# Patient Record
Sex: FEMALE | Race: BLACK OR AFRICAN AMERICAN | Employment: UNEMPLOYED | URBAN - METROPOLITAN AREA
[De-identification: names, ages, dates, MRNs, and addresses within clinical notes are randomized per-mention and may not be internally consistent; named-entity substitution may affect disease eponyms.]

---

## 2017-01-31 ENCOUNTER — OFFICE VISIT (OUTPATIENT)
Dept: ORTHOPEDIC SURGERY | Age: 32
End: 2017-01-31

## 2017-01-31 VITALS
TEMPERATURE: 98.1 F | BODY MASS INDEX: 17.04 KG/M2 | HEART RATE: 57 BPM | SYSTOLIC BLOOD PRESSURE: 105 MMHG | HEIGHT: 66 IN | DIASTOLIC BLOOD PRESSURE: 63 MMHG | WEIGHT: 106 LBS | OXYGEN SATURATION: 100 % | RESPIRATION RATE: 18 BRPM

## 2017-01-31 DIAGNOSIS — M79.2 NEURITIS: ICD-10-CM

## 2017-01-31 DIAGNOSIS — M54.6 THORACIC SPINE PAIN: Primary | ICD-10-CM

## 2017-01-31 DIAGNOSIS — M47.816 LUMBAR FACET ARTHROPATHY: ICD-10-CM

## 2017-01-31 DIAGNOSIS — M54.50 PAIN OF LUMBAR SPINE: ICD-10-CM

## 2017-01-31 RX ORDER — METHYLPREDNISOLONE 4 MG/1
TABLET ORAL
Qty: 1 DOSE PACK | Refills: 0 | Status: SHIPPED | OUTPATIENT
Start: 2017-01-31 | End: 2017-02-09 | Stop reason: ALTCHOICE

## 2017-01-31 NOTE — PROGRESS NOTES
[facility]  [address] [place], [address]  Phone: [phone]  Fax: [phone]          HISTORY OF PRESENT ILLNESS:  Gordo Sevilla is a 32 y.o. female with history of lumbar pain x 5+ year. She c/o progressive lower back pain and denies any inciting injuries. Pt also c/o numbness in the left thigh when she experiences lower back pain/hip pain. She states that her lower back pain increases as the day progresses. Pt tried physical therapy with initial improvement but stopped when she experienced increased pain and moved to the area from [place]. She states that she has a lumbar support. Pt denies ever trying steroid injections. Pt has history of a seizure disorder. She states that she is not currently on seizure medication but she is taking medication for migraine headaches. Pt reports that she has difficulty gaining weight and has had multiple thyroid test. She has tried a muscle relaxant in the past but discontinued the medication due to sedation. Pt has also used Excedrin Extra Strength with minimal benefit. Pt denies ever trying a Medrol Dosepak. Pt at this time desires to proceed with lumbar MRI and medication evaluation. 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 She has a 15 y.o and two 6 y. o.children. Her  is a  in [place], [place]. Pain Scale: 4/10     PCP: TE Mcgovern      Past Medical History   Diagnosis Date    Abnormal Papanicolaou smear of cervix     Asthma     Bradycardia     GERD (gastroesophageal reflux disease)     H/O sinus bradycardia     Headache     Migraine     Photophobia of both eyes     Seizures (HCC)     Stomach pain         Social History     Social History    Marital status: SINGLE     Spouse name: N/A    Number of children: 2    Years of education: 12     Occupational History    Not on file.      Social History Main Topics    Smoking status: Former Smoker     Years: 13.00    Smokeless tobacco: Never Used      Comment: cigarello, once a month    Alcohol use No    Drug use: No    Sexual activity: Yes     Partners: Male     Birth control/ protection: Pill     Other Topics Concern     Service Yes     [employer]    Blood Transfusions No    Caffeine Concern No    Occupational Exposure No    Hobby Hazards No    Sleep Concern Yes    Stress Concern No    Weight Concern No    Special Diet Yes    Back Care Yes    Exercise Yes    Bike Helmet No    Seat Belt Yes    Self-Exams No     Social History Narrative       Current Outpatient Prescriptions   Medication Sig Dispense Refill    methylPREDNISolone (MEDROL DOSEPACK) 4 mg tablet Per dose pack instructions 1 Dose Pack 0    Cholecalciferol, Vitamin D3, (VITAMIN D3) 2,000 unit cap capsule Take 2,000 Units by mouth daily. 90 Cap 3    DAILY VITAMIN WITH IRON AND CA tab TAKE 1 TABLET BY MOUTH EVERY DAY  3    Omeprazole delayed release (PRILOSEC D/R) 20 mg tablet Take 1 Tab by mouth daily. 90 Tab 3    fluticasone furoate (ARNUITY ELLIPTA) 200 mcg/actuation dsdv inhaler 1 puff daily. 1 Inhaler 3    ondansetron (ZOFRAN ODT) 4 mg disintegrating tablet DISSOLVE 1 TABLET BY MOUTH EVERY 8 HOURS AS NEEDED FOR NAUSEA  3    albuterol (PROVENTIL HFA, VENTOLIN HFA, PROAIR HFA) 90 mcg/actuation inhaler Take 1 Puff by inhalation every four (4) hours as needed for Wheezing or Shortness of Breath. 2 Inhaler 3    cyproheptadine (PERIACTIN) 4 mg tablet Take 1 Tab by mouth once over twenty-four (24) hours. 90 Tab 3    food supplemt, lactose-reduced (ENSURE PLUS) 0.05-1.5 gram-kcal/mL liqd Take 237 mL by mouth three (3) times daily. 100 Can 3    multivitamin (ONE A DAY) tablet Take 1 Tab by mouth daily. 90 Tab 3    norethindrone (MICRONOR) 0.35 mg tablet Take 1 Tab by mouth daily. 1 Package 11    cyclobenzaprine (FLEXERIL) 5 mg tablet Take 5 mg by mouth.          Allergies   Allergen Reactions    Imitrex [Sumatriptan Succinate] Anaphylaxis    Sea Food [Seafood] Hives     Per pt report        REVIEW OF SYSTEMS    Constitutional: Negative for fever, chills, or weight change. Respiratory: Negative for cough or shortness of breath. Cardiovascular: Negative for chest pain or palpitations. Gastrointestinal: Negative for acid reflux, change in bowel habits, or constipation. Genitourinary: Negative for dysuria and flank pain. Musculoskeletal: Positive for lumbar pain. Skin: Negative for rash. Neurological: Positive for intermittent numbness in the left leg. Negative for headaches or dizziness. Endo/Heme/Allergies: Negative for increased bruising. Psychiatric/Behavioral: Negative for difficulty with sleep. PHYSICAL EXAMINATION  Visit Vitals    /63    Pulse (!) 57    Temp 98.1 °F (36.7 °C) (Oral)    Resp 18    Ht 5' 6\" (1.676 m)    Wt 106 lb (48.1 kg)    SpO2 100%    BMI 17.11 kg/m2       Constitutional: Awake, alert, and in no acute distress  HEENT: Normocephalic. Atraumatic. Oropharynx is moist and clear. PERRL. EOMI. Sclerae are nonicteric  Heart: Regular rate and rhythm  Lungs: Clear to auscultation bilaterally  Abdomen: Soft and nontender. Bowel sounds are present  Neurological: 1+ symmetrical DTRs in the upper extremities. 1+ symmetrical DTRs in the lower extremities. Sensation to light touch is intact. Negative Krystle's sign bilaterally. Skin: warm, dry, and intact. Musculoskeletal: Tenderness to palpation in the lower lumbar region. Moderate pain with extension and axial loading. No different with forward flexion. No pain with internal or external rotation of her hips. Negative straight leg raise bilaterally. No pain with heel or toe walking. No difficulty with the single leg stand bilaterally.       Biceps  Triceps Deltoids Wrist Ext Wrist Flex Hand Intrin   Right +4/5 +4/5 +4/5 +4/5 +4/5 +4/5   Left +4/5 +4/5 +4/5 +4/5 +4/5 +4/5      Hip Flex  Quads Hamstrings Ankle DF EHL Ankle PF   Right +4/5 +4/5 +4/5 +4/5 +4/5 +4/5   Left +4/5 +4/5 +4/5 +4/5 +4/5 +4/5     IMAGING:    Thoracic Spine 2V X-rays from 01/31/2017 were personally reviewed with the Pt and demonstrated:  1) straightening of the thoracic spine with mild scoliosis at the lower end. Lumbar Spine 4V X-rays from 01/31/2017 were personally reviewed with the Pt and demonstrated:  1) Mild scoliosis. 2) Good alignment of the spine   3) No instability. Lumbar Spine MRI from 02/21/2015 was personally reviewed with the Pt and demonstrated:  IMPRESSION:  L3-L4 demonstrates a disc bulge. L4-5 demonstrates a disc bulge. Transitional vertebrae of the lumbosacral junction. La Smiley was seen today for back pain and new patient. Diagnoses and all orders for this visit:    Thoracic spine pain  -     [31309] T Spine 2V  -     MRI LUMB SPINE WO CONT; Future    Pain of lumbar spine  -     [67894] LS Spine 4V  -     MRI LUMB SPINE WO CONT; Future    Lumbar facet arthropathy (HCC)  -     methylPREDNISolone (MEDROL DOSEPACK) 4 mg tablet; Per dose pack instructions  -     MRI LUMB SPINE WO CONT; Future    Neuritis  -     MRI LUMB SPINE WO CONT; Future       IMPRESSION AND PLAN:  Kacy Lobato is a 32 y.o. female with history of lumbar pain x 5+ year. She c/o progressive lower back pain and denies any inciting injuries. Pt also c/o numbness in the left thigh when she experiences lower back pain/hip pain. She states that her lower back pain increases as the day progresses. Pt tried physical therapy with initial improvement but stopped when she experienced increased pain and moved to the area from [place]. 1) Pt was given information on lumbar arthritis exercises. 2) Discussed treatment options with the Pt, including medication, physical therapy, steroid injections, and a RFA. 3) Pt was given information on radiofrequency ablation. 4) I instructed the Pt to only wear her lumbar support intermittently.   5) A lumbar MRI was ordered. The pt has tried physical therapy, Excedrin, and muscle relaxants with minimal benefit. 6) She was prescribed a Medrol Dosepak. 7) Ms. Jarrett Alicea has a reminder for a \"due or due soon\" health maintenance. I have asked that she contact her primary care provider, Valentino Palma, PA, for follow-up on this health maintenance. 8)  reviewed. 9) Pt will follow-up in 2-3 weeks.         Written by Sarika Vigil, as dictated by Gilma Harris MD.

## 2017-01-31 NOTE — PATIENT INSTRUCTIONS
Low Back Arthritis: Exercises  Your Care Instructions  Here are some examples of typical rehabilitation exercises for your condition. Start each exercise slowly. Ease off the exercise if you start to have pain. Your doctor or physical therapist will tell you when you can start these exercises and which ones will work best for you. When you are not being active, find a comfortable position for rest. Some people are comfortable on the floor or a medium-firm bed with a small pillow under their head and another under their knees. Some people prefer to lie on their side with a pillow between their knees. Don't stay in one position for too long. Take short walks (10 to 20 minutes) every 2 to 3 hours. Avoid slopes, hills, and stairs until you feel better. Walk only distances you can manage without pain, especially leg pain. How to do the exercises  Pelvic tilt    1. Lie on your back with your knees bent. 2. \"Brace\" your stomach--tighten your muscles by pulling in and imagining your belly button moving toward your spine. 3. Press your lower back into the floor. You should feel your hips and pelvis rock back. 4. Hold for 6 seconds while breathing smoothly. 5. Relax and allow your pelvis and hips to rock forward. 6. Repeat 8 to 12 times. Back stretches    1. Get down on your hands and knees on the floor. 2. Relax your head and allow it to droop. Round your back up toward the ceiling until you feel a nice stretch in your upper, middle, and lower back. Hold this stretch for as long as it feels comfortable, or about 15 to 30 seconds. 3. Return to the starting position with a flat back while you are on your hands and knees. 4. Let your back sway by pressing your stomach toward the floor. Lift your buttocks toward the ceiling. 5. Hold this position for 15 to 30 seconds. 6. Repeat 2 to 4 times. Follow-up care is a key part of your treatment and safety.  Be sure to make and go to all appointments, and call your doctor if you are having problems. It's also a good idea to know your test results and keep a list of the medicines you take. Where can you learn more? Go to http://norma-kaley.info/. Enter J497 in the search box to learn more about \"Low Back Arthritis: Exercises. \"  Current as of: May 23, 2016  Content Version: 11.1  © 6022-3661 [employer]. Care instructions adapted under license by [employer] (which disclaims liability or warranty for this information). If you have questions about a medical condition or this instruction, always ask your healthcare professional. [place] [address] any warranty or liability for your use of this information. Learning About Medial Branch Block and Neurotomy  What are medial branch block and neurotomy? Facet joints connect your vertebrae to each other. Problems in these joints can cause chronic (long-term) pain in the neck or back. They can sometimes affect the shoulders, arms, buttocks, or legs. Medial branch nerves are the nerves that carry many of the pain messages from your facet joints. Radiofrequency medial branch neurotomy is a type of medial branch neurotomy that is used to relieve arthritis pain. It uses radio waves to damage nerves in your neck or back so that they can no longer send pain messages to your brain. Before your doctor knows if a neurotomy will help you, he or she will do a medial branch block to find out if certain nerves are the ones that are a source of your pain. You will need two separate visits to the outpatient center or hospital to have both procedures. How is a medial branch block done? The doctor will use a tiny needle to numb the skin where you will get the block. Then he or she puts the block needle into the numbed area. You may feel some pressure, but you should not feel pain.  Using fluoroscopy (live X-ray) to guide the needle, the doctor injects medicine onto one or more nerves to make them numb. If you get relief from your pain in the next 4 to 6 hours, it's a sign that those nerves may be contributing to your pain. The relief will last only a short time. You may then have a medial branch neurotomy at a later visit to try to get longer relief. It takes 20 to 30 minutes to get the block. You can go home after the doctor watches you for about an hour. You will get instructions on how to report how much pain you have when you are at home. You will need someone to drive you home. How is medial branch neurotomy done? The doctor will use a tiny needle to numb the skin where you will get the neurotomy. Then he or she puts the neurotomy needle into the numbed area. You may feel some pressure. Using fluoroscopy (live X-ray) to guide the needle, the doctor sends radio waves through the needle to the nerve for 60 to 90 seconds. The radio waves heat the nerve, which damages it. The doctor may do this several times. And he or she may treat more than one nerve. It takes 45 to 90 minutes to get a neurotomy, depending on how many nerves are heated. You will probably go home 30 to 60 minutes later. You will need someone to drive you home. What can you expect after a neurotomy? You may feel a little sore or tender at the injection site at first. But after a successful neurotomy, most people have pain relief right away. It often lasts for 9 to 12 months or longer. Sometimes the pain relief is permanent. If your pain does come back, it may mean that the damaged nerve has healed and can send pain messages again. Or it can mean that a different nerve is causing pain. Your doctor will discuss your options with you. Follow-up care is a key part of your treatment and safety. Be sure to make and go to all appointments, and call your doctor if you are having problems. It's also a good idea to know your test results and keep a list of the medicines you take. Where can you learn more?   Go to http://norma-kaley.info/. Enter B736 in the search box to learn more about \"Learning About Medial Branch Block and Neurotomy. \"  Current as of: February 19, 2016  Content Version: 11.1  © 1193-3287 [employer]. Care instructions adapted under license by [employer] (which disclaims liability or warranty for this information). If you have questions about a medical condition or this instruction, always ask your healthcare professional. [address] any warranty or liability for your use of this information.

## 2017-01-31 NOTE — PROGRESS NOTES
VORB per Dr. Farrell Deem MRI Lumbar without contrast ordered.  Placed in [facility]

## 2017-01-31 NOTE — MR AVS SNAPSHOT
Visit Information Date & Time Provider Department Dept. Phone Encounter #  
 1/31/2017 11:00 AM Quin Madrigal, [address] [facility] [phone] [id number] Follow-up Instructions Return in about 3 weeks (around 2/21/2017) for Diagnostic Test follow up. Routing History Your Appointments 2/1/2017 10:00 AM  
Follow Up with TE Rosenbaum [facility] ([facility]) Appt Note: paperwork to be fill out [address] [facility] [address] [facility]  
[phone]  
  
   
 [address] [id number]  
  
    
 2/17/2017 11:00 AM  
Follow Up with TE Rosenbaum [facility] ([facility]) Appt Note: [address] [facility] [address] [facility]  
[phone]  
  
    
 2/28/2017 10:50 AM  
DIAG TEST F/U with Quin Madrigal MD  
[facility]) Appt Note: mri back fu no copay [address] [facility] [address]  
[phone]  
  
   
 [address] [facility] [address] Upcoming Health Maintenance Date Due Pneumococcal 19-64 Medium Risk (1 of 1 - PPSV23) 11/30/2004 DTaP/Tdap/Td series (1 - Tdap) 11/30/2006 INFLUENZA AGE 9 TO ADULT 8/1/2016 PAP AKA CERVICAL CYTOLOGY 12/3/2018 Allergies as of 1/31/2017  Review Complete On: 1/31/2017 By: Quin Madrigal MD  
  
 Severity Noted Reaction Type Reactions Imitrex [Sumatriptan Succinate] High 05/25/2015    Anaphylaxis Sea Food [Seafood]  06/04/2015    Hives Per pt report Current Immunizations  Never Reviewed No immunizations on file. Not reviewed this visit You Were Diagnosed With   
  
 Codes Comments Thoracic spine pain    -  Primary ICD-10-CM: M54.6 ICD-9-CM: 724.1 Pain of lumbar spine     ICD-10-CM: M54.5 ICD-9-CM: 724.2 Lumbar facet arthropathy (HCC)     ICD-10-CM: M12.88 ICD-9-CM: 721.3 Neuritis     ICD-10-CM: M79.2 ICD-9-CM: 729.2 Vitals BP Pulse Temp Resp Height(growth percentile) Weight(growth percentile) 105/63 (!) 57 98.1 °F (36.7 °C) (Oral) 18 5' 6\" (1.676 m) 106 lb (48.1 kg) SpO2 BMI OB Status Smoking Status 100% 17.11 kg/m2 Having regular periods Former Smoker BMI and BSA Data Body Mass Index Body Surface Area  
 17.11 kg/m 2 1.5 m 2 Preferred Pharmacy Pharmacy Name Phone [facility]/PHARMACY #[id number]- [address], [address] [place] [address] [phone] Your Updated Medication List  
  
   
This list is accurate as of: 1/31/17 12:25 PM.  Always use your most recent med list.  
  
  
  
  
 albuterol 90 mcg/actuation inhaler Commonly known as:  PROVENTIL HFA, VENTOLIN HFA, PROAIR HFA Take 1 Puff by inhalation every four (4) hours as needed for Wheezing or Shortness of Breath. Cholecalciferol (Vitamin D3) 2,000 unit Cap capsule Commonly known as:  VITAMIN D3 Take 2,000 Units by mouth daily. cyclobenzaprine 5 mg tablet Commonly known as:  FLEXERIL Take 5 mg by mouth. cyproheptadine 4 mg tablet Commonly known as:  PERIACTIN Take 1 Tab by mouth once over twenty-four (24) hours. DAILY VITAMIN WITH IRON AND CA Tab Generic drug:  multivitamins-ca-iron-minerals TAKE 1 TABLET BY MOUTH EVERY DAY  
  
 fluticasone furoate 200 mcg/actuation Dsdv inhaler Commonly known as:  ARNUITY ELLIPTA 1 puff daily. food supplemt, lactose-reduced 0.05-1.5 gram-kcal/mL Liqd Commonly known as:  ENSURE PLUS Take 237 mL by mouth three (3) times daily. methylPREDNISolone 4 mg tablet Commonly known as:  Cleta Loss Per dose pack instructions  
  
 multivitamin tablet Commonly known as:  ONE A DAY Take 1 Tab by mouth daily. norethindrone 0.35 mg Tab Commonly known as:  Micha & Micha Take 1 Tab by mouth daily. Omeprazole delayed release 20 mg tablet Commonly known as:  PRILOSEC D/R Take 1 Tab by mouth daily. ondansetron 4 mg disintegrating tablet Commonly known as:  ZOFRAN ODT  
DISSOLVE 1 TABLET BY MOUTH EVERY 8 HOURS AS NEEDED FOR NAUSEA Prescriptions Sent to Pharmacy Refills  
 methylPREDNISolone (MEDROL DOSEPACK) 4 mg tablet 0 Sig: Per dose pack instructions Class: Normal  
 Pharmacy: [address], [address] [facility],[address]  #: [phone] We Performed the Following AMB POC XRAY, SPINE, LUMBOSACRAL; 4+ [id number] CPT(R)] AMB POC XRAY, SPINE; THORACIC, 2 VIEW [86731 CPT(R)] Follow-up Instructions Return in about 3 weeks (around 2/21/2017) for Diagnostic Test follow up. To-Do List   
 02/07/2017 Imaging:  MRI LUMB SPINE WO CONT Referral Information Referral ID Referred By Referred To  
  
 [id number] Vidal Galindo Not Available Visits Status Start Date End Date 1 New Request 1/31/17 1/31/18 If your referral has a status of pending review or denied, additional information will be sent to support the outcome of this decision. Patient Instructions Low Back Arthritis: Exercises Your Care Instructions Here are some examples of typical rehabilitation exercises for your condition. Start each exercise slowly. Ease off the exercise if you start to have pain. Your doctor or physical therapist will tell you when you can start these exercises and which ones will work best for you. When you are not being active, find a comfortable position for rest. Some people are comfortable on the floor or a medium-firm bed with a small pillow under their head and another under their knees. Some people prefer to lie on their side with a pillow between their knees. Don't stay in one position for too long. Take short walks (10 to 20 minutes) every 2 to 3 hours.  Avoid slopes, hills, and stairs until you feel better. Walk only distances you can manage without pain, especially leg pain. How to do the exercises Pelvic tilt 1. Lie on your back with your knees bent. 2. \"Brace\" your stomachtighten your muscles by pulling in and imagining your belly button moving toward your spine. 3. Press your lower back into the floor. You should feel your hips and pelvis rock back. 4. Hold for 6 seconds while breathing smoothly. 5. Relax and allow your pelvis and hips to rock forward. 6. Repeat 8 to 12 times. Back stretches 1. Get down on your hands and knees on the floor. 2. Relax your head and allow it to droop. Round your back up toward the ceiling until you feel a nice stretch in your upper, middle, and lower back. Hold this stretch for as long as it feels comfortable, or about 15 to 30 seconds. 3. Return to the starting position with a flat back while you are on your hands and knees. 4. Let your back sway by pressing your stomach toward the floor. Lift your buttocks toward the ceiling. 5. Hold this position for 15 to 30 seconds. 6. Repeat 2 to 4 times. Follow-up care is a key part of your treatment and safety. Be sure to make and go to all appointments, and call your doctor if you are having problems. It's also a good idea to know your test results and keep a list of the medicines you take. Where can you learn more? Go to http://norma-kaley.info/. Enter M880 in the search box to learn more about \"Low Back Arthritis: Exercises. \" Current as of: May 23, 2016 Content Version: 11.1 © 4561-1674 Tyres on the Drive[employer]. Care instructions adapted under license by Glassdoor (which disclaims liability or warranty for this information). If you have questions about a medical condition or this instruction, always ask your healthcare professional. [address] any warranty or liability for your use of this information. Learning About Medial Branch Block and Neurotomy What are medial branch block and neurotomy? Facet joints connect your vertebrae to each other. Problems in these joints can cause chronic (long-term) pain in the neck or back. They can sometimes affect the shoulders, arms, buttocks, or legs. Medial branch nerves are the nerves that carry many of the pain messages from your facet joints. Radiofrequency medial branch neurotomy is a type of medial branch neurotomy that is used to relieve arthritis pain. It uses radio waves to damage nerves in your neck or back so that they can no longer send pain messages to your brain. Before your doctor knows if a neurotomy will help you, he or she will do a medial branch block to find out if certain nerves are the ones that are a source of your pain. You will need two separate visits to the outpatient center or hospital to have both procedures. How is a medial branch block done? The doctor will use a tiny needle to numb the skin where you will get the block. Then he or she puts the block needle into the numbed area. You may feel some pressure, but you should not feel pain. Using fluoroscopy (live X-ray) to guide the needle, the doctor injects medicine onto one or more nerves to make them numb. If you get relief from your pain in the next 4 to 6 hours, it's a sign that those nerves may be contributing to your pain. The relief will last only a short time. You may then have a medial branch neurotomy at a later visit to try to get longer relief. It takes 20 to 30 minutes to get the block. You can go home after the doctor watches you for about an hour. You will get instructions on how to report how much pain you have when you are at home. You will need someone to drive you home. How is medial branch neurotomy done? The doctor will use a tiny needle to numb the skin where you will get the neurotomy. Then he or she puts the neurotomy needle into the numbed area. You may feel some pressure. Using fluoroscopy (live X-ray) to guide the needle, the doctor sends radio waves through the needle to the nerve for 60 to 90 seconds. The radio waves heat the nerve, which damages it. The doctor may do this several times. And he or she may treat more than one nerve. It takes 45 to 90 minutes to get a neurotomy, depending on how many nerves are heated. You will probably go home 30 to 60 minutes later. You will need someone to drive you home. What can you expect after a neurotomy? You may feel a little sore or tender at the injection site at first. But after a successful neurotomy, most people have pain relief right away. It often lasts for 9 to 12 months or longer. Sometimes the pain relief is permanent. If your pain does come back, it may mean that the damaged nerve has healed and can send pain messages again. Or it can mean that a different nerve is causing pain. Your doctor will discuss your options with you. Follow-up care is a key part of your treatment and safety. Be sure to make and go to all appointments, and call your doctor if you are having problems. It's also a good idea to know your test results and keep a list of the medicines you take. Where can you learn more? Go to http://norma-kaley.info/. Enter W363 in the search box to learn more about \"Learning About Medial Branch Block and Neurotomy. \" Current as of: February 19, 2016 Content Version: 11.1 © 7205-0583 Swiftype. Care instructions adapted under license by XMLAW (which disclaims liability or warranty for this information). If you have questions about a medical condition or this instruction, always ask your healthcare professional. [address] any warranty or liability for your use of this information. Introducing [facility] & HEALTH SERVICES! Dear Yasmine Hollingsworth: 
Thank you for requesting a Hobzy account.   Our records indicate that you already have an active Searchbox account. You can access your account anytime at [email or web address] Wolly Doodle/GameMix Did you know that you can access your hospital and ER discharge instructions at any time in Searchbox? You can also review all of your test results from your hospital stay or ER visit. Additional Information If you have questions, please visit the Frequently Asked Questions section of the Searchbox website at [email or web address] [email or web address] Remember, Searchbox is NOT to be used for urgent needs. For medical emergencies, dial 911. Now available from your iPhone and Android! Please provide this summary of care documentation to your next provider. Your primary care clinician is listed as Ramesh Hagan. If you have any questions after today's visit, please call [phone].

## 2017-02-01 ENCOUNTER — PATIENT MESSAGE (OUTPATIENT)
Dept: FAMILY MEDICINE CLINIC | Facility: CLINIC | Age: 32
End: 2017-02-01

## 2017-02-01 ENCOUNTER — OFFICE VISIT (OUTPATIENT)
Dept: FAMILY MEDICINE CLINIC | Facility: CLINIC | Age: 32
End: 2017-02-01

## 2017-02-01 VITALS
SYSTOLIC BLOOD PRESSURE: 110 MMHG | RESPIRATION RATE: 14 BRPM | OXYGEN SATURATION: 99 % | DIASTOLIC BLOOD PRESSURE: 60 MMHG | WEIGHT: 105.8 LBS | TEMPERATURE: 97.4 F | BODY MASS INDEX: 17.08 KG/M2 | HEART RATE: 59 BPM

## 2017-02-01 DIAGNOSIS — Z23 NEED FOR TDAP VACCINATION: ICD-10-CM

## 2017-02-01 DIAGNOSIS — G89.29 CHRONIC MIDLINE LOW BACK PAIN, WITH SCIATICA PRESENCE UNSPECIFIED: ICD-10-CM

## 2017-02-01 DIAGNOSIS — M54.5 CHRONIC MIDLINE LOW BACK PAIN, WITH SCIATICA PRESENCE UNSPECIFIED: ICD-10-CM

## 2017-02-01 DIAGNOSIS — R63.6 LOW BODY WEIGHT DUE TO INADEQUATE CALORIC INTAKE: ICD-10-CM

## 2017-02-01 DIAGNOSIS — R59.9 ADENOPATHY: ICD-10-CM

## 2017-02-01 DIAGNOSIS — M47.816 LUMBAR FACET ARTHROPATHY: ICD-10-CM

## 2017-02-01 DIAGNOSIS — J45.22 MILD INTERMITTENT ASTHMA WITH STATUS ASTHMATICUS: Primary | ICD-10-CM

## 2017-02-01 DIAGNOSIS — G40.409 OTHER GENERALIZED EPILEPSY, NOT INTRACTABLE, WITHOUT STATUS EPILEPTICUS (HCC): ICD-10-CM

## 2017-02-01 DIAGNOSIS — R11.0 CHRONIC NAUSEA: ICD-10-CM

## 2017-02-01 DIAGNOSIS — E55.9 VITAMIN D DEFICIENCY: ICD-10-CM

## 2017-02-01 DIAGNOSIS — R00.1 BRADYCARDIA: ICD-10-CM

## 2017-02-01 RX ORDER — BUDESONIDE AND FORMOTEROL FUMARATE DIHYDRATE 160; 4.5 UG/1; UG/1
2 AEROSOL RESPIRATORY (INHALATION) 2 TIMES DAILY
Qty: 1 INHALER | Refills: 1 | Status: SHIPPED | OUTPATIENT
Start: 2017-02-01 | End: 2017-02-16

## 2017-02-01 NOTE — PATIENT INSTRUCTIONS

## 2017-02-01 NOTE — MR AVS SNAPSHOT
Visit Information Date & Time Provider Department Dept. Phone Encounter #  
 2/1/2017 10:00 AM Rohan DavisBenita [address] [phone] [id number] Follow-up Instructions Return in about 3 months (around 5/1/2017) for routine care with me. Your Appointments 2/17/2017 11:00 AM  
Follow Up with TE Davis [address] ([address]) Appt Note: [address] [facility] [address]  
[phone]  
  
   
 [address] [address]  
  
    
 2/28/2017 10:50 AM  
DIAG TEST F/U with Juan Clifford MD  
[facility] [address]) Appt Note: mri back fu no copay [address] [facility] [id number] [phone]  
  
   
 [address] [facility] [address] Upcoming Health Maintenance Date Due Pneumococcal 19-64 Medium Risk (1 of 1 - PPSV23) 11/30/2004 DTaP/Tdap/Td series (1 - Tdap) 11/30/2006 PAP AKA CERVICAL CYTOLOGY 12/3/2018 Allergies as of 2/1/2017  Review Complete On: 2/1/2017 By: TE Davis Severity Noted Reaction Type Reactions Imitrex [Sumatriptan Succinate] High 05/25/2015    Anaphylaxis Sea Food [Seafood]  06/04/2015    Hives Per pt report Current Immunizations  Never Reviewed No immunizations on file. Not reviewed this visit You Were Diagnosed With   
  
 Codes Comments Mild intermittent asthma with status asthmaticus    -  Primary ICD-10-CM: J45.22 
ICD-9-CM: 493.91 Need for Tdap vaccination     ICD-10-CM: L96 ICD-9-CM: V06.1 Other generalized epilepsy, not intractable, without status epilepticus ([facility]     ICD-10-CM: G40.409 Lumbar facet arthropathy (HCC)     ICD-10-CM: M12.88 ICD-9-CM: 721.3 Bradycardia     ICD-10-CM: R00.1 ICD-9-CM: 427.89 Vitamin D deficiency     ICD-10-CM: E55.9 ICD-9-CM: 268.9 Vitals BP Pulse Temp Resp Weight(growth percentile) SpO2  
 110/60 (BP 1 Location: Right arm, BP Patient Position: Sitting) (!) 59 97.4 °F (36.3 °C) (Oral) 14 105 lb 12.8 oz (48 kg) 99% BMI OB Status Smoking Status 17.08 kg/m2 Having regular periods Former Smoker BMI and BSA Data Body Mass Index Body Surface Area 17.08 kg/m 2 1.5 m 2 Preferred Pharmacy Pharmacy Name Phone [facility]/PHARMACY #[id number]- [address] [phone] Your Updated Medication List  
  
   
This list is accurate as of: 2/1/17 10:42 AM.  Always use your most recent med list.  
  
  
  
  
 albuterol 90 mcg/actuation inhaler Commonly known as:  PROVENTIL HFA, VENTOLIN HFA, PROAIR HFA Take 1 Puff by inhalation every four (4) hours as needed for Wheezing or Shortness of Breath. budesonide-formoterol 160-4.5 mcg/actuation HFA inhaler Commonly known as:  SYMBICORT Take 2 Puffs by inhalation two (2) times a day. Cholecalciferol (Vitamin D3) 2,000 unit Cap capsule Commonly known as:  VITAMIN D3 Take 2,000 Units by mouth daily. cyproheptadine 4 mg tablet Commonly known as:  PERIACTIN Take 1 Tab by mouth once over twenty-four (24) hours. DAILY VITAMIN WITH IRON AND CA Tab Generic drug:  multivitamins-ca-iron-minerals TAKE 1 TABLET BY MOUTH EVERY DAY  
  
 diph,Pertuss(Acell),Tet Vac-PF 2 Lf-(2.5-5-3-5 mcg)-5Lf/0.5 mL susp Commonly known as:  ADACEL  
0.5 mL by IntraMUSCular route once for 1 dose. food supplemt, lactose-reduced 0.05-1.5 gram-kcal/mL Liqd Commonly known as:  ENSURE PLUS Take 237 mL by mouth three (3) times daily. methylPREDNISolone 4 mg tablet Commonly known as:  Grambling Glee Per dose pack instructions  
  
 multivitamin tablet Commonly known as:  ONE A DAY Take 1 Tab by mouth daily. norethindrone 0.35 mg Tab Commonly known as:  Micha & Micha Take 1 Tab by mouth daily. Omeprazole delayed release 20 mg tablet Commonly known as:  PRILOSEC D/R Take 1 Tab by mouth daily. ondansetron 4 mg disintegrating tablet Commonly known as:  ZOFRAN ODT  
DISSOLVE 1 TABLET BY MOUTH EVERY 8 HOURS AS NEEDED FOR NAUSEA  
  
 pneumococcal 23-valent 25 mcg/0.5 mL injection Commonly known as:  PNEUMOVAX 23  
0.5 mL by IntraMUSCular route once for 1 dose. Prescriptions Printed Refills  
 pneumococcal 23-valent (PNEUMOVAX 23) 25 mcg/0.5 mL injection 0 Si.5 mL by IntraMUSCular route once for 1 dose. Class: Print Route: IntraMUSCular  
 diph,Pertuss,Acell,,Tet Vac-PF (ADACEL) 2 Lf-(2.5-5-3-5 mcg)-5Lf/0.5 mL susp 0 Si.5 mL by IntraMUSCular route once for 1 dose. Class: Print Route: IntraMUSCular Prescriptions Sent to Pharmacy Refills  
 budesonide-formoterol (SYMBICORT) 160-4.5 mcg/actuation HFA inhaler 1 Sig: Take 2 Puffs by inhalation two (2) times a day. Class: Normal  
 Pharmacy: [address], [address] [facility],[address] Floor  #: [phone] Route: Inhalation Follow-up Instructions Return in about 3 months (around 2017) for routine care with me. To-Do List   
 2017 Lab:  METABOLIC PANEL, COMPREHENSIVE   
  
 02/15/2017 Lab:  VITAMIN D, 25 HYDROXY Patient Instructions Asthma Attack: Care Instructions Your Care Instructions During an asthma attack, the airways swell and narrow. This makes it hard to breathe. Severe asthma attacks can be life-threatening, but you can help prevent them by keeping your asthma under control and treating symptoms before they get bad. Symptoms include being short of breath, having chest tightness, coughing, and wheezing. Noting and treating these symptoms can also help you avoid future trips to the emergency room. The doctor has checked you carefully, but problems can develop later.  If you notice any problems or new symptoms, get medical treatment right away. Follow-up care is a key part of your treatment and safety. Be sure to make and go to all appointments, and call your doctor if you are having problems. It's also a good idea to know your test results and keep a list of the medicines you take. How can you care for yourself at home? · Follow your asthma action plan to prevent and treat attacks. If you don't have an asthma action plan, work with your doctor to create one. · Take your asthma medicines exactly as prescribed. Talk to your doctor right away if you have any questions about how to take them. ¨ Use your quick-relief medicine when you have symptoms of an attack. Quick-relief medicine is usually an albuterol inhaler. Some people need to use quick-relief medicine before they exercise. ¨ Take your controller medicine every day, not just when you have symptoms. Controller medicine is usually an inhaled corticosteroid. The goal is to prevent problems before they occur. Don't use your controller medicine to treat an attack that has already started. It doesn't work fast enough to help. ¨ If your doctor prescribed corticosteroid pills to use during an attack, take them exactly as prescribed. It may take hours for the pills to work, but they may make the episode shorter and help you breathe better. ¨ Keep your quick-relief medicine with you at all times. · Talk to your doctor before using other medicines. Some medicines, such as aspirin, can cause asthma attacks in some people. · If you have a peak flow meter, use it to check how well you are breathing. This can help you predict when an asthma attack is going to occur. Then you can take medicine to prevent the asthma attack or make it less severe. · Do not smoke or allow others to smoke around you. Avoid smoky places. Smoking makes asthma worse.  If you need help quitting, talk to your doctor about stop-smoking programs and medicines. These can increase your chances of quitting for good. · Learn what triggers an asthma attack for you, and avoid the triggers when you can. Common triggers include colds, smoke, air pollution, dust, pollen, mold, pets, cockroaches, stress, and cold air. · Avoid colds and the flu. Get a pneumococcal vaccine shot. If you have had one before, ask your doctor if you need a second dose. Get a flu vaccine every fall. If you must be around people with colds or the flu, wash your hands often. When should you call for help? Call 911 anytime you think you may need emergency care. For example, call if: 
· You have severe trouble breathing. Call your doctor now or seek immediate medical care if: 
· Your symptoms do not get better after you have followed your asthma action plan. · You have new or worse trouble breathing. · Your coughing and wheezing get worse. · You cough up dark brown or bloody mucus (sputum). · You have a new or higher fever. Watch closely for changes in your health, and be sure to contact your doctor if: 
· You need to use quick-relief medicine on more than 2 days a week (unless it is just for exercise). · You cough more deeply or more often, especially if you notice more mucus or a change in the color of your mucus. · You are not getting better as expected. Where can you learn more? Go to http://norma-kaley.info/. Enter G574 in the search box to learn more about \"Asthma Attack: Care Instructions. \" Current as of: May 23, 2016 Content Version: 11.1 © 4730-6250 Healthwise, Incorporated. Care instructions adapted under license by Yoox Group (which disclaims liability or warranty for this information). If you have questions about a medical condition or this instruction, always ask your healthcare professional. [address] any warranty or liability for your use of this information. Introducing [facility] & HEALTH SERVICES! Dear Martin Garcia: 
Thank you for requesting a Retail Solutions account. Our records indicate that you already have an active Retail Solutions account. You can access your account anytime at [email or web address] [email or web address]/Mantis Deposition Did you know that you can access your hospital and ER discharge instructions at any time in Retail Solutions? You can also review all of your test results from your hospital stay or ER visit. Additional Information If you have questions, please visit the Frequently Asked Questions section of the Retail Solutions website at [email or web address] Remember, Retail Solutions is NOT to be used for urgent needs. For medical emergencies, dial 911. Now available from your iPhone and Android! Please provide this summary of care documentation to your next provider. Your primary care clinician is listed as Kita Najjar. If you have any questions after today's visit, please call [phone].

## 2017-02-01 NOTE — LETTER
NOTIFICATION RETURN TO WORK / SCHOOL 
 
2/1/2017 11:40 AM 
 
Ms. Ines Freeman [address] [facility] [address] [id number] To Whom It May Concern: 
 
Ines Freeman is currently under the care of [address] [place] [address]. She was initially seen on 4/20/16 for chronic low back pain, dizziness, headaches, chest pains, abdominal pain, nausea, shortness of breath, weight loss, lack of appetite and headaches. All of these symptoms were preventing her from performing her normal daily activities of living. She was diagnosed with GERD (Gastroesophageal Reflux Disease) and was referred to Dr. Fred Gonzalez at [facility]. Miss Author Norris underwent an Endoscopy on August 12, 2016 for GERD. She has been prescribed the following medications to control her symptoms: Omeprazole delayed release, Fluticasone Furoate, Ondansetron, Cyclobenzaprine, Albuterol, Cyproheptadine (for weight gain), food supplement lactose-reduced (Ensure Plus 3 times daily), Cholecalciferol (vitamin D), and daily multivitamin with iron and calcium tablet. As of today, Miss Author Norris is feeling and doing much better. If there are questions or concerns please have the patient contact our office.  
 
 
 
Sincerely, 
 
 
 
 
TE Urena

## 2017-02-01 NOTE — PROGRESS NOTES
History and Physical    Patient: Freddy Fernandez MRN: [id number]  SSN: [id number]    YOB: 1985  Age: 32 y.o. Sex: female      Subjective:      Freddy Fernandez is a 32 y.o. female who presents today for follow up of asthma, low BMI, chronic nausea, chest pains, h/o seizures etc.    Fasting blood work has been ordered but not done. Patient recently followed up with spine specialist for h/o chronic low back pain, was placed on medrol mega, has MRI that needs to be done. Has not noticed change in back pain as of yet as she states she just started medication. In terms of her low BMI, she eats 3 meals with snacks daily and drinks ensure three times daily and takes periactin daily. Weight is down by 1 lb. Patient states she has an appetite. Has an upcoming appt with gi coming up in several months for h/o GERD, patient continues to need zofran daily for chronic nausea, she denies current problems with swallowing that she was having, on days she tries not to take her zofran, she will feel nauseated the whole day, is not related to eating. In terms of her chest pains, she states they only occur when she runs, otherwise they are absent, they are sternal and feels like her chest is collapsing, she is using her albuterol inhaler twice daily as she continues to feel short of breath. She uses her Arnuity Ellipta daily. ECHO several months ago was normal.  Was referred to pulmonology but she states she has not seen them yet, she is working on it as it went through her [facility] benefits rather than medicaid, denies need for another referral.    In terms of her h/o seizures, she has not scheduled appointment with neurologist as of yet.    She states there was confusion with her referral going through her VA benefits rather than medicaid, she denies needing another referral, she states that she is working on getting the appointment.     She is feeling much better, she is ready to go back to school, is requesting a letter that states as such.       Lat PAP due: 12/15- was stated to be normal by GYN      She is followed by:  GYN  Ortho/spine      PMH:  Past Medical History   Diagnosis Date    Abnormal Papanicolaou smear of cervix     Asthma     Bradycardia     GERD (gastroesophageal reflux disease)     H/O sinus bradycardia     Headache     Migraine     Photophobia of both eyes     Seizures ([address]     Stomach pain      Past Surgical History   Procedure Laterality Date    Hx appendectomy      Hx gyn       cervical cerclage    Hx  section          FamHx:  Family History   Problem Relation Age of Onset    Diabetes Mother     Heart Attack Father     Attention Deficit Hyperactivity Disorder Sister     Attention Deficit Hyperactivity Disorder Brother     Cancer Maternal Aunt 45     breast    Diabetes Maternal Grandmother     Attention Deficit Hyperactivity Disorder Brother     No Known Problems Brother     No Known Problems Brother     No Known Problems Brother     Cancer Maternal Aunt      lung    Heart defect Son    Panchito Aura Migraines Son     Seizures Son      h/o    Other Son      h/o jaundice       SocialHx:  Social History   Substance Use Topics    Smoking status: Former Smoker     Years: 13.00    Smokeless tobacco: Never Used      Comment: cigarello, once a month    Alcohol use No        Meds:  Prior to Admission medications    Medication Sig Start Date End Date Taking? Authorizing Provider   budesonide-formoterol (SYMBICORT) 160-4.5 mcg/actuation HFA inhaler Take 2 Puffs by inhalation two (2) times a day. 17  Yes Jessica Pumphrey V, PA   pneumococcal 23-valent (PNEUMOVAX 23) 25 mcg/0.5 mL injection 0.5 mL by IntraMUSCular route once for 1 dose. 17 Yes Jessica Pumphrey V, PA   diph,Pertuss,Acell,,Tet Vac-PF (ADACEL) 2 Lf-(2.5-5-3-5 mcg)-5Lf/0.5 mL susp 0.5 mL by IntraMUSCular route once for 1 dose.  17 Yes Jessica Pumphrey V, PA   methylPREDNISolone (MEDROL DOSEPACK) 4 mg tablet Per dose pack instructions 1/31/17  Yes Marley Mckeon MD   Cholecalciferol, Vitamin D3, (VITAMIN D3) 2,000 unit cap capsule Take 2,000 Units by mouth daily. 11/29/16  Yes Jessica Pumphrey V, PA   DAILY VITAMIN WITH IRON AND CA tab TAKE 1 TABLET BY MOUTH EVERY DAY 10/18/16  Yes Historical Provider   Omeprazole delayed release (PRILOSEC D/R) 20 mg tablet Take 1 Tab by mouth daily. 9/27/16  Yes Jessica Pumphrey V, PA   ondansetron (ZOFRAN ODT) 4 mg disintegrating tablet DISSOLVE 1 TABLET BY MOUTH EVERY 8 HOURS AS NEEDED FOR NAUSEA 7/5/16  Yes Historical Provider   albuterol (PROVENTIL HFA, VENTOLIN HFA, PROAIR HFA) 90 mcg/actuation inhaler Take 1 Puff by inhalation every four (4) hours as needed for Wheezing or Shortness of Breath. 9/23/16  Yes Jessica Pumphrey V, PA   cyproheptadine (PERIACTIN) 4 mg tablet Take 1 Tab by mouth once over twenty-four (24) hours. 9/23/16  Yes Jessica Pumphrey V, PA   food supplemt, lactose-reduced (ENSURE PLUS) 0.05-1.5 gram-kcal/mL liqd Take 237 mL by mouth three (3) times daily. 9/23/16  Yes Jessica Pumphrey V, PA   multivitamin (ONE A DAY) tablet Take 1 Tab by mouth daily. 9/23/16  Yes Jessica Pumphrey V, PA   norethindrone (MICRONOR) 0.35 mg tablet Take 1 Tab by mouth daily. 12/4/15  Yes Eduardo Hollingsworth DO        Allergies:   Allergies   Allergen Reactions    Imitrex [Sumatriptan Succinate] Anaphylaxis    Sea Food [Seafood] Hives     Per pt report        Review of Systems:  Items in [place] are positive  Constitutional: negative for fevers, chills and malaise  Eyes: negative for visual disturbance  Ears, Nose, Mouth, Throat, and Face: negative for nasal congestion  Respiratory: intermittent SOB, negative for cough   Cardiovascular: sternal CP with exertion, negative for chest pressure/discomfort  Gastrointestinal: chronic nausea- stable, negative for vomiting, melena, diarrhea, constipation and abdominal pain  Genitourinary:negative for frequency, dysuria or hematuria  Musculoskeletal: chronic low back pain, negative for myalgias and arthralgias  Neurological: negative for headaches, dizziness and paresthesia, negative for recent seizure activity    Objective:     Visit Vitals    /60 (BP 1 Location: Right arm, BP Patient Position: Sitting)    Pulse (!) 59    Temp 97.4 °F (36.3 °C) (Oral)    Resp 14    Wt 105 lb 12.8 oz (48 kg)    SpO2 99%    BMI 17.08 kg/m2       Physical Exam:  GENERAL: thin, alert, cooperative, no distress, appears stated age  HEENT: EYE: conjunctivae/corneas clear. PERRL, EOM's intact. EAR: TM's pearly gray bilaterally NOSE: Nasal mucosa pink and moist bilaterally, THROAT: no erythema or edema  THYROID: no thyromegaly  NECK: bilateral posterior cervical adenopathy  LUNG: clear to auscultation bilaterally  HEART: regular rate and rhythm, S1, S2 normal, no murmur, click, rub or gallop  ABDOMEN: soft, non-tender. Bowel sounds normal. No masses  BACK: no spinal or paraspinal ttp  EXTREMITIES:  extremities normal, atraumatic, no cyanosis or edema  NEUROLOGIC: AOx3. Gait normal.        Assessment and Plan:       ICD-10-CM ICD-9-CM    1. Mild intermittent asthma with status asthmaticus J45.22 493.91 budesonide-formoterol (SYMBICORT) 160-4.5 mcg/actuation HFA inhaler      pneumococcal 23-valent (PNEUMOVAX 23) 25 mcg/0.5 mL injection   2. Low body weight due to inadequate caloric intake R63.6 783.22 REFERRAL TO GENERAL SURGERY   3. Other generalized epilepsy, not intractable, without status epilepticus (HCC) U58.287  METABOLIC PANEL, COMPREHENSIVE   4. Lumbar facet arthropathy (HCC) M12.88 721.3    5. Chronic midline low back pain, with sciatica presence unspecified M54.5 724.2     G89.29 338.29    6. Bradycardia R00.1 427.89    7. Adenopathy R59.1 785.6 REFERRAL TO GENERAL SURGERY   8. Chronic nausea R11.0 787.02 REFERRAL TO GENERAL SURGERY   9. Vitamin D deficiency E55.9 268.9 VITAMIN D, 25 HYDROXY   10.  Need for Tdap vaccination Z23 V06.1 diph,Pertuss,Acell,,Tet Vac-PF (ADACEL) 2 Lf-(2.5-5-3-5 mcg)-5Lf/0.5 mL susp         Medical Decision Making:  Asthma- adding Symbicort currently uncontrolled with twice daily use of albuterol    Low BMI- continue periactin + ensure- patients appetite is up from before, weight stable    H/O Seizures- patient in process of getting appointment with neurology    Chronic Low Back Pain- being managed by spine/ortho    Bradycardia- continue to monitor- asymptomatic    Posterior Cervical Adenopathy- referral to gen surg- wish to rule out potential pathology from lymphadenopathy, especially given difficulty gaining weight and chronic nausea    Vitamin d deficiency- labs- needs follow up    *script given for tdap  *script given for pneumovax    Follow-up Disposition:  Return in about 3 months (around 5/1/2017) for routine care with me. Patient acknowledges understanding of instructions and acknowledges understanding to call back if current symptoms worsen or new symptoms arise. Patient acknowledges and agrees with plan.         Signed By: TE Campos     February 1, 2017

## 2017-02-01 NOTE — LETTER
NOTIFICATION RETURN TO WORK / SCHOOL 
 
2/1/2017 11:55 AM 
 
Ms. Lisa Clancy [address] [facility] [address] [id number] To Whom It May Concern: 
 
Lisa Clancy is currently under the care of [address] [place] [address]. Patient has been in seen in my office for chronic low back pain, dizziness, headaches, chest pains, abdominal pain, nausea, shortness of breath, weight loss, lack of appetite and headaches. All of these symptoms were preventing her from performing her normal daily activities of living. She was diagnosed with GERD (Gastroesophageal Reflux Disease) and was referred to Dr. Isi Owens at [facility]. Miss Ana Nelson underwent an Endoscopy on August 12, 2016 for GERD. She has been prescribed the following medications to control her symptoms: Omeprazole delayed release, Fluticasone Furoate, Ondansetron, Cyclobenzaprine, Albuterol, Cyproheptadine (for weight gain), food supplement lactose-reduced (Ensure Plus 3 times daily), Cholecalciferol (vitamin D), and daily multivitamin with iron and calcium tablet. As of today, Miss Ana Nelson is feeling and doing much better.  
 
 
 
Sincerely, 
 
 
TE Diez

## 2017-02-02 ENCOUNTER — TELEPHONE (OUTPATIENT)
Dept: FAMILY MEDICINE CLINIC | Facility: CLINIC | Age: 32
End: 2017-02-02

## 2017-02-02 ENCOUNTER — HOSPITAL ENCOUNTER (OUTPATIENT)
Dept: MRI IMAGING | Age: 32
Discharge: HOME | End: 2017-02-02
Attending: PHYSICAL MEDICINE & REHABILITATION
Payer: MEDICAID

## 2017-02-02 DIAGNOSIS — M47.816 LUMBAR FACET ARTHROPATHY: ICD-10-CM

## 2017-02-02 DIAGNOSIS — M54.50 PAIN OF LUMBAR SPINE: ICD-10-CM

## 2017-02-02 DIAGNOSIS — E87.5 HYPERKALEMIA: Primary | ICD-10-CM

## 2017-02-02 DIAGNOSIS — M54.6 THORACIC SPINE PAIN: ICD-10-CM

## 2017-02-02 DIAGNOSIS — M79.2 NEURITIS: ICD-10-CM

## 2017-02-02 LAB
25(OH)D3+25(OH)D2 SERPL-MCNC: 14.9 NG/ML (ref 30–100)
ALBUMIN SERPL-MCNC: 4.9 G/DL (ref 3.5–5.5)
ALBUMIN/GLOB SERPL: 2 {RATIO} (ref 1.1–2.5)
ALP SERPL-CCNC: 55 IU/L (ref 39–117)
ALT SERPL-CCNC: 8 IU/L (ref 0–32)
AST SERPL-CCNC: 17 IU/L (ref 0–40)
BILIRUB SERPL-MCNC: 0.5 MG/DL (ref 0–1.2)
BUN SERPL-MCNC: 9 MG/DL (ref 6–20)
BUN/CREAT SERPL: 13 (ref 8–20)
CALCIUM SERPL-MCNC: 10.1 MG/DL (ref 8.7–10.2)
CHLORIDE SERPL-SCNC: 102 MMOL/L (ref 96–106)
CO2 SERPL-SCNC: 26 MMOL/L (ref 18–29)
CREAT SERPL-MCNC: 0.68 MG/DL (ref 0.57–1)
GLOBULIN SER CALC-MCNC: 2.5 G/DL (ref 1.5–4.5)
GLUCOSE SERPL-MCNC: 98 MG/DL (ref 65–99)
POTASSIUM SERPL-SCNC: 5.4 MMOL/L (ref 3.5–5.2)
PROT SERPL-MCNC: 7.4 G/DL (ref 6–8.5)
SODIUM SERPL-SCNC: 143 MMOL/L (ref 134–144)

## 2017-02-02 PROCEDURE — 72148 MRI LUMBAR SPINE W/O DYE: CPT

## 2017-02-02 RX ORDER — BUTALBITAL, ASPIRIN, AND CAFFEINE 325; 50; 40 MG/1; MG/1; MG/1
2 CAPSULE ORAL
COMMUNITY
End: 2017-05-30

## 2017-02-02 RX ORDER — ERGOCALCIFEROL 1.25 MG/1
50000 CAPSULE ORAL
Qty: 12 CAP | Refills: 1 | Status: SHIPPED | OUTPATIENT
Start: 2017-02-02 | End: 2017-08-24 | Stop reason: SDUPTHER

## 2017-02-02 NOTE — TELEPHONE ENCOUNTER
Advised of lab results, patient will return next week to recheck potassium and vitamin D once weekly will be sent to pharmacy, patient was out of vitamin d

## 2017-02-02 NOTE — TELEPHONE ENCOUNTER
Spoke with the pt and informed received notice her insurance does not cover vaccinations. Pt is provided a list of clinics provided low cost vaccines. Pt verbally acknowledges and voices no concerns at this time.

## 2017-02-09 ENCOUNTER — HOSPITAL ENCOUNTER (OUTPATIENT)
Dept: LAB | Age: 32
Discharge: HOME | End: 2017-02-09

## 2017-02-09 ENCOUNTER — OFFICE VISIT (OUTPATIENT)
Dept: OBGYN CLINIC | Age: 32
End: 2017-02-09

## 2017-02-09 VITALS
SYSTOLIC BLOOD PRESSURE: 109 MMHG | BODY MASS INDEX: 17.52 KG/M2 | WEIGHT: 109 LBS | HEART RATE: 76 BPM | DIASTOLIC BLOOD PRESSURE: 74 MMHG | HEIGHT: 66 IN

## 2017-02-09 DIAGNOSIS — Z30.09 FAMILY PLANNING: ICD-10-CM

## 2017-02-09 DIAGNOSIS — Z01.419 WELL WOMAN EXAM WITH ROUTINE GYNECOLOGICAL EXAM: Primary | ICD-10-CM

## 2017-02-09 DIAGNOSIS — Z11.3 SCREEN FOR STD (SEXUALLY TRANSMITTED DISEASE): ICD-10-CM

## 2017-02-09 PROCEDURE — 99001 SPECIMEN HANDLING PT-LAB: CPT | Performed by: PHYSICIAN ASSISTANT

## 2017-02-09 RX ORDER — ACETAMINOPHEN AND CODEINE PHOSPHATE 120; 12 MG/5ML; MG/5ML
1 SOLUTION ORAL DAILY
Qty: 1 PACKAGE | Refills: 11 | Status: SHIPPED | OUTPATIENT
Start: 2017-02-09 | End: 2018-02-13 | Stop reason: SDUPTHER

## 2017-02-09 NOTE — PATIENT INSTRUCTIONS

## 2017-02-09 NOTE — MR AVS SNAPSHOT
Visit Information Date & Time Provider Department Dept. Phone Encounter #  
 2/9/2017  1:00 PM Hollie Argueta, [facility] [phone] Follow-up Instructions Return in about 1 year (around 2/9/2018). Your Appointments 2/16/2017 10:00 AM  
Follow Up with TE [facility] ([facility]) Appt Note: FOLLOW-UP; .  
 [address] [facility] [address] [phone]  
[phone]  
  
   
 [phone]  
  
    
 2/28/2017 10:50 AM  
DIAG TEST F/U with Elida Lynne MD  
[facility]) Appt Note: mri back fu no copay [address] [place] [address]  
[phone]  
  
   
 [address] [place] [address] Upcoming Health Maintenance Date Due  
 PAP AKA CERVICAL CYTOLOGY 12/3/2018 Allergies as of 2/9/2017  Review Complete On: 2/9/2017 By: Hollie Argueta DO Severity Noted Reaction Type Reactions Imitrex [Sumatriptan Succinate] High 05/25/2015    Anaphylaxis Sea Food [Seafood]  06/04/2015    Hives Per pt report Current Immunizations  Never Reviewed No immunizations on file. Not reviewed this visit You Were Diagnosed With   
  
 Codes Comments Well woman exam with routine gynecological exam    -  Primary ICD-10-CM: C21.880 ICD-9-CM: V72.31 Family planning     ICD-10-CM: Z30.09 
ICD-9-CM: V25.09 Vitals BP Pulse Height(growth percentile) Weight(growth percentile) LMP BMI  
 109/74 76 5' 6\" (1.676 m) 109 lb (49.4 kg) 01/24/2017 17.59 kg/m2 OB Status Smoking Status Having regular periods Former Smoker Vitals History BMI and BSA Data Body Mass Index Body Surface Area  
 17.59 kg/m 2 1.52 m 2 Preferred Pharmacy Pharmacy Name Phone [facility][id number], [address] [place] [address] Michel [phone] Your Updated Medication List  
  
   
This list is accurate as of: 2/9/17  1:44 PM.  Always use your most recent med list.  
  
  
  
  
 albuterol 90 mcg/actuation inhaler Commonly known as:  PROVENTIL HFA, VENTOLIN HFA, PROAIR HFA Take 1 Puff by inhalation every four (4) hours as needed for Wheezing or Shortness of Breath. budesonide-formoterol 160-4.5 mcg/actuation HFA inhaler Commonly known as:  SYMBICORT Take 2 Puffs by inhalation two (2) times a day. butalbital-aspirin-caffeine capsule Commonly known as:  Amanda Maxcy Take 2 Caps by mouth every eight (8) hours as needed for Pain. cyproheptadine 4 mg tablet Commonly known as:  PERIACTIN Take 1 Tab by mouth once over twenty-four (24) hours. DAILY VITAMIN WITH IRON AND CA Tab Generic drug:  multivitamins-ca-iron-minerals TAKE 1 TABLET BY MOUTH EVERY DAY  
  
 ergocalciferol 50,000 unit capsule Commonly known as:  ERGOCALCIFEROL Take 1 Cap by mouth every seven (7) days. food supplemt, lactose-reduced 0.05-1.5 gram-kcal/mL Liqd Commonly known as:  ENSURE PLUS Take 237 mL by mouth three (3) times daily. multivitamin tablet Commonly known as:  ONE A DAY Take 1 Tab by mouth daily. norethindrone 0.35 mg Tab Commonly known as:  Micha & Micha Take 1 Tab by mouth daily. Omeprazole delayed release 20 mg tablet Commonly known as:  PRILOSEC D/R Take 1 Tab by mouth daily. ondansetron 4 mg disintegrating tablet Commonly known as:  ZOFRAN ODT  
DISSOLVE 1 TABLET BY MOUTH EVERY 8 HOURS AS NEEDED FOR NAUSEA Prescriptions Sent to Pharmacy Refills  
 norethindrone (MICRONOR) 0.35 mg tab 11 Sig: Take 1 Tab by mouth daily. Class: Normal  
 Pharmacy: [facility], [address] [facility] Ph #: [phone] Route: Oral  
  
We Performed the Following AMB POC URINE PREGNANCY TEST, VISUAL COLOR COMPARISON [17147 CPT(R)] Follow-up Instructions Return in about 1 year (around 2/9/2018). To-Do List   
 02/09/2017 Lab:  CHLAMYDIA/NEISSERIA/TRICHOMONAS AMP Patient Instructions Well Visit, Ages 25 to 48: Care Instructions Your Care Instructions Physical exams can help you stay healthy. Your doctor has checked your overall health and may have suggested ways to take good care of yourself. He or she also may have recommended tests. At home, you can help prevent illness with healthy eating, regular exercise, and other steps. Follow-up care is a key part of your treatment and safety. Be sure to make and go to all appointments, and call your doctor if you are having problems. It's also a good idea to know your test results and keep a list of the medicines you take. How can you care for yourself at home? · Reach and stay at a healthy weight. This will lower your risk for many problems, such as obesity, diabetes, heart disease, and high blood pressure. · Get at least 30 minutes of physical activity on most days of the week. Walking is a good choice. You also may want to do other activities, such as running, swimming, cycling, or playing tennis or team sports. Discuss any changes in your exercise program with your doctor. · Do not smoke or allow others to smoke around you. If you need help quitting, talk to your doctor about stop-smoking programs and medicines. These can increase your chances of quitting for good. · Talk to your doctor about whether you have any risk factors for sexually transmitted infections (STIs). Having one sex partner (who does not have STIs and does not have sex with anyone else) is a good way to avoid these infections. · Use birth control if you do not want to have children at this time. Talk with your doctor about the choices available and what might be best for you. · Protect your skin from too much sun. When you're outdoors from 10 a.m. to 4 p.m., stay in the shade or cover up with clothing and a hat with a wide brim. Wear sunglasses that block UV rays. Even when it's cloudy, put broad-spectrum sunscreen (SPF 30 or higher) on any exposed skin. · See a dentist one or two times a year for checkups and to have your teeth cleaned. · Wear a seat belt in the car. · Drink alcohol in moderation, if at all. That means no more than 2 drinks a day for men and 1 drink a day for women. Follow your doctor's advice about when to have certain tests. These tests can spot problems early. For everyone · Cholesterol. Have the fat (cholesterol) in your blood tested after age 21. Your doctor will tell you how often to have this done based on your age, family history, or other things that can increase your risk for heart disease. · Blood pressure. Have your blood pressure checked during a routine doctor visit. Your doctor will tell you how often to check your blood pressure based on your age, your blood pressure results, and other factors. · Vision. Talk with your doctor about how often to have a glaucoma test. 
· Diabetes. Ask your doctor whether you should have tests for diabetes. · Colon cancer. Have a test for colon cancer at age 48. You may have one of several tests. If you are younger than 48, you may need a test earlier if you have any risk factors. Risk factors include whether you already had a precancerous polyp removed from your colon or whether your parent, brother, sister, or child has had colon cancer. For women · Breast exam and mammogram. Talk to your doctor about when you should have a clinical breast exam and a mammogram. Medical experts differ on whether and how often women under 50 should have these tests. Your doctor can help you decide what is right for you. · Pap test and pelvic exam. Begin Pap tests at age 24.  A Pap test is the best way to find cervical cancer. The test often is part of a pelvic exam. Ask how often to have this test. 
· Tests for sexually transmitted infections (STIs). Ask whether you should have tests for STIs. You may be at risk if you have sex with more than one person, especially if your partners do not wear condoms. For men · Tests for sexually transmitted infections (STIs). Ask whether you should have tests for STIs. You may be at risk if you have sex with more than one person, especially if you do not wear a condom. · Testicular cancer exam. Ask your doctor whether you should check your testicles regularly. · Prostate exam. Talk to your doctor about whether you should have a blood test (called a PSA test) for prostate cancer. Experts differ on whether and when men should have this test. Some experts suggest it if you are older than 39 and are -American or have a father or brother who got prostate cancer when he was younger than 72. When should you call for help? Watch closely for changes in your health, and be sure to contact your doctor if you have any problems or symptoms that concern you. Where can you learn more? Go to http://norma-kaley.info/. Enter P072 in the search box to learn more about \"Well Visit, Ages 25 to 48: Care Instructions. \" Current as of: July 19, 2016 Content Version: 11.1 © 1591-9824 [employer]. Care instructions adapted under license by [employer] (which disclaims liability or warranty for this information). If you have questions about a medical condition or this instruction, always ask your healthcare professional. [place] [address] any warranty or liability for your use of this information. Introducing [facility]! Dear Laura Bautista: 
Thank you for requesting a Sun Number account. Our records indicate that you already have an active Sun Number account.   You can access your account anytime at [email or web address]. [email or web address]/"[email or web address] Did you know that you can access your hospital and ER discharge instructions at any time in WordRake? You can also review all of your test results from your hospital stay or ER visit. Additional Information If you have questions, please visit the Frequently Asked Questions section of the WordRake website at [email or web address] Remember, WordRake is NOT to be used for urgent needs. For medical emergencies, dial 911. Now available from your iPhone and Android! Please provide this summary of care documentation to your next provider. Your primary care clinician is listed as Nancy Reich. If you have any questions after today's visit, please call [phone].

## 2017-02-09 NOTE — PROGRESS NOTES
Subjective:   32 y.o. female for annual routine Pap and checkup. Patient's last menstrual period was 2017. Last pap smear:  2015 NILM  Menstrual history: regular on POPs  Dysmenorrhea no  H/o STIs:  no  Social History: single partner, contraception - OCP (Oral Contraceptive Pills). [unfilled]  OB History    Para Term  AB SAB TAB Ectopic Multiple Living   4 2   1 1    2      # Outcome Date GA Lbr Luan/2nd Weight Sex Delivery Anes PTL Lv   4             3 Para            2 Para            1 SAB                   Pertinent past medical hstory: migraines, no history of HTN, DVT, CAD, DM, liver disease, or smoking. Patient Active Problem List   Diagnosis Code    Spontaneous  O03.9    Epilepsy ([facility] G40.909    Chronic midline low back pain M54.5, G89.29    ACP (advance care planning) Z71.89    Bradycardia R00.1    Mild intermittent asthma with status asthmaticus J45.22    Low body weight due to inadequate caloric intake R63.6    Lumbar facet arthropathy ([facility]) M12.88    Vitamin D deficiency E55.9     Patient Active Problem List    Diagnosis Date Noted    Vitamin D deficiency 2017    [facility] 2017    Mild intermittent asthma with status asthmaticus 2016    Low body weight due to inadequate caloric intake 2016    Bradycardia 2016    Epilepsy ([facility] 2016    Chronic midline low back pain 2016    ACP (advance care planning) 2016    Spontaneous  2015     Current Outpatient Prescriptions   Medication Sig Dispense Refill    norethindrone (MICRONOR) 0.35 mg tab Take 1 Tab by mouth daily. 1 Package 11    ergocalciferol (ERGOCALCIFEROL) 50,000 unit capsule Take 1 Cap by mouth every seven (7) days. 12 Cap 1    butalbital-aspirin-caffeine (FIORINAL) capsule Take 2 Caps by mouth every eight (8) hours as needed for Pain.       budesonide-formoterol (SYMBICORT) 160-4.5 mcg/actuation HFA inhaler Take 2 Puffs by inhalation two (2) times a day. 1 Inhaler 1    DAILY VITAMIN WITH IRON AND CA tab TAKE 1 TABLET BY MOUTH EVERY DAY  3    Omeprazole delayed release (PRILOSEC D/R) 20 mg tablet Take 1 Tab by mouth daily. 90 Tab 3    ondansetron (ZOFRAN ODT) 4 mg disintegrating tablet DISSOLVE 1 TABLET BY MOUTH EVERY 8 HOURS AS NEEDED FOR NAUSEA  3    albuterol (PROVENTIL HFA, VENTOLIN HFA, PROAIR HFA) 90 mcg/actuation inhaler Take 1 Puff by inhalation every four (4) hours as needed for Wheezing or Shortness of Breath. 2 Inhaler 3    cyproheptadine (PERIACTIN) 4 mg tablet Take 1 Tab by mouth once over twenty-four (24) hours. 90 Tab 3    food supplemt, lactose-reduced (ENSURE PLUS) 0.05-1.5 gram-kcal/mL liqd Take 237 mL by mouth three (3) times daily. 100 Can 3    multivitamin (ONE A DAY) tablet Take 1 Tab by mouth daily.  90 Tab 3     Allergies   Allergen Reactions    Imitrex [Sumatriptan Succinate] Anaphylaxis    Sea Food [Seafood] Hives     Per pt report      Past Medical History   Diagnosis Date    Abnormal Papanicolaou smear of cervix     Asthma     Bradycardia     GERD (gastroesophageal reflux disease)     H/O sinus bradycardia     Headache     Migraine     Photophobia of both eyes     Seizures (HCC)     Stomach pain      Past Surgical History   Procedure Laterality Date    Hx appendectomy      Hx gyn       cervical cerclage    Hx  section       Family History   Problem Relation Age of Onset    Diabetes Mother     Heart Attack Father     Attention Deficit Hyperactivity Disorder Sister     Attention Deficit Hyperactivity Disorder Brother     Cancer Maternal Aunt 45     breast    Diabetes Maternal Grandmother     Attention Deficit Hyperactivity Disorder Brother     No Known Problems Brother     No Known Problems Brother     No Known Problems Brother     Cancer Maternal Aunt      lung    Heart defect Son     Migraines Son     Seizures Son      h/o    Other Son h/o jaundice     Social History   Substance Use Topics    Smoking status: Former Smoker     Years: 13.00    Smokeless tobacco: Never Used      Comment: cigarello, once a month    Alcohol use No        ROS:  Feeling well. No dyspnea or chest pain on exertion. No abdominal pain, change in bowel habits, black or bloody stools. No urinary tract symptoms. GYN ROS: normal menses, no pelvic pain or discharge, no breast pain or new or enlarging lumps on self exam. No neurological complaints. Objective:     Visit Vitals    /74    Pulse 76    Ht 5' 6\" (1.676 m)    Wt 109 lb (49.4 kg)    LMP 01/24/2017    BMI 17.59 kg/m2     The patient appears well, alert, oriented x 3, in no distress. ENT normal.  Neck supple. No adenopathy or thyromegaly. TASHA. Lungs are clear, good air entry, no wheezes, rhonchi or rales. S1 and S2 normal, no murmurs, regular rate and rhythm. Abdomen soft without tenderness, guarding, mass or organomegaly. Extremities show no edema, normal peripheral pulses. Neurological is normal, no focal findings. BREAST EXAM: right breast normal without mass, skin or nipple changes or axillary nodes, left breast normal without mass, skin or nipple changes or axillary nodes    PELVIC EXAM: VULVA: normal appearing vulva with no masses, tenderness or lesions, VAGINA: normal appearing vagina with normal color and discharge, no lesions, CERVIX: normal appearing cervix without discharge or lesions, UTERUS: uterus is normal size, shape, consistency and nontender, ADNEXA: normal adnexa in size, nontender and no masses, DNA probe for chlamydia and GC obtained    Assessment/Plan:   well woman  counseled on breast self exam, use and side effects of OCP's and family planning choices  additional lab tests per orders  return annually or prn    ICD-10-CM ICD-9-CM    1.  Well woman exam with routine gynecological exam Z01.419 V72.31 AMB POC URINE PREGNANCY TEST, VISUAL COLOR COMPARISON CHLAMYDIA/NEISSERIA/TRICHOMONAS AMP   2. Family planning Z30.09 V25.09 norethindrone (MICRONOR) 0.35 mg tab   3. Screen for STD (sexually transmitted disease) Z11.3 V74.5 CHLAMYDIA / GC AMPLIFICATION      T PALLIDUM AB      HEP B SURFACE AG      HEPATITIS C AB      HIV 1/2 AG/AB, 4TH GENERATION,W RFLX CONFIRM   . I have verbalized the plan of care with patient and the patient expressed understanding.    All questions were answered

## 2017-02-10 LAB
HBV SURFACE AG SERPL QL IA: NEGATIVE
HCV AB S/CO SERPL IA: <0.1 S/CO RATIO (ref 0–0.9)

## 2017-02-11 LAB
C TRACH RRNA SPEC QL NAA+PROBE: NEGATIVE
HIV 1+2 AB+HIV1 P24 AG SERPL QL IA: NON REACTIVE
N GONORRHOEA RRNA SPEC QL NAA+PROBE: NEGATIVE
T PALLIDUM AB SER QL IA: NEGATIVE
T VAGINALIS RRNA SPEC QL NAA+PROBE: POSITIVE

## 2017-02-13 DIAGNOSIS — E55.9 VITAMIN D DEFICIENCY: ICD-10-CM

## 2017-02-13 DIAGNOSIS — G40.409 OTHER GENERALIZED EPILEPSY, NOT INTRACTABLE, WITHOUT STATUS EPILEPTICUS (HCC): ICD-10-CM

## 2017-02-14 ENCOUNTER — TELEPHONE (OUTPATIENT)
Dept: OBGYN CLINIC | Age: 32
End: 2017-02-14

## 2017-02-14 DIAGNOSIS — A59.9 TRICHOMONAS INFECTION: Primary | ICD-10-CM

## 2017-02-14 RX ORDER — METRONIDAZOLE 500 MG/1
TABLET ORAL
Qty: 4 TAB | Refills: 0 | Status: SHIPPED | OUTPATIENT
Start: 2017-02-14 | End: 2017-02-16

## 2017-02-14 NOTE — TELEPHONE ENCOUNTER
Spoke with patient concerning abnormal Lab results , patient stated she undesrtood these results, Pt aware that med's have been sent to the Lab

## 2017-02-14 NOTE — PROGRESS NOTES
+trich. Rx sent to patient's preferred pharmacy on file. LPN to call patient to notify her of results and that she may  her medication and take as prescribed. Patient to call if any further questions.

## 2017-02-15 ENCOUNTER — TELEPHONE (OUTPATIENT)
Dept: FAMILY MEDICINE CLINIC | Facility: CLINIC | Age: 32
End: 2017-02-15

## 2017-02-16 ENCOUNTER — OFFICE VISIT (OUTPATIENT)
Dept: FAMILY MEDICINE CLINIC | Facility: CLINIC | Age: 32
End: 2017-02-16

## 2017-02-16 VITALS
HEART RATE: 82 BPM | BODY MASS INDEX: 17.29 KG/M2 | WEIGHT: 107.6 LBS | HEIGHT: 66 IN | OXYGEN SATURATION: 97 % | SYSTOLIC BLOOD PRESSURE: 108 MMHG | DIASTOLIC BLOOD PRESSURE: 64 MMHG | TEMPERATURE: 98.3 F | RESPIRATION RATE: 16 BRPM

## 2017-02-16 DIAGNOSIS — J45.22 MILD INTERMITTENT ASTHMA WITH STATUS ASTHMATICUS: Primary | ICD-10-CM

## 2017-02-16 DIAGNOSIS — G40.409 OTHER GENERALIZED EPILEPSY, NOT INTRACTABLE, WITHOUT STATUS EPILEPTICUS (HCC): ICD-10-CM

## 2017-02-16 DIAGNOSIS — A59.9 TRICHOMONAS INFECTION: ICD-10-CM

## 2017-02-16 DIAGNOSIS — E55.9 VITAMIN D DEFICIENCY: ICD-10-CM

## 2017-02-16 DIAGNOSIS — Z13.9 SCREENING: ICD-10-CM

## 2017-02-16 DIAGNOSIS — G89.29 CHRONIC BILATERAL LOW BACK PAIN WITHOUT SCIATICA: ICD-10-CM

## 2017-02-16 DIAGNOSIS — R63.6 LOW BODY WEIGHT DUE TO INADEQUATE CALORIC INTAKE: ICD-10-CM

## 2017-02-16 DIAGNOSIS — M54.50 CHRONIC BILATERAL LOW BACK PAIN WITHOUT SCIATICA: ICD-10-CM

## 2017-02-16 RX ORDER — FLUTICASONE FUROATE 200 UG/1
POWDER RESPIRATORY (INHALATION)
Refills: 3 | COMMUNITY
Start: 2017-01-07 | End: 2017-04-18 | Stop reason: ALTCHOICE

## 2017-02-16 RX ORDER — FLUTICASONE FUROATE AND VILANTEROL 100; 25 UG/1; UG/1
1 POWDER RESPIRATORY (INHALATION) DAILY
Qty: 1 INHALER | Refills: 1 | Status: SHIPPED | OUTPATIENT
Start: 2017-02-16 | End: 2017-04-27 | Stop reason: SDUPTHER

## 2017-02-16 RX ORDER — NICOTINE 11MG/24HR
PATCH, TRANSDERMAL 24 HOURS TRANSDERMAL
Refills: 3 | COMMUNITY
Start: 2017-01-12 | End: 2017-04-18 | Stop reason: ALTCHOICE

## 2017-02-16 NOTE — PROGRESS NOTES
History and Physical    Patient: Belkis Whelan MRN: [id number]  SSN: [id number]    YOB: 1985  Age: 32 y.o. Sex: female      Subjective:      Belkis Whelan is a 32 y.o. female who presents today for lab review and follow up of asthma, low BMI, chronic nausea, h/o seizures etc.     In terms of her low BMI, she eats 3 meals with snacks daily and drinks ensure three times daily and takes periactin daily. Weight is down by 2 lbs. Patient states she has been sick to her stomach recently as she found out she has an STD and is very upset with her fiancee.     Patient previously stated she had a follow up with GI for follow up of her chronic nausea and GERD, but now states she does not have follow up. Patient continues to need zofran daily for chronic nausea, she denies current problems with swallowing that she was having, on days she tries not to take her zofran, she will feel nauseated the whole day, is not related to eating.      In terms of her chest pains, she states they only occur when she runs, otherwise they are absent, they are sternal and feels like her chest is collapsing, she is using her albuterol inhaler twice daily as she continues to feel short of breath. She uses her Arnuity Ellipta daily. ECHO several months ago was normal. Was referred to pulmonology but that went through her [employer] benefits, and has not seen anyone yet. She was given Symbicort, but her insurance wishes her to try [facility] or [facility] first.  She is requesting another referral.     In terms of her h/o seizures, she has not scheduled appointment with neurologist as of yet, referral went through [place]. Last seizures 1/2014, she is starting to get similar symptoms she had prior to last seizure, such as dizziness, she knows its stress related, she is trying to remain calm.   She is requesting another referral to neurology.      Patient is concerned as she recently found out that she has Trichomoniasis, she is very upset as she is thinking her fiance was unfaithful. She has finished the flagyl, she wishes to be tested for clearance/cure. She has started school, she is doing well. Patient had recent MRI of her spine done by her spine specialist for h/o chronic low back pain. Last PAP due: 12/15- was normal by GYN      She is followed by:  GYN  Ortho/spine      PMH:  Past Medical History   Diagnosis Date    Abnormal Papanicolaou smear of cervix     Asthma     Bradycardia     GERD (gastroesophageal reflux disease)     H/O sinus bradycardia     Headache     Migraine     Photophobia of both eyes     Seizures ([address]     Stomach pain      Past Surgical History   Procedure Laterality Date    Hx appendectomy      Hx gyn       cervical cerclage    Hx  section          FamHx:  Family History   Problem Relation Age of Onset    Diabetes Mother     Heart Attack Father     Attention Deficit Hyperactivity Disorder Sister     Attention Deficit Hyperactivity Disorder Brother     Cancer Maternal Aunt 45     breast    Diabetes Maternal Grandmother     Attention Deficit Hyperactivity Disorder Brother     No Known Problems Brother     No Known Problems Brother     No Known Problems Brother     Cancer Maternal Aunt      lung    Heart defect Son    Vertie Amis Migraines Son     Seizures Son      h/o    Other Son      h/o jaundice       SocialHx:  Social History   Substance Use Topics    Smoking status: Former Smoker     Years: 13.00    Smokeless tobacco: Never Used      Comment: cigarello, once a month    Alcohol use No        Meds:  Prior to Admission medications    Medication Sig Start Date End Date Taking? Authorizing Provider   VITAMIN D3 2,000 unit cap capsule TAKE 1 CAPSULE BY MOUTH DAILY. 17  Yes Historical Provider   ARNUITY ELLIPTA 200 mcg/actuation dsdv inhaler USE 1 PUFF DAILY AS DIRECTED 17  Yes Historical Provider   norethindrone (MICRONOR) 0.35 mg tab Take 1 Tab by mouth daily.  17  Yes Anita Cortes Casimiro English,    ergocalciferol (ERGOCALCIFEROL) 50,000 unit capsule Take 1 Cap by mouth every seven (7) days. 2/2/17  Yes Jessica Pumphrey V, PA   butalbital-aspirin-caffeine (FIORINAL) capsule Take 2 Caps by mouth every eight (8) hours as needed for Pain. Yes Historical Provider   DAILY VITAMIN WITH IRON AND CA tab TAKE 1 TABLET BY MOUTH EVERY DAY 10/18/16  Yes Historical Provider   Omeprazole delayed release (PRILOSEC D/R) 20 mg tablet Take 1 Tab by mouth daily. 9/27/16  Yes Jessica Pumphrey V, PA   ondansetron (ZOFRAN ODT) 4 mg disintegrating tablet DISSOLVE 1 TABLET BY MOUTH EVERY 8 HOURS AS NEEDED FOR NAUSEA 7/5/16  Yes Historical Provider   albuterol (PROVENTIL HFA, VENTOLIN HFA, PROAIR HFA) 90 mcg/actuation inhaler Take 1 Puff by inhalation every four (4) hours as needed for Wheezing or Shortness of Breath. 9/23/16  Yes Jessica Pumphrey V, PA   cyproheptadine (PERIACTIN) 4 mg tablet Take 1 Tab by mouth once over twenty-four (24) hours. 9/23/16  Yes Jessica Pumphrey V, PA   food supplemt, lactose-reduced (ENSURE PLUS) 0.05-1.5 gram-kcal/mL liqd Take 237 mL by mouth three (3) times daily. 9/23/16  Yes Jessica Pumphrey V, PA   multivitamin (ONE A DAY) tablet Take 1 Tab by mouth daily. 9/23/16  Yes Jessica Pumphrey V, PA   fluticasone-vilanterol (BREO ELLIPTA) 100-25 mcg/dose inhaler Take 1 Puff by inhalation daily. 2/16/17   TE Mcgovern        Allergies:   Allergies   Allergen Reactions    Imitrex [Sumatriptan Succinate] Anaphylaxis    Iodine Cough     Coughing up blood      Sea Food [Seafood] Hives     Per pt report        Review of Systems:  Items in [place] are positive  Constitutional: negative for fevers, chills and malaise  Eyes: negative for visual disturbance  Ears, Nose, Mouth, Throat, and Face: negative for nasal congestion  Respiratory: intermittent SOB, negative for cough   Cardiovascular: sternal CP with exertion, negative for chest pressure/discomfort  Gastrointestinal: chronic nausea- stable, negative for vomiting, melena, diarrhea, constipation and abdominal pain  Genitourinary:negative for frequency, dysuria or hematuria  Musculoskeletal: negative for myalgias and arthralgias  Neurological: dizziness, negative for headaches and paresthesia, negative for recent seizure activity    Objective:     Visit Vitals    /64 (BP 1 Location: Right arm, BP Patient Position: Sitting)    Pulse 82    Temp 98.3 °F (36.8 °C) (Oral)    Resp 16    Ht 5' 6\" (1.676 m)    Wt 107 lb 9.6 oz (48.8 kg)    LMP 01/24/2017    SpO2 97%    BMI 17.37 kg/m2       Physical Exam:  GENERAL: alert, cooperative, no distress, appears stated age  HEENT: EYE: conjunctivae/corneas clear. PERRL, EOM's intact. LUNG: clear to auscultation bilaterally  HEART: regular rate and rhythm, S1, S2 normal, no murmur, click, rub or gallop  NEUROLOGIC: AOx3. Gait normal.    Labs  Lab Results   Component Value Date/Time    WBC 9.1 02/09/2017 09:54 AM    HGB 12.8 02/09/2017 09:54 AM    HCT 38.5 02/09/2017 09:54 AM    PLATELET 716 96/95/2441 09:54 AM    MCV 91 02/09/2017 09:54 AM     Lab Results   Component Value Date/Time    Sodium 140 02/09/2017 09:54 AM    Potassium 4.7 02/09/2017 09:54 AM    Chloride 102 02/09/2017 09:54 AM    CO2 22 02/09/2017 09:54 AM    Anion gap 10 04/20/2016 11:16 AM    Glucose 93 02/09/2017 09:54 AM    BUN 11 02/09/2017 09:54 AM    Creatinine 0.68 02/09/2017 09:54 AM    BUN/Creatinine ratio 16 02/09/2017 09:54 AM    GFR est  02/09/2017 09:54 AM    GFR est non- 02/09/2017 09:54 AM    Calcium 9.4 02/09/2017 09:54 AM    Bilirubin, total 0.2 02/09/2017 09:54 AM    AST (SGOT) 9 02/09/2017 09:54 AM    Alk.  phosphatase 48 02/09/2017 09:54 AM    Protein, total 6.8 02/09/2017 09:54 AM    Albumin 4.4 02/09/2017 09:54 AM    Globulin 3.1 04/20/2016 11:16 AM    A-G Ratio 1.8 02/09/2017 09:54 AM    ALT (SGPT) 8 02/09/2017 09:54 AM     Lab Results   Component Value Date/Time    Cholesterol, total 150 02/09/2017 09:54 AM    HDL Cholesterol 58 02/09/2017 09:54 AM    LDL, calculated 84 02/09/2017 09:54 AM    VLDL, calculated 8 02/09/2017 09:54 AM    Triglyceride 42 02/09/2017 09:54 AM    CHOL/HDL Ratio 2.6 04/20/2016 11:16 AM     Lab Results   Component Value Date/Time    TSH 0.769 02/09/2017 09:54 AM    T4, Free 1.15 02/09/2017 09:54 AM     Lab Results   Component Value Date/Time    Hemoglobin A1c 5.6 02/09/2017 09:54 AM     Lab Results   Component Value Date/Time    Vitamin D 25-Hydroxy 27.2 04/20/2016 11:16 AM    VITAMIN D, 25-HYDROXY 30.1 02/09/2017 09:54 AM           Assessment and Plan:       ICD-10-CM ICD-9-CM    1. Mild intermittent asthma with status asthmaticus J45.22 493.91 REFERRAL TO PULMONARY DISEASE   2. Low body weight due to inadequate caloric intake R63.6 783.22    3. Other generalized epilepsy, not intractable, without status epilepticus ([facility] G40.409  REFERRAL TO NEUROLOGY   4. Vitamin D deficiency E55.9 268.9    5. Trichomonas infection A59.9 131.9    6. Chronic bilateral low back pain without sciatica M54.5 724.2     G89.29 338.29          Medical Decision Making:  Asthma- switch to [employer], insurance not covering Symbicort + referral to pulmonology- run through [facility]- currently uncontrolled with twice daily use of albuterol     Low BMI- continue periactin + ensure- patient encouraged to call gastro and schedule follow up for chronic nausea and GERD     H/O Seizures- referral to neurology- will run through medicaid     Vitamin d deficiency- continue current therapy    Trichomoniasis- will follow up within 1-2 weeks with test for cure    Chronic low back pain- follow up with ortho/spine specialist for results and next steps     *script given for tdap- has not had done  *script given for pneumovax- has not had done      Follow-up Disposition:  Return in about 2 weeks (around 3/2/2017) for for repeat trich testing, routine care with me.     Patient acknowledges understanding of instructions and acknowledges understanding to call back if current symptoms worsen or new symptoms arise. Patient acknowledges and agrees with plan.         Signed By: TE Oneil     February 16, 2017

## 2017-02-16 NOTE — MR AVS SNAPSHOT
Visit Information Date & Time Provider Department Dept. Phone Encounter #  
 2/16/2017 10:00 AM Valentino Palma, [address] [phone] [id number] Follow-up Instructions Return in about 2 weeks (around 3/2/2017) for for repeat trich testing, routine care with me. Your Appointments 2/28/2017 10:50 AM  
DIAG TEST F/U with Edis Trinidad MD  
[facility]) Appt Note: mri back fu no copay [address] [place] [address]  
[phone]  
  
   
 [address] [place] [address] Upcoming Health Maintenance Date Due Pneumococcal 19-64 Medium Risk (1 of 1 - PPSV23) 11/30/2004 DTaP/Tdap/Td series (1 - Tdap) 11/30/2006 PAP AKA CERVICAL CYTOLOGY 12/3/2018 Allergies as of 2/16/2017  Review Complete On: 2/16/2017 By: Fernie Scott LPN Severity Noted Reaction Type Reactions Imitrex [Sumatriptan Succinate] High 05/25/2015    Anaphylaxis Iodine  02/16/2017    Cough Coughing up blood Sea Food [Seafood]  06/04/2015    Hives Per pt report Current Immunizations  Never Reviewed No immunizations on file. Not reviewed this visit You Were Diagnosed With   
  
 Codes Comments Mild intermittent asthma with status asthmaticus    -  Primary ICD-10-CM: J45.22 
ICD-9-CM: 493.91 Other generalized epilepsy, not intractable, without status epilepticus ([facility]     ICD-10-CM: G40.409 Vitals BP Pulse Temp Resp Height(growth percentile) Weight(growth percentile) 108/64 (BP 1 Location: Right arm, BP Patient Position: Sitting) 82 98.3 °F (36.8 °C) (Oral) 16 5' 6\" (1.676 m) 107 lb 9.6 oz (48.8 kg) LMP SpO2 BMI OB Status Smoking Status 01/24/2017 97% 17.37 kg/m2 Having regular periods Former Smoker BMI and BSA Data  Body Mass Index Body Surface Area  
 17.37 kg/m 2 1.51 m 2  
  
  
 Preferred Pharmacy Pharmacy Name Phone [facility] #[id number]- Huma Alvares, [address] [place] [address] [phone] Your Updated Medication List  
  
   
This list is accurate as of: 2/16/17 10:13 AM.  Always use your most recent med list.  
  
  
  
  
 albuterol 90 mcg/actuation inhaler Commonly known as:  PROVENTIL HFA, VENTOLIN HFA, PROAIR HFA Take 1 Puff by inhalation every four (4) hours as needed for Wheezing or Shortness of Breath. ARNUITY ELLIPTA 200 mcg/actuation Dsdv inhaler Generic drug:  fluticasone furoate USE 1 PUFF DAILY AS DIRECTED  
  
 butalbital-aspirin-caffeine capsule Commonly known as:  Josetta  Take 2 Caps by mouth every eight (8) hours as needed for Pain. cyproheptadine 4 mg tablet Commonly known as:  PERIACTIN Take 1 Tab by mouth once over twenty-four (24) hours. DAILY VITAMIN WITH IRON AND CA Tab Generic drug:  multivitamins-ca-iron-minerals TAKE 1 TABLET BY MOUTH EVERY DAY  
  
 ergocalciferol 50,000 unit capsule Commonly known as:  ERGOCALCIFEROL Take 1 Cap by mouth every seven (7) days. fluticasone-vilanterol 100-25 mcg/dose inhaler Commonly known as:  BREO ELLIPTA Take 1 Puff by inhalation daily. food supplemt, lactose-reduced 0.05-1.5 gram-kcal/mL Liqd Commonly known as:  ENSURE PLUS Take 237 mL by mouth three (3) times daily. metroNIDAZOLE 500 mg tablet Commonly known as:  FLAGYL Take 4 tabs PO all at once. Indications: trichomoniasis  
  
 multivitamin tablet Commonly known as:  ONE A DAY Take 1 Tab by mouth daily. norethindrone 0.35 mg Tab Commonly known as:  Micha & Micha Take 1 Tab by mouth daily. Omeprazole delayed release 20 mg tablet Commonly known as:  PRILOSEC D/R Take 1 Tab by mouth daily. ondansetron 4 mg disintegrating tablet Commonly known as:  ZOFRAN ODT  
DISSOLVE 1 TABLET BY MOUTH EVERY 8 HOURS AS NEEDED FOR NAUSEA  
  
 VITAMIN D3 2,000 unit Cap capsule Generic drug:  Cholecalciferol (Vitamin D3) TAKE 1 CAPSULE BY MOUTH DAILY. We Performed the Following REFERRAL TO NEUROLOGY [JRD80 Custom] Comments:  
 For h/o seizures- [facility], not [facility] REFERRAL TO PULMONARY DISEASE [JES02 Custom] Comments:  
 For h/o uncontrolled asthma-  go through medicaid, not [facility] Follow-up Instructions Return in about 2 weeks (around 3/2/2017) for for repeat trich testing, routine care with me. Referral Information Referral ID Referred By Referred To  
  
 [id number] [facility] Not Available Visits Status Start Date End Date 1 New Request 2/16/17 2/16/18 If your referral has a status of pending review or denied, additional information will be sent to support the outcome of this decision. Referral ID Referred By Referred To  
 [id number] [facility] Not Available Visits Status Start Date End Date 1 New Request 2/16/17 2/16/18 If your referral has a status of pending review or denied, additional information will be sent to support the outcome of this decision. Patient Instructions Learning About Vitamin D Why is it important to get enough vitamin D? Your body needs vitamin D to absorb calcium. Calcium keeps your bones and muscles, including your heart, healthy and strong. If your muscles don't get enough calcium, they can cramp, hurt, or feel weak. You may have long-term (chronic) muscle aches and pains. If you don't get enough vitamin D throughout life, you have an increased chance of having thin and brittle bones (osteoporosis) in your later years. Children who don't get enough vitamin D may not grow as much as others their age. They also have a chance of getting a rare disease called rickets. It causes weak bones. Vitamin D and calcium are added to many foods. And your body uses sunshine to make its own vitamin D. How much vitamin D do you need? The [place] of Medicine recommends that people ages 3 through 79 get 600 IU (international units) every day. Adults 71 and older need 800 IU every day. Blood tests for vitamin D can check your vitamin D level. But there is no standard normal range used by all laboratories. The [place] of Medicine recommends a blood level of 20 ng/mL of vitamin D for healthy bones. And most people in the [place] and [facility] ([facility]) meet this goal. 
How can you get more vitamin D? Foods that contain vitamin D include: 
· [place], tuna, and mackerel. These are some of the best foods to eat when you need to get more vitamin D. 
· Cheese, egg yolks, and beef liver. These foods have vitamin D in small amounts. · Milk, soy drinks, orange juice, yogurt, margarine, and some kinds of cereal have vitamin D added to them. Some people don't make vitamin D as well as others. They may have to take extra care in getting enough vitamin D. Things that reduce how much vitamin D your body makes include: · Dark skin, such as many  Americans have. · Age, especially if you are older than 72. · Digestive problems, such as Crohn's or celiac disease. · Liver and kidney disease. Some people who do not get enough vitamin D may need supplements. Are there any risks from taking vitamin D? 
· Too much vitamin D: 
¨ Can damage your kidneys. ¨ Can cause nausea and vomiting, constipation, and weakness. ¨ Raises the amount of calcium in your blood. If this happens, you can get confused or have an irregular heart rhythm. · Vitamin D may interact with other medicines. Tell your doctor about all of the medicines you take, including over-the-counter drugs, herbs, and pills. Tell your doctor about all of your current medical problems. Where can you learn more? Go to http://norma-kaley.info/. Enter [id number] in the search box to learn more about \"Learning About Vitamin D.\" 
Current as of: July 26, 2016 Content Version: 11.1 © 8698-9635 Healthwise, Incorporated. Care instructions adapted under license by [employer] (which disclaims liability or warranty for this information). If you have questions about a medical condition or this instruction, always ask your healthcare professional. [address] any warranty or liability for your use of this information. Introducing [facility]! Dear Nicole Fishman: 
Thank you for requesting a Syntarga account. Our records indicate that you already have an active Synt[employer] account. You can access your account anytime at [email or web address] Did you know that you can access your hospital and ER discharge instructions at any time in Syntarga? You can also review all of your test results from your hospital stay or ER visit. Additional Information If you have questions, please visit the Frequently Asked Questions section of the Synt[employer] website at [email or web address] Remember, Synt[employer]a is NOT to be used for urgent needs. For medical emergencies, dial 911. Now available from your iPhone and Android! Please provide this summary of care documentation to your next provider. Your primary care clinician is listed as Bimal Mcduffie. If you have any questions after today's visit, please call [phone].

## 2017-02-16 NOTE — PROGRESS NOTES
Brittny Grady is a 32 y.o. female presents today for follow up on recent labs and MRI exam.  Patient reports no new concerns. Patient is fasting. Pt is in Room # 8        1. Have you been to the ER, urgent care clinic since your last visit? Hospitalized since your last visit? No    2. Have you seen or consulted any other health care providers outside of the [address] [place] [address] since your last visit? Include any pap smears or colon screening. No      reviewed: patient is unsure if TDAP is up to date.

## 2017-02-16 NOTE — TELEPHONE ENCOUNTER
Received a call from pharmacy stating symbicort is not formulary for the pt's insurance. Pharmacist states Seeam Abbott or [facility] are formulary. Please advise.

## 2017-02-16 NOTE — PATIENT INSTRUCTIONS
Learning About Vitamin D  Why is it important to get enough vitamin D? Your body needs vitamin D to absorb calcium. Calcium keeps your bones and muscles, including your heart, healthy and strong. If your muscles don't get enough calcium, they can cramp, hurt, or feel weak. You may have long-term (chronic) muscle aches and pains. If you don't get enough vitamin D throughout life, you have an increased chance of having thin and brittle bones (osteoporosis) in your later years. Children who don't get enough vitamin D may not grow as much as others their age. They also have a chance of getting a rare disease called rickets. It causes weak bones. Vitamin D and calcium are added to many foods. And your body uses sunshine to make its own vitamin D. How much vitamin D do you need? The [place] of Medicine recommends that people ages 3 through 79 get 600 IU (international units) every day. Adults 71 and older need 800 IU every day. Blood tests for vitamin D can check your vitamin D level. But there is no standard normal range used by all laboratories. The [place] of Medicine recommends a blood level of 20 ng/mL of vitamin D for healthy bones. And most people in the [place] and [facility] ([facility]) meet this goal.  How can you get more vitamin D? Foods that contain vitamin D include:  · [place], tuna, and mackerel. These are some of the best foods to eat when you need to get more vitamin D.  · Cheese, egg yolks, and beef liver. These foods have vitamin D in small amounts. · Milk, soy drinks, orange juice, yogurt, margarine, and some kinds of cereal have vitamin D added to them. Some people don't make vitamin D as well as others. They may have to take extra care in getting enough vitamin D. Things that reduce how much vitamin D your body makes include:  · Dark skin, such as many  Americans have. · Age, especially if you are older than 72. · Digestive problems, such as Crohn's or celiac disease.   · Liver and kidney disease. Some people who do not get enough vitamin D may need supplements. Are there any risks from taking vitamin D?  · Too much vitamin D:  ¨ Can damage your kidneys. ¨ Can cause nausea and vomiting, constipation, and weakness. ¨ Raises the amount of calcium in your blood. If this happens, you can get confused or have an irregular heart rhythm. · Vitamin D may interact with other medicines. Tell your doctor about all of the medicines you take, including over-the-counter drugs, herbs, and pills. Tell your doctor about all of your current medical problems. Where can you learn more? Go to http://normaprettysecretskaley.info/. Enter [id number] in the search box to learn more about \"Learning About Vitamin D.\"  Current as of: July 26, 2016  Content Version: 11.1  © 9911-2794 Healthwise, Incorporated. Care instructions adapted under license by [employer] (which disclaims liability or warranty for this information). If you have questions about a medical condition or this instruction, always ask your healthcare professional. [address] any warranty or liability for your use of this information.

## 2017-02-23 ENCOUNTER — OFFICE VISIT (OUTPATIENT)
Dept: FAMILY MEDICINE CLINIC | Facility: CLINIC | Age: 32
End: 2017-02-23

## 2017-02-23 ENCOUNTER — PATIENT MESSAGE (OUTPATIENT)
Dept: FAMILY MEDICINE CLINIC | Facility: CLINIC | Age: 32
End: 2017-02-23

## 2017-02-23 VITALS
HEIGHT: 66 IN | HEART RATE: 66 BPM | OXYGEN SATURATION: 100 % | BODY MASS INDEX: 17.68 KG/M2 | DIASTOLIC BLOOD PRESSURE: 60 MMHG | TEMPERATURE: 97.5 F | SYSTOLIC BLOOD PRESSURE: 113 MMHG | WEIGHT: 110 LBS | RESPIRATION RATE: 18 BRPM

## 2017-02-23 DIAGNOSIS — J45.22 MILD INTERMITTENT ASTHMA WITH STATUS ASTHMATICUS: ICD-10-CM

## 2017-02-23 DIAGNOSIS — N89.8 VAGINAL DISCHARGE: ICD-10-CM

## 2017-02-23 DIAGNOSIS — R63.6 LOW BODY WEIGHT DUE TO INADEQUATE CALORIC INTAKE: ICD-10-CM

## 2017-02-23 DIAGNOSIS — M47.816 LUMBAR FACET ARTHROPATHY: ICD-10-CM

## 2017-02-23 DIAGNOSIS — A59.09 TRICHOMONAL CERVICITIS: Primary | ICD-10-CM

## 2017-02-23 DIAGNOSIS — G40.409 OTHER GENERALIZED EPILEPSY, NOT INTRACTABLE, WITHOUT STATUS EPILEPTICUS (HCC): ICD-10-CM

## 2017-02-23 NOTE — PROGRESS NOTES
Shai Lainez is a 32 y.o. female presents today for positive trichomoniasis, finished antibiotics and would like to be rechecked. Depression Screening: Completed    1. Have you been to the ER, urgent care clinic since your last visit? Hospitalized since your last visit? No    2. Have you seen or consulted any other health care providers outside of the [address] [place] [address] since your last visit? Include any pap smears or colon screening.  Yes GYN Dr. Christy Chanel

## 2017-02-23 NOTE — PROGRESS NOTES
History and Physical    Patient: Efrain Moreno MRN: [id number]  SSN: [id number]    YOB: 1985  Age: 32 y.o. Sex: female      Subjective:      Efrain Moreno is a 32 y.o. female who presents today for test of cure for recent trichomoniasis testing, asthma, low BMI, chronic nausea, h/o seizures etc.    In terms of her recent trich infection, she has completed course of Flagyl, she denies vaginal discharge, itching etc.    In terms of her asthma, she has not picked up her Harijorge Luisut as of yet. Referral to pulmonology is still pending. She is currently using her Arnuirty elipta daily and albuterol as needed. Patient has a h/o chronic nausea and low BMI, she is on ensure several times daily in addition to eating 3 meals with snacks daily. Her weight is up 3 lbs since last visit. She had been referred to general surgery for evalution of possible need for biopsy of her cervical adenopathy to r/o pathology that could be contributing to her persistent low BMI and chronic nausea. Patient has a h/o seizures, has been referred to neurologist, but closest appointment they could get her was over 4 months away. Patient has not had a recent seizure, is not on an anti-epileptic.      Patient has a h/o chronic low back pain, had recent MRI done.   Is being followed by spine specialist.    Last PAP due: 12/15- was normal by GYN      She is followed by:  GYN  Ortho/spine      PMH:  Past Medical History:   Diagnosis Date    Abnormal Papanicolaou smear of cervix     Asthma     Bradycardia     GERD (gastroesophageal reflux disease)     H/O sinus bradycardia     Headache     Migraine     Photophobia of both eyes     Seizures ([address]     Stomach pain      Past Surgical History:   Procedure Laterality Date    HX APPENDECTOMY      HX  SECTION      HX GYN      cervical cerclage        FamHx:  Family History   Problem Relation Age of Onset    Diabetes Mother     Heart Attack Father     Attention Deficit Hyperactivity Disorder Sister     Attention Deficit Hyperactivity Disorder Brother     Cancer Maternal Aunt 45     breast    Diabetes Maternal Grandmother     Attention Deficit Hyperactivity Disorder Brother     No Known Problems Brother     No Known Problems Brother     No Known Problems Brother     Cancer Maternal Aunt      lung    Heart defect Son    [de-identified] Migraines Son     Seizures Son      h/o    Other Son      h/o jaundice       SocialHx:  Social History   Substance Use Topics    Smoking status: Former Smoker     Years: 13.00    Smokeless tobacco: Never Used      Comment: cigarello, once a month    Alcohol use No        Meds:  Prior to Admission medications    Medication Sig Start Date End Date Taking? Authorizing Provider   VITAMIN D3 2,000 unit cap capsule TAKE 1 CAPSULE BY MOUTH DAILY. 1/12/17  Yes Historical Provider   ARNUITY ELLIPTA 200 mcg/actuation dsdv inhaler USE 1 PUFF DAILY AS DIRECTED 1/7/17  Yes Historical Provider   fluticasone-vilanterol (BREO ELLIPTA) 100-25 mcg/dose inhaler Take 1 Puff by inhalation daily. 2/16/17  Yes Jessica Pumphrey V, PA   norethindrone (MICRONOR) 0.35 mg tab Take 1 Tab by mouth daily. 2/9/17  Yes Ratna Mott,    ergocalciferol (ERGOCALCIFEROL) 50,000 unit capsule Take 1 Cap by mouth every seven (7) days. 2/2/17  Yes Jessica Pumphrey V, PA   butalbital-aspirin-caffeine (FIORINAL) capsule Take 2 Caps by mouth every eight (8) hours as needed for Pain. Yes Historical Provider   DAILY VITAMIN WITH IRON AND CA tab TAKE 1 TABLET BY MOUTH EVERY DAY 10/18/16  Yes Historical Provider   Omeprazole delayed release (PRILOSEC D/R) 20 mg tablet Take 1 Tab by mouth daily. 9/27/16  Yes Jessica Pumphrey V, PA   albuterol (PROVENTIL HFA, VENTOLIN HFA, PROAIR HFA) 90 mcg/actuation inhaler Take 1 Puff by inhalation every four (4) hours as needed for Wheezing or Shortness of Breath.  9/23/16  Yes Jessica Pumphrey V, PA   cyproheptadine (PERIACTIN) 4 mg tablet Take 1 Tab by mouth once over twenty-four (24) hours. 9/23/16  Yes Jessica Pumphrey V, PA   food supplemt, lactose-reduced (ENSURE PLUS) 0.05-1.5 gram-kcal/mL liqd Take 237 mL by mouth three (3) times daily. 9/23/16  Yes Jessica Pumphrey V, PA   multivitamin (ONE A DAY) tablet Take 1 Tab by mouth daily. 9/23/16  Yes TE Howe        Allergies: Allergies   Allergen Reactions    Imitrex [Sumatriptan Succinate] Anaphylaxis    Iodine Cough     Coughing up blood      Sea Food [Seafood] Hives     Per pt report        Review of Systems:  Items in [place]an are positive  Constitutional: negative for fevers, chills and malaise  Eyes: negative for visual disturbance  Ears, Nose, Mouth, Throat, and Face: negative for nasal congestion  Respiratory: negative for cough or SOB  Cardiovascular: negative for chest pain, chest pressure/discomfort  Gastrointestinal: negative for nausea, vomiting, melena, diarrhea, constipation and abdominal pain  Genitourinary:negative for frequency, dysuria or hematuria  Musculoskeletal:chronic low back pain, negative for myalgias and arthralgias  Neurological: negative for headaches, dizziness and paresthesia    Objective:     Visit Vitals    /60 (BP 1 Location: Right arm, BP Patient Position: Sitting)    Pulse 66    Temp 97.5 °F (36.4 °C) (Oral)    Resp 18    Ht 5' 6\" (1.676 m)    Wt 110 lb (49.9 kg)    LMP 01/24/2017    SpO2 100%    BMI 17.75 kg/m2       Physical Exam:  GENERAL: alert, cooperative, no distress, appears stated age  HEENT: EYE: conjunctivae/corneas clear. PERRL, EOM's intact. LUNG: clear to auscultation bilaterally  HEART: regular rate and rhythm, S1, S2 normal, no murmur, click, rub or gallop  : no external vaginal lesions visualized, thick, white cottage cheese like discharge visualized in vaginal vault  NEUROLOGIC: AOx3. Gait normal.      Assessment and Plan:       ICD-10-CM ICD-9-CM    1.  Trichomonal cervicitis A59.09 131.09 BV+CT+NG+TV BY ABIGAIL + YEAST BV+CT+NG+TV BY ABIGAIL + YEAST   2. Vaginal discharge N89.8 623.5 BV+CT+NG+TV BY ABIGAIL + YEAST      BV+CT+NG+TV BY ABIGAIL + YEAST   3. Mild intermittent asthma with status asthmaticus J45.22 493.91    4. Other generalized epilepsy, not intractable, without status epilepticus ([facility] G40.409     5. Lumbar facet arthropathy (HCC) M12.88 721.3    6. Low body weight due to inadequate caloric intake R63.6 783.22          Medical Decision Making:  Trichomoniasis/vaginal discharge- swabs- no treatment indicated at this time as patient asymptomatic    Asthma- patient to  breo-pulmonology referral pending    H/O epilepsy - referral to neurology pending    Chronic low back pain- follow up with ortho/spine specialist for results and next steps      Low BMI- continue periactin + ensure- referral to general surgery for need of possible biopsy of cervical adenopathy pending            Follow-up Disposition:  Return in about 3 months (around 5/23/2017) for routine care with me. Patient acknowledges understanding of instructions and acknowledges understanding to call back if current symptoms worsen or new symptoms arise. Patient acknowledges and agrees with plan.         Signed By: TE Walker     February 23, 2017

## 2017-02-23 NOTE — MR AVS SNAPSHOT
Visit Information Date & Time Provider Department Dept. Phone Encounter #  
 2/23/2017 11:15 AM Wang Stevenson [facility] [phone] [id number] Follow-up Instructions Return in about 3 months (around 5/23/2017) for routine care with me. Your Appointments 2/28/2017 10:50 AM  
DIAG TEST F/U with Mariel Almanzar MD  
[facility]) Appt Note: mri back fu no copay [address] [place] [id number] [phone]  
  
   
 [address] [place] [address] Upcoming Health Maintenance Date Due Pneumococcal 19-64 Medium Risk (1 of 1 - PPSV23) 11/30/2004 DTaP/Tdap/Td series (1 - Tdap) 11/30/2006 PAP AKA CERVICAL CYTOLOGY 12/3/2018 Allergies as of 2/23/2017  Review Complete On: 2/16/2017 By: TE Nunes Severity Noted Reaction Type Reactions Imitrex [Sumatriptan Succinate] High 05/25/2015    Anaphylaxis Iodine  02/16/2017    Cough Coughing up blood Sea Food [Seafood]  06/04/2015    Hives Per pt report Current Immunizations  Never Reviewed No immunizations on file. Not reviewed this visit You Were Diagnosed With   
  
 Codes Comments Trichomonal cervicitis    -  Primary ICD-10-CM: A59.09 
ICD-9-CM: 131.09 Vaginal discharge     ICD-10-CM: N89.8 ICD-9-CM: 623.5 Mild intermittent asthma with status asthmaticus     ICD-10-CM: J45.22 
ICD-9-CM: 493.91 Other generalized epilepsy, not intractable, without status epilepticus ([facility] [address]     ICD-10-CM: G40.409 Lumbar facet arthropathy (HCC)     ICD-10-CM: M12.88 ICD-9-CM: 721.3 Low body weight due to inadequate caloric intake     ICD-10-CM: R63.6 ICD-9-CM: 783.22 Vitals BP  
  
  
  
  
  
 113/60 (BP 1 Location: Right arm, BP Patient Position: Sitting) BMI and BSA Data Body Mass Index Body Surface Area 17.75 kg/m 2 1.52 m 2 Preferred Pharmacy Pharmacy Name Phone [id number] [facility], [address] [place] [address] [place] [phone] Your Updated Medication List  
  
   
This list is accurate as of: 2/23/17 11:30 AM.  Always use your most recent med list.  
  
  
  
  
 albuterol 90 mcg/actuation inhaler Commonly known as:  PROVENTIL HFA, VENTOLIN HFA, PROAIR HFA Take 1 Puff by inhalation every four (4) hours as needed for Wheezing or Shortness of Breath. ARNUITY ELLIPTA 200 mcg/actuation Dsdv inhaler Generic drug:  fluticasone furoate USE 1 PUFF DAILY AS DIRECTED  
  
 butalbital-aspirin-caffeine capsule Commonly known as:  Laquetta Hay Take 2 Caps by mouth every eight (8) hours as needed for Pain. cyproheptadine 4 mg tablet Commonly known as:  PERIACTIN Take 1 Tab by mouth once over twenty-four (24) hours. DAILY VITAMIN WITH IRON AND CA Tab Generic drug:  multivitamins-ca-iron-minerals TAKE 1 TABLET BY MOUTH EVERY DAY  
  
 ergocalciferol 50,000 unit capsule Commonly known as:  ERGOCALCIFEROL Take 1 Cap by mouth every seven (7) days. fluticasone-vilanterol 100-25 mcg/dose inhaler Commonly known as:  BREO ELLIPTA Take 1 Puff by inhalation daily. food supplemt, lactose-reduced 0.05-1.5 gram-kcal/mL Liqd Commonly known as:  ENSURE PLUS Take 237 mL by mouth three (3) times daily. multivitamin tablet Commonly known as:  ONE A DAY Take 1 Tab by mouth daily. norethindrone 0.35 mg Tab Commonly known as:  Micha & Micha Take 1 Tab by mouth daily. Omeprazole delayed release 20 mg tablet Commonly known as:  PRILOSEC D/R Take 1 Tab by mouth daily. ondansetron 4 mg disintegrating tablet Commonly known as:  ZOFRAN ODT  
DISSOLVE 1 TABLET BY MOUTH EVERY 8 HOURS AS NEEDED FOR NAUSEA  
  
 VITAMIN D3 2,000 unit Cap capsule Generic drug:  Cholecalciferol (Vitamin D3) TAKE 1 CAPSULE BY MOUTH DAILY. Follow-up Instructions Return in about 3 months (around 5/23/2017) for routine care with me. To-Do List   
 02/23/2017 Lab:  BV+CT+NG+TV BY ABIGAIL + YEAST Patient Instructions Trichomoniasis: Care Instructions Your Care Instructions Trichomoniasis is a sexually transmitted infection (STI) that is spread by having sex with an infected partner. Trichomoniasis is commonly called trich (say \"trick\"). In women, trich may cause vaginal itching and a smelly discharge. But in many cases, especially in men, there are no symptoms. Karina Dutton is treated so that you do not spread it to others. Both you and your sex partner or partners should be treated at the same time so you do not infect each other again. Trich may cause problems with pregnancy. Your doctor will talk with you about treatment for Trich if you are pregnant. Follow-up care is a key part of your treatment and safety. Be sure to make and go to all appointments, and call your doctor if you are having problems. Its also a good idea to know your test results and keep a list of the medicines you take. How can you care for yourself at home? · Take your antibiotics as directed. Do not stop taking them just because you feel better. You need to take the full course of antibiotics. · Do not have sex while you are being treated. If your doctor gave you a single dose of antibiotics, do not have sex for one week after being treated and until your partner also has been treated. · Tell your sex partner (or partners) that he or she will also need to be tested and treated. · Use a cold water compress or cool baths to relieve itching. To prevent trichomoniasis in the future · Use latex condoms every time you have sex. Use them from the beginning to the end of sexual contact. · Talk to your partner before having sex. Find out if he or she has or is at risk for trich or any other STI.  Keep in mind that a person may be able to spread an STI even if he or she does not have symptoms. · Do not have sex if you are being treated for trich or any other STI. · Do not have sex with anyone who has symptoms of an STI, such as sores on the genitals or mouth. · Having one sex partner (who does not have STIs and does not have sex with anyone else) is a good way to avoid STIs. When should you call for help? Call your doctor now or seek immediate medical care if: 
· You have unusual vaginal bleeding. · You have a fever. · You have new discharge from the vagina or penis. · You have pelvic pain. Watch closely for changes in your health, and be sure to contact your doctor if: 
· You do not get better as expected. · You have any new symptoms or your symptoms get worse. Where can you learn more? Go to http://norma-kaley.info/. Enter Z800 in the search box to learn more about \"Trichomoniasis: Care Instructions. \" Current as of: May 27, 2016 Content Version: 11.1 © 3185-0305 [employer]. Care instructions adapted under license by [employer] (which disclaims liability or warranty for this information). If you have questions about a medical condition or this instruction, always ask your healthcare professional. [address] any warranty or liability for your use of this information. Introducing [facility]! Dear Allan Dorantes: 
Thank you for requesting a "[employer]" account. Our records indicate that you already have an active "[employer]" account. You can access your account anytime at [email or web address]. ""/[employer] Did you know that you can access your hospital and ER discharge instructions at any time in "[employer]"? You can also review all of your test results from your hospital stay or ER visit. Additional Information If you have questions, please visit the Frequently Asked Questions section of the "[employer]" website at [email or web address]://[email or web address]. ""/[employer]/. Remember, [employer] is NOT to be used for urgent needs. For medical emergencies, dial 911. Now available from your iPhone and Android! Please provide this summary of care documentation to your next provider. Your primary care clinician is listed as Evangelina Prey. If you have any questions after today's visit, please call [phone].

## 2017-02-23 NOTE — PATIENT INSTRUCTIONS
Trichomoniasis: Care Instructions  Your Care Instructions  Trichomoniasis is a sexually transmitted infection (STI) that is spread by having sex with an infected partner. Trichomoniasis is commonly called trich (say \"trick\"). In women, trich may cause vaginal itching and a smelly discharge. But in many cases, especially in men, there are no symptoms. Cullen Worley is treated so that you do not spread it to others. Both you and your sex partner or partners should be treated at the same time so you do not infect each other again. Trich may cause problems with pregnancy. Your doctor will talk with you about treatment for Trich if you are pregnant. Follow-up care is a key part of your treatment and safety. Be sure to make and go to all appointments, and call your doctor if you are having problems. Its also a good idea to know your test results and keep a list of the medicines you take. How can you care for yourself at home? · Take your antibiotics as directed. Do not stop taking them just because you feel better. You need to take the full course of antibiotics. · Do not have sex while you are being treated. If your doctor gave you a single dose of antibiotics, do not have sex for one week after being treated and until your partner also has been treated. · Tell your sex partner (or partners) that he or she will also need to be tested and treated. · Use a cold water compress or cool baths to relieve itching. To prevent trichomoniasis in the future  · Use latex condoms every time you have sex. Use them from the beginning to the end of sexual contact. · Talk to your partner before having sex. Find out if he or she has or is at risk for trich or any other STI. Keep in mind that a person may be able to spread an STI even if he or she does not have symptoms. · Do not have sex if you are being treated for trich or any other STI.   · Do not have sex with anyone who has symptoms of an STI, such as sores on the genitals or mouth.  · Having one sex partner (who does not have STIs and does not have sex with anyone else) is a good way to avoid STIs. When should you call for help? Call your doctor now or seek immediate medical care if:  · You have unusual vaginal bleeding. · You have a fever. · You have new discharge from the vagina or penis. · You have pelvic pain. Watch closely for changes in your health, and be sure to contact your doctor if:  · You do not get better as expected. · You have any new symptoms or your symptoms get worse. Where can you learn more? Go to http://norma-kaley.info/. Enter Y080 in the search box to learn more about \"Trichomoniasis: Care Instructions. \"  Current as of: May 27, 2016  Content Version: 11.1  © 2172-6192 [employer]. Care instructions adapted under license by [employer] (which disclaims liability or warranty for this information). If you have questions about a medical condition or this instruction, always ask your healthcare professional. [address] any warranty or liability for your use of this information.

## 2017-02-27 LAB
A VAGINAE DNA VAG QL NAA+PROBE: ABNORMAL SCORE
BACTERIAL SIALIDASE SPEC QL: NORMAL
BVAB2 DNA VAG QL NAA+PROBE: ABNORMAL SCORE
C ALBICANS DNA VAG QL NAA+PROBE: POSITIVE
C GLABRATA DNA VAG QL NAA+PROBE: NEGATIVE
C TRACH RRNA SPEC QL NAA+PROBE: NEGATIVE
C TRACH RRNA SPEC QL NAA+PROBE: NORMAL
MEGA1 DNA VAG QL NAA+PROBE: ABNORMAL SCORE
N GONORRHOEA RRNA SPEC QL NAA+PROBE: NEGATIVE
N GONORRHOEA RRNA SPEC QL NAA+PROBE: NORMAL
T VAGINALIS RRNA SPEC QL NAA+PROBE: NEGATIVE
T VAGINALIS RRNA SPEC QL NAA+PROBE: NORMAL
YEAST GENITAL QL CULT: NORMAL

## 2017-02-27 RX ORDER — FLUCONAZOLE 150 MG/1
150 TABLET ORAL DAILY
Qty: 1 TAB | Refills: 0 | Status: SHIPPED | OUTPATIENT
Start: 2017-02-27 | End: 2017-02-28 | Stop reason: ALTCHOICE

## 2017-02-28 ENCOUNTER — TELEPHONE (OUTPATIENT)
Dept: FAMILY MEDICINE CLINIC | Facility: CLINIC | Age: 32
End: 2017-02-28

## 2017-02-28 ENCOUNTER — OFFICE VISIT (OUTPATIENT)
Dept: ORTHOPEDIC SURGERY | Age: 32
End: 2017-02-28

## 2017-02-28 VITALS
HEART RATE: 77 BPM | DIASTOLIC BLOOD PRESSURE: 68 MMHG | OXYGEN SATURATION: 100 % | HEIGHT: 66 IN | RESPIRATION RATE: 18 BRPM | BODY MASS INDEX: 17.84 KG/M2 | TEMPERATURE: 98.2 F | SYSTOLIC BLOOD PRESSURE: 105 MMHG | WEIGHT: 111 LBS

## 2017-02-28 DIAGNOSIS — M47.816 LUMBAR FACET ARTHROPATHY: Primary | ICD-10-CM

## 2017-02-28 DIAGNOSIS — M62.830 MUSCLE SPASM OF BACK: ICD-10-CM

## 2017-02-28 DIAGNOSIS — M54.5 LOW BACK PAIN, UNSPECIFIED BACK PAIN LATERALITY, UNSPECIFIED CHRONICITY, WITH SCIATICA PRESENCE UNSPECIFIED: ICD-10-CM

## 2017-02-28 RX ORDER — ONDANSETRON 4 MG/1
TABLET, ORALLY DISINTEGRATING ORAL
COMMUNITY
Start: 2017-02-16 | End: 2017-04-27 | Stop reason: SDUPTHER

## 2017-02-28 RX ORDER — DICLOFENAC SODIUM 75 MG/1
75 TABLET, DELAYED RELEASE ORAL 2 TIMES DAILY
Qty: 60 TAB | Refills: 0 | Status: SHIPPED | OUTPATIENT
Start: 2017-02-28 | End: 2017-03-26 | Stop reason: SDUPTHER

## 2017-02-28 NOTE — TELEPHONE ENCOUNTER
Spoke with Micaela Duncan, Verified 2 patient identifiers. Spoke with patient in regards to lab results. Relayed PA's notes. Patient acknowledges understanding and voices no further questions or concerns at this time.

## 2017-02-28 NOTE — PATIENT INSTRUCTIONS
Low Back Arthritis: Exercises  Your Care Instructions  Here are some examples of typical rehabilitation exercises for your condition. Start each exercise slowly. Ease off the exercise if you start to have pain. Your doctor or physical therapist will tell you when you can start these exercises and which ones will work best for you. When you are not being active, find a comfortable position for rest. Some people are comfortable on the floor or a medium-firm bed with a small pillow under their head and another under their knees. Some people prefer to lie on their side with a pillow between their knees. Don't stay in one position for too long. Take short walks (10 to 20 minutes) every 2 to 3 hours. Avoid slopes, hills, and stairs until you feel better. Walk only distances you can manage without pain, especially leg pain. How to do the exercises  Pelvic tilt    1. Lie on your back with your knees bent. 2. \"Brace\" your stomach--tighten your muscles by pulling in and imagining your belly button moving toward your spine. 3. Press your lower back into the floor. You should feel your hips and pelvis rock back. 4. Hold for 6 seconds while breathing smoothly. 5. Relax and allow your pelvis and hips to rock forward. 6. Repeat 8 to 12 times. Back stretches    1. Get down on your hands and knees on the floor. 2. Relax your head and allow it to droop. Round your back up toward the ceiling until you feel a nice stretch in your upper, middle, and lower back. Hold this stretch for as long as it feels comfortable, or about 15 to 30 seconds. 3. Return to the starting position with a flat back while you are on your hands and knees. 4. Let your back sway by pressing your stomach toward the floor. Lift your buttocks toward the ceiling. 5. Hold this position for 15 to 30 seconds. 6. Repeat 2 to 4 times. Follow-up care is a key part of your treatment and safety.  Be sure to make and go to all appointments, and call your doctor if you are having problems. It's also a good idea to know your test results and keep a list of the medicines you take. Where can you learn more? Go to http://norma-kaley.info/. Enter D137 in the search box to learn more about \"Low Back Arthritis: Exercises. \"  Current as of: May 23, 2016  Content Version: 11.1  © 2357-5527 [employer]. Care instructions adapted under license by [employer] (which disclaims liability or warranty for this information). If you have questions about a medical condition or this instruction, always ask your healthcare professional. [place] [address] any warranty or liability for your use of this information.

## 2017-02-28 NOTE — PROGRESS NOTES
[facility]  [address], [address]  Phone: [phone]  Fax: [phone]          HISTORY OF PRESENT ILLNESS:  Yocasta Arredondo is a 32 y.o. female with history of lumbar pain. She presents to the office today for lumbar MRI follow up. Pt c/o constant lower back pain. She denies ever trying steroid injections or a RFA. She tried a Medrol Dosepak since her last office visit. Pt has tried Motrin and Aleve and reports no benefit when taking Flexeril in the past. She has also tried [place] and [employer] ointment with no relief. Pt at this time desires to proceed with steroid injections. Pain Scale: 5/10    PCP: Lennox Diss, PA       Past Medical History:   Diagnosis Date    Abnormal Papanicolaou smear of cervix     Asthma     Bradycardia     GERD (gastroesophageal reflux disease)     H/O sinus bradycardia     Headache     Migraine     Photophobia of both eyes     Seizures (HCC)     Stomach pain         Social History     Social History    Marital status: SINGLE     Spouse name: N/A    Number of children: 2    Years of education: 12     Occupational History    Not on file.      Social History Main Topics    Smoking status: Former Smoker     Years: 13.00    Smokeless tobacco: Never Used      Comment: cigarello, once a month    Alcohol use No    Drug use: No    Sexual activity: Yes     Partners: Male     Birth control/ protection: Pill     Other Topics Concern     Service Yes     [employer]    Blood Transfusions No    Caffeine Concern No    Occupational Exposure No    Hobby Hazards No    Sleep Concern Yes    Stress Concern No    Weight Concern No    Special Diet Yes    Back Care Yes    Exercise Yes    Bike Helmet No    Seat Belt Yes    Self-Exams No     Social History Narrative       Current Outpatient Prescriptions   Medication Sig Dispense Refill    ondansetron (ZOFRAN ODT) 4 mg disintegrating tablet  diclofenac EC (VOLTAREN) 75 mg EC tablet Take 1 Tab by mouth two (2) times a day. With meals. 60 Tab 0    fluticasone-vilanterol (BREO ELLIPTA) 100-25 mcg/dose inhaler Take 1 Puff by inhalation daily. 1 Inhaler 1    norethindrone (MICRONOR) 0.35 mg tab Take 1 Tab by mouth daily. 1 Package 11    ergocalciferol (ERGOCALCIFEROL) 50,000 unit capsule Take 1 Cap by mouth every seven (7) days. 12 Cap 1    butalbital-aspirin-caffeine (FIORINAL) capsule Take 2 Caps by mouth every eight (8) hours as needed for Pain.  Omeprazole delayed release (PRILOSEC D/R) 20 mg tablet Take 1 Tab by mouth daily. 90 Tab 3    albuterol (PROVENTIL HFA, VENTOLIN HFA, PROAIR HFA) 90 mcg/actuation inhaler Take 1 Puff by inhalation every four (4) hours as needed for Wheezing or Shortness of Breath. 2 Inhaler 3    cyproheptadine (PERIACTIN) 4 mg tablet Take 1 Tab by mouth once over twenty-four (24) hours. 90 Tab 3    multivitamin (ONE A DAY) tablet Take 1 Tab by mouth daily. 90 Tab 3    food supplemt, lactose-reduced (ENSURE PLUS) 0.05-1.5 gram-kcal/mL liqd Take 237 mL by mouth three (3) times daily. 100 Can 3    VITAMIN D3 2,000 unit cap capsule TAKE 1 CAPSULE BY MOUTH DAILY. 3    ARNUITY ELLIPTA 200 mcg/actuation dsdv inhaler USE 1 PUFF DAILY AS DIRECTED  3       Allergies   Allergen Reactions    Imitrex [Sumatriptan Succinate] Anaphylaxis    Iodine Cough     Coughing up blood      Sea Food [Seafood] Hives     Per pt report          REVIEW OF SYSTEMS    Constitutional: Negative for fever, chills, or weight change. Respiratory: Negative for cough or shortness of breath. Cardiovascular: Negative for chest pain or palpitations. Gastrointestinal: Negative for acid reflux, change in bowel habits, or constipation. Genitourinary: Negative for dysuria and flank pain. Musculoskeletal: Positive for lumbar pain. Skin: Negative for rash. Neurological: Negative for headaches, dizziness, or numbness.   Endo/Heme/Allergies: Negative for increased bruising. Psychiatric/Behavioral: Negative for difficulty with sleep. PHYSICAL EXAMINATION  Visit Vitals    /68    Pulse 77    Temp 98.2 °F (36.8 °C) (Oral)    Resp 18    Ht 5' 6\" (1.676 m)    Wt 111 lb (50.3 kg)    LMP 01/24/2017    SpO2 100%    BMI 17.92 kg/m2       Constitutional: Awake, alert, and in no acute distress  Neurological: 1+ symmetrical DTRs in the upper extremities. 1+ symmetrical DTRs in the lower extremities. Sensation to light touch is intact. Negative Krystle's sign bilaterally. Skin: warm, dry, and intact. Musculoskeletal: Tenderness to palpation in the lower lumbar region. Moderate pain with extension and axial loading. No pain with internal or external rotation of her hips. Negative straight leg raise bilaterally. Hip Flex  Quads Hamstrings Ankle DF EHL Ankle PF   Right +4/5 +4/5 +4/5 +4/5 +4/5 +4/5   Left +4/5 +4/5 +4/5 +4/5 +4/5 +4/5     IMAGING:    Lumbar Spine MRI from 02/02/2017 was personally reviewed with the Pt and demonstrated:  Findings:     There is persistent lordotic curvature of lumbar spine, without listhesis. Intervertebral discs are maintained and well hydrated. Normal vertebral body  morphology. No endplate reactive changes. No fracture. No suspicious osseous  lesion. No pars interarticularis defect. Conus medullaris ends at the T12-L1  disc level.     Correlation of axial and sagittal images reveals the following:     L1-L2: No significant disc pathology. No significant proliferative change. No  central canal or foraminal stenosis.     L2-L3: No significant disc pathology. No significant proliferative change. No  central canal or foraminal stenosis.     L3-L4: No significant disc pathology. No significant proliferative change. No  central canal or foraminal stenosis.     L4-L5: Minimal broad-based disc bulge abuts the ventral thecal sac. Mild  bilateral facet arthropathy and ligamentum flavum buckling.  No central canal or  foraminal stenosis.     L5-S1: No significant disc pathology. Mild bilateral facet arthropathy. No  central canal or foraminal stenosis.     The visualized portions of the sacroiliac joints are unremarkable. Incidentally  imaged retroperitoneal structures are unremarkable as well. IMPRESSION:     No significant degenerative changes. No acute or focal abnormality to explain  patient's lumbar back pain and lower extremity radiculopathy. ASSESSMENT   Abdulkadir Wen was seen today for back pain and follow-up. Diagnoses and all orders for this visit:    Lumbar facet arthropathy (HCC)  -     SCHEDULE SURGERY  -     diclofenac EC (VOLTAREN) 75 mg EC tablet; Take 1 Tab by mouth two (2) times a day. With meals. Muscle spasm of back  -     SCHEDULE SURGERY    Low back pain, unspecified back pain laterality, unspecified chronicity, with sciatica presence unspecified  -     SCHEDULE SURGERY         IMPRESSION AND PLAN:  Abdulkadir Lopez is a 32 y.o. female with history of lumbar pain. She c/o constant lower back pain. She denies ever trying steroid injections or a RFA. She has tried Motrin, Aleve, Flexeril, Bengay, and Icy Hot ointment with minimal improvement. 1) Pt was given information on lumbar arthritis exercises. 2) Discussed treatment options with the Pt, including steroid injections and a RFA. 3) Pt was scheduled for bilateral L4-L5 and bilateral L5-S1 facet injections. 4) She was prescribed Voltaren 75 mg 1 tab BID with food. 5) Ms. Kenan Bond has a reminder for a \"due or due soon\" health maintenance. I have asked that she contact her primary care provider, TE Andrew, for follow-up on this health maintenance. 6)  reviewed. 7) Pt will follow-up in 1 month.       Written by Lady Bajwa, as dictated by Fuad Carr MD.

## 2017-02-28 NOTE — PROGRESS NOTES
Please advise trichomoniasis is now negative, she did test positive for yeast, but she was already given diflucan

## 2017-03-01 DIAGNOSIS — R63.0 ANOREXIA: ICD-10-CM

## 2017-03-01 DIAGNOSIS — R63.4 WEIGHT LOSS: ICD-10-CM

## 2017-03-15 ENCOUNTER — HOSPITAL ENCOUNTER (EMERGENCY)
Age: 32
Discharge: HOME | End: 2017-03-15
Attending: EMERGENCY MEDICINE | Admitting: EMERGENCY MEDICINE
Payer: MEDICAID

## 2017-03-15 ENCOUNTER — APPOINTMENT (OUTPATIENT)
Dept: ULTRASOUND IMAGING | Age: 32
End: 2017-03-15
Attending: PHYSICIAN ASSISTANT
Payer: MEDICAID

## 2017-03-15 VITALS
TEMPERATURE: 98.8 F | RESPIRATION RATE: 18 BRPM | HEART RATE: 62 BPM | SYSTOLIC BLOOD PRESSURE: 109 MMHG | OXYGEN SATURATION: 100 % | DIASTOLIC BLOOD PRESSURE: 76 MMHG

## 2017-03-15 DIAGNOSIS — R10.9 LEFT FLANK PAIN: Primary | ICD-10-CM

## 2017-03-15 LAB
ALBUMIN SERPL BCP-MCNC: 4.5 G/DL (ref 3.4–5)
ALBUMIN/GLOB SERPL: 1.4 {RATIO} (ref 0.8–1.7)
ALP SERPL-CCNC: 61 U/L (ref 45–117)
ALT SERPL-CCNC: 19 U/L (ref 13–56)
ANION GAP BLD CALC-SCNC: 7 MMOL/L (ref 3–18)
APPEARANCE UR: ABNORMAL
AST SERPL W P-5'-P-CCNC: 10 U/L (ref 15–37)
BASOPHILS # BLD AUTO: 0 K/UL (ref 0–0.06)
BASOPHILS # BLD: 0 % (ref 0–2)
BILIRUB SERPL-MCNC: 0.5 MG/DL (ref 0.2–1)
BILIRUB UR QL: NEGATIVE
BUN SERPL-MCNC: 8 MG/DL (ref 7–18)
BUN/CREAT SERPL: 12 (ref 12–20)
CALCIUM SERPL-MCNC: 9.1 MG/DL (ref 8.5–10.1)
CHLORIDE SERPL-SCNC: 107 MMOL/L (ref 100–108)
CO2 SERPL-SCNC: 28 MMOL/L (ref 21–32)
COLOR UR: YELLOW
CREAT SERPL-MCNC: 0.67 MG/DL (ref 0.6–1.3)
DIFFERENTIAL METHOD BLD: NORMAL
EOSINOPHIL # BLD: 0 K/UL (ref 0–0.4)
EOSINOPHIL NFR BLD: 0 % (ref 0–5)
ERYTHROCYTE [DISTWIDTH] IN BLOOD BY AUTOMATED COUNT: 12 % (ref 11.6–14.5)
GLOBULIN SER CALC-MCNC: 3.2 G/DL (ref 2–4)
GLUCOSE SERPL-MCNC: 84 MG/DL (ref 74–99)
GLUCOSE UR STRIP.AUTO-MCNC: NEGATIVE MG/DL
HCG UR QL: NEGATIVE
HCT VFR BLD AUTO: 39.1 % (ref 35–45)
HGB BLD-MCNC: 13.2 G/DL (ref 12–16)
HGB UR QL STRIP: NEGATIVE
KETONES UR QL STRIP.AUTO: NEGATIVE MG/DL
LEUKOCYTE ESTERASE UR QL STRIP.AUTO: NEGATIVE
LIPASE SERPL-CCNC: 168 U/L (ref 73–393)
LYMPHOCYTES # BLD AUTO: 34 % (ref 21–52)
LYMPHOCYTES # BLD: 1.7 K/UL (ref 0.9–3.6)
MCH RBC QN AUTO: 30.5 PG (ref 24–34)
MCHC RBC AUTO-ENTMCNC: 33.8 G/DL (ref 31–37)
MCV RBC AUTO: 90.3 FL (ref 74–97)
MONOCYTES # BLD: 0.3 K/UL (ref 0.05–1.2)
MONOCYTES NFR BLD AUTO: 7 % (ref 3–10)
NEUTS SEG # BLD: 2.9 K/UL (ref 1.8–8)
NEUTS SEG NFR BLD AUTO: 59 % (ref 40–73)
NITRITE UR QL STRIP.AUTO: NEGATIVE
PH UR STRIP: 6.5 [PH] (ref 5–8)
PLATELET # BLD AUTO: 257 K/UL (ref 135–420)
PMV BLD AUTO: 10.7 FL (ref 9.2–11.8)
POTASSIUM SERPL-SCNC: 4 MMOL/L (ref 3.5–5.5)
PROT SERPL-MCNC: 7.7 G/DL (ref 6.4–8.2)
PROT UR STRIP-MCNC: NEGATIVE MG/DL
RBC # BLD AUTO: 4.33 M/UL (ref 4.2–5.3)
SODIUM SERPL-SCNC: 142 MMOL/L (ref 136–145)
SP GR UR REFRACTOMETRY: >1.03 (ref 1–1.03)
UROBILINOGEN UR QL STRIP.AUTO: 1 EU/DL (ref 0.2–1)
WBC # BLD AUTO: 4.9 K/UL (ref 4.6–13.2)

## 2017-03-15 PROCEDURE — 81003 URINALYSIS AUTO W/O SCOPE: CPT | Performed by: EMERGENCY MEDICINE

## 2017-03-15 PROCEDURE — 80053 COMPREHEN METABOLIC PANEL: CPT

## 2017-03-15 PROCEDURE — 85025 COMPLETE CBC W/AUTO DIFF WBC: CPT

## 2017-03-15 PROCEDURE — 76705 ECHO EXAM OF ABDOMEN: CPT

## 2017-03-15 PROCEDURE — 81025 URINE PREGNANCY TEST: CPT | Performed by: EMERGENCY MEDICINE

## 2017-03-15 PROCEDURE — 83690 ASSAY OF LIPASE: CPT

## 2017-03-15 PROCEDURE — 99283 EMERGENCY DEPT VISIT LOW MDM: CPT

## 2017-03-15 NOTE — ED PROVIDER NOTES
HPI 32 YOF here for atraumatic left flank pain for 2 weeks. She says sometimes its worse with movement, sometimes not. She says she started with left low back pain, then the flank, some pain radiates to the left mid abdomen, and sometimes toward the LLQ, but not much. She says she mainly came to see what may be going on. She is still eating, voiding, and having normal BM's. She says she has had a recent ob/gyn workup over the last few weeks for routine tests and all was normal.  She says she isn't worried about her pelvis today. She denies fevers, chills, or other complaints. Past Medical History:   Diagnosis Date    Abnormal Papanicolaou smear of cervix     Asthma     Bradycardia     GERD (gastroesophageal reflux disease)     H/O sinus bradycardia     Headache     Migraine     Photophobia of both eyes     Seizures ([address]     Stomach pain        Past Surgical History:   Procedure Laterality Date    HX APPENDECTOMY      HX  SECTION      HX GYN      cervical cerclage         Family History:   Problem Relation Age of Onset    Diabetes Mother     Heart Attack Father     Attention Deficit Hyperactivity Disorder Sister     Attention Deficit Hyperactivity Disorder Brother     Cancer Maternal Aunt 45     breast    Diabetes Maternal Grandmother     Attention Deficit Hyperactivity Disorder Brother     No Known Problems Brother     No Known Problems Brother     No Known Problems Brother     Cancer Maternal Aunt      lung    Heart defect Son    [de-identified] Migraines Son     Seizures Son      h/o    Other Son      h/o jaundice       Social History     Social History    Marital status: SINGLE     Spouse name: N/A    Number of children: 2    Years of education: 12     Occupational History    Not on file.      Social History Main Topics    Smoking status: Former Smoker     Years: 13.00    Smokeless tobacco: Never Used      Comment: cigarello, once a month    Alcohol use No    Drug use: No    Sexual activity: Yes     Partners: Male     Birth control/ protection: Pill     Other Topics Concern     Service Yes     [employer]    Blood Transfusions No    Caffeine Concern No    Occupational Exposure No    Hobby Hazards No    Sleep Concern Yes    Stress Concern No    Weight Concern No    Special Diet Yes    Back Care Yes    Exercise Yes    Bike Helmet No    Seat Belt Yes    Self-Exams No     Social History Narrative         ALLERGIES: Imitrex [sumatriptan succinate]; Iodine; and Sea food [seafood]    Review of Systems   Constitutional: Negative. HENT: Negative. Eyes: Negative. Respiratory: Negative. Gastrointestinal: Positive for abdominal pain. Negative for abdominal distention, anal bleeding, blood in stool, constipation, diarrhea, nausea, rectal pain and vomiting. Genitourinary: Positive for flank pain. Negative for decreased urine volume, difficulty urinating, dysuria, hematuria, pelvic pain, vaginal bleeding and vaginal pain. Musculoskeletal: Negative for back pain, neck pain and neck stiffness. Skin: Negative. Neurological: Negative. Psychiatric/Behavioral: Negative for confusion. All other systems reviewed and are negative. Vitals:    03/15/17 0949   BP: 121/72   Pulse: 62   Resp: 18   Temp: 98.8 °F (37.1 °C)   SpO2: 100%            Physical Exam   Constitutional: She is oriented to person, place, and time. She appears well-developed and well-nourished. No distress. HENT:   Head: Normocephalic and atraumatic. Right Ear: External ear normal.   Left Ear: External ear normal.   Nose: Nose normal.   Mouth/Throat: Oropharynx is clear and moist.   Eyes: Conjunctivae and EOM are normal.   Neck: Normal range of motion. Neck supple. Cardiovascular: Normal rate, regular rhythm, normal heart sounds and intact distal pulses. Pulmonary/Chest: Effort normal and breath sounds normal.   Abdominal: Soft. Bowel sounds are normal. She exhibits no distension.  There is tenderness (left flank). There is no rebound and no guarding. Musculoskeletal: Normal range of motion. She exhibits no edema or tenderness. Lymphadenopathy:     She has no cervical adenopathy. Neurological: She is alert and oriented to person, place, and time. No cranial nerve deficit. Skin: Skin is warm and dry. She is not diaphoretic. Psychiatric: She has a normal mood and affect. Her behavior is normal.   Nursing note and vitals reviewed. MDM  Number of Diagnoses or Management Options  Left flank pain:      Amount and/or Complexity of Data Reviewed  Clinical lab tests: ordered and reviewed  Tests in the radiology section of CPT®: ordered and reviewed    Risk of Complications, Morbidity, and/or Mortality  Presenting problems: low  Diagnostic procedures: low  Management options: low  General comments: Pt in no distress, using cell phone in ED ambulatory, doing well, ready for home. ED Course       Procedures           Labs Reviewed   URINALYSIS W/ RFLX MICROSCOPIC - Abnormal; Notable for the following:        Result Value    Specific gravity >1.030 (*)     All other components within normal limits   METABOLIC PANEL, COMPREHENSIVE - Abnormal; Notable for the following:     AST (SGOT) 10 (*)     All other components within normal limits   HCG URINE, QL   CBC WITH AUTOMATED DIFF   LIPASE     Status Provider Status         Final result (Exam End: 3/15/2017 12:15 PM) Open        Study Result      EXAM: Limited abdominal ultrasound     INDICATION: Left flank pain.     COMPARISON: None.     _______________        FINDINGS:     Dedicated ultrasound of the left abdomen was performed, including spleen and  left kidney.     SPLEEN: Normal size, maximum diameter = 8.5 cm. Normal echogenicity.     LEFT KIDNEY: 9.6 cm. No hydronephrosis. No evidence of solid mass or calculus.   Cortical echogenicity/thickness appears normal.     OTHER: No evidence of ascites.     _______________        IMPRESSION  IMPRESSION:     Unremarkable exam. Normal appearance of the left kidney and spleen. No  hydronephrosis. ICD-10-CM ICD-9-CM   1. Left flank pain R10.9 789.09       Plan: fluids, rest, see pcp in 2 days, return here for any concerns.

## 2017-03-15 NOTE — ED NOTES
I have reviewed discharge instruction with patient. Patient verbalized understanding and has no further questions at this time. Education taught and patient verbalized understanding of education. Teach back method used. PIV catheter tip intact on removal.    Patients pain decreased. Belongings given to patient. Patient discharged with family to home.

## 2017-03-22 ENCOUNTER — HOSPITAL ENCOUNTER (OUTPATIENT)
Age: 32
Setting detail: OUTPATIENT SURGERY
Discharge: HOME | End: 2017-03-22
Attending: PHYSICAL MEDICINE & REHABILITATION | Admitting: PHYSICAL MEDICINE & REHABILITATION
Payer: MEDICAID

## 2017-03-22 ENCOUNTER — SURGERY (OUTPATIENT)
Age: 32
End: 2017-03-22

## 2017-03-22 ENCOUNTER — APPOINTMENT (OUTPATIENT)
Dept: GENERAL RADIOLOGY | Age: 32
End: 2017-03-22
Attending: PHYSICAL MEDICINE & REHABILITATION
Payer: MEDICAID

## 2017-03-22 VITALS
SYSTOLIC BLOOD PRESSURE: 107 MMHG | DIASTOLIC BLOOD PRESSURE: 60 MMHG | WEIGHT: 108 LBS | TEMPERATURE: 98.3 F | HEIGHT: 66 IN | RESPIRATION RATE: 16 BRPM | OXYGEN SATURATION: 100 % | BODY MASS INDEX: 17.36 KG/M2 | HEART RATE: 66 BPM

## 2017-03-22 PROCEDURE — 76010000009 HC PAIN MGT 0 TO 30 MIN PROC: Performed by: PHYSICAL MEDICINE & REHABILITATION

## 2017-03-22 PROCEDURE — 74011250637 HC RX REV CODE- 250/637: Performed by: PHYSICAL MEDICINE & REHABILITATION

## 2017-03-22 PROCEDURE — 74011000250 HC RX REV CODE- 250: Performed by: PHYSICAL MEDICINE & REHABILITATION

## 2017-03-22 PROCEDURE — 74011250636 HC RX REV CODE- 250/636

## 2017-03-22 PROCEDURE — 74011000250 HC RX REV CODE- 250

## 2017-03-22 PROCEDURE — 74011250636 HC RX REV CODE- 250/636: Performed by: PHYSICAL MEDICINE & REHABILITATION

## 2017-03-22 RX ORDER — DEXAMETHASONE SODIUM PHOSPHATE 100 MG/10ML
INJECTION INTRAMUSCULAR; INTRAVENOUS AS NEEDED
Status: DISCONTINUED | OUTPATIENT
Start: 2017-03-22 | End: 2017-03-22 | Stop reason: HOSPADM

## 2017-03-22 RX ORDER — DIAZEPAM 5 MG/1
5-20 TABLET ORAL ONCE
Status: COMPLETED | OUTPATIENT
Start: 2017-03-22 | End: 2017-03-22

## 2017-03-22 RX ORDER — SODIUM CHLORIDE 0.9 % (FLUSH) 0.9 %
5-10 SYRINGE (ML) INJECTION AS NEEDED
Status: DISCONTINUED | OUTPATIENT
Start: 2017-03-22 | End: 2017-03-22 | Stop reason: HOSPADM

## 2017-03-22 RX ORDER — LIDOCAINE HYDROCHLORIDE 10 MG/ML
INJECTION, SOLUTION EPIDURAL; INFILTRATION; INTRACAUDAL; PERINEURAL AS NEEDED
Status: DISCONTINUED | OUTPATIENT
Start: 2017-03-22 | End: 2017-03-22 | Stop reason: HOSPADM

## 2017-03-22 RX ADMIN — DEXAMETHASONE SODIUM PHOSPHATE 30 MG: 10 INJECTION INTRAMUSCULAR; INTRAVENOUS at 13:05

## 2017-03-22 RX ADMIN — LIDOCAINE HYDROCHLORIDE 10 ML: 10 INJECTION, SOLUTION EPIDURAL; INFILTRATION; INTRACAUDAL; PERINEURAL at 13:05

## 2017-03-22 RX ADMIN — DIAZEPAM 5 MG: 5 TABLET ORAL at 12:20

## 2017-03-22 NOTE — DISCHARGE INSTRUCTIONS
[facility]      Post Procedures Instructions    *Resume Diet and Activity as tolerated. Rest for the remainder of the day. *You may fell worse before you feel better as the numbing medications wear off before the steroids take effect if used for your procedures. *Do not use affected extremity until numbness or loss of sensation has completely resolved without assistance. *DO NOT DRIVE, operate machinery/heavey equipment for 24 hours. *DO NOT DRINK ALCOHOL for 24 hours as it may interact with the sedation if you received it and also thins your blood and may cause you to bleed. *WAIT 24 hours before starting back ANY Blood thinning medications:   (Heparin, Coumadin, Warfarin, Lovenox, Plavix, Aggrenox)    *Resume Pre-Procedure Medications as prescribed except Blood Thinners unless directed by your Physician or Cardiologist.     *Avoid Hot tubs and Heating pad for 24 hours to prevent dissipation of medications, you may shower to remove bandages and remaining prep residue on the skin. * If you develop a Headache, drink plenty of fluids including beverages with caffeine (Coffee, Mt. Dew etc.) and rest.  If the headache persists longer than 24 hoursor intensifies - Please call [facility]) [phone]      * If you are DIABETIC, check your blood sugar three times a day for the next three days, the steroids will increase your blood sugar. If your blood sugar is greater than 400 have someone drive you to the nearest [address]. * If you experience any of the following problems, call the [facility] [phone] between 8:00 am - 4:30pm or After Hours [phone].     Shortness of breath    Fever of 101 F or higher    Nausea / Vomiting (not normal to you)    Increasing stiffness in the neck    Weakness or numbness in the arms or legs that is not resolving    Prolonged and increasing pain > than 4 days    ANYTHING OUT of the ORDINARY TO YOU    If YOU are experiencing a severe reaction / complication that you have never had before post procedure, call 911 or go to the nearest emergency room! All patients must have a  for transportation [place] regardless if you do or do not receive sedation. DISCHARGE SUMMARY from Nurse      PATIENT INSTRUCTIONS:    After Oral  or intravenous sedation, for 24 hours or while taking prescription Narcotics:  · Limit your activities  · Do not drive and operate hazardous machinery  · Do not make important personal or business decisions  · Do  not drink alcoholic beverages  · If you have not urinated within 8 hours after discharge, please contact your surgeon on call. Report the following to your surgeon:  · Excessive pain, swelling, redness or odor of or around the surgical area  · Temperature over 101  · Nausea and vomiting lasting longer than 4 hours or if unable to take medications  · Any signs of decreased circulation or nerve impairment to extremity: change in color, persistent  numbness, tingling, coldness or increase pain  · Any questions        What to do at Home:  Recommended activity: Activity as tolerated, NO DRIVING FOR 24 Hours post injection          *  Please give a list of your current medications to your Primary Care Provider. *  Please update this list whenever your medications are discontinued, doses are      changed, or new medications (including over-the-counter products) are added. *  Please carry medication information at all times in case of emergency situations. These are general instructions for a healthy lifestyle:    No smoking/ No tobacco products/ Avoid exposure to second hand smoke    Surgeon General's Warning:  Quitting smoking now greatly reduces serious risk to your health.     Obesity, smoking, and sedentary lifestyle greatly increases your risk for illness    A healthy diet, regular physical exercise & weight monitoring are important for maintaining a healthy lifestyle    You may be retaining fluid if you have a history of heart failure or if you experience any of the following symptoms:  Weight gain of 3 pounds or more overnight or 5 pounds in a week, increased swelling in our hands or feet or shortness of breath while lying flat in bed. Please call your doctor as soon as you notice any of these symptoms; do not wait until your next office visit. Recognize signs and symptoms of STROKE:    F-face looks uneven    A-arms unable to move or move unevenly    S-speech slurred or non-existent    T-time-call 911 as soon as signs and symptoms begin-DO NOT go       Back to bed or wait to see if you get better-TIME IS BRAIN. [employer] Activation    Thank you for requesting access to [employer]. Please follow the instructions below to securely access and download your online medical record. [employer] allows you to send messages to your doctor, view your test results, renew your prescriptions, schedule appointments, and more. How Do I Sign Up? 1. In your internet browser, go to [email or web address]  2. Click on the First Time User? Click Here link in the Sign In box. You will be redirect to the New Member Sign Up page. 3. Enter your [employer] Access Code exactly as it appears below. You will not need to use this code after youve completed the sign-up process. If you do not sign up before the expiration date, you must request a new code. [employer] Access Code: Activation code not generated  Current [employer] Status: Active (This is the date your [employer] access code will )    4. Enter the last four digits of your Social Security Number (xxxx) and Date of Birth (mm/dd/yyyy) as indicated and click Submit. You will be taken to the next sign-up page. 5. Create a [employer] ID. This will be your [employer] login ID and cannot be changed, so think of one that is secure and easy to remember.   6. Create a [employer] password. You can change your password at any time. 7. Enter your Password Reset Question and Answer. This can be used at a later time if you forget your password. 8. Enter your e-mail address. You will receive e-mail notification when new information is available in [address] 9. Click Sign Up. You can now view and download portions of your medical record. 10. Click the Download Summary menu link to download a portable copy of your medical information. Additional Information    If you have questions, please visit the Frequently Asked Questions section of the [employer] website at [email or web address] Remember, [employer] is NOT to be used for urgent needs. For medical emergencies, dial 911.

## 2017-03-22 NOTE — H&P
Date of Surgery Update:  Tobin Ohara was seen and examined. History and physical has been reviewed. The patient has been examined. There have been no significant clinical changes since the completion of the last office visit.       Signed By: Daphne Greer MD     March 22, 2017 12:54 PM

## 2017-03-22 NOTE — PROCEDURES
Facet Joint Block Procedure Note    Patient Name: Pat Aguila    Date of Procedure: March 22, 2017    Preoperative Diagnosis: Lumbar facet arthropathy ([facility] [address] [M12.88]  Muscle spasm of back [M62.830]  Low back pain, unspecified back pain laterality, unspecified chronicity, with sciatica presence unspecified [M54.5]    Post Operative Diagnosis:Lumbar facet arthropathy ([address] [M12.88]  Muscle spasm of back [M62.830]  Low back pain, unspecified back pain laterality, unspecified chronicity, with sciatica presence unspecified [M54.5]     Procedure:  bilateral  L4-L5 Facet Joint Block  bilateral  L5-S1 Facet Joint Block    Consent: Informed consent was obtained prior to the procedure. The patient was given the opportunity to ask questions regarding the procedure and its associated risks. In addition to the potential risks associated with the procedure itself, the patient was informed both verbally and in writing of potential side effects of the use of glucocorticoids. The patient appeared to comprehend the informed consent and desired to have the procedure perfored. Procedure: The patient was placed in the prone position on the flouroscopy table and the back was prepped and draped in the usual sterile manner. At each blocked level, the exact location of the facet joint was identified with flouroscopy, and after local Lidocaine 1% injection, a #22 gauge spinal needle was then advanced toward the joint. A total of 30 mg of preservative free Dexamethasone and 5 cc of Lidocaine was injected around and equally divided among all of the sites. The patient tolerated the procedure well. The injection area was cleaned and bandaids applied. No excessive bleeding was noted. Patient dressed and was discharged to home with instructions.        Karey Juares MD  March 22, 2017

## 2017-03-23 ENCOUNTER — TELEPHONE (OUTPATIENT)
Dept: OBGYN CLINIC | Age: 32
End: 2017-03-23

## 2017-03-23 NOTE — TELEPHONE ENCOUNTER
Patient called wanted to speak with Dr. Amelie Acosta regarding her test results.  She had a few questions

## 2017-03-24 NOTE — TELEPHONE ENCOUNTER
Answered questions regarding trich transmission. I have verbalized the plan of care with patient. The patient was given a full opportunity to ask questions and indicated that her questions had been answered.

## 2017-03-26 DIAGNOSIS — M47.816 LUMBAR FACET ARTHROPATHY: ICD-10-CM

## 2017-03-27 RX ORDER — DICLOFENAC SODIUM 75 MG/1
TABLET, DELAYED RELEASE ORAL
Qty: 60 TAB | Refills: 0 | Status: SHIPPED | OUTPATIENT
Start: 2017-03-27 | End: 2017-04-17 | Stop reason: SINTOL

## 2017-04-05 ENCOUNTER — TELEPHONE (OUTPATIENT)
Dept: FAMILY MEDICINE CLINIC | Facility: CLINIC | Age: 32
End: 2017-04-05

## 2017-04-05 DIAGNOSIS — G47.00 INSOMNIA, UNSPECIFIED TYPE: Primary | ICD-10-CM

## 2017-04-05 NOTE — TELEPHONE ENCOUNTER
Patient would like to see sleep specialist ib regards to having sleep issues, patient states she wake up constantly ha not slept in days.

## 2017-04-17 ENCOUNTER — OFFICE VISIT (OUTPATIENT)
Dept: ORTHOPEDIC SURGERY | Age: 32
End: 2017-04-17

## 2017-04-17 VITALS
DIASTOLIC BLOOD PRESSURE: 62 MMHG | HEART RATE: 79 BPM | RESPIRATION RATE: 20 BRPM | TEMPERATURE: 98.1 F | SYSTOLIC BLOOD PRESSURE: 101 MMHG | WEIGHT: 109.4 LBS | OXYGEN SATURATION: 100 % | BODY MASS INDEX: 17.58 KG/M2 | HEIGHT: 66 IN

## 2017-04-17 DIAGNOSIS — M54.5 CHRONIC MIDLINE LOW BACK PAIN, WITH SCIATICA PRESENCE UNSPECIFIED: ICD-10-CM

## 2017-04-17 DIAGNOSIS — M62.830 MUSCLE SPASM OF BACK: ICD-10-CM

## 2017-04-17 DIAGNOSIS — G89.29 CHRONIC MIDLINE LOW BACK PAIN, WITH SCIATICA PRESENCE UNSPECIFIED: ICD-10-CM

## 2017-04-17 DIAGNOSIS — M47.816 LUMBAR FACET ARTHROPATHY: Primary | ICD-10-CM

## 2017-04-17 RX ORDER — MELOXICAM 7.5 MG/1
7.5 TABLET ORAL DAILY
Qty: 30 TAB | Refills: 2 | Status: SHIPPED | OUTPATIENT
Start: 2017-04-17 | End: 2017-11-07

## 2017-04-17 NOTE — PROGRESS NOTES
[facility]  [address] [place], [facility] [address]  Phone: [phone]  Fax: [phone]      ASSESSMENT   Rosa Jackson was seen today for back pain and follow-up. Diagnoses and all orders for this visit:    Lumbar facet arthropathy ([address]  -     REFERRAL TO PHYSICAL THERAPY  -     meloxicam (MOBIC) 7.5 mg tablet; Take 1 Tab by mouth daily. Chronic midline low back pain, with sciatica presence unspecified  -     REFERRAL TO PHYSICAL THERAPY    Muscle spasm of back  -     REFERRAL TO PHYSICAL THERAPY         IMPRESSION AND PLAN:  Nehal Latham is a 32 y.o. female with history of lumbar pain. She tried a bilateral L4-5 and bilateral L5-S1 facet injections on 03/22/2017 and experienced low back pain relief. Pt wishes to restart physical therapy at this time since she continues to experience middle back pain. She did not tolerate Voltaren 75 mg.    1) Pt was given information on lumbar arthritis exercises. 2) I recommended the Pt try water exercise and yoga. 3) She was given orders to resume physical therapy. 4) Pt was prescribed Mobic 7.5 mg 1 tab daily to take in placed of Voltaren 75 mg.   5) Ms. Carrie Garcia has a reminder for a \"due or due soon\" health maintenance. I have asked that she contact her primary care provider, TE Sigala, for follow-up on this health maintenance. 6)  reviewed. 7) Pt will follow-up in 2-3 months. HISTORY OF PRESENT ILLNESS:  Nehal Latham is a 32 y.o. female with history of lumbar pain. She tried a bilateral L4-5 and bilateral L5-S1 facet injections on 03/22/2017 and experienced low back pain relief. Pt admits that she continues to experience left hip pain that radiates down the leg. She admits that she continues to experience burning pain in the lower back but this is not as severe as it was prior to injections. Pt had to discontinue physical therapy sessions since she became sick.  She wishes to restart physical therapy at this time since she continues to experience middle back pain. Pt discontinued Voltaren 75 mg since she experienced stomach pain and felt like she was \"going to pass out,\" when taking the medication. Pt at this time desires to proceed with physical therapy. Of note, Pt is a nonsmoker. Pain Scale: 6/10    PCP: TE Mcconnell       Past Medical History:   Diagnosis Date    Abnormal Papanicolaou smear of cervix     Asthma     Bradycardia     GERD (gastroesophageal reflux disease)     H/O sinus bradycardia     Headache     Migraine     Photophobia of both eyes     Seizures (HCC)     Stomach pain         Social History     Social History    Marital status: SINGLE     Spouse name: N/A    Number of children: 2    Years of education: 12     Occupational History    Not on file. Social History Main Topics    Smoking status: Former Smoker     Years: 13.00    Smokeless tobacco: Never Used      Comment: cigarello, once a month    Alcohol use No    Drug use: No    Sexual activity: Yes     Partners: Male     Birth control/ protection: Pill     Other Topics Concern     Service Yes     [employer]    Blood Transfusions No    Caffeine Concern No    Occupational Exposure No    Hobby Hazards No    Sleep Concern Yes    Stress Concern No    Weight Concern No    Special Diet Yes    Back Care Yes    Exercise Yes    Bike Helmet No    Seat Belt Yes    Self-Exams No     Social History Narrative       Current Outpatient Prescriptions   Medication Sig Dispense Refill    meloxicam (MOBIC) 7.5 mg tablet Take 1 Tab by mouth daily. 30 Tab 2    food supplemt, lactose-reduced (ENSURE PLUS) 0.05-1.5 gram-kcal/mL liqd Take 237 mL by mouth three (3) times daily. 100 Can 3    ondansetron (ZOFRAN ODT) 4 mg disintegrating tablet       fluticasone-vilanterol (BREO ELLIPTA) 100-25 mcg/dose inhaler Take 1 Puff by inhalation daily.  1 Inhaler 1    norethindrone (MICRONOR) 0.35 mg tab Take 1 Tab by mouth daily. 1 Package 11    ergocalciferol (ERGOCALCIFEROL) 50,000 unit capsule Take 1 Cap by mouth every seven (7) days. 12 Cap 1    butalbital-aspirin-caffeine (FIORINAL) capsule Take 2 Caps by mouth every eight (8) hours as needed for Pain.  Omeprazole delayed release (PRILOSEC D/R) 20 mg tablet Take 1 Tab by mouth daily. 90 Tab 3    albuterol (PROVENTIL HFA, VENTOLIN HFA, PROAIR HFA) 90 mcg/actuation inhaler Take 1 Puff by inhalation every four (4) hours as needed for Wheezing or Shortness of Breath. 2 Inhaler 3    cyproheptadine (PERIACTIN) 4 mg tablet Take 1 Tab by mouth once over twenty-four (24) hours. 90 Tab 3    multivitamin (ONE A DAY) tablet Take 1 Tab by mouth daily. 90 Tab 3    VITAMIN D3 2,000 unit cap capsule TAKE 1 CAPSULE BY MOUTH DAILY. 3    ARNUITY ELLIPTA 200 mcg/actuation dsdv inhaler USE 1 PUFF DAILY AS DIRECTED  3       Allergies   Allergen Reactions    Imitrex [Sumatriptan Succinate] Anaphylaxis    Iodine Cough     Coughing up blood      Sea Food [Seafood] Hives     Per pt report          REVIEW OF SYSTEMS    Constitutional: Negative for fever, chills, or weight change. Respiratory: Negative for cough or shortness of breath. Cardiovascular: Negative for chest pain or palpitations. Gastrointestinal: Negative for acid reflux, change in bowel habits, or constipation. Genitourinary: Negative for dysuria and flank pain. Musculoskeletal: Positive for lumbar pain. Skin: Negative for rash. Neurological: Negative for headaches, dizziness, or numbness. Endo/Heme/Allergies: Negative for increased bruising. Psychiatric/Behavioral: Negative for difficulty with sleep.       PHYSICAL EXAMINATION  Visit Vitals    /62    Pulse 79    Temp 98.1 °F (36.7 °C) (Oral)    Resp 20    Ht 5' 6\" (1.676 m)    Wt 109 lb 6.4 oz (49.6 kg)    SpO2 100%    BMI 17.66 kg/m2       Constitutional: Awake, alert, and in no acute distress  Neurological: 1+ symmetrical DTRs in the lower extremities. Sensation to light touch is intact. Skin: warm, dry, and intact. Musculoskeletal: Mild Tenderness to palpation in the lower lumbar region. Moderate pain with extension and axial loading. No pain with internal or external rotation of her hips. Negative straight leg raise bilaterally. Hip Flex  Quads Hamstrings Ankle DF EHL Ankle PF   Right +4/5 +4/5 +4/5 +4/5 +4/5 +4/5   Left +4/5 +4/5 +4/5 +4/5 +4/5 +4/5     IMAGING:    Lumbar spine MRI from 02/02/2017 was personally reviewed with the Pt and demonstrated:  Findings:     There is persistent lordotic curvature of lumbar spine, without listhesis. Intervertebral discs are maintained and well hydrated. Normal vertebral body  morphology. No endplate reactive changes. No fracture. No suspicious osseous  lesion. No pars interarticularis defect. Conus medullaris ends at the T12-L1  disc level.     Correlation of axial and sagittal images reveals the following:     L1-L2: No significant disc pathology. No significant proliferative change. No  central canal or foraminal stenosis.     L2-L3: No significant disc pathology. No significant proliferative change. No  central canal or foraminal stenosis.     L3-L4: No significant disc pathology. No significant proliferative change. No  central canal or foraminal stenosis.     L4-L5: Minimal broad-based disc bulge abuts the ventral thecal sac. Mild  bilateral facet arthropathy and ligamentum flavum buckling. No central canal or  foraminal stenosis.     L5-S1: No significant disc pathology. Mild bilateral facet arthropathy. No  central canal or foraminal stenosis.     The visualized portions of the sacroiliac joints are unremarkable. Incidentally  imaged retroperitoneal structures are unremarkable as well.     IMPRESSION:     No significant degenerative changes. No acute or focal abnormality to explain  patient's lumbar back pain and lower extremity radiculopathy.      Written by Gilson Cordero ScribPerlaick, as dictated by Jerome Collins MD.  I, Dr. Jerome Collins confirm that all documentation is accurate.

## 2017-04-17 NOTE — MR AVS SNAPSHOT
Visit Information Date & Time Provider Department Dept. Phone Encounter #  
 4/17/2017 10:45 AM Jaci Cordova MD [facility] [phone] Follow-up Instructions Return in about 3 months (around 7/17/2017) for PT follow up, Medication follow up. Your Appointments 4/18/2017 11:15 AM  
New Patient with Silver Olmedo MD  
[facility]) Appt Note: TE Ellis; insomnia; sleep eval; notes & ref in cc; pt to arrive @ 10:30  
 [address] [place] [address] Nasim [id number] [phone]  
  
   
 [facility] [id number] 5/16/2017 10:15 AM  
XRAY with [facility] [address] ([facility]) Appt Note: geo 1030; np apt w/ejf 11am dx-sob [address] [place] [address], [address] [id number] [phone]  
  
   
 [address] [place] [address], [address] [place] [address],[facility] [address] & [address] [place] [id number] 5/16/2017 10:30 AM  
Nurse Visit with [facility] [address] ([facility]) Appt Note: np apt w/ejf 11am dx-sob; cxr [address], [address] [address]  
[phone]  
  
   
 [address] [place] [address], [address] [place] [address],[facility] [address] & [address] [place] [id number] 5/16/2017 11:00 AM  
New Patient with Kyler Kim MD  
[address] ([facility]) Appt Note: PA Jessica Pumphrey/sob; [address]r 36; geo [address], [address] [address]  
[phone]  
  
   
 [address] [place] [address], [address] [place] [address],[facility] [address] & [address] [place] [id number] Upcoming Health Maintenance Date Due Pneumococcal 19-64 Medium Risk (1 of 1 - PPSV23) 11/30/2004 DTaP/Tdap/Td series (1 - Tdap) 11/30/2006 PAP AKA CERVICAL CYTOLOGY 12/3/2018 Allergies as of 4/17/2017  Review Complete On: 4/17/2017 By: Jaci Cordova MD  
  
 Severity Noted Reaction Type Reactions Imitrex [Sumatriptan Succinate] High 05/25/2015    Anaphylaxis Iodine  02/16/2017    Cough Coughing up blood Sea Food [Seafood]  06/04/2015    Hives Per pt report Current Immunizations  Never Reviewed No immunizations on file. Not reviewed this visit You Were Diagnosed With   
  
 Codes Comments Lumbar facet arthropathy (HCC)    -  Primary ICD-10-CM: M12.88 ICD-9-CM: 309. 3 Chronic midline low back pain, with sciatica presence unspecified     ICD-10-CM: M54.5, G89.29 ICD-9-CM: 724.2, 338.29 Muscle spasm of back     ICD-10-CM: R60.059 ICD-9-CM: 724.8 Vitals BP Pulse Temp Resp Height(growth percentile) Weight(growth percentile) 101/62 79 98.1 °F (36.7 °C) (Oral) 20 5' 6\" (1.676 m) 109 lb 6.4 oz (49.6 kg) SpO2 BMI OB Status Smoking Status 100% 17.66 kg/m2 Having regular periods Former Smoker BMI and BSA Data Body Mass Index Body Surface Area  
 17.66 kg/m 2 1.52 m 2 Preferred Pharmacy Pharmacy Name Phone [id number] [phone] [phone] Your Updated Medication List  
  
   
This list is accurate as of: 4/17/17 11:27 AM.  Always use your most recent med list.  
  
  
  
  
 albuterol 90 mcg/actuation inhaler Commonly known as:  PROVENTIL HFA, VENTOLIN HFA, PROAIR HFA Take 1 Puff by inhalation every four (4) hours as needed for Wheezing or Shortness of Breath. ARNUITY ELLIPTA 200 mcg/actuation Dsdv inhaler Generic drug:  fluticasone furoate USE 1 PUFF DAILY AS DIRECTED  
  
 butalbital-aspirin-caffeine capsule Commonly known as:  Bennye Ochoa Take 2 Caps by mouth every eight (8) hours as needed for Pain. cyproheptadine 4 mg tablet Commonly known as:  PERIACTIN Take 1 Tab by mouth once over twenty-four (24) hours. ergocalciferol 50,000 unit capsule Commonly known as:  ERGOCALCIFEROL Take 1 Cap by mouth every seven (7) days. fluticasone-vilanterol 100-25 mcg/dose inhaler Commonly known as:  BREO ELLIPTA Take 1 Puff by inhalation daily. food supplemt, lactose-reduced 0.05-1.5 gram-kcal/mL Liqd Commonly known as:  ENSURE PLUS Take 237 mL by mouth three (3) times daily. meloxicam 7.5 mg tablet Commonly known as:  MOBIC Take 1 Tab by mouth daily. multivitamin tablet Commonly known as:  ONE A DAY Take 1 Tab by mouth daily. norethindrone 0.35 mg Tab Commonly known as:  Micha & Micha Take 1 Tab by mouth daily. Omeprazole delayed release 20 mg tablet Commonly known as:  PRILOSEC D/R Take 1 Tab by mouth daily. ondansetron 4 mg disintegrating tablet Commonly known as:  ZOFRAN ODT  
  
 VITAMIN D3 2,000 unit Cap capsule Generic drug:  Cholecalciferol (Vitamin D3) TAKE 1 CAPSULE BY MOUTH DAILY. Prescriptions Sent to Pharmacy Refills  
 meloxicam (MOBIC) 7.5 mg tablet 2 Sig: Take 1 Tab by mouth daily. Class: Normal  
 Pharmacy: Gabi Young , [address] [facility]  #: [phone] Route: Oral  
  
We Performed the Following REFERRAL TO PHYSICAL THERAPY [NRT12 Custom] Comments:  
 Eval/Treat, patient to resume PT as before. Patient will set up PT when ready. Follow-up Instructions Return in about 3 months (around 7/17/2017) for PT follow up, Medication follow up. Referral Information Referral ID Referred By Referred To  
  
 [id number] Not Available Visits Status Start Date End Date 1 New Request 4/17/17 4/17/18 If your referral has a status of pending review or denied, additional information will be sent to support the outcome of this decision. Patient Instructions Low Back Arthritis: Exercises Your Care Instructions Here are some examples of typical rehabilitation exercises for your condition. Start each exercise slowly.  Ease off the exercise if you start to have pain. Your doctor or physical therapist will tell you when you can start these exercises and which ones will work best for you. When you are not being active, find a comfortable position for rest. Some people are comfortable on the floor or a medium-firm bed with a small pillow under their head and another under their knees. Some people prefer to lie on their side with a pillow between their knees. Don't stay in one position for too long. Take short walks (10 to 20 minutes) every 2 to 3 hours. Avoid slopes, hills, and stairs until you feel better. Walk only distances you can manage without pain, especially leg pain. How to do the exercises Pelvic tilt 1. Lie on your back with your knees bent. 2. \"Brace\" your stomachtighten your muscles by pulling in and imagining your belly button moving toward your spine. 3. Press your lower back into the floor. You should feel your hips and pelvis rock back. 4. Hold for 6 seconds while breathing smoothly. 5. Relax and allow your pelvis and hips to rock forward. 6. Repeat 8 to 12 times. Back stretches 1. Get down on your hands and knees on the floor. 2. Relax your head and allow it to droop. Round your back up toward the ceiling until you feel a nice stretch in your upper, middle, and lower back. Hold this stretch for as long as it feels comfortable, or about 15 to 30 seconds. 3. Return to the starting position with a flat back while you are on your hands and knees. 4. Let your back sway by pressing your stomach toward the floor. Lift your buttocks toward the ceiling. 5. Hold this position for 15 to 30 seconds. 6. Repeat 2 to 4 times. Follow-up care is a key part of your treatment and safety. Be sure to make and go to all appointments, and call your doctor if you are having problems. It's also a good idea to know your test results and keep a list of the medicines you take. Where can you learn more? Go to http://norma-kaley.info/. Enter K270 in the search box to learn more about \"Low Back Arthritis: Exercises. \" Current as of: May 23, 2016 Content Version: 11.2 © 8284-6595 [employer]. Care instructions adapted under license by [employer] (which disclaims liability or warranty for this information). If you have questions about a medical condition or this instruction, always ask your healthcare professional. [address] any warranty or liability for your use of this information. Introducing [facility]! Dear Zuleima Wyatt: 
Thank you for requesting a FUJIAN HAIYUAN account. Our records indicate that you already have an active FUJIAN HAIYUAN account. You can access your account anytime at [email or web address] Did you know that you can access your hospital and ER discharge instructions at any time in FUJIAN HAIYUAN? You can also review all of your test results from your hospital stay or ER visit. Additional Information If you have questions, please visit the Frequently Asked Questions section of the FUJIAN HAIYUAN website at [email or web address] Remember, FUJIAN HAIYUAN is NOT to be used for urgent needs. For medical emergencies, dial 911. Now available from your iPhone and Android! Please provide this summary of care documentation to your next provider. Your primary care clinician is listed as Leonarda Marcum. If you have any questions after today's visit, please call [phone].

## 2017-04-17 NOTE — PATIENT INSTRUCTIONS
Low Back Arthritis: Exercises  Your Care Instructions  Here are some examples of typical rehabilitation exercises for your condition. Start each exercise slowly. Ease off the exercise if you start to have pain. Your doctor or physical therapist will tell you when you can start these exercises and which ones will work best for you. When you are not being active, find a comfortable position for rest. Some people are comfortable on the floor or a medium-firm bed with a small pillow under their head and another under their knees. Some people prefer to lie on their side with a pillow between their knees. Don't stay in one position for too long. Take short walks (10 to 20 minutes) every 2 to 3 hours. Avoid slopes, hills, and stairs until you feel better. Walk only distances you can manage without pain, especially leg pain. How to do the exercises  Pelvic tilt    1. Lie on your back with your knees bent. 2. \"Brace\" your stomach--tighten your muscles by pulling in and imagining your belly button moving toward your spine. 3. Press your lower back into the floor. You should feel your hips and pelvis rock back. 4. Hold for 6 seconds while breathing smoothly. 5. Relax and allow your pelvis and hips to rock forward. 6. Repeat 8 to 12 times. Back stretches    1. Get down on your hands and knees on the floor. 2. Relax your head and allow it to droop. Round your back up toward the ceiling until you feel a nice stretch in your upper, middle, and lower back. Hold this stretch for as long as it feels comfortable, or about 15 to 30 seconds. 3. Return to the starting position with a flat back while you are on your hands and knees. 4. Let your back sway by pressing your stomach toward the floor. Lift your buttocks toward the ceiling. 5. Hold this position for 15 to 30 seconds. 6. Repeat 2 to 4 times. Follow-up care is a key part of your treatment and safety.  Be sure to make and go to all appointments, and call your doctor if you are having problems. It's also a good idea to know your test results and keep a list of the medicines you take. Where can you learn more? Go to http://norma-kaley.info/. Enter O516 in the search box to learn more about \"Low Back Arthritis: Exercises. \"  Current as of: May 23, 2016  Content Version: 11.2  © 4006-3906 [employer]. Care instructions adapted under license by [employer] (which disclaims liability or warranty for this information). If you have questions about a medical condition or this instruction, always ask your healthcare professional. [address] any warranty or liability for your use of this information.

## 2017-04-18 ENCOUNTER — OFFICE VISIT (OUTPATIENT)
Dept: NEUROLOGY | Age: 32
End: 2017-04-18

## 2017-04-18 VITALS
WEIGHT: 109 LBS | DIASTOLIC BLOOD PRESSURE: 74 MMHG | RESPIRATION RATE: 14 BRPM | HEIGHT: 66 IN | SYSTOLIC BLOOD PRESSURE: 118 MMHG | TEMPERATURE: 98.7 F | HEART RATE: 93 BPM | OXYGEN SATURATION: 99 % | BODY MASS INDEX: 17.52 KG/M2

## 2017-04-18 DIAGNOSIS — G47.10 HYPERSOMNIA WITH SLEEP APNEA: ICD-10-CM

## 2017-04-18 DIAGNOSIS — G47.30 HYPERSOMNIA WITH SLEEP APNEA: ICD-10-CM

## 2017-04-18 DIAGNOSIS — G47.8 SLEEP PARALYSIS: ICD-10-CM

## 2017-04-18 DIAGNOSIS — R51.9 CHRONIC DAILY HEADACHE: ICD-10-CM

## 2017-04-18 DIAGNOSIS — G47.00 INSOMNIA W/ SLEEP APNEA: Primary | ICD-10-CM

## 2017-04-18 DIAGNOSIS — R51.9 MORNING HEADACHE: ICD-10-CM

## 2017-04-18 DIAGNOSIS — G47.30 INSOMNIA W/ SLEEP APNEA: Primary | ICD-10-CM

## 2017-04-18 DIAGNOSIS — Z86.69 HISTORY OF EPILEPSY: ICD-10-CM

## 2017-04-18 RX ORDER — ZALEPLON 10 MG/1
10 CAPSULE ORAL
Qty: 10 CAP | Refills: 0 | Status: SHIPPED | OUTPATIENT
Start: 2017-04-18 | End: 2017-05-30

## 2017-04-18 NOTE — PROGRESS NOTES
[facility]             [address] [facility], [address] [place] [address]      Liza Cranker is right handed [address]  female who presents in evaluation of sleep disorder. Patient describes early adult years Onset was gradual over several years. Patient describes difficulty initiating and maintaining sleep worse over the past several years. She does drink up to 36 ounces of soda a day she goes to bed at 10 can take on average 45 minutes to an hour to fall asleep but at times does not fall asleep at all wakes every several hours up to 10-15 minutes to eat and occasionally to urinate she gets up at 7 AM but does not feel rested she does not schedule naps she does not act out dreams snore or have witnessed apneas. She does admit to sleep hallucinations and sleep paralysis as well as morning headaches but she does have chronic daily headaches as well she also has history of seizure disorder none since 2014 he is aware of discomfort in her leg but denies restless leg symptoms  Ess 11/24,stop bang=1/8    Current Outpatient Prescriptions:     loratadine-pseudoephedrine (CLARITIN-D 12 HOUR) 5-120 mg per tablet, Take 1 Tab by mouth two (2) times daily as needed. , Disp: , Rfl:     zaleplon (SONATA) 10 mg capsule, Take 1 Cap by mouth nightly. Max Daily Amount: 10 mg., Disp: 10 Cap, Rfl: 0    meloxicam (MOBIC) 7.5 mg tablet, Take 1 Tab by mouth daily. , Disp: 30 Tab, Rfl: 2    food supplemt, lactose-reduced (ENSURE PLUS) 0.05-1.5 gram-kcal/mL liqd, Take 237 mL by mouth three (3) times daily. , Disp: 100 Can, Rfl: 3    ondansetron (ZOFRAN ODT) 4 mg disintegrating tablet, , Disp: , Rfl:     fluticasone-vilanterol (BREO ELLIPTA) 100-25 mcg/dose inhaler, Take 1 Puff by inhalation daily. , Disp: 1 Inhaler, Rfl: 1    norethindrone (MICRONOR) 0.35 mg tab, Take 1 Tab by mouth daily. , Disp: 1 Package, Rfl: 11    ergocalciferol (ERGOCALCIFEROL) 50,000 unit capsule, Take 1 Cap by mouth every seven (7) days. , Disp: 12 Cap, Rfl: 1    butalbital-aspirin-caffeine (FIORINAL) capsule, Take 2 Caps by mouth every eight (8) hours as needed for Pain., Disp: , Rfl:     Omeprazole delayed release (PRILOSEC D/R) 20 mg tablet, Take 1 Tab by mouth daily. , Disp: 90 Tab, Rfl: 3    albuterol (PROVENTIL HFA, VENTOLIN HFA, PROAIR HFA) 90 mcg/actuation inhaler, Take 1 Puff by inhalation every four (4) hours as needed for Wheezing or Shortness of Breath., Disp: 2 Inhaler, Rfl: 3    cyproheptadine (PERIACTIN) 4 mg tablet, Take 1 Tab by mouth once over twenty-four (24) hours. , Disp: 90 Tab, Rfl: 3    multivitamin (ONE A DAY) tablet, Take 1 Tab by mouth daily. , Disp: 80 Tab, Rfl: 3  Past Medical History:   Diagnosis Date    Abnormal Papanicolaou smear of cervix     Asthma     Bradycardia     GERD (gastroesophageal reflux disease)     H/O sinus bradycardia     Headache     Migraine     Photophobia of both eyes     Seizures (HCC)     Stomach pain      Social History     Social History    Marital status: SINGLE     Spouse name: N/A    Number of children: 2    Years of education: 12     Occupational History    Not on file. Social History Main Topics    Smoking status: Former Smoker     Years: 13.00    Smokeless tobacco: Never Used      Comment: cigarello, once a month    Alcohol use No    Drug use: No    Sexual activity: Yes     Partners: Male     Birth control/ protection: Pill     Other Topics Concern     Service Yes     [employer]    Blood Transfusions No    Caffeine Concern No    Occupational Exposure No    Hobby Hazards No    Sleep Concern Yes    Stress Concern No    Weight Concern No    Special Diet Yes    Back Care Yes    Exercise Yes    Bike Helmet No    Seat Belt Yes    Self-Exams No     Social History Narrative       Review of Systems:   Constitutional:  gained, 20 lbs.    Eyes:  Negative blurred vision  CVS:  Negative chest pain  Pulm:  Negative shortness of breath  GI:  Negative nausea or vomiting  :  Positive nocturia  Musculoskeletal:  Positive significant joint pain at night  Skin:  Negative rashes  Neuro:  Positive dizziness   Psych:  Positive significant mood issues    Sleep Review of Systems: notable for Positive difficulty falling asleep; Positive awakenings at night; Positive percieved regular dreaming; Positive nightmares; Positive early morning headaches; 0 afternoon naps per week; Negative memory problems; Positive concentration issues; Negative history of any automobile or occupational accidents due to daytime drowsiness. Physical Exam    Visit Vitals    /74 (BP 1 Location: Left arm, BP Patient Position: Sitting)    Pulse 93    Temp 98.7 °F (37.1 °C) (Oral)    Resp 14    Ht 5' 6\" (1.676 m)    Wt 49.4 kg (109 lb)    SpO2 99%    BMI 17.59 kg/m2       Neck Circumference: 31 cm      General:  Well defined, nourished, and groomed individual in no acute distress. HEENT: head :at/nc Throat: no oropharnynx  Crowding noted  Neck: Supple, nontender, thyroid within normal limits, no JVD, no bruits, no pain   with resistance to active range of motion. Heart: Regular rate and rhythm, no murmurs, rub, or gallop. Normal S1S2. Lungs:  Clear to auscultation   Musculoskeletal:  Extremities revealed no edema, clubbing or cyanosis. Psych:  Good mood and normal affect    Neurologic Exam    Mental Status: The patient is awake, alert, and oriented x 3. Speech is fluent and memory appears to be intact, both long and short term. No aphasias or apraxias. Cranial Nerves: II - Visual fields are full to confrontation. Funduscopic examination reveals flat disks bilaterally. Pupils are both equal and reactive to light and accommodation. III, IV, VI - Extraocular movements are intact and there is no nystagmus. V - Facial sensation is intact to pinprick and light touch. VII - Face is symmetrical.   VIII - Hearing is present.    IX, X - Palate rises symmetrically. Gag is present. Tongue is in the midline. Motor: Tone is normal and symmetric. There is no pronator drift present. The strength is intact for all muscle groups tested in all four extremities. Sensory: Sensory examination is intact to pinprick, light touch and position sense testing. Coordination: Finger to nose, heel to shin, fine motor movements are well performed. Gait testing is normal as well as stressed gait testing. Romberg is negative    Reflexes: Symmetrical  plantars are down going    Assessment and Plan  Liza Cranker is a 32 y.o. right handed female whose history and physical are consistent with insomnia ?sleep apnea. Liza Cranker who has risk factors including asthma, history of seizures and headaches. Leon Haro was seen today for establish care, sleep problem, migraine and seizure. Diagnoses and all orders for this visit:    Insomnia w/ sleep apnea  -     SLEEP STUDY FULL ([id number]); Future    Hypersomnia with sleep apnea  -     SLEEP STUDY FULL ([id number]); Future    Sleep paralysis  -     SLEEP STUDY FULL ([id number]); Future    Morning headache  -     SLEEP STUDY FULL ([id number]); Future    History of epilepsy  -     SLEEP STUDY FULL ([id number]); Future    Chronic daily headache  -     SLEEP STUDY FULL ([id number]); Future    Other orders  -     zaleplon (SONATA) 10 mg capsule; Take 1 Cap by mouth nightly. Max Daily Amount: 10 mg. Follow-up Disposition:  Return in about 1 month (around 5/18/2017). Reviewed work up planned . would be glad to see patient for headaches/seizures with appropriate referral     I spent 60 minutes with the patient in face-to-face consultation, of which greater than 50% was spent in counseling and coordination of care as described above.         Electronically Signed by:  Pacheco Schuster MD

## 2017-04-18 NOTE — MR AVS SNAPSHOT
Visit Information Date & Time Provider Department Dept. Phone Encounter #  
 4/18/2017 11:15 AM Escobar Castillo  [facility] [address] [address] [id number] Follow-up Instructions Return in about 1 month (around 5/18/2017). Follow-up and Disposition History Your Appointments 5/5/2017 10:40 AM  
Follow Up with Shauna Mckee MD  
[facility]) Appt Note: TE [place]; epilepsy & migraines; ref & notes in cc. ... ywp  
 [address] [place] [address]  
[phone]  
  
   
 [place] [address] 5/16/2017 10:15 AM  
XRAY with [facility] [address] ([facility]) Appt Note: geo 1030; np apt w/ejf 11am dx-sob [address] [place] [address], [address] [address]  
[phone]  
  
   
 [address] [place] [address], [address] [place] [address],[address] [place] [address] 5/16/2017 10:30 AM  
Nurse Visit with [facility] [address] ([facility]) Appt Note: np apt w/ejf 11am dx-sob; cxr [address], [address] [address]  
[phone]  
  
   
 [address] [place] [address], [address] [place] [address],[address] [place] [address] 5/16/2017 11:00 AM  
New Patient with Rema Chow MD  
[address] ([facility]) Appt Note: [place] Jessica Pumphrey/sob; cxr 21 ; geo [address], [address] [address]  
[phone]  
  
   
 [address] [place] [address], [address] [place] [address],[address] [place] [address] 5/23/2017  1:15 PM  
Follow Up with Escobar Castillo MD  
[facility]) Appt Note: 1mon f/u; sleep study [address] [place] [address]  
[phone]  
  
   
 [place] [address]  7/17/2017 10:45 AM  
Follow Up with Rossana Blancas MD  
 [facility] ([address]) Appt Note: 3 mnth fu  
 [address] [facility] [address] [phone]  
  
   
 [address] [facility] [address] Upcoming Health Maintenance Date Due Pneumococcal 19-64 Medium Risk (1 of 1 - PPSV23) 11/30/2004 DTaP/Tdap/Td series (1 - Tdap) 11/30/2006 PAP AKA CERVICAL CYTOLOGY 12/3/2018 Allergies as of 4/18/2017  Review Complete On: 4/18/2017 By: Misa Huang LPN Severity Noted Reaction Type Reactions Imitrex [Sumatriptan Succinate] High 05/25/2015    Anaphylaxis Iodine  02/16/2017    Cough Coughing up blood Sea Food [Seafood]  06/04/2015    Hives Per pt report Current Immunizations  Never Reviewed No immunizations on file. Not reviewed this visit You Were Diagnosed With   
  
 Codes Comments Insomnia w/ sleep apnea    -  Primary ICD-10-CM: G47.00, G47.30 ICD-9-CM: 780.51 Hypersomnia with sleep apnea     ICD-10-CM: G47.10, G47.30 ICD-9-CM: 780.53 Sleep paralysis     ICD-10-CM: G47.8 ICD-9-CM: 780.58 Morning headache     ICD-10-CM: R51 ICD-9-CM: 784.0 History of epilepsy     ICD-10-CM: Z86.69 
ICD-9-CM: V12.49 Chronic daily headache     ICD-10-CM: R51 ICD-9-CM: 946. 0 Vitals BP Pulse Temp Resp Height(growth percentile) Weight(growth percentile) 118/74 (BP 1 Location: Left arm, BP Patient Position: Sitting) 93 98.7 °F (37.1 °C) (Oral) 14 5' 6\" (1.676 m) 109 lb (49.4 kg) LMP SpO2 BMI OB Status Smoking Status 04/02/2017 (Exact Date) 99% 17.59 kg/m2 Having regular periods Former Smoker Vitals History BMI and BSA Data Body Mass Index Body Surface Area  
 17.59 kg/m 2 1.52 m 2 Preferred Pharmacy Pharmacy Name Phone [id number] [facility], [address] [phone] Your Updated Medication List  
  
   
 This list is accurate as of: 4/18/17 12:17 PM.  Always use your most recent med list.  
  
  
  
  
 albuterol 90 mcg/actuation inhaler Commonly known as:  PROVENTIL HFA, VENTOLIN HFA, PROAIR HFA Take 1 Puff by inhalation every four (4) hours as needed for Wheezing or Shortness of Breath. butalbital-aspirin-caffeine capsule Commonly known as:  Jeffrie Golds Take 2 Caps by mouth every eight (8) hours as needed for Pain. CLARITIN-D 12 HOUR 5-120 mg per tablet Generic drug:  loratadine-pseudoephedrine Take 1 Tab by mouth two (2) times daily as needed. cyproheptadine 4 mg tablet Commonly known as:  PERIACTIN Take 1 Tab by mouth once over twenty-four (24) hours. ergocalciferol 50,000 unit capsule Commonly known as:  ERGOCALCIFEROL Take 1 Cap by mouth every seven (7) days. fluticasone-vilanterol 100-25 mcg/dose inhaler Commonly known as:  BREO ELLIPTA Take 1 Puff by inhalation daily. food supplemt, lactose-reduced 0.05-1.5 gram-kcal/mL Liqd Commonly known as:  ENSURE PLUS Take 237 mL by mouth three (3) times daily. meloxicam 7.5 mg tablet Commonly known as:  MOBIC Take 1 Tab by mouth daily. multivitamin tablet Commonly known as:  ONE A DAY Take 1 Tab by mouth daily. norethindrone 0.35 mg Tab Commonly known as:  Micha & Micha Take 1 Tab by mouth daily. Omeprazole delayed release 20 mg tablet Commonly known as:  PRILOSEC D/R Take 1 Tab by mouth daily. ondansetron 4 mg disintegrating tablet Commonly known as:  ZOFRAN ODT  
  
 zaleplon 10 mg capsule Commonly known as:  SONATA Take 1 Cap by mouth nightly. Max Daily Amount: 10 mg.  
  
  
  
  
Prescriptions Printed Refills  
 zaleplon (SONATA) 10 mg capsule 0 Sig: Take 1 Cap by mouth nightly. Max Daily Amount: 10 mg.  
 Class: Print Route: Oral  
  
Follow-up Instructions Return in about 1 month (around 5/18/2017). To-Do List   
 04/18/2017 Sleep Center:  SLEEP STUDY FULL Patient Instructions No driving drowsy Insomnia: Care Instructions Your Care Instructions Insomnia is the inability to sleep well. It is a common problem for most people at some time. Insomnia may make it hard for you to get to sleep, stay asleep, or sleep as long as you need to. This can make you tired and grouchy during the day. It can also make you forgetful, less effective at work, and unhappy. Insomnia can be caused by conditions such as depression or anxiety. Pain can also affect your ability to sleep. When these problems are solved, the insomnia usually clears up. But sometimes bad sleep habits can cause insomnia. If insomnia is affecting your work or your enjoyment of life, you can take steps to improve your sleep. Follow-up care is a key part of your treatment and safety. Be sure to make and go to all appointments, and call your doctor if you are having problems. It's also a good idea to know your test results and keep a list of the medicines you take. How can you care for yourself at home? What to avoid · Do not have drinks with caffeine, such as coffee or black tea, for 8 hours before bed. · Do not smoke or use other types of tobacco near bedtime. Nicotine is a stimulant and can keep you awake. · Avoid drinking alcohol late in the evening, because it can cause you to wake in the middle of the night. · Do not eat a big meal close to bedtime. If you are hungry, eat a light snack. · Do not drink a lot of water close to bedtime, because the need to urinate may wake you up during the night. · Do not read or watch TV in bed. Use the bed only for sleeping and sexual activity. What to try · Go to bed at the same time every night, and wake up at the same time every morning. Do not take naps during the day. · Keep your bedroom quiet, dark, and cool. · Sleep on a comfortable pillow and mattress. · If watching the clock makes you anxious, turn it facing away from you so you cannot see the time. · If you worry when you lie down, start a worry book. Well before bedtime, write down your worries, and then set the book and your concerns aside. · Try meditation or other relaxation techniques before you go to bed. · If you cannot fall asleep, get up and go to another room until you feel sleepy. Do something relaxing. Repeat your bedtime routine before you go to bed again. · Make your house quiet and calm about an hour before bedtime. Turn down the lights, turn off the TV, log off the computer, and turn down the volume on music. This can help you relax after a busy day. When should you call for help? Watch closely for changes in your health, and be sure to contact your doctor if: 
· Your efforts to improve your sleep do not work. · Your insomnia gets worse. · You have been feeling down, depressed, or hopeless or have lost interest in things that you usually enjoy. Where can you learn more? Go to http://norma-kaley.info/. Enter P513 in the search box to learn more about \"Insomnia: Care Instructions. \" Current as of: July 26, 2016 Content Version: 11.2 © 7698-6798 [employer]. Care instructions adapted under license by [employer] (which disclaims liability or warranty for this information). If you have questions about a medical condition or this instruction, always ask your healthcare professional. [place] [address] any warranty or liability for your use of this information. Patient will obtain referral for neurological consultation headaches Patient Instructions History Introducing [facility]! Dear Ivon Brantley: 
Thank you for requesting a [facility] account. Our records indicate that you already have an active [facility] account. You can access your account anytime at [email or web address] Did you know that you can access your hospital and ER discharge instructions at any time in [employer]? You can also review all of your test results from your hospital stay or ER visit. Additional Information If you have questions, please visit the Frequently Asked Questions section of the [employer] website at [email or web address] Remember, [employer] is NOT to be used for urgent needs. For medical emergencies, dial 911. Now available from your iPhone and Android! Please provide this summary of care documentation to your next provider. Your primary care clinician is listed as Rosio Holcomb. If you have any questions after today's visit, please call [phone].

## 2017-04-18 NOTE — PATIENT INSTRUCTIONS
No driving drowsy     Insomnia: Care Instructions  Your Care Instructions  Insomnia is the inability to sleep well. It is a common problem for most people at some time. Insomnia may make it hard for you to get to sleep, stay asleep, or sleep as long as you need to. This can make you tired and grouchy during the day. It can also make you forgetful, less effective at work, and unhappy. Insomnia can be caused by conditions such as depression or anxiety. Pain can also affect your ability to sleep. When these problems are solved, the insomnia usually clears up. But sometimes bad sleep habits can cause insomnia. If insomnia is affecting your work or your enjoyment of life, you can take steps to improve your sleep. Follow-up care is a key part of your treatment and safety. Be sure to make and go to all appointments, and call your doctor if you are having problems. It's also a good idea to know your test results and keep a list of the medicines you take. How can you care for yourself at home? What to avoid  · Do not have drinks with caffeine, such as coffee or black tea, for 8 hours before bed. · Do not smoke or use other types of tobacco near bedtime. Nicotine is a stimulant and can keep you awake. · Avoid drinking alcohol late in the evening, because it can cause you to wake in the middle of the night. · Do not eat a big meal close to bedtime. If you are hungry, eat a light snack. · Do not drink a lot of water close to bedtime, because the need to urinate may wake you up during the night. · Do not read or watch TV in bed. Use the bed only for sleeping and sexual activity. What to try  · Go to bed at the same time every night, and wake up at the same time every morning. Do not take naps during the day. · Keep your bedroom quiet, dark, and cool. · Sleep on a comfortable pillow and mattress. · If watching the clock makes you anxious, turn it facing away from you so you cannot see the time.   · If you worry when you lie down, start a worry book. Well before bedtime, write down your worries, and then set the book and your concerns aside. · Try meditation or other relaxation techniques before you go to bed. · If you cannot fall asleep, get up and go to another room until you feel sleepy. Do something relaxing. Repeat your bedtime routine before you go to bed again. · Make your house quiet and calm about an hour before bedtime. Turn down the lights, turn off the TV, log off the computer, and turn down the volume on music. This can help you relax after a busy day. When should you call for help? Watch closely for changes in your health, and be sure to contact your doctor if:  · Your efforts to improve your sleep do not work. · Your insomnia gets worse. · You have been feeling down, depressed, or hopeless or have lost interest in things that you usually enjoy. Where can you learn more? Go to http://norma-kaley.info/. Enter P513 in the search box to learn more about \"Insomnia: Care Instructions. \"  Current as of: July 26, 2016  Content Version: 11.2  © 2972-3856 [employer]. Care instructions adapted under license by [employer] (which disclaims liability or warranty for this information). If you have questions about a medical condition or this instruction, always ask your healthcare professional. [address] any warranty or liability for your use of this information.   Patient will obtain referral for neurological consultation headaches

## 2017-04-27 RX ORDER — ONDANSETRON 4 MG/1
4 TABLET, ORALLY DISINTEGRATING ORAL
Qty: 30 TAB | Refills: 1 | Status: SHIPPED | OUTPATIENT
Start: 2017-04-27 | End: 2017-07-11 | Stop reason: SDUPTHER

## 2017-04-27 RX ORDER — FLUTICASONE FUROATE AND VILANTEROL 100; 25 UG/1; UG/1
1 POWDER RESPIRATORY (INHALATION) DAILY
Qty: 1 INHALER | Refills: 4 | Status: SHIPPED | OUTPATIENT
Start: 2017-04-27 | End: 2017-05-30

## 2017-05-01 ENCOUNTER — HOSPITAL ENCOUNTER (OUTPATIENT)
Dept: SLEEP MEDICINE | Age: 32
Discharge: HOME | End: 2017-05-01
Payer: MEDICAID

## 2017-05-01 DIAGNOSIS — G47.00 INSOMNIA W/ SLEEP APNEA: ICD-10-CM

## 2017-05-01 DIAGNOSIS — G47.30 INSOMNIA W/ SLEEP APNEA: ICD-10-CM

## 2017-05-01 DIAGNOSIS — R51.9 MORNING HEADACHE: ICD-10-CM

## 2017-05-01 DIAGNOSIS — G47.10 HYPERSOMNIA WITH SLEEP APNEA: ICD-10-CM

## 2017-05-01 DIAGNOSIS — Z86.69 HISTORY OF EPILEPSY: ICD-10-CM

## 2017-05-01 DIAGNOSIS — G47.30 HYPERSOMNIA WITH SLEEP APNEA: ICD-10-CM

## 2017-05-01 DIAGNOSIS — G47.8 SLEEP PARALYSIS: ICD-10-CM

## 2017-05-01 DIAGNOSIS — R51.9 CHRONIC DAILY HEADACHE: ICD-10-CM

## 2017-05-01 PROCEDURE — 95810 POLYSOM 6/> YRS 4/> PARAM: CPT

## 2017-05-02 ENCOUNTER — DOCUMENTATION ONLY (OUTPATIENT)
Dept: SLEEP MEDICINE | Age: 32
End: 2017-05-02

## 2017-05-02 NOTE — PROGRESS NOTES
Sleep study scored per the American Academy of Sleep Medicine (AASM) Manual for the Scoring of Sleep and Associated Events, Rules, Terminology and Technical Specifications Visual and Cardiac Rules, 2007. V2.2    Preliminary diagnostic sleep study report scanned in to patient's chart and routed to Dr. Suresh Hurley for review.

## 2017-05-05 DIAGNOSIS — R06.02 SOB (SHORTNESS OF BREATH): ICD-10-CM

## 2017-05-05 DIAGNOSIS — J45.909 UNCOMPLICATED ASTHMA, UNSPECIFIED ASTHMA SEVERITY: Primary | ICD-10-CM

## 2017-05-05 NOTE — PROGRESS NOTES
Verbal Order with read back per Dr. Claudean Him, MD  For PFT smart panel. AMB POC PFT complete w/ bronchodilator  AMB POC PFT complete w/o bronchodilator    Dr. Claudean Him, MD will co-sign the orders.

## 2017-05-08 NOTE — PROCEDURES
[facility] -POLYSOMNOGRAM    Name:  Marcin Gomez  MR#:  [id number]  :  1985  Account #:  [de-identified]  Date of Adm:  2017  Date of Service:  2017      TYPE OF STUDY: Polysomnographic study performed as a diagnostic  study. REFERRING PHYSICIAN: Corwin Hfofmann PA-C    INTERPRETING PHYSICIAN: Zak Delgado. Karuna Moreno MD    The patient presents for study due to hypersomnolence. She reports  [place] sleepiness score of 12 out of 24. STOP-Bang was 1 out of 8. Study was obtained with standard parameter monitoring. Blood  pressure prior to study 101/56, post study 96/60. Reports being on  Breo, Zofran, Claritin, Sonata, Mobic, Ensure, Micronor, vitamin D,  Fiorinal, Prilosec, albuterol, Periactin, multivitamin. Patient denies sleep  hygiene protocol violations. She took 10 mg of Sonata prior to test.  Reports 6 hours of sleep the night prior to study. Study was begun  with total recording time of 462.5. Total sleep time was 439.5 for 95%  sleep efficiency. Arousal index was normal at 5. On sleep architecture  review, she entered all stages of sleep. She had no respiratory  disturbances. Apnea-hypopnea index was zero throughout. She was  recorded supine and on the left side. Minimal snoring was noted. Total  mean oxygen saturation was 95% in REM and non-REM. Minimum  was 92 in both non-REM and REM. Mean hear rate was 63 in non-  REM, 64 in REM. Minimum was 49 in non-REM as well as REM. Maximum was 98 in non-REM, was 94 in REM. Periodic movements of  sleep index was 8.9. Periodic limb movements of sleep with arousal  index was 1. CONCLUSION: The patient's polysomnographic study showed no  evidence of respiratory disturbance or periodic movements of sleep to  account for sleeping complaints. She had normal sleep study.         Ann Marie Mathew MD    NM / TB  D:  2017   11:16  T:  2017   14:20  Job #:  [id number]

## 2017-05-12 ENCOUNTER — OFFICE VISIT (OUTPATIENT)
Dept: NEUROLOGY | Age: 32
End: 2017-05-12

## 2017-05-12 VITALS
BODY MASS INDEX: 17.39 KG/M2 | RESPIRATION RATE: 14 BRPM | HEART RATE: 81 BPM | WEIGHT: 108.2 LBS | HEIGHT: 66 IN | SYSTOLIC BLOOD PRESSURE: 102 MMHG | TEMPERATURE: 98.9 F | OXYGEN SATURATION: 99 % | DIASTOLIC BLOOD PRESSURE: 64 MMHG

## 2017-05-12 DIAGNOSIS — G40.409 OTHER GENERALIZED EPILEPSY, NOT INTRACTABLE, WITHOUT STATUS EPILEPTICUS (HCC): Primary | ICD-10-CM

## 2017-05-12 DIAGNOSIS — G44.40 ANALGESIC REBOUND HEADACHE: ICD-10-CM

## 2017-05-12 DIAGNOSIS — T39.95XA ANALGESIC REBOUND HEADACHE: ICD-10-CM

## 2017-05-12 DIAGNOSIS — G43.019 INTRACTABLE MIGRAINE WITHOUT AURA AND WITHOUT STATUS MIGRAINOSUS: ICD-10-CM

## 2017-05-12 RX ORDER — TOPIRAMATE 25 MG/1
25 TABLET ORAL 2 TIMES DAILY
Qty: 60 TAB | Refills: 2 | Status: SHIPPED | OUTPATIENT
Start: 2017-05-12 | End: 2017-05-25 | Stop reason: ALTCHOICE

## 2017-05-12 NOTE — PROGRESS NOTES
Guillermina Browne is a 32 y.o., right handed female, who has been sent in for an evaluation of her migraines and seizures. She's had seizures all her life. They apparently stopped between the ages of 13 to 23. She had them as a infant. She unfortunately was in the foster care system of [address] [place] [address] and therefore has very little knowledge of her history. Her seizure type is that she'll have generalized tonic clonic seizures and post ictal confusion. She doesn't have incontinence or tongue biting regularly. She has no knowledge of the events when she was a child. Her last event was 2014. She's been on Depakote and Keppra in the past.  The last time she took medications was back in . She's been without a neurologist for the last several years. 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 She has an uncle and a cousin with epilepsy. She also gets daily headaches. The pain can last from hours to days. The pain varies from a sharp to a pressure like pain. She gets frequent nausea and some vertigo with the pain. She'll need to lay down frequently with the pain. For the migraines she takes Fiorinal.  She's been taking this 2-3 daily for greater than 18 months. The headaches started to get worse around . This was coincidentally around the time she was on no medication for her seizures. She first started to get the headaches around age 23. They were much less frequenct when they first started, 1-2 times per weeks. Currently the pain ranges from a 5-9/10 most days. She gets some photo and sonophobia with the pain. Social History; she's single, lives with her fiance and her children. She quit smoking in . Doesn't drink nor use illicit drugs. She attends school. Family History; father  from [place]. Mother has diabetes, hypertension. Siblings are in good health.       Current Outpatient Prescriptions   Medication Sig Dispense Refill    fluticasone-vilanterol (BREO ELLIPTA) 100-25 mcg/dose inhaler Take 1 Puff by inhalation daily. 1 Inhaler 4    ondansetron (ZOFRAN ODT) 4 mg disintegrating tablet Take 1 Tab by mouth every eight (8) hours as needed for Nausea. 30 Tab 1    loratadine-pseudoephedrine (CLARITIN-D 12 HOUR) 5-120 mg per tablet Take 1 Tab by mouth two (2) times daily as needed.  zaleplon (SONATA) 10 mg capsule Take 1 Cap by mouth nightly. Max Daily Amount: 10 mg. 10 Cap 0    meloxicam (MOBIC) 7.5 mg tablet Take 1 Tab by mouth daily. 30 Tab 2    food supplemt, lactose-reduced (ENSURE PLUS) 0.05-1.5 gram-kcal/mL liqd Take 237 mL by mouth three (3) times daily. 100 Can 3    norethindrone (MICRONOR) 0.35 mg tab Take 1 Tab by mouth daily. 1 Package 11    ergocalciferol (ERGOCALCIFEROL) 50,000 unit capsule Take 1 Cap by mouth every seven (7) days. 12 Cap 1    butalbital-aspirin-caffeine (FIORINAL) capsule Take 2 Caps by mouth every eight (8) hours as needed for Pain.  Omeprazole delayed release (PRILOSEC D/R) 20 mg tablet Take 1 Tab by mouth daily. 90 Tab 3    albuterol (PROVENTIL HFA, VENTOLIN HFA, PROAIR HFA) 90 mcg/actuation inhaler Take 1 Puff by inhalation every four (4) hours as needed for Wheezing or Shortness of Breath. 2 Inhaler 3    cyproheptadine (PERIACTIN) 4 mg tablet Take 1 Tab by mouth once over twenty-four (24) hours. 90 Tab 3    multivitamin (ONE A DAY) tablet Take 1 Tab by mouth daily.  80 Tab 3       Past Medical History:   Diagnosis Date    Abnormal Papanicolaou smear of cervix     Asthma     Bradycardia     GERD (gastroesophageal reflux disease)     H/O sinus bradycardia     Headache     Migraine     Photophobia of both eyes     Seizures (HCC)     Stomach pain        Past Surgical History:   Procedure Laterality Date    HX APPENDECTOMY      HX  SECTION      HX GYN      cervical cerclage    HX OTHER SURGICAL  2017    Mendel L4-5, L5-S1 Facet Joint block       Allergies   Allergen Reactions    Imitrex [Sumatriptan Succinate] Anaphylaxis    Iodine Cough     Coughing up blood      Sea Food [Seafood] Hives     Per pt report        Patient Active Problem List   Diagnosis Code    Spontaneous  O03.9    Epilepsy ([facility] [address] G40.909    Chronic midline low back pain M54.5, G89.29    ACP (advance care planning) Z71.89    Bradycardia R00.1    Mild intermittent asthma with status asthmaticus J45.22    Low body weight due to inadequate caloric intake R63.6    Lumbar facet arthropathy (HCC) M12.88    Vitamin D deficiency E55.9    Trichomonas infection A59.9    Muscle spasm of back M62.830         Review of Systems: she has terrible insomnia, being seen by Dr Briana Jurado. As above otherwise 11 point review of systems negative including;   Constitutional no fever or chills  Skin denies rash or itching  HENT  Denies tinnitus, hearing lose  Eyes denies diplopia vision lose  Respiratory denies shortness of breath  Cardiovascular denies chest pain, dyspnea on exertion  Gastrointestinal denies nausea, vomiting, diarrhea, constipation  Genitourinary denies incontinence  Musculoskeletal denies joint pain or swelling  Endocrine denies weight change  Hematology denies easy bruising or bleeding   Neurological as above in HPI      PHYSICAL EXAMINATION:      VITAL SIGNS:    Visit Vitals    /64 (BP 1 Location: Left arm, BP Patient Position: Sitting)    Pulse 81    Temp 98.9 °F (37.2 °C) (Oral)    Resp 14    Ht 5' 6\" (1.676 m)    Wt 49.1 kg (108 lb 3.2 oz)    LMP 2017 (Exact Date)    SpO2 99%    BMI 17.46 kg/m2       GENERAL: The patient is well developed, well nourished, and in no apparent distress. EXTREMITIES: No clubbing, cyanosis, or edema is identified. Pulses 2+ and symmetrical.  Muscle tone is normal.  HEAD:   Ear, nose, and throat appear to be without trauma. The patient is normocephalic. NEUROLOGIC EXAMINATION    MENTAL STATUS: The patient is awake, alert, and oriented x 4. Fund of knowledge is adequate.   Speech is fluent and memory appears to be intact, both long and short term. CRANIAL NERVES: II - Visual fields are full to confrontation. Funduscopic examination reveals flat disks bilaterally. Pupils are both 4 mm and briskly reactive to light and accommodation. III, IV, VI - Extraocular movements are intact and there is no nystagmus. V - Facial sensation is intact to pinprick and light touch. VII - Face is symmetrical.   VIII - Hearing is present. IX, X, XII- Palate rises symmetrically. Gag is present. Tongue is in the midline. XI - Shoulder shrugging and head turning intact  MOTOR:  The patient is 5/5 in all four limbs without any drift. Fine finger movements are symmetrical.  Isolated motor group testing reveals no focal abnormalities. Tone is normal.  Sensory examination is intact to pinprick, light touch and position sense testing. Reflexes are 2+ and symmetrical. Plantars are down going. Cerebellar examination reveals no gross ataxia or dysmetria. Gait is normal and the patient can tandem walk without any difficulty.        CBC:   Lab Results   Component Value Date/Time    WBC 4.9 03/15/2017 11:03 AM    RBC 4.33 03/15/2017 11:03 AM    HGB 13.2 03/15/2017 11:03 AM    HCT 39.1 03/15/2017 11:03 AM    PLATELET 106 67/95/0920 11:03 AM     BMP:   Lab Results   Component Value Date/Time    Glucose 84 03/15/2017 11:03 AM    Sodium 142 03/15/2017 11:03 AM    Potassium 4.0 03/15/2017 11:03 AM    Chloride 107 03/15/2017 11:03 AM    CO2 28 03/15/2017 11:03 AM    BUN 8 03/15/2017 11:03 AM    Creatinine 0.67 03/15/2017 11:03 AM    Calcium 9.1 03/15/2017 11:03 AM     CMP:   Lab Results   Component Value Date/Time    Glucose 84 03/15/2017 11:03 AM    Sodium 142 03/15/2017 11:03 AM    Potassium 4.0 03/15/2017 11:03 AM    Chloride 107 03/15/2017 11:03 AM    CO2 28 03/15/2017 11:03 AM    BUN 8 03/15/2017 11:03 AM    Creatinine 0.67 03/15/2017 11:03 AM    Calcium 9.1 03/15/2017 11:03 AM    Anion gap 7 03/15/2017 11:03 AM    BUN/Creatinine ratio 12 03/15/2017 11:03 AM    Alk. 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 She's been off of anti-convulsants successfully since 2014, but still has what sounds like pretty disabling headaches. The headaches are likely a combination of migraine and analgesic rebound headaches. Plan: Will get a baseline EEG. I'm not particularly inclined to restart anti-convulsants in a patient who has been seizure free now since 2014. I do want to start her on migraine prophylaxis with Topamax, for both the migraine and anti-convulsant effect. I warned her not to use the Fiorinal since it's just going to make her headaches worse. Follow up here in about 4 weeks.

## 2017-05-12 NOTE — LETTER
5/12/2017 12:10 PM 
 
Patient:  Ranelle Leyden YOB: 1985 Date of Visit: 5/12/2017 Dear TE Wilson 
[address] [place] [phone] VIA In Basket 
 : Thank you for referring Ms. Tio Akbar to me for evaluation/treatment. Below are the relevant portions of my assessment and plan of care. Ranelle Leyden is a 32 y.o., right handed female, who has been sent in for an evaluation of her migraines and seizures. She's had seizures all her life. They apparently stopped between the ages of 13 to 23. She had them as a infant. She unfortunately was in the foster care system of [address] [place] [address] and therefore has very little knowledge of her history. Her seizure type is that she'll have generalized tonic clonic seizures and post ictal confusion. She doesn't have incontinence or tongue biting regularly. She has no knowledge of the events when she was a child. Her last event was January 2014. She's been on Depakote and Keppra in the past.  The last time she took medications was back in 2012. She's been without a neurologist for the last several years. 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 She has an uncle and a cousin with epilepsy. She also gets daily headaches. The pain can last from hours to days. The pain varies from a sharp to a pressure like pain. She gets frequent nausea and some vertigo with the pain. She'll need to lay down frequently with the pain. For the migraines she takes Fiorinal.  She's been taking this 2-3 daily for greater than 18 months. The headaches started to get worse around 2014. This was coincidentally around the time she was on no medication for her seizures. She first started to get the headaches around age 23. They were much less frequenct when they first started, 1-2 times per weeks. Currently the pain ranges from a 5-9/10 most days. She gets some photo and sonophobia with the pain. Social History; she's single, lives with her fiance and her children. She quit smoking in . Doesn't drink nor use illicit drugs. She attends school. Family History; father  from [place]. Mother has diabetes, hypertension. Siblings are in good health. Current Outpatient Prescriptions Medication Sig Dispense Refill  fluticasone-vilanterol (BREO ELLIPTA) 100-25 mcg/dose inhaler Take 1 Puff by inhalation daily. 1 Inhaler 4  
 ondansetron (ZOFRAN ODT) 4 mg disintegrating tablet Take 1 Tab by mouth every eight (8) hours as needed for Nausea. 30 Tab 1  
 loratadine-pseudoephedrine (CLARITIN-D 12 HOUR) 5-120 mg per tablet Take 1 Tab by mouth two (2) times daily as needed.  zaleplon (SONATA) 10 mg capsule Take 1 Cap by mouth nightly. Max Daily Amount: 10 mg. 10 Cap 0  
 meloxicam (MOBIC) 7.5 mg tablet Take 1 Tab by mouth daily. 30 Tab 2  
 food supplemt, lactose-reduced (ENSURE PLUS) 0.05-1.5 gram-kcal/mL liqd Take 237 mL by mouth three (3) times daily. 100 Can 3  
 norethindrone (MICRONOR) 0.35 mg tab Take 1 Tab by mouth daily. 1 Package 11  
 ergocalciferol (ERGOCALCIFEROL) 50,000 unit capsule Take 1 Cap by mouth every seven (7) days. 12 Cap 1  
 butalbital-aspirin-caffeine (FIORINAL) capsule Take 2 Caps by mouth every eight (8) hours as needed for Pain.  Omeprazole delayed release (PRILOSEC D/R) 20 mg tablet Take 1 Tab by mouth daily. 90 Tab 3  
 albuterol (PROVENTIL HFA, VENTOLIN HFA, PROAIR HFA) 90 mcg/actuation inhaler Take 1 Puff by inhalation every four (4) hours as needed for Wheezing or Shortness of Breath. 2 Inhaler 3  cyproheptadine (PERIACTIN) 4 mg tablet Take 1 Tab by mouth once over twenty-four (24) hours. 90 Tab 3  
 multivitamin (ONE A DAY) tablet Take 1 Tab by mouth daily. 90 Tab 3 Past Medical History:  
Diagnosis Date  Abnormal Papanicolaou smear of cervix  Asthma  Bradycardia  GERD (gastroesophageal reflux disease)  H/O sinus bradycardia  Headache  Migraine  Photophobia of both eyes  Seizures ([address]  Stomach pain Past Surgical History:  
Procedure Laterality Date  HX APPENDECTOMY  HX  SECTION    
 HX GYN    
 cervical cerclage  HX OTHER SURGICAL  2017 Mendel L4-5, L5-S1 Facet Joint block Allergies Allergen Reactions  Imitrex [Sumatriptan Succinate] Anaphylaxis  Iodine Cough Coughing up blood [address] Per pt report Patient Active Problem List  
Diagnosis Code  Spontaneous  O03.9  Epilepsy ([address] G40.909  Chronic midline low back pain M54.5, G89.29  
 ACP (advance care planning) Z71.89  
 Bradycardia R00.1  Mild intermittent asthma with status asthmaticus J45.22  
 Low body weight due to inadequate caloric intake R63.6  Lumbar facet arthropathy (HCC) M12.88  Vitamin D deficiency E55.9  Trichomonas infection A59.9  Muscle spasm of back M62.830 Review of Systems: she has terrible insomnia, being seen by Dr Jewell Listen. As above otherwise 11 point review of systems negative including;  
Constitutional no fever or chills Skin denies rash or itching HENT  Denies tinnitus, hearing lose Eyes denies diplopia vision lose Respiratory denies shortness of breath Cardiovascular denies chest pain, dyspnea on exertion Gastrointestinal denies nausea, vomiting, diarrhea, constipation Genitourinary denies incontinence Musculoskeletal denies joint pain or swelling Endocrine denies weight change Hematology denies easy bruising or bleeding Neurological as above in HPI PHYSICAL EXAMINATION:   
 
VITAL SIGNS:   
Visit Vitals  /64 (BP 1 Location: Left arm, BP Patient Position: Sitting)  Pulse 81  Temp 98.9 °F (37.2 °C) (Oral)  Resp 14  
 Ht 5' 6\" (1.676 m)  Wt 49.1 kg (108 lb 3.2 oz)  LMP 2017 (Exact Date)  SpO2 99%  BMI 17.46 kg/m2 GENERAL: The patient is well developed, well nourished, and in no apparent distress. EXTREMITIES: No clubbing, cyanosis, or edema is identified. Pulses 2+ and symmetrical.  Muscle tone is normal. 
HEAD:   Ear, nose, and throat appear to be without trauma. The patient is normocephalic. NEUROLOGIC EXAMINATION 
 
MENTAL STATUS: The patient is awake, alert, and oriented x 4. Fund of knowledge is adequate. Speech is fluent and memory appears to be intact, both long and short term. CRANIAL NERVES: II  Visual fields are full to confrontation. Funduscopic examination reveals flat disks bilaterally. Pupils are both 4 mm and briskly reactive to light and accommodation. III, IV, VI  Extraocular movements are intact and there is no nystagmus. V  Facial sensation is intact to pinprick and light touch. VII  Face is symmetrical.  
VIII - Hearing is present. IX, X, [address] rises symmetrically. Gag is present. Tongue is in the midline. XI - Shoulder shrugging and head turning intact MOTOR:  The patient is 5/5 in all four limbs without any drift. Fine finger movements are symmetrical.  Isolated motor group testing reveals no focal abnormalities. Tone is normal.  Sensory examination is intact to pinprick, light touch and position sense testing. Reflexes are 2+ and symmetrical. Plantars are down going. Cerebellar examination reveals no gross ataxia or dysmetria. Gait is normal and the patient can tandem walk without any difficulty. CBC:  
Lab Results Component Value Date/Time WBC 4.9 03/15/2017 11:03 AM  
 RBC 4.33 03/15/2017 11:03 AM  
 HGB 13.2 03/15/2017 11:03 AM  
 HCT 39.1 03/15/2017 11:03 AM  
 PLATELET 524 63/60/2855 11:03 AM  
 
BMP:  
Lab Results Component Value Date/Time  Glucose 84 03/15/2017 11:03 AM  
 Sodium 142 03/15/2017 11:03 AM  
 Potassium 4.0 03/15/2017 11:03 AM  
 Chloride 107 03/15/2017 11:03 AM  
 CO2 28 03/15/2017 11:03 AM  
 BUN 8 03/15/2017 11:03 AM  
 Creatinine 0.67 03/15/2017 11:03 AM  
 Calcium 9.1 03/15/2017 11:03 AM  
 
CMP:  
Lab Results Component Value Date/Time Glucose 84 03/15/2017 11:03 AM  
 Sodium 142 03/15/2017 11:03 AM  
 Potassium 4.0 03/15/2017 11:03 AM  
 Chloride 107 03/15/2017 11:03 AM  
 CO2 28 03/15/2017 11:03 AM  
 BUN 8 03/15/2017 11:03 AM  
 Creatinine 0.67 03/15/2017 11:03 AM  
 Calcium 9.1 03/15/2017 11:03 AM  
 Anion gap 7 03/15/2017 11:03 AM  
 BUN/Creatinine ratio 12 03/15/2017 11:03 AM  
 Alk. phosphatase 61 03/15/2017 11:03 AM  
 Protein, total 7.7 03/15/2017 11:03 AM  
 Albumin 4.5 03/15/2017 11:03 AM  
 Globulin 3.2 03/15/2017 11:03 AM  
 A-G Ratio 1.4 03/15/2017 11:03 AM  
 
Coagulation: No results found for: PTP, INR, APTT, PTTT Cardiac markers: No results found for: CPK, CKND1, DONNA Impression: Combination of epilepsy from childhood and headaches in this patient who has risk factors including strong family history of epilepsy. She's been off of anti-convulsants successfully since 2014, but still has what sounds like pretty disabling headaches. The headaches are likely a combination of migraine and analgesic rebound headaches. Plan: Will get a baseline EEG. I'm not particularly inclined to restart anti-convulsants in a patient who has been seizure free now since 2014. I do want to start her on migraine prophylaxis with Topamax, for both the migraine and anti-convulsant effect. I warned her not to use the Fiorinal since it's just going to make her headaches worse. Follow up here in about 4 weeks. If you have questions, please do not hesitate to call me. I look forward to following Ms. Lucille Almodovar along with you.  
 
 
 
Sincerely, 
 
 
Armaan Burger MD

## 2017-05-12 NOTE — COMMUNICATION BODY
Baby Zia is a 32 y.o., right handed female, who has been sent in for an evaluation of her migraines and seizures. She's had seizures all her life. They apparently stopped between the ages of 13 to 23. She had them as a infant. She unfortunately was in the foster care system of [address] [place] [address] and therefore has very little knowledge of her history. Her seizure type is that she'll have generalized tonic clonic seizures and post ictal confusion. She doesn't have incontinence or tongue biting regularly. She has no knowledge of the events when she was a child. Her last event was 2014. She's been on Depakote and Keppra in the past.  The last time she took medications was back in . She's been without a neurologist for the last several years. 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 She has an uncle and a cousin with epilepsy. She also gets daily headaches. The pain can last from hours to days. The pain varies from a sharp to a pressure like pain. She gets frequent nausea and some vertigo with the pain. She'll need to lay down frequently with the pain. For the migraines she takes Fiorinal.  She's been taking this 2-3 daily for greater than 18 months. The headaches started to get worse around . This was coincidentally around the time she was on no medication for her seizures. She first started to get the headaches around age 23. They were much less frequenct when they first started, 1-2 times per weeks. Currently the pain ranges from a 5-9/10 most days. She gets some photo and sonophobia with the pain. Social History; she's single, lives with her fiance and her children. She quit smoking in . Doesn't drink nor use illicit drugs. She attends school. Family History; father  from [place]. Mother has diabetes, hypertension. Siblings are in good health.       Current Outpatient Prescriptions   Medication Sig Dispense Refill    fluticasone-vilanterol (BREO ELLIPTA) 100-25 mcg/dose inhaler Take 1 Puff by inhalation daily. 1 Inhaler 4    ondansetron (ZOFRAN ODT) 4 mg disintegrating tablet Take 1 Tab by mouth every eight (8) hours as needed for Nausea. 30 Tab 1    loratadine-pseudoephedrine (CLARITIN-D 12 HOUR) 5-120 mg per tablet Take 1 Tab by mouth two (2) times daily as needed.  zaleplon (SONATA) 10 mg capsule Take 1 Cap by mouth nightly. Max Daily Amount: 10 mg. 10 Cap 0    meloxicam (MOBIC) 7.5 mg tablet Take 1 Tab by mouth daily. 30 Tab 2    food supplemt, lactose-reduced (ENSURE PLUS) 0.05-1.5 gram-kcal/mL liqd Take 237 mL by mouth three (3) times daily. 100 Can 3    norethindrone (MICRONOR) 0.35 mg tab Take 1 Tab by mouth daily. 1 Package 11    ergocalciferol (ERGOCALCIFEROL) 50,000 unit capsule Take 1 Cap by mouth every seven (7) days. 12 Cap 1    butalbital-aspirin-caffeine (FIORINAL) capsule Take 2 Caps by mouth every eight (8) hours as needed for Pain.  Omeprazole delayed release (PRILOSEC D/R) 20 mg tablet Take 1 Tab by mouth daily. 90 Tab 3    albuterol (PROVENTIL HFA, VENTOLIN HFA, PROAIR HFA) 90 mcg/actuation inhaler Take 1 Puff by inhalation every four (4) hours as needed for Wheezing or Shortness of Breath. 2 Inhaler 3    cyproheptadine (PERIACTIN) 4 mg tablet Take 1 Tab by mouth once over twenty-four (24) hours. 90 Tab 3    multivitamin (ONE A DAY) tablet Take 1 Tab by mouth daily.  80 Tab 3       Past Medical History:   Diagnosis Date    Abnormal Papanicolaou smear of cervix     Asthma     Bradycardia     GERD (gastroesophageal reflux disease)     H/O sinus bradycardia     Headache     Migraine     Photophobia of both eyes     Seizures (HCC)     Stomach pain        Past Surgical History:   Procedure Laterality Date    HX APPENDECTOMY      HX  SECTION      HX GYN      cervical cerclage    HX OTHER SURGICAL  2017    Mendel L4-5, L5-S1 Facet Joint block       Allergies   Allergen Reactions    Imitrex [Sumatriptan Succinate] Anaphylaxis    Iodine Cough     Coughing up blood      Sea Food [Seafood] Hives     Per pt report        Patient Active Problem List   Diagnosis Code    Spontaneous  O03.9    Epilepsy ([facility] G40.909    Chronic midline low back pain M54.5, G89.29    ACP (advance care planning) Z71.89    Bradycardia R00.1    Mild intermittent asthma with status asthmaticus J45.22    Low body weight due to inadequate caloric intake R63.6    Lumbar facet arthropathy (HCC) M12.88    Vitamin D deficiency E55.9    Trichomonas infection A59.9    Muscle spasm of back M62.830         Review of Systems: she has terrible insomnia, being seen by Dr Sally Valencia. As above otherwise 11 point review of systems negative including;   Constitutional no fever or chills  Skin denies rash or itching  HENT  Denies tinnitus, hearing lose  Eyes denies diplopia vision lose  Respiratory denies shortness of breath  Cardiovascular denies chest pain, dyspnea on exertion  Gastrointestinal denies nausea, vomiting, diarrhea, constipation  Genitourinary denies incontinence  Musculoskeletal denies joint pain or swelling  Endocrine denies weight change  Hematology denies easy bruising or bleeding   Neurological as above in HPI      PHYSICAL EXAMINATION:      VITAL SIGNS:    Visit Vitals    /64 (BP 1 Location: Left arm, BP Patient Position: Sitting)    Pulse 81    Temp 98.9 °F (37.2 °C) (Oral)    Resp 14    Ht 5' 6\" (1.676 m)    Wt 49.1 kg (108 lb 3.2 oz)    LMP 2017 (Exact Date)    SpO2 99%    BMI 17.46 kg/m2       GENERAL: The patient is well developed, well nourished, and in no apparent distress. EXTREMITIES: No clubbing, cyanosis, or edema is identified. Pulses 2+ and symmetrical.  Muscle tone is normal.  HEAD:   Ear, nose, and throat appear to be without trauma. The patient is normocephalic. NEUROLOGIC EXAMINATION    MENTAL STATUS: The patient is awake, alert, and oriented x 4. Fund of knowledge is adequate.   Speech is fluent and memory appears to be intact, both long and short term. CRANIAL NERVES: II - Visual fields are full to confrontation. Funduscopic examination reveals flat disks bilaterally. Pupils are both 4 mm and briskly reactive to light and accommodation. III, IV, VI - Extraocular movements are intact and there is no nystagmus. V - Facial sensation is intact to pinprick and light touch. VII - Face is symmetrical.   VIII - Hearing is present. IX, X, XII- Palate rises symmetrically. Gag is present. Tongue is in the midline. XI - Shoulder shrugging and head turning intact  MOTOR:  The patient is 5/5 in all four limbs without any drift. Fine finger movements are symmetrical.  Isolated motor group testing reveals no focal abnormalities. Tone is normal.  Sensory examination is intact to pinprick, light touch and position sense testing. Reflexes are 2+ and symmetrical. Plantars are down going. Cerebellar examination reveals no gross ataxia or dysmetria. Gait is normal and the patient can tandem walk without any difficulty.        CBC:   Lab Results   Component Value Date/Time    WBC 4.9 03/15/2017 11:03 AM    RBC 4.33 03/15/2017 11:03 AM    HGB 13.2 03/15/2017 11:03 AM    HCT 39.1 03/15/2017 11:03 AM    PLATELET 214 10/45/3987 11:03 AM     BMP:   Lab Results   Component Value Date/Time    Glucose 84 03/15/2017 11:03 AM    Sodium 142 03/15/2017 11:03 AM    Potassium 4.0 03/15/2017 11:03 AM    Chloride 107 03/15/2017 11:03 AM    CO2 28 03/15/2017 11:03 AM    BUN 8 03/15/2017 11:03 AM    Creatinine 0.67 03/15/2017 11:03 AM    Calcium 9.1 03/15/2017 11:03 AM     CMP:   Lab Results   Component Value Date/Time    Glucose 84 03/15/2017 11:03 AM    Sodium 142 03/15/2017 11:03 AM    Potassium 4.0 03/15/2017 11:03 AM    Chloride 107 03/15/2017 11:03 AM    CO2 28 03/15/2017 11:03 AM    BUN 8 03/15/2017 11:03 AM    Creatinine 0.67 03/15/2017 11:03 AM    Calcium 9.1 03/15/2017 11:03 AM    Anion gap 7 03/15/2017 11:03 AM    BUN/Creatinine ratio 12 03/15/2017 11:03 AM    Alk. 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 She's been off of anti-convulsants successfully since 2014, but still has what sounds like pretty disabling headaches. The headaches are likely a combination of migraine and analgesic rebound headaches. Plan: Will get a baseline EEG. I'm not particularly inclined to restart anti-convulsants in a patient who has been seizure free now since 2014. I do want to start her on migraine prophylaxis with Topamax, for both the migraine and anti-convulsant effect. I warned her not to use the Fiorinal since it's just going to make her headaches worse. Follow up here in about 4 weeks.

## 2017-05-12 NOTE — MR AVS SNAPSHOT
Visit Information Date & Time Provider Department Dept. Phone Encounter #  
 5/12/2017 11:20 AM Lisbeth Booth, [address] [facility] [address] [address] [id number] Follow-up Instructions Return in about 2 weeks (around 5/26/2017). Follow-up and Disposition History Your Appointments 5/16/2017 10:15 AM  
XRAY with [facility] [address] ([address] [place] [address]) Appt Note: geo 1030; np apt w/ejf 11am dx-sob [address] [place] [address], [address] [address]  
[phone]  
  
   
 [address] [place] [address], [address] [place] [address],[address] [place] [address] 5/16/2017 10:30 AM  
Nurse Visit with [facility] [address] ([address] [place] [address]) Appt Note: np apt w/ejf 11am dx-sob; cxr [address], [address] [address]  
[phone]  
  
   
 [address] [place] [address], [address] [place] [address],[address] [place] [address] 5/16/2017 11:00 AM  
New Patient with Joni Hughes MD  
[address] ([address] [place] [address]) Appt Note: PA Jessica Pumphrey/sob; cxr 21 ; geo [address], [address] [address]  
[phone]  
  
   
 [address] [place] [address], [address] [place] [address],[address] [place] [address] 5/23/2017  1:15 PM  
Follow Up with Rex Lora MD  
[address]) Appt Note: 1mon f/u; sleep study [address] [place] [address] [phone]  
  
   
 [facility] [address] 7/17/2017 10:45 AM  
Follow Up with Lenin Ackerman MD  
[facility] [address] [place] [address]) Appt Note: [address]  
 [address] [facility] [address]  
[phone]  
  
   
 [address] [facility] [address] Upcoming Health Maintenance Date Due Pneumococcal 19-64 Medium Risk (1 of 1 - PPSV23) 11/30/2004 DTaP/Tdap/Td series (1 - Tdap) 11/30/2006 INFLUENZA AGE 9 TO ADULT 8/1/2017 PAP AKA CERVICAL CYTOLOGY 12/3/2018 Allergies as of 5/12/2017  Review Complete On: 5/12/2017 By: Mart Wiggins LPN Severity Noted Reaction Type Reactions Imitrex [Sumatriptan Succinate] High 05/25/2015    Anaphylaxis Iodine  02/16/2017    Cough Coughing up blood Sea Food [Seafood]  06/04/2015    Hives Per pt report Current Immunizations  Never Reviewed No immunizations on file. Not reviewed this visit You Were Diagnosed With   
  
 Codes Comments Other generalized epilepsy, not intractable, without status epilepticus ([facility]    -  Primary ICD-10-CM: G40.409 ICD-9-CM: 345.90 Intractable migraine without aura and without status migrainosus     ICD-10-CM: G43.019 
ICD-9-CM: 346.11 Analgesic rebound headache     ICD-10-CM: T39.91XA, G44.40 ICD-9-CM: 339.3, E935.9 Vitals BP Pulse Temp Resp Height(growth percentile) Weight(growth percentile) 102/64 (BP 1 Location: Left arm, BP Patient Position: Sitting) 81 98.9 °F (37.2 °C) (Oral) 14 5' 6\" (1.676 m) 108 lb 3.2 oz (49.1 kg) LMP SpO2 BMI OB Status Smoking Status 04/30/2017 (Exact Date) 99% 17.46 kg/m2 Having regular periods Former Smoker Vitals History BMI and BSA Data Body Mass Index Body Surface Area  
 17.46 kg/m 2 1.51 m 2 Preferred Pharmacy Pharmacy Name Phone [phone] [phone] Your Updated Medication List  
  
   
This list is accurate as of: 5/12/17 12:14 PM.  Always use your most recent med list.  
  
  
  
  
 albuterol 90 mcg/actuation inhaler Commonly known as:  PROVENTIL HFA, VENTOLIN HFA, PROAIR HFA Take 1 Puff by inhalation every four (4) hours as needed for Wheezing or Shortness of Breath. butalbital-aspirin-caffeine capsule Commonly known as:  Carlos Silva  
 Take 2 Caps by mouth every eight (8) hours as needed for Pain. CLARITIN-D 12 HOUR 5-120 mg per tablet Generic drug:  loratadine-pseudoephedrine Take 1 Tab by mouth two (2) times daily as needed. cyproheptadine 4 mg tablet Commonly known as:  PERIACTIN Take 1 Tab by mouth once over twenty-four (24) hours. ergocalciferol 50,000 unit capsule Commonly known as:  ERGOCALCIFEROL Take 1 Cap by mouth every seven (7) days. fluticasone-vilanterol 100-25 mcg/dose inhaler Commonly known as:  BREO ELLIPTA Take 1 Puff by inhalation daily. food supplemt, lactose-reduced 0.05-1.5 gram-kcal/mL Liqd Commonly known as:  ENSURE PLUS Take 237 mL by mouth three (3) times daily. meloxicam 7.5 mg tablet Commonly known as:  MOBIC Take 1 Tab by mouth daily. multivitamin tablet Commonly known as:  ONE A DAY Take 1 Tab by mouth daily. norethindrone 0.35 mg Tab Commonly known as:  Micha & Micha Take 1 Tab by mouth daily. Omeprazole delayed release 20 mg tablet Commonly known as:  PRILOSEC D/R Take 1 Tab by mouth daily. ondansetron 4 mg disintegrating tablet Commonly known as:  ZOFRAN ODT Take 1 Tab by mouth every eight (8) hours as needed for Nausea. topiramate 25 mg tablet Commonly known as:  TOPAMAX Take 1 Tab by mouth two (2) times a day. zaleplon 10 mg capsule Commonly known as:  SONATA Take 1 Cap by mouth nightly. Max Daily Amount: 10 mg.  
  
  
  
  
Prescriptions Sent to Pharmacy Refills  
 topiramate (TOPAMAX) 25 mg tablet 2 Sig: Take 1 Tab by mouth two (2) times a day. Class: Normal  
 Pharmacy: [address] [address], [address] [facility] Ph #: [phone] Route: Oral  
  
Follow-up Instructions Return in about 2 weeks (around 5/26/2017). Introducing [facility]! Dear Kristal Delacruz: 
Thank you for requesting a [employer] account.   Our records indicate that you already have an active [employer] account. You can access your account anytime at [email or web address]/"[email or web address] Did you know that you can access your hospital and ER discharge instructions at any time in [employer]? You can also review all of your test results from your hospital stay or ER visit. Additional Information If you have questions, please visit the Frequently Asked Questions section of the [employer] website at [email or web address] [email or web address]/"[email or web address]/. Remember, [employer] is NOT to be used for urgent needs. For medical emergencies, dial 911. Now available from your iPhone and Android! Please provide this summary of care documentation to your next provider. Your primary care clinician is listed as Maddie Chi. If you have any questions after today's visit, please call [phone].

## 2017-05-16 ENCOUNTER — OFFICE VISIT (OUTPATIENT)
Dept: PULMONOLOGY | Age: 32
End: 2017-05-16

## 2017-05-16 VITALS
BODY MASS INDEX: 17.36 KG/M2 | TEMPERATURE: 98.5 F | DIASTOLIC BLOOD PRESSURE: 70 MMHG | HEIGHT: 66 IN | RESPIRATION RATE: 19 BRPM | HEART RATE: 66 BPM | OXYGEN SATURATION: 99 % | SYSTOLIC BLOOD PRESSURE: 110 MMHG | WEIGHT: 108 LBS

## 2017-05-16 DIAGNOSIS — J45.909 UNCOMPLICATED ASTHMA, UNSPECIFIED ASTHMA SEVERITY: ICD-10-CM

## 2017-05-16 DIAGNOSIS — R06.02 SOB (SHORTNESS OF BREATH): Primary | ICD-10-CM

## 2017-05-16 NOTE — MR AVS SNAPSHOT
Visit Information Date & Time Provider Department Dept. Phone Encounter #  
 5/16/2017 11:00 AM Leyda Castano MD [facility] [id number] Your Appointments 5/23/2017  1:15 PM  
Follow Up with Yudelka Hoffmann MD  
[facility]) Appt Note: 1mon f/u; sleep study [address] [facility] [id number] [phone]  
  
   
 [facility] [address]  
  
    
 5/25/2017 11:20 AM  
Follow Up with Mack Barajas MD  
[facility]) Appt Note: F/U migraine/ seizures [address] [facility] [address]  
[phone]  
  
   
 [facility] [address] 7/17/2017 10:45 AM  
Follow Up with Magalis Saavedra MD  
[facility]) Appt Note: [address] fu  
 [address] [facility] [address]  
[phone]  
  
   
 [address] [facility] [address] Upcoming Health Maintenance Date Due Pneumococcal 19-64 Medium Risk (1 of 1 - PPSV23) 11/30/2004 DTaP/Tdap/Td series (1 - Tdap) 11/30/2006 INFLUENZA AGE 9 TO ADULT 8/1/2017 PAP AKA CERVICAL CYTOLOGY 12/3/2018 Allergies as of 5/16/2017  Review Complete On: 5/16/2017 By: Danielle Treadwell, RT Severity Noted Reaction Type Reactions Imitrex [Sumatriptan Succinate] High 05/25/2015    Anaphylaxis Iodine  02/16/2017    Cough Coughing up blood Sea Food [Seafood]  06/04/2015    Hives Per pt report Current Immunizations  Never Reviewed No immunizations on file. Not reviewed this visit You Were Diagnosed With   
  
 Codes Comments SOB (shortness of breath)    -  Primary ICD-10-CM: R06.02 
ICD-9-CM: 786.05 Uncomplicated asthma, unspecified asthma severity     ICD-10-CM: J45.909 ICD-9-CM: 493.90   
  
 Vitals BP Pulse Temp Resp Height(growth percentile) Weight(growth percentile) 110/70 (BP 1 Location: Left arm, BP Patient Position: At rest) 66 98.5 °F (36.9 °C) (Oral) 19 5' 6\" (1.676 m) 108 lb (49 kg) LMP SpO2 BMI OB Status Smoking Status 04/30/2017 (Exact Date) 99% 17.43 kg/m2 Having regular periods Former Smoker BMI and BSA Data Body Mass Index Body Surface Area  
 17.43 kg/m 2 1.51 m 2 Preferred Pharmacy Pharmacy Name Phone [phone] [phone], [address] [facility] [address] [facility] [address] [facility] [phone] Your Updated Medication List  
  
   
This list is accurate as of: 5/16/17 12:06 PM.  Always use your most recent med list.  
  
  
  
  
 albuterol 90 mcg/actuation inhaler Commonly known as:  PROVENTIL HFA, VENTOLIN HFA, PROAIR HFA Take 1 Puff by inhalation every four (4) hours as needed for Wheezing or Shortness of Breath. butalbital-aspirin-caffeine capsule Commonly known as:  Halima Keo Take 2 Caps by mouth every eight (8) hours as needed for Pain. CLARITIN-D 12 HOUR 5-120 mg per tablet Generic drug:  loratadine-pseudoephedrine Take 1 Tab by mouth two (2) times daily as needed. cyproheptadine 4 mg tablet Commonly known as:  PERIACTIN Take 1 Tab by mouth once over twenty-four (24) hours. ergocalciferol 50,000 unit capsule Commonly known as:  ERGOCALCIFEROL Take 1 Cap by mouth every seven (7) days. fluticasone-vilanterol 100-25 mcg/dose inhaler Commonly known as:  BREO ELLIPTA Take 1 Puff by inhalation daily. food supplemt, lactose-reduced 0.05-1.5 gram-kcal/mL Liqd Commonly known as:  ENSURE PLUS Take 237 mL by mouth three (3) times daily. meloxicam 7.5 mg tablet Commonly known as:  MOBIC Take 1 Tab by mouth daily. multivitamin tablet Commonly known as:  ONE A DAY Take 1 Tab by mouth daily. norethindrone 0.35 mg Tab Commonly known as:  Micha & Micha Take 1 Tab by mouth daily. Omeprazole delayed release 20 mg tablet Commonly known as:  PRILOSEC D/R Take 1 Tab by mouth daily. ondansetron 4 mg disintegrating tablet Commonly known as:  ZOFRAN ODT Take 1 Tab by mouth every eight (8) hours as needed for Nausea. topiramate 25 mg tablet Commonly known as:  TOPAMAX Take 1 Tab by mouth two (2) times a day. zaleplon 10 mg capsule Commonly known as:  SONATA Take 1 Cap by mouth nightly. Max Daily Amount: 10 mg. We Performed the Following AMB POC PFT COMPLETE W/O BRONCHODILATOR [21872 CPT(R)] AMB POC XRAY, CHEST, 2 VIEWS, FRONTAL [58975 CPT(R)] To-Do List   
 Around 05/17/2017 ECHO:  2D ECHO COMPLETE ADULT (TTE) W OR WO CONTR Introducing \[facility]\"" & [facility] SERVICES! Dear Bard Magdaleno: 
Thank you for requesting a [employer] account. Our records indicate that you already have an active [employer] account. You can access your account anytime at [email or web address] Did you know that you can access your hospital and ER discharge instructions at any time in [employer]? You can also review all of your test results from your hospital stay or ER visit. Additional Information If you have questions, please visit the Frequently Asked Questions section of the [employer] website at [email or web address] Remember, [employer] is NOT to be used for urgent needs. For medical emergencies, dial 911. Now available from your iPhone and Android! Please provide this summary of care documentation to your next provider. Your primary care clinician is listed as Marcia Mendez. If you have any questions after today's visit, please call [phone].

## 2017-05-16 NOTE — LETTER
Request for Examination Exam Date: 2017 Patient's Name:  Priscila Ching SS#:  [id number] :  1985 Pregnant: No 
 
Address:  [address] [address] Phone#:  [phone] (home) [phone] (work) Ordering Physician: Sarah Haro MD 
 
Technician: Aylin Bird LPN Insurance Information: See Attached Exams Ordered: CXR PA/LAT Clinical Data/ Brief History: SOB R06.02 Preliminary Exam Report: 
 
 
 
 
 
 
 
 
___________________________  __________________ ___________ Radiologist                  Date         Time

## 2017-05-16 NOTE — PROGRESS NOTES
[facility] PULMONARY SPECIALISTS  Pulmonary, Critical Care, and Sleep Medicine    Dear MsBenita Pumphrey,    Chief complaint:  Shortness of breath    HPI:  Rashaun Latham is 32years old and comes to the office today concerning shortness of breath which she relates she's had for a year and states that he usually can come on any time including when she is sitting quietly and in the last about 10 min. Or less and then resolve spontaneously. She denies any associated chest pain but she does have chest pains frequently which feel like something is sitting on her chest and this can last 5 min. Or more and usually occurs with mild activity. It is nonradiating she also denies a chronic cough leg pain or swelling PND. she relates she takes Breo with mild relief but it really doesn't eradicate her symptoms and albuterol which has little effect on her symptoms  Allergies   Allergen Reactions    Imitrex [Sumatriptan Succinate] Anaphylaxis    Iodine Cough     Coughing up blood      Sea Food [Seafood] Hives     Per pt report      Current Outpatient Prescriptions   Medication Sig    topiramate (TOPAMAX) 25 mg tablet Take 1 Tab by mouth two (2) times a day.  fluticasone-vilanterol (BREO ELLIPTA) 100-25 mcg/dose inhaler Take 1 Puff by inhalation daily.  ondansetron (ZOFRAN ODT) 4 mg disintegrating tablet Take 1 Tab by mouth every eight (8) hours as needed for Nausea.  loratadine-pseudoephedrine (CLARITIN-D 12 HOUR) 5-120 mg per tablet Take 1 Tab by mouth two (2) times daily as needed.  meloxicam (MOBIC) 7.5 mg tablet Take 1 Tab by mouth daily.  food supplemt, lactose-reduced (ENSURE PLUS) 0.05-1.5 gram-kcal/mL liqd Take 237 mL by mouth three (3) times daily.  norethindrone (MICRONOR) 0.35 mg tab Take 1 Tab by mouth daily.  ergocalciferol (ERGOCALCIFEROL) 50,000 unit capsule Take 1 Cap by mouth every seven (7) days.  Omeprazole delayed release (PRILOSEC D/R) 20 mg tablet Take 1 Tab by mouth daily.     albuterol (PROVENTIL HFA, VENTOLIN HFA, PROAIR HFA) 90 mcg/actuation inhaler Take 1 Puff by inhalation every four (4) hours as needed for Wheezing or Shortness of Breath.  cyproheptadine (PERIACTIN) 4 mg tablet Take 1 Tab by mouth once over twenty-four (24) hours.  multivitamin (ONE A DAY) tablet Take 1 Tab by mouth daily.  zaleplon (SONATA) 10 mg capsule Take 1 Cap by mouth nightly. Max Daily Amount: 10 mg.    butalbital-aspirin-caffeine (FIORINAL) capsule Take 2 Caps by mouth every eight (8) hours as needed for Pain. No current facility-administered medications for this visit. Past Medical History:   Diagnosis Date    Abnormal Papanicolaou smear of cervix     Asthma     Bradycardia     GERD (gastroesophageal reflux disease)     H/O sinus bradycardia     Headache     Migraine     Photophobia of both eyes     Seizures (HCC)     Stomach pain    she denies a history of heart disease except for bradycardia diabetes hypertension kidney disease liver disease also as tuberculosis cancer  Past Surgical History:   Procedure Laterality Date    HX APPENDECTOMY      HX  SECTION      HX GYN      cervical cerclage    HX OTHER SURGICAL  2017    Mendel L4-5, L5-S1 Facet Joint block       Family history:heart disease        Social History:smoke cigarettes but stopped 7 years ago  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   She denies arthritis or rashes and reports syncopal episodes and seizures but none for 2-3 years and she also says she has trouble saying despite a normal eye exam and has no trouble hearing she says food gets stuck when she swallows but does not choke on food and has chronic abdominal pain of unknown origin but denies melena or blood in her stools recently and denies dysuria or hematuria    Physical Exam:  Visit Vitals    /70 (BP 1 Location: Left arm, BP Patient Position: At rest)    Pulse 66    Temp 98.5 °F (36.9 °C) (Oral)    Resp 19    Ht 5' 6\" (1.676 m)    Wt 49 kg (108 lb)    LMP 04/30/2017 (Exact Date)    SpO2 99%    BMI 17.43 kg/m2     Well-developed and thin  HEENT: pupils equal, reactive, sclera, non-icteric   Oropharynx tongue: normal   Neck: Supple   Lymph Nodes: Supra clavicular and cervical nodes, negative   Chest: Equal symmetrical expansion, no dullness, no wheezes, rales or rubs   Heart: Regular rhythm without valerie or murmur   Abdomen: soft, non-tender no masses or organomegaly   Extremities: no cyanosis, clubbing, no edema   Skin: No rash  Musculoskeletal no acute joint inflammation or muscle wasting   Neurological: alert, oriented, moves all extremities  Psychological: Pleasant cooperative    LABS:  O2 sat room air resting 99% and with walking 6 min. O2 sat on room air after 6 minutes O2 sat remained at 97 % with highest pulse of 88  Spirometry 5/16/17: normal study  Chest x-ray 5/16/17: Normal appearance  Impression:   The cause of the patient's shortness of breath is not clear she has no signs of pulmonary hypertension but will obtain an echocardiogram to rule out call us but I suspect because of her normal spirometry and chest x-ray and from the way she describes her shortness of breath and her other problems associated with anxiety that this may be related to anxiety    Plan:  Echocardiogram  Followup in one month  Consider empiric trial of discontinuing Breo    Thanks for for allowing me to share in this patient's evaluation    Sincerely,    Jazmyn Weeks MD , [facility]    CC: TE [place]    [address] [place] [address],[address]  [place], [address] [address]     P: [phone]     F: [phone]

## 2017-05-25 ENCOUNTER — OFFICE VISIT (OUTPATIENT)
Dept: NEUROLOGY | Age: 32
End: 2017-05-25

## 2017-05-25 VITALS
HEIGHT: 66 IN | RESPIRATION RATE: 14 BRPM | BODY MASS INDEX: 17.68 KG/M2 | TEMPERATURE: 98.9 F | HEART RATE: 75 BPM | WEIGHT: 110 LBS | SYSTOLIC BLOOD PRESSURE: 100 MMHG | DIASTOLIC BLOOD PRESSURE: 66 MMHG | OXYGEN SATURATION: 99 %

## 2017-05-25 DIAGNOSIS — G43.019 INTRACTABLE MIGRAINE WITHOUT AURA AND WITHOUT STATUS MIGRAINOSUS: Primary | ICD-10-CM

## 2017-05-25 RX ORDER — DIVALPROEX SODIUM 250 MG/1
250 TABLET, DELAYED RELEASE ORAL 2 TIMES DAILY
Qty: 60 TAB | Refills: 7 | Status: SHIPPED | OUTPATIENT
Start: 2017-05-25 | End: 2017-12-14 | Stop reason: SDUPTHER

## 2017-05-25 NOTE — COMMUNICATION BODY
Re:  Ryley Pierre,Follow up visit     5/25/2017 11:40 AM    SSN: [id number]    Subjective:   Marcelo Valdez ana paulans for follow up of her headaches. There's no change in the headaches and she's having both nausea and vomiting from the Topamax. She was told to stop it. Medications:    Current Outpatient Prescriptions   Medication Sig Dispense Refill    topiramate (TOPAMAX) 25 mg tablet Take 1 Tab by mouth two (2) times a day. 60 Tab 2    fluticasone-vilanterol (BREO ELLIPTA) 100-25 mcg/dose inhaler Take 1 Puff by inhalation daily. 1 Inhaler 4    ondansetron (ZOFRAN ODT) 4 mg disintegrating tablet Take 1 Tab by mouth every eight (8) hours as needed for Nausea. 30 Tab 1    loratadine-pseudoephedrine (CLARITIN-D 12 HOUR) 5-120 mg per tablet Take 1 Tab by mouth two (2) times daily as needed.  zaleplon (SONATA) 10 mg capsule Take 1 Cap by mouth nightly. Max Daily Amount: 10 mg. 10 Cap 0    meloxicam (MOBIC) 7.5 mg tablet Take 1 Tab by mouth daily. 30 Tab 2    food supplemt, lactose-reduced (ENSURE PLUS) 0.05-1.5 gram-kcal/mL liqd Take 237 mL by mouth three (3) times daily. 100 Can 3    norethindrone (MICRONOR) 0.35 mg tab Take 1 Tab by mouth daily. 1 Package 11    ergocalciferol (ERGOCALCIFEROL) 50,000 unit capsule Take 1 Cap by mouth every seven (7) days. 12 Cap 1    butalbital-aspirin-caffeine (FIORINAL) capsule Take 2 Caps by mouth every eight (8) hours as needed for Pain.  Omeprazole delayed release (PRILOSEC D/R) 20 mg tablet Take 1 Tab by mouth daily. 90 Tab 3    albuterol (PROVENTIL HFA, VENTOLIN HFA, PROAIR HFA) 90 mcg/actuation inhaler Take 1 Puff by inhalation every four (4) hours as needed for Wheezing or Shortness of Breath. 2 Inhaler 3    cyproheptadine (PERIACTIN) 4 mg tablet Take 1 Tab by mouth once over twenty-four (24) hours. 90 Tab 3    multivitamin (ONE A DAY) tablet Take 1 Tab by mouth daily.  90 Tab 3       Vital signs:    Visit Vitals    /66 (BP 1 Location: Left arm, BP Patient Position: Sitting)    Pulse 75    Temp 98.9 °F (37.2 °C) (Oral)    Resp 14    Ht 5' 6\" (1.676 m)    Wt 49.9 kg (110 lb)    LMP 2017 (Exact Date)    SpO2 99%    BMI 17.75 kg/m2       Review of Systems:   As above otherwise 11 point review of systems negative including;   Constitutional no fever or chills  Skin denies rash or itching  HEENT  Denies tinnitus, hearing lose  Eyes denies diplopia vision lose  Respiratory denies sortness of breath  Cardiovascular denies chest pain, dyspnea on exertion  Gastrointestinal denies nausea, vomiting, diarrhea, constipation  Genitourinary denies incontinence  Musculoskeletal denies joint pain or swelling  Endocrine denies weight change  Hematology denies easy bruising or bleeding   Neurological as above in HPI      Patient Active Problem List   Diagnosis Code    Spontaneous  O03.9    [facility] G40.909    Chronic midline low back pain M54.5, G89.29    ACP (advance care planning) Z71.89    Bradycardia R00.1    Mild intermittent asthma with status asthmaticus J45.22    Low body weight due to inadequate caloric intake R63.6    Lumbar facet arthropathy (HCC) M12.88    Vitamin D deficiency E55.9    Trichomonas infection A59.9    Muscle spasm of back M62.830    Intractable migraine without aura and without status migrainosus G43.019    Analgesic rebound headache T39.91XA, G44.40         Objective: The patient is awake, alert, and oriented x 4. Fund of knowledge is adequate. Speech is fluent and memory is intact. Cranial Nerves: II - Visual fields are full to confrontation. III, IV, VI - Extraocular movements are intact. There is no nystagmus. V - Facial sensation is intact to pinprick. VII - Face is symmetrical.  VIII - Hearing is present. IX, X, XII - Palate is symmetrical.   XI - Shoulder shrugging and head turning intact  Motor: The patient moves all four limbs fairly well and symmetrically.  Tone is normal. Reflexes are 2+ and symmetrical. Plantars are down going. Gait is normal.    CBC:   Lab Results   Component Value Date/Time    WBC 4.9 03/15/2017 11:03 AM    RBC 4.33 03/15/2017 11:03 AM    HGB 13.2 03/15/2017 11:03 AM    HCT 39.1 03/15/2017 11:03 AM    PLATELET 585 34/30/6739 11:03 AM     BMP:   Lab Results   Component Value Date/Time    Glucose 84 03/15/2017 11:03 AM    Sodium 142 03/15/2017 11:03 AM    Potassium 4.0 03/15/2017 11:03 AM    Chloride 107 03/15/2017 11:03 AM    CO2 28 03/15/2017 11:03 AM    BUN 8 03/15/2017 11:03 AM    Creatinine 0.67 03/15/2017 11:03 AM    Calcium 9.1 03/15/2017 11:03 AM     CMP:   Lab Results   Component Value Date/Time    Glucose 84 03/15/2017 11:03 AM    Sodium 142 03/15/2017 11:03 AM    Potassium 4.0 03/15/2017 11:03 AM    Chloride 107 03/15/2017 11:03 AM    CO2 28 03/15/2017 11:03 AM    BUN 8 03/15/2017 11:03 AM    Creatinine 0.67 03/15/2017 11:03 AM    Calcium 9.1 03/15/2017 11:03 AM    Anion gap 7 03/15/2017 11:03 AM    BUN/Creatinine ratio 12 03/15/2017 11:03 AM    Alk. phosphatase 61 03/15/2017 11:03 AM    Protein, total 7.7 03/15/2017 11:03 AM    Albumin 4.5 03/15/2017 11:03 AM    Globulin 3.2 03/15/2017 11:03 AM    A-G Ratio 1.4 03/15/2017 11:03 AM     Coagulation: No results found for: PTP, INR, APTT, PTTT  Cardiac markers: No results found for: CPK, CKND1, DONNA    Assessment:  Will stop the Topamax and start Depakote. I'm not able to use the beta blockers because of her asthma. She also has a lower blood pressure so any of the anti-hypertensive medications may be off the table. Plan: Will start Depakote 250 mg BID and see her back here in about 3 weeks. Sincerely,        Sharon Osman.  Tae Lake M.D.

## 2017-05-25 NOTE — MR AVS SNAPSHOT
Visit Information Date & Time Provider Department Dept. Phone Encounter #  
 5/25/2017 11:20 AM Luis Cobos  [facility] [address] [address] [id number] Follow-up Instructions Return in about 3 weeks (around 6/15/2017). Follow-up and Disposition History Your Appointments 6/19/2017 12:00 PM  
Follow Up with Nidia Reid MD  
[address] ([facility] Appt Note: 4wk fu from 05-16-17  
 [address] [place] [address], [address] [address]  
[phone]  
  
   
 [address] [place] [address], [address] [place] [address]  
  
    
 7/6/2017 11:00 AM  
Follow Up with Steve Avila MD  
[facility]) Appt Note: 1mon f/u; sleep study; PSG Done; Conf apt of 05/23/2017; rs'd from 05/23/2017 per pt request.  
 [address] [place] [id number]  
[phone]  
  
   
 [facility] [id number] 7/17/2017 10:45 AM  
Follow Up with Ede Aldridge MD  
[facility]) Appt Note: 3 mnth fu  
 [address] [facility] [address]  
[phone]  
  
   
 [address] [facility] [address] Upcoming Health Maintenance Date Due Pneumococcal 19-64 Medium Risk (1 of 1 - PPSV23) 11/30/2004 DTaP/Tdap/Td series (1 - Tdap) 11/30/2006 INFLUENZA AGE 9 TO ADULT 8/1/2017 PAP AKA CERVICAL CYTOLOGY 12/3/2018 Allergies as of 5/25/2017  Review Complete On: 5/25/2017 By: Kye Fischer Severity Noted Reaction Type Reactions Imitrex [Sumatriptan Succinate] High 05/25/2015    Anaphylaxis Iodine  02/16/2017    Cough Coughing up blood Sea Food [Seafood]  06/04/2015    Hives Per pt report Current Immunizations  Never Reviewed No immunizations on file. Not reviewed this visit You Were Diagnosed With   
  
 Codes Comments Intractable migraine without aura and without status migrainosus    -  Primary ICD-10-CM: G43.019 
ICD-9-CM: 346.11 Vitals BP Pulse Temp Resp Height(growth percentile) Weight(growth percentile) 100/66 (BP 1 Location: Left arm, BP Patient Position: Sitting) 75 98.9 °F (37.2 °C) (Oral) 14 5' 6\" (1.676 m) 110 lb (49.9 kg) LMP SpO2 BMI OB Status Smoking Status 04/30/2017 (Exact Date) 99% 17.75 kg/m2 Having regular periods Former Smoker BMI and BSA Data Body Mass Index Body Surface Area 17.75 kg/m 2 1.52 m 2 Preferred Pharmacy Pharmacy Name Phone [id number]- Trae Schirmer, [address] [facility] [phone] Your Updated Medication List  
  
   
This list is accurate as of: 5/25/17 11:50 AM.  Always use your most recent med list.  
  
  
  
  
 albuterol 90 mcg/actuation inhaler Commonly known as:  PROVENTIL HFA, VENTOLIN HFA, PROAIR HFA Take 1 Puff by inhalation every four (4) hours as needed for Wheezing or Shortness of Breath. butalbital-aspirin-caffeine capsule Commonly known as:  Gayleen Sparrow Take 2 Caps by mouth every eight (8) hours as needed for Pain. CLARITIN-D 12 HOUR 5-120 mg per tablet Generic drug:  loratadine-pseudoephedrine Take 1 Tab by mouth two (2) times daily as needed. cyproheptadine 4 mg tablet Commonly known as:  PERIACTIN Take 1 Tab by mouth once over twenty-four (24) hours. divalproex  mg tablet Commonly known as:  DEPAKOTE Take 1 Tab by mouth two (2) times a day. Indications: MIGRAINE PREVENTION  
  
 ergocalciferol 50,000 unit capsule Commonly known as:  ERGOCALCIFEROL Take 1 Cap by mouth every seven (7) days. fluticasone-vilanterol 100-25 mcg/dose inhaler Commonly known as:  BREO ELLIPTA Take 1 Puff by inhalation daily. food supplemt, lactose-reduced 0.05-1.5 gram-kcal/mL Liqd Commonly known as:  ENSURE PLUS Take 237 mL by mouth three (3) times daily. meloxicam 7.5 mg tablet Commonly known as:  MOBIC Take 1 Tab by mouth daily. multivitamin tablet Commonly known as:  ONE A DAY Take 1 Tab by mouth daily. norethindrone 0.35 mg Tab Commonly known as:  Micha & Micha Take 1 Tab by mouth daily. Omeprazole delayed release 20 mg tablet Commonly known as:  PRILOSEC D/R Take 1 Tab by mouth daily. ondansetron 4 mg disintegrating tablet Commonly known as:  ZOFRAN ODT Take 1 Tab by mouth every eight (8) hours as needed for Nausea. zaleplon 10 mg capsule Commonly known as:  SONATA Take 1 Cap by mouth nightly. Max Daily Amount: 10 mg.  
  
  
  
  
Prescriptions Sent to Pharmacy Refills  
 divalproex DR (DEPAKOTE) 250 mg tablet 7 Sig: Take 1 Tab by mouth two (2) times a day. Indications: MIGRAINE PREVENTION Class: Normal  
 Pharmacy: [facility] [address], [address] [facility]  #: [phone] Route: Oral  
  
Follow-up Instructions Return in about 3 weeks (around 6/15/2017). To-Do List   
 05/26/2017 1:00 PM  
  Appointment with [facility] [address] [facility] CV SERV at [facility] NON-INVASIVE [address] [place] [address] ([phone]) Patient needs to bring a current list of all medications  No preparation is required for this study  This study requires patient to bring a written physicians order or the MD office may fax the order to Central Scheduling at [phone]. This exam is performed in the [address] at [facility] in the echo department. Please have the patient arrive 15 minutes prior to their schedule appointment time and report to Patient Registration at the main entrance of the hospital.     AGE LIMIT for this procedure at [facility] is 25years old. All patients under 25years of age should be referred to [facility]. Introducing [facility]! Dear Kristyn Jacobo: 
Thank you for requesting a [employer] account.   Our records indicate that you already have an active [employer] account. You can access your account anytime at [email or web address] Did you know that you can access your hospital and ER discharge instructions at any time in [employer]? You can also review all of your test results from your hospital stay or ER visit. Additional Information If you have questions, please visit the Frequently Asked Questions section of the [employer] website at [email or web address] Remember, [employer] is NOT to be used for urgent needs. For medical emergencies, dial 911. Now available from your iPhone and Android! Please provide this summary of care documentation to your next provider. Your primary care clinician is listed as Imani Adames. If you have any questions after today's visit, please call [phone].

## 2017-05-25 NOTE — LETTER
5/25/2017 11:46 AM 
 
Patient:  Leora Salter YOB: 1985 Date of Visit: 5/25/2017 Dear TE Wise 
[address] [place], [place] [address]Benita Petit [facility] [id number] VIA In Basket 
 : Thank you for referring Ms. Srinivasan Castillo to me for evaluation/treatment. Below are the relevant portions of my assessment and plan of care. Re:  Zenaida Pierre,Follow up visit     5/25/2017 11:40 AM 
 
SSN: [id number] Subjective:   Leora Salter retuens for follow up of her headaches. There's no change in the headaches and she's having both nausea and vomiting from the Topamax. She was told to stop it. Medications:   
Current Outpatient Prescriptions Medication Sig Dispense Refill  topiramate (TOPAMAX) 25 mg tablet Take 1 Tab by mouth two (2) times a day. 60 Tab 2  
 fluticasone-vilanterol (BREO ELLIPTA) 100-25 mcg/dose inhaler Take 1 Puff by inhalation daily. 1 Inhaler 4  
 ondansetron (ZOFRAN ODT) 4 mg disintegrating tablet Take 1 Tab by mouth every eight (8) hours as needed for Nausea. 30 Tab 1  
 loratadine-pseudoephedrine (CLARITIN-D 12 HOUR) 5-120 mg per tablet Take 1 Tab by mouth two (2) times daily as needed.  zaleplon (SONATA) 10 mg capsule Take 1 Cap by mouth nightly. Max Daily Amount: 10 mg. 10 Cap 0  
 meloxicam (MOBIC) 7.5 mg tablet Take 1 Tab by mouth daily. 30 Tab 2  
 food supplemt, lactose-reduced (ENSURE PLUS) 0.05-1.5 gram-kcal/mL liqd Take 237 mL by mouth three (3) times daily. 100 Can 3  
 norethindrone (MICRONOR) 0.35 mg tab Take 1 Tab by mouth daily. 1 Package 11  
 ergocalciferol (ERGOCALCIFEROL) 50,000 unit capsule Take 1 Cap by mouth every seven (7) days. 12 Cap 1  
 butalbital-aspirin-caffeine (FIORINAL) capsule Take 2 Caps by mouth every eight (8) hours as needed for Pain.  Omeprazole delayed release (PRILOSEC D/R) 20 mg tablet Take 1 Tab by mouth daily.  90 Tab 3  
  albuterol (PROVENTIL HFA, VENTOLIN HFA, PROAIR HFA) 90 mcg/actuation inhaler Take 1 Puff by inhalation every four (4) hours as needed for Wheezing or Shortness of Breath. 2 Inhaler 3  cyproheptadine (PERIACTIN) 4 mg tablet Take 1 Tab by mouth once over twenty-four (24) hours. 90 Tab 3  
 multivitamin (ONE A DAY) tablet Take 1 Tab by mouth daily. 90 Tab 3 Vital signs:   
Visit Vitals  /66 (BP 1 Location: Left arm, BP Patient Position: Sitting)  Pulse 75  Temp 98.9 °F (37.2 °C) (Oral)  Resp 14  
 Ht 5' 6\" (1.676 m)  Wt 49.9 kg (110 lb)  LMP 2017 (Exact Date)  SpO2 99%  BMI 17.75 kg/m2 Review of Systems: As above otherwise 11 point review of systems negative including;  
Constitutional no fever or chills Skin denies rash or itching HEENT  Denies tinnitus, hearing lose Eyes denies diplopia vision lose Respiratory denies sortness of breath Cardiovascular denies chest pain, dyspnea on exertion Gastrointestinal denies nausea, vomiting, diarrhea, constipation Genitourinary denies incontinence Musculoskeletal denies joint pain or swelling Endocrine denies weight change Hematology denies easy bruising or bleeding Neurological as above in HPI Patient Active Problem List  
Diagnosis Code  Spontaneous  O03.9  Epilepsy ([facility] [address] G40.909  Chronic midline low back pain M54.5, G89.29  
 ACP (advance care planning) Z71.89  
 Bradycardia R00.1  Mild intermittent asthma with status asthmaticus J45.22  
 Low body weight due to inadequate caloric intake R63.6  Lumbar facet arthropathy (HCC) M12.88  Vitamin D deficiency E55.9  Trichomonas infection A59.9  Muscle spasm of back M62.830  
 Intractable migraine without aura and without status migrainosus G43.019  
 Analgesic rebound headache T39.91XA, G44.40 Objective: The patient is awake, alert, and oriented x 4.   Fund of knowledge is adequate. Speech is fluent and memory is intact. Cranial Nerves: II  Visual fields are full to confrontation. III, IV, VI  Extraocular movements are intact. There is no nystagmus. V  Facial sensation is intact to pinprick. VII  Face is symmetrical.  VIII - Hearing is present. IX, X, XII  Palate is symmetrical.   XI - Shoulder shrugging and head turning intact Motor: The patient moves all four limbs fairly well and symmetrically. Tone is normal. Reflexes are 2+ and symmetrical. Plantars are down going. Gait is normal. 
 
CBC:  
Lab Results Component Value Date/Time WBC 4.9 03/15/2017 11:03 AM  
 RBC 4.33 03/15/2017 11:03 AM  
 HGB 13.2 03/15/2017 11:03 AM  
 HCT 39.1 03/15/2017 11:03 AM  
 PLATELET 499 58/22/5018 11:03 AM  
 
BMP:  
Lab Results Component Value Date/Time Glucose 84 03/15/2017 11:03 AM  
 Sodium 142 03/15/2017 11:03 AM  
 Potassium 4.0 03/15/2017 11:03 AM  
 Chloride 107 03/15/2017 11:03 AM  
 CO2 28 03/15/2017 11:03 AM  
 BUN 8 03/15/2017 11:03 AM  
 Creatinine 0.67 03/15/2017 11:03 AM  
 Calcium 9.1 03/15/2017 11:03 AM  
 
CMP:  
Lab Results Component Value Date/Time Glucose 84 03/15/2017 11:03 AM  
 Sodium 142 03/15/2017 11:03 AM  
 Potassium 4.0 03/15/2017 11:03 AM  
 Chloride 107 03/15/2017 11:03 AM  
 CO2 28 03/15/2017 11:03 AM  
 BUN 8 03/15/2017 11:03 AM  
 Creatinine 0.67 03/15/2017 11:03 AM  
 Calcium 9.1 03/15/2017 11:03 AM  
 Anion gap 7 03/15/2017 11:03 AM  
 BUN/Creatinine ratio 12 03/15/2017 11:03 AM  
 Alk. phosphatase 61 03/15/2017 11:03 AM  
 Protein, total 7.7 03/15/2017 11:03 AM  
 Albumin 4.5 03/15/2017 11:03 AM  
 Globulin 3.2 03/15/2017 11:03 AM  
 A-G Ratio 1.4 03/15/2017 11:03 AM  
 
Coagulation: No results found for: PTP, INR, APTT, PTTT Cardiac markers: No results found for: CPK, CKND1, DONNA Assessment:  Will stop the Topamax and start Depakote. I'm not able to use the beta blockers because of her asthma.   She also has a lower blood pressure so any of the anti-hypertensive medications may be off the table. Plan: Will start Depakote 250 mg BID and see her back here in about 3 weeks. Sincerely, 
 
 
 
Dalton Fried. Duncan Lopez M.D. If you have questions, please do not hesitate to call me. I look forward to following Ms. Lucille Almodovar along with you.  
 
 
 
Sincerely, 
 
 
Armaan Burger MD

## 2017-05-25 NOTE — PROGRESS NOTES
Re:  Uriel Pierre,Follow up visit     5/25/2017 11:40 AM    SSN: [id number]    Subjective:   Aleksander Hansen ana paulans for follow up of her headaches. There's no change in the headaches and she's having both nausea and vomiting from the Topamax. She was told to stop it. Medications:    Current Outpatient Prescriptions   Medication Sig Dispense Refill    topiramate (TOPAMAX) 25 mg tablet Take 1 Tab by mouth two (2) times a day. 60 Tab 2    fluticasone-vilanterol (BREO ELLIPTA) 100-25 mcg/dose inhaler Take 1 Puff by inhalation daily. 1 Inhaler 4    ondansetron (ZOFRAN ODT) 4 mg disintegrating tablet Take 1 Tab by mouth every eight (8) hours as needed for Nausea. 30 Tab 1    loratadine-pseudoephedrine (CLARITIN-D 12 HOUR) 5-120 mg per tablet Take 1 Tab by mouth two (2) times daily as needed.  zaleplon (SONATA) 10 mg capsule Take 1 Cap by mouth nightly. Max Daily Amount: 10 mg. 10 Cap 0    meloxicam (MOBIC) 7.5 mg tablet Take 1 Tab by mouth daily. 30 Tab 2    food supplemt, lactose-reduced (ENSURE PLUS) 0.05-1.5 gram-kcal/mL liqd Take 237 mL by mouth three (3) times daily. 100 Can 3    norethindrone (MICRONOR) 0.35 mg tab Take 1 Tab by mouth daily. 1 Package 11    ergocalciferol (ERGOCALCIFEROL) 50,000 unit capsule Take 1 Cap by mouth every seven (7) days. 12 Cap 1    butalbital-aspirin-caffeine (FIORINAL) capsule Take 2 Caps by mouth every eight (8) hours as needed for Pain.  Omeprazole delayed release (PRILOSEC D/R) 20 mg tablet Take 1 Tab by mouth daily. 90 Tab 3    albuterol (PROVENTIL HFA, VENTOLIN HFA, PROAIR HFA) 90 mcg/actuation inhaler Take 1 Puff by inhalation every four (4) hours as needed for Wheezing or Shortness of Breath. 2 Inhaler 3    cyproheptadine (PERIACTIN) 4 mg tablet Take 1 Tab by mouth once over twenty-four (24) hours. 90 Tab 3    multivitamin (ONE A DAY) tablet Take 1 Tab by mouth daily.  90 Tab 3       Vital signs:    Visit Vitals    /66 (BP 1 Location: Left arm, BP Patient Position: Sitting)    Pulse 75    Temp 98.9 °F (37.2 °C) (Oral)    Resp 14    Ht 5' 6\" (1.676 m)    Wt 49.9 kg (110 lb)    LMP 2017 (Exact Date)    SpO2 99%    BMI 17.75 kg/m2       Review of Systems:   As above otherwise 11 point review of systems negative including;   Constitutional no fever or chills  Skin denies rash or itching  HEENT  Denies tinnitus, hearing lose  Eyes denies diplopia vision lose  Respiratory denies sortness of breath  Cardiovascular denies chest pain, dyspnea on exertion  Gastrointestinal denies nausea, vomiting, diarrhea, constipation  Genitourinary denies incontinence  Musculoskeletal denies joint pain or swelling  Endocrine denies weight change  Hematology denies easy bruising or bleeding   Neurological as above in HPI      Patient Active Problem List   Diagnosis Code    Spontaneous  O03.9    [facility] G40.909    Chronic midline low back pain M54.5, G89.29    ACP (advance care planning) Z71.89    Bradycardia R00.1    Mild intermittent asthma with status asthmaticus J45.22    Low body weight due to inadequate caloric intake R63.6    Lumbar facet arthropathy (HCC) M12.88    Vitamin D deficiency E55.9    Trichomonas infection A59.9    Muscle spasm of back M62.830    Intractable migraine without aura and without status migrainosus G43.019    Analgesic rebound headache T39.91XA, G44.40         Objective: The patient is awake, alert, and oriented x 4. Fund of knowledge is adequate. Speech is fluent and memory is intact. Cranial Nerves: II - Visual fields are full to confrontation. III, IV, VI - Extraocular movements are intact. There is no nystagmus. V - Facial sensation is intact to pinprick. VII - Face is symmetrical.  VIII - Hearing is present. IX, X, XII - Palate is symmetrical.   XI - Shoulder shrugging and head turning intact  Motor: The patient moves all four limbs fairly well and symmetrically.  Tone is normal. Reflexes are 2+ and symmetrical. Plantars are down going. Gait is normal.    CBC:   Lab Results   Component Value Date/Time    WBC 4.9 03/15/2017 11:03 AM    RBC 4.33 03/15/2017 11:03 AM    HGB 13.2 03/15/2017 11:03 AM    HCT 39.1 03/15/2017 11:03 AM    PLATELET 574 99/55/3879 11:03 AM     BMP:   Lab Results   Component Value Date/Time    Glucose 84 03/15/2017 11:03 AM    Sodium 142 03/15/2017 11:03 AM    Potassium 4.0 03/15/2017 11:03 AM    Chloride 107 03/15/2017 11:03 AM    CO2 28 03/15/2017 11:03 AM    BUN 8 03/15/2017 11:03 AM    Creatinine 0.67 03/15/2017 11:03 AM    Calcium 9.1 03/15/2017 11:03 AM     CMP:   Lab Results   Component Value Date/Time    Glucose 84 03/15/2017 11:03 AM    Sodium 142 03/15/2017 11:03 AM    Potassium 4.0 03/15/2017 11:03 AM    Chloride 107 03/15/2017 11:03 AM    CO2 28 03/15/2017 11:03 AM    BUN 8 03/15/2017 11:03 AM    Creatinine 0.67 03/15/2017 11:03 AM    Calcium 9.1 03/15/2017 11:03 AM    Anion gap 7 03/15/2017 11:03 AM    BUN/Creatinine ratio 12 03/15/2017 11:03 AM    Alk. phosphatase 61 03/15/2017 11:03 AM    Protein, total 7.7 03/15/2017 11:03 AM    Albumin 4.5 03/15/2017 11:03 AM    Globulin 3.2 03/15/2017 11:03 AM    A-G Ratio 1.4 03/15/2017 11:03 AM     Coagulation: No results found for: PTP, INR, APTT, PTTT  Cardiac markers: No results found for: CPK, CKND1, DONNA    Assessment:  Will stop the Topamax and start Depakote. I'm not able to use the beta blockers because of her asthma. She also has a lower blood pressure so any of the anti-hypertensive medications may be off the table. Plan: Will start Depakote 250 mg BID and see her back here in about 3 weeks. Sincerely,        Cristina Gallegos.  Paco Gonzalez M.D.

## 2017-05-26 ENCOUNTER — HOSPITAL ENCOUNTER (OUTPATIENT)
Dept: NON INVASIVE DIAGNOSTICS | Age: 32
Discharge: HOME | End: 2017-05-26
Attending: INTERNAL MEDICINE
Payer: MEDICAID

## 2017-05-26 DIAGNOSIS — R06.02 SOB (SHORTNESS OF BREATH): ICD-10-CM

## 2017-05-26 PROCEDURE — 93306 TTE W/DOPPLER COMPLETE: CPT

## 2017-05-30 ENCOUNTER — TELEPHONE (OUTPATIENT)
Dept: FAMILY MEDICINE CLINIC | Facility: CLINIC | Age: 32
End: 2017-05-30

## 2017-05-30 ENCOUNTER — OFFICE VISIT (OUTPATIENT)
Dept: FAMILY MEDICINE CLINIC | Facility: CLINIC | Age: 32
End: 2017-05-30

## 2017-05-30 VITALS
BODY MASS INDEX: 18 KG/M2 | DIASTOLIC BLOOD PRESSURE: 60 MMHG | WEIGHT: 112 LBS | HEART RATE: 103 BPM | RESPIRATION RATE: 18 BRPM | TEMPERATURE: 98.6 F | HEIGHT: 66 IN | SYSTOLIC BLOOD PRESSURE: 124 MMHG | OXYGEN SATURATION: 98 %

## 2017-05-30 DIAGNOSIS — G43.019 INTRACTABLE MIGRAINE WITHOUT AURA AND WITHOUT STATUS MIGRAINOSUS: ICD-10-CM

## 2017-05-30 DIAGNOSIS — R63.6 LOW BODY WEIGHT DUE TO INADEQUATE CALORIC INTAKE: ICD-10-CM

## 2017-05-30 DIAGNOSIS — G40.409 OTHER GENERALIZED EPILEPSY, NOT INTRACTABLE, WITHOUT STATUS EPILEPTICUS (HCC): ICD-10-CM

## 2017-05-30 DIAGNOSIS — J45.22 MILD INTERMITTENT ASTHMA WITH STATUS ASTHMATICUS: Primary | ICD-10-CM

## 2017-05-30 DIAGNOSIS — F41.9 ANXIETY: ICD-10-CM

## 2017-05-30 DIAGNOSIS — Z00.00 BLOOD TESTS FOR ROUTINE GENERAL PHYSICAL EXAMINATION: ICD-10-CM

## 2017-05-30 DIAGNOSIS — G47.00 INSOMNIA, UNSPECIFIED TYPE: ICD-10-CM

## 2017-05-30 DIAGNOSIS — M47.816 LUMBAR FACET ARTHROPATHY: ICD-10-CM

## 2017-05-30 DIAGNOSIS — R05.9 COUGH: ICD-10-CM

## 2017-05-30 RX ORDER — HYDROXYZINE PAMOATE 50 MG/1
50 CAPSULE ORAL
Qty: 30 CAP | Refills: 2 | Status: SHIPPED | OUTPATIENT
Start: 2017-05-30 | End: 2017-08-25 | Stop reason: SDUPTHER

## 2017-05-30 RX ORDER — BENZONATATE 100 MG/1
100 CAPSULE ORAL
Qty: 30 CAP | Refills: 0 | Status: SHIPPED | OUTPATIENT
Start: 2017-05-30 | End: 2017-07-11

## 2017-05-30 RX ORDER — PREDNISONE 10 MG/1
TABLET ORAL
Qty: 21 TAB | Refills: 0 | Status: SHIPPED | OUTPATIENT
Start: 2017-05-30 | End: 2017-06-14

## 2017-05-30 RX ORDER — PAROXETINE HYDROCHLORIDE 20 MG/1
20 TABLET, FILM COATED ORAL DAILY
Qty: 30 TAB | Refills: 3 | Status: SHIPPED | OUTPATIENT
Start: 2017-05-30 | End: 2017-07-05 | Stop reason: SDUPTHER

## 2017-05-30 NOTE — PATIENT INSTRUCTIONS

## 2017-05-30 NOTE — TELEPHONE ENCOUNTER
Please follow up on general surgery referral, her referral is for LOW bmi, cervical adenopathy not for bariatrics

## 2017-05-30 NOTE — MR AVS SNAPSHOT
Visit Information Date & Time Provider Department Dept. Phone Encounter #  
 5/30/2017  2:15 PM Reina Morillo SHE[facility] [phone] [id number] Follow-up Instructions Return in about 6 weeks (around 7/11/2017) for routine care with me. Your Appointments 6/15/2017 11:40 AM  
Follow Up with Nathaly He MD  
[address] [facility] Appt Note: 3wk f/u  
 [address] [id number] [phone]  
  
   
 [place] [id number] 6/19/2017 12:00 PM  
Follow Up with Hugo Barreto MD  
[address] ([facility]) Appt Note: 4wk fu from 05-16-17  
 [address] [place] [address], [address] [place] [address] [phone]  
  
   
 [address] [place] [address], [address] [facility] [facility] [address]  
  
    
 7/17/2017 10:45 AM  
Follow Up with Leopoldo Dally, MD  
[facility]) Appt Note: 3 mnth fu  
 [address] [facility] [address] [phone]  
  
   
 [address] [facility] [address] Upcoming Health Maintenance Date Due INFLUENZA AGE 9 TO ADULT 8/1/2017 PAP AKA CERVICAL CYTOLOGY 12/3/2018 DTaP/Tdap/Td series (2 - Td) 5/30/2027 Allergies as of 5/30/2017  Review Complete On: 5/30/2017 By: TE Anderson Severity Noted Reaction Type Reactions Imitrex [Sumatriptan Succinate] High 05/25/2015    Anaphylaxis Iodine  02/16/2017    Cough Coughing up blood Sea Food [Seafood]  06/04/2015    Hives Per pt report Current Immunizations  Never Reviewed No immunizations on file. Not reviewed this visit You Were Diagnosed With   
  
 Codes Comments Mild intermittent asthma with status asthmaticus    -  Primary ICD-10-CM: J45.22 
ICD-9-CM: 493.91  Lumbar facet arthropathy (HCC)     ICD-10-CM: M12.88 
 ICD-9-CM: 721.3 Low body weight due to inadequate caloric intake     ICD-10-CM: R63.6 ICD-9-CM: 783.22 Intractable migraine without aura and without status migrainosus     ICD-10-CM: G43.019 
ICD-9-CM: 346.11 Other generalized epilepsy, not intractable, without status epilepticus ([facility] [address]     ICD-10-CM: G40.409 ICD-9-CM: 345.90 Blood tests for routine general physical examination     ICD-10-CM: Z00.00 ICD-9-CM: V72.62 Anxiety     ICD-10-CM: F41.9 ICD-9-CM: 300.00 Insomnia, unspecified type     ICD-10-CM: G47.00 ICD-9-CM: 780.52 Cough     ICD-10-CM: R05 ICD-9-CM: 329. 2 Vitals BP Pulse Temp Resp Height(growth percentile) Weight(growth percentile) 124/60 (BP 1 Location: Left arm, BP Patient Position: Sitting) (!) 103 98.6 °F (37 °C) (Oral) 18 5' 6\" (1.676 m) 112 lb (50.8 kg) LMP SpO2 BMI OB Status Smoking Status 04/30/2017 (Exact Date) 98% 18.08 kg/m2 Having regular periods Former Smoker Vitals History BMI and BSA Data Body Mass Index Body Surface Area 18.08 kg/m 2 1.54 m 2 Preferred Pharmacy Pharmacy Name Phone [facility]/PH[facility]ACY #[id number]- [address] [phone] Your Updated Medication List  
  
   
This list is accurate as of: 5/30/17  2:52 PM.  Always use your most recent med list.  
  
  
  
  
 albuterol 90 mcg/actuation inhaler Commonly known as:  PROVENTIL HFA, VENTOLIN HFA, PROAIR HFA Take 1 Puff by inhalation every four (4) hours as needed for Wheezing or Shortness of Breath. benzonatate 100 mg capsule Commonly known as:  TESSALON Take 1 Cap by mouth three (3) times daily as needed for Cough. butalbital-aspirin-caffeine capsule Commonly known as:  Jose Shown Take 2 Caps by mouth every eight (8) hours as needed for Pain. CLARITIN-D 12 HOUR 5-120 mg per tablet Generic drug:  loratadine-pseudoephedrine Take 1 Tab by mouth two (2) times daily as needed. cyproheptadine 4 mg tablet Commonly known as:  PERIACTIN Take 1 Tab by mouth once over twenty-four (24) hours. divalproex  mg tablet Commonly known as:  DEPAKOTE Take 1 Tab by mouth two (2) times a day. Indications: MIGRAINE PREVENTION  
  
 ergocalciferol 50,000 unit capsule Commonly known as:  ERGOCALCIFEROL Take 1 Cap by mouth every seven (7) days. fluticasone-vilanterol 100-25 mcg/dose inhaler Commonly known as:  BREO ELLIPTA Take 1 Puff by inhalation daily. food supplemt, lactose-reduced 0.05-1.5 gram-kcal/mL Liqd Commonly known as:  ENSURE PLUS Take 237 mL by mouth three (3) times daily. hydrOXYzine pamoate 50 mg capsule Commonly known as:  VISTARIL Take 1 Cap by mouth nightly. meloxicam 7.5 mg tablet Commonly known as:  MOBIC Take 1 Tab by mouth daily. multivitamin tablet Commonly known as:  ONE A DAY Take 1 Tab by mouth daily. norethindrone 0.35 mg Tab Commonly known as:  Micha & Micha Take 1 Tab by mouth daily. Omeprazole delayed release 20 mg tablet Commonly known as:  PRILOSEC D/R Take 1 Tab by mouth daily. ondansetron 4 mg disintegrating tablet Commonly known as:  ZOFRAN ODT Take 1 Tab by mouth every eight (8) hours as needed for Nausea. PARoxetine 20 mg tablet Commonly known as:  PAXIL Take 1 Tab by mouth daily. predniSONE 10 mg tablet Commonly known as:  Laqueta Maw Take 6 tablets on day 1, 5 tablets on day 2, 4 tablets on day 3, 3 tablets on day 4, 2 tablets on day 5, 1 tablets on day 6  
  
 zaleplon 10 mg capsule Commonly known as:  SONATA Take 1 Cap by mouth nightly. Max Daily Amount: 10 mg.  
  
  
  
  
Prescriptions Sent to Pharmacy Refills PARoxetine (PAXIL) 20 mg tablet 3 Sig: Take 1 Tab by mouth daily. Class: Normal  
 Pharmacy: [facility], [address] [facility],[address] Ph #: [phone]  Route: Oral  
 predniSONE (DELTASONE) 10 mg tablet 0 Sig: Take 6 tablets on day 1, 5 tablets on day 2, 4 tablets on day 3, 3 tablets on day 4, 2 tablets on day 5, 1 tablets on day 6 Class: Normal  
 Pharmacy: [facility]/pharmacy #[id number]- [place], [address] [place] [address],[address] Ph #: [phone]  
 benzonatate (TESSALON) 100 mg capsule 0 Sig: Take 1 Cap by mouth three (3) times daily as needed for Cough. Class: Normal  
 Pharmacy: [facility] , [address] [place] [address],[address] [place] [address] Ph #: [phone] Route: Oral  
 hydrOXYzine pamoate (VISTARIL) 50 mg capsule 2 Sig: Take 1 Cap by mouth nightly. Class: Normal  
 Pharmacy: [facility] , [address] [place] [address],[address] [place] [address] Ph #: [phone] Route: Oral  
  
Follow-up Instructions Return in about 6 weeks (around 7/11/2017) for routine care with me. To-Do List   
 05/30/2017 Lab:  METABOLIC PANEL, COMPREHENSIVE   
  
 05/30/2017 Lab:  VITAMIN D, 25 HYDROXY Patient Instructions Asthma Attack: Care Instructions Your Care Instructions During an asthma attack, the airways swell and narrow. This makes it hard to breathe. Severe asthma attacks can be life-threatening, but you can help prevent them by keeping your asthma under control and treating symptoms before they get bad. Symptoms include being short of breath, having chest tightness, coughing, and wheezing. Noting and treating these symptoms can also help you avoid future trips to the emergency room. The doctor has checked you carefully, but problems can develop later. If you notice any problems or new symptoms, get medical treatment right away. Follow-up care is a key part of your treatment and safety. Be sure to make and go to all appointments, and call your doctor if you are having problems. It's also a good idea to know your test results and keep a list of the medicines you take. How can you care for yourself at home? · Follow your asthma action plan to prevent and treat attacks. If you don't have an asthma action plan, work with your doctor to create one. · Take your asthma medicines exactly as prescribed. Talk to your doctor right away if you have any questions about how to take them. ¨ Use your quick-relief medicine when you have symptoms of an attack. Quick-relief medicine is usually an albuterol inhaler. Some people need to use quick-relief medicine before they exercise. ¨ Take your controller medicine every day, not just when you have symptoms. Controller medicine is usually an inhaled corticosteroid. The goal is to prevent problems before they occur. Don't use your controller medicine to treat an attack that has already started. It doesn't work fast enough to help. ¨ If your doctor prescribed corticosteroid pills to use during an attack, take them exactly as prescribed. It may take hours for the pills to work, but they may make the episode shorter and help you breathe better. ¨ Keep your quick-relief medicine with you at all times. · Talk to your doctor before using other medicines. Some medicines, such as aspirin, can cause asthma attacks in some people. · If you have a peak flow meter, use it to check how well you are breathing. This can help you predict when an asthma attack is going to occur. Then you can take medicine to prevent the asthma attack or make it less severe. · Do not smoke or allow others to smoke around you. Avoid smoky places. Smoking makes asthma worse. If you need help quitting, talk to your doctor about stop-smoking programs and medicines. These can increase your chances of quitting for good. · Learn what triggers an asthma attack for you, and avoid the triggers when you can. Common triggers include colds, smoke, air pollution, dust, pollen, mold, pets, cockroaches, stress, and cold air. · Avoid colds and the flu. Get a pneumococcal vaccine shot.  If you have had one before, ask your doctor if you need a second dose. Get a flu vaccine every fall. If you must be around people with colds or the flu, wash your hands often. When should you call for help? Call 911 anytime you think you may need emergency care. For example, call if: 
· You have severe trouble breathing. Call your doctor now or seek immediate medical care if: 
· Your symptoms do not get better after you have followed your asthma action plan. · You have new or worse trouble breathing. · Your coughing and wheezing get worse. · You cough up dark brown or bloody mucus (sputum). · You have a new or higher fever. Watch closely for changes in your health, and be sure to contact your doctor if: 
· You need to use quick-relief medicine on more than 2 days a week (unless it is just for exercise). · You cough more deeply or more often, especially if you notice more mucus or a change in the color of your mucus. · You are not getting better as expected. Where can you learn more? Go to http://norma-kaley.info/. Enter Q203 in the search box to learn more about \"Asthma Attack: Care Instructions. \" Current as of: May 23, 2016 Content Version: 11.2 © 1372-9503 [employer]. Care instructions adapted under license by [employer] (which disclaims liability or warranty for this information). If you have questions about a medical condition or this instruction, always ask your healthcare professional. [place] [address] any warranty or liability for your use of this information. Introducing [facility]! Dear Cristiano Horvath: 
Thank you for requesting a [employer] account. Our records indicate that you already have an active [employer] account. You can access your account anytime at [email or web address] Did you know that you can access your hospital and ER discharge instructions at any time in [employer]?   You can also review all of your test results from your hospital stay or ER visit. Additional Information If you have questions, please visit the Frequently Asked Questions section of the [email or web address] website at [email or web address] Remember, [email or web address] is NOT to be used for urgent needs. For medical emergencies, dial 911. Now available from your iPhone and Android! Please provide this summary of care documentation to your next provider. Your primary care clinician is listed as Alicia Razo. If you have any questions after today's visit, please call [phone].

## 2017-05-30 NOTE — PROGRESS NOTES
Marshall Marx is a 32 y.o. female presents today for cough fir 4 weeks. Depression Screening: Completed      1. Have you been to the ER, urgent care clinic since your last visit? Hospitalized since your last visit? No    2. Have you seen or consulted any other health care providers outside of the [address] [place] [address] since your last visit? Include any pap smears or colon screening. No     Health Maintenance reviewed.

## 2017-05-30 NOTE — PROGRESS NOTES
History and Physical    Patient: Jumana Altman MRN: [id number]  SSN: [id number]    YOB: 1985  Age: 32 y.o. Sex: female      Subjective:      Jumana Altman is a 32 y.o. female who presents today for follow up chronic headaches, h/o seizures, chronic low back pain, h/o asthma/sob etc    In terms of her headaches, insomnia and h/o seizures, patient has been seeing neurology. Patient was taken off Topamax and Fiorinal and told to start Depakote, patient was told to wait several days from stopping Fiornal and Topamax and starting Depakote, has yet to start. No plans to restart anti-epileptics at this point. Patients headaches are stable, not worsened aftercoming off medications. In terms of her asthma, she is on Breo and albuterol PRN. Patient has seen pulmonology who performed PFTs that were normal, CXR also normal.  ECHO has been ordered to r/o pulmonary HTN. Pulmonologist had suggested SOB may be related to anxiety and to stop Breo. Patient has noticed a productive cough over the past 4 weeks, patient was on Breo when this developed. Patient has not noticed a change to her cough after stopping Breo. Continues to use her albuterol inhaler 4 times daily. Patient also notices a feeling of swelling or blockage at the start of her throat, also present for 4 weeks. She also notices a bilateral chest fluttering when she coughs. She denies chest tightness or pressure. Patient states that she her insomnia is caused by racing thoughts at night, she denies katy panic but some mild anxiety, denies depression, si/hi. Patient has a h/o chronic low back pain, has been seeing pain management, they have performed injections and ordered PT, but she has not started this. She states the injections offer temporary relief but wears off quickly, has a follow up coming soon.      Patient was referred to general surgery for h/o low BMI and cervical adenopathy, she states she received a phone call but they thought it was for her bariatrics. Patient was previously seeing gastroenterology for h/o chronic nausea and h/o blood in her stool. Patient has had complaints of dysphagia which she states has dissipated, patient is due for her follow up. Patient continues to have chronic nausea, continues to use zofran. Patient has a h/o low BMI, on periactin and boost in addition to regular meals, weight up 2 lbs despite patient stating she has been vomiting frequently with recent cough.          Last PAP due: 12/15- was normal by GYN      She is followed by:  GYN  Ortho/spine  neuro    PMH:  Past Medical History:   Diagnosis Date    Abnormal Papanicolaou smear of cervix     Asthma     Bradycardia     GERD (gastroesophageal reflux disease)     H/O sinus bradycardia     Headache     Migraine     Photophobia of both eyes     Seizures (HCC)     Stomach pain      Past Surgical History:   Procedure Laterality Date    HX APPENDECTOMY      HX  SECTION      HX GYN      cervical cerclage    HX OTHER SURGICAL  2017    Mendel L4-5, L5-S1 Facet Joint block        FamHx:  Family History   Problem Relation Age of Onset    Diabetes Mother     Heart Attack Father     Attention Deficit Hyperactivity Disorder Sister     Attention Deficit Hyperactivity Disorder Brother     Cancer Maternal Aunt 45     breast    Diabetes Maternal Grandmother     Attention Deficit Hyperactivity Disorder Brother     No Known Problems Brother     No Known Problems Brother     No Known Problems Brother     Cancer Maternal Aunt      lung    Heart defect Son    [facility] Migraines Son     Seizures Son      h/o    Other Son      h/o jaundice       SocialHx:  Social History   Substance Use Topics    Smoking status: Former Smoker     Packs/day: 1.00     Years: 13.00     Types: Cigarettes     Quit date: 2015    Smokeless tobacco: Never Used      Comment: cigarello, once a month    Alcohol use No        Meds:  Prior to Admission medications    Medication Sig Start Date End Date Taking? Authorizing Provider   PARoxetine (PAXIL) 20 mg tablet Take 1 Tab by mouth daily. 5/30/17  Yes Jessica Pumphrey V, PA   predniSONE (DELTASONE) 10 mg tablet Take 6 tablets on day 1, 5 tablets on day 2, 4 tablets on day 3, 3 tablets on day 4, 2 tablets on day 5, 1 tablets on day 6 5/30/17  Yes Jessica Pumphrey V, PA   benzonatate (TESSALON) 100 mg capsule Take 1 Cap by mouth three (3) times daily as needed for Cough. 5/30/17  Yes Jessica Pumphrey V, PA   hydrOXYzine pamoate (VISTARIL) 50 mg capsule Take 1 Cap by mouth nightly. 5/30/17  Yes Jessica Pumphrey V, PA   divalproex DR (DEPAKOTE) 250 mg tablet Take 1 Tab by mouth two (2) times a day. Indications: MIGRAINE PREVENTION 5/25/17  Yes Hector Kay MD   ondansetron (ZOFRAN ODT) 4 mg disintegrating tablet Take 1 Tab by mouth every eight (8) hours as needed for Nausea. 4/27/17  Yes Jessica Pumphrey V, PA   loratadine-pseudoephedrine (CLARITIN-D 12 HOUR) 5-120 mg per tablet Take 1 Tab by mouth two (2) times daily as needed. Yes Historical Provider   meloxicam (MOBIC) 7.5 mg tablet Take 1 Tab by mouth daily. 4/17/17  Yes Lu Johnson MD   food supplemt, lactose-reduced (ENSURE PLUS) 0.05-1.5 gram-kcal/mL liqd Take 237 mL by mouth three (3) times daily. 3/2/17  Yes Jessica Pumphrey V, PA   norethindrone (MICRONOR) 0.35 mg tab Take 1 Tab by mouth daily. 2/9/17  Yes Paty Harper DO   ergocalciferol (ERGOCALCIFEROL) 50,000 unit capsule Take 1 Cap by mouth every seven (7) days. 2/2/17  Yes Jessica Pumphrey V, PA   Omeprazole delayed release (PRILOSEC D/R) 20 mg tablet Take 1 Tab by mouth daily. 9/27/16  Yes Jessica Pumphrey V, PA   albuterol (PROVENTIL HFA, VENTOLIN HFA, PROAIR HFA) 90 mcg/actuation inhaler Take 1 Puff by inhalation every four (4) hours as needed for Wheezing or Shortness of Breath.  9/23/16  Yes Jessica Pumphrey V, PA   cyproheptadine (PERIACTIN) 4 mg tablet Take 1 Tab by mouth once over twenty-four (24) hours. 9/23/16  Yes Jessica Pumphrey V, PA   multivitamin (ONE A DAY) tablet Take 1 Tab by mouth daily. 9/23/16  Yes Brooke PumphreyTE Dick   fluticasone-vilanterol (BREO ELLIPTA) 100-25 mcg/dose inhaler Take 1 Puff by inhalation daily. 4/27/17   TE Taveras   zaleplon (SONATA) 10 mg capsule Take 1 Cap by mouth nightly. Max Daily Amount: 10 mg. 4/18/17   Zeus Rueda MD   butalbital-aspirin-caffeine [facility]) capsule Take 2 Caps by mouth every eight (8) hours as needed for Pain. Historical Provider        Allergies: Allergies   Allergen Reactions    Imitrex [Sumatriptan Succinate] Anaphylaxis    Iodine Cough     Coughing up blood      Sea Food [Seafood] Hives     Per pt report        Review of Systems:  Items in [place] are positive  Constitutional: negative for fevers, chills and malaise  Eyes: negative for visual disturbance  Ears, Nose, Mouth, Throat, and Face: throat swelling negative for nasal congestion  Respiratory: productive cough, sob   Cardiovascular: negative for chest pain, chest pressure/discomfort  Gastrointestinal: dysphagia, chronic nausea, negative for nausea, vomiting, melena, diarrhea, constipation and abdominal pain  Genitourinary:negative for frequency, dysuria or hematuria  Musculoskeletal: chronic low back pain  Neurological: negative for headaches, dizziness and paresthesia  Psych: insomnia, mild anxiety, denies depression, si/hi    Objective:     Visit Vitals    /60 (BP 1 Location: Left arm, BP Patient Position: Sitting)    Pulse (!) 103    Temp 98.6 °F (37 °C) (Oral)    Resp 18    Ht 5' 6\" (1.676 m)    Wt 112 lb (50.8 kg)    LMP 04/30/2017 (Exact Date)    SpO2 98%    BMI 18.08 kg/m2       Physical Exam:  GENERAL: alert, cooperative, no distress, appears stated age  HEENT: EYE: conjunctivae/corneas clear. PERRL, EOM's intact.  EAR: TM's pearly gray bilaterally NOSE: Nasal mucosa pink and moist bilaterally, THROAT: no erythema or edema  THYROID: no thyromegaly  NECK: no adenopathy  LUNG: bronchospasm, clear to auscultation bilaterally  HEART: regular rate and rhythm, S1, S2 normal, no murmur, click, rub or gallop  ABDOMEN: soft, non-tender. Bowel sounds normal. No masses  EXTREMITIES:  extremities normal, atraumatic, no cyanosis or edema  NEUROLOGIC: AOx3. Gait normal.  PSYCH: mood: neutral; affect: neutral      Assessment and Plan:       ICD-10-CM ICD-9-CM    1. Mild intermittent asthma with status asthmaticus J45.22 493.91    2. Cough R05 786.2 predniSONE (DELTASONE) 10 mg tablet      benzonatate (TESSALON) 100 mg capsule   3. Lumbar facet arthropathy (HCC) M12.88 721.3    4. Low body weight due to inadequate caloric intake R63.6 783.22    5. Intractable migraine without aura and without status migrainosus G43.019 346.11    6. Other generalized epilepsy, not intractable, without status epilepticus ([facility] G40.409 345.90    7. Anxiety F41.9 300.00 PARoxetine (PAXIL) 20 mg tablet   8. Insomnia, unspecified type G47.00 780.52 hydrOXYzine pamoate (VISTARIL) 50 mg capsule   9. Blood tests for routine general physical examination Z00.00 V72.62 VITAMIN D, 25 HYDROXY      METABOLIC PANEL, COMPREHENSIVE         Medical Decision Making:  Asthma/cough- continue albuterol PRN + prednisone + tessalon    Lumbar facet arthrotpathy/chronic back pain- follow up with pain management    Low bmi- continue periactin and ensure    H/o migraines/epilepsy- follow up with neurology    Anxiety- start paxil    Insomnia- start Vistaril advised to stop medication and contact office if has dry mouth, dizziness, urinary retention etc     Follow-up Disposition:  Return in about 6 weeks (around 7/11/2017) for routine care with me. Patient acknowledges understanding of instructions and acknowledges understanding to call back if current symptoms worsen or new symptoms arise. Patient acknowledges and agrees with plan.         Signed By: TE Lewis May 30, 2017

## 2017-06-14 ENCOUNTER — HOSPITAL ENCOUNTER (EMERGENCY)
Age: 32
Discharge: HOME | End: 2017-06-15
Attending: EMERGENCY MEDICINE
Payer: MEDICAID

## 2017-06-14 ENCOUNTER — APPOINTMENT (OUTPATIENT)
Dept: ULTRASOUND IMAGING | Age: 32
End: 2017-06-14
Attending: EMERGENCY MEDICINE
Payer: MEDICAID

## 2017-06-14 VITALS
OXYGEN SATURATION: 100 % | HEART RATE: 74 BPM | DIASTOLIC BLOOD PRESSURE: 75 MMHG | SYSTOLIC BLOOD PRESSURE: 123 MMHG | RESPIRATION RATE: 18 BRPM | TEMPERATURE: 98.1 F

## 2017-06-14 DIAGNOSIS — O20.0 THREATENED MISCARRIAGE: Primary | ICD-10-CM

## 2017-06-14 DIAGNOSIS — Z34.90 PREGNANCY, UNSPECIFIED GESTATIONAL AGE: ICD-10-CM

## 2017-06-14 LAB
ALBUMIN SERPL BCP-MCNC: 4.1 G/DL (ref 3.4–5)
ALBUMIN/GLOB SERPL: 1.2 {RATIO} (ref 0.8–1.7)
ALP SERPL-CCNC: 53 U/L (ref 45–117)
ALT SERPL-CCNC: 20 U/L (ref 13–56)
ANION GAP BLD CALC-SCNC: 10 MMOL/L (ref 3–18)
APPEARANCE UR: ABNORMAL
AST SERPL W P-5'-P-CCNC: 15 U/L (ref 15–37)
BACTERIA URNS QL MICRO: ABNORMAL /HPF
BASOPHILS # BLD AUTO: 0 K/UL (ref 0–0.06)
BASOPHILS # BLD: 0 % (ref 0–2)
BILIRUB SERPL-MCNC: 0.6 MG/DL (ref 0.2–1)
BILIRUB UR QL: ABNORMAL
BUN SERPL-MCNC: 10 MG/DL (ref 7–18)
BUN/CREAT SERPL: 16 (ref 12–20)
CALCIUM SERPL-MCNC: 8.6 MG/DL (ref 8.5–10.1)
CHLORIDE SERPL-SCNC: 104 MMOL/L (ref 100–108)
CO2 SERPL-SCNC: 24 MMOL/L (ref 21–32)
COLOR UR: ABNORMAL
CREAT SERPL-MCNC: 0.62 MG/DL (ref 0.6–1.3)
DIFFERENTIAL METHOD BLD: ABNORMAL
EOSINOPHIL # BLD: 0 K/UL (ref 0–0.4)
EOSINOPHIL NFR BLD: 0 % (ref 0–5)
EPITH CASTS URNS QL MICRO: ABNORMAL /LPF (ref 0–5)
ERYTHROCYTE [DISTWIDTH] IN BLOOD BY AUTOMATED COUNT: 12.2 % (ref 11.6–14.5)
GLOBULIN SER CALC-MCNC: 3.3 G/DL (ref 2–4)
GLUCOSE SERPL-MCNC: 88 MG/DL (ref 74–99)
GLUCOSE UR STRIP.AUTO-MCNC: 100 MG/DL
HCG UR QL: POSITIVE
HCT VFR BLD AUTO: 35.3 % (ref 35–45)
HGB BLD-MCNC: 12 G/DL (ref 12–16)
HGB UR QL STRIP: ABNORMAL
KETONES UR QL STRIP.AUTO: NEGATIVE MG/DL
LEUKOCYTE ESTERASE UR QL STRIP.AUTO: ABNORMAL
LIPASE SERPL-CCNC: 204 U/L (ref 73–393)
LYMPHOCYTES # BLD AUTO: 14 % (ref 21–52)
LYMPHOCYTES # BLD: 1.5 K/UL (ref 0.9–3.6)
MCH RBC QN AUTO: 30.1 PG (ref 24–34)
MCHC RBC AUTO-ENTMCNC: 34 G/DL (ref 31–37)
MCV RBC AUTO: 88.5 FL (ref 74–97)
MONOCYTES # BLD: 0.5 K/UL (ref 0.05–1.2)
MONOCYTES NFR BLD AUTO: 5 % (ref 3–10)
NEUTS SEG # BLD: 8.2 K/UL (ref 1.8–8)
NEUTS SEG NFR BLD AUTO: 81 % (ref 40–73)
NITRITE UR QL STRIP.AUTO: NEGATIVE
PH UR STRIP: 5 [PH] (ref 5–8)
PLATELET # BLD AUTO: 238 K/UL (ref 135–420)
PMV BLD AUTO: 10.3 FL (ref 9.2–11.8)
POTASSIUM SERPL-SCNC: 3.6 MMOL/L (ref 3.5–5.5)
PROT SERPL-MCNC: 7.4 G/DL (ref 6.4–8.2)
PROT UR STRIP-MCNC: 300 MG/DL
RBC # BLD AUTO: 3.99 M/UL (ref 4.2–5.3)
RBC #/AREA URNS HPF: ABNORMAL /HPF (ref 0–5)
SERVICE CMNT-IMP: NORMAL
SODIUM SERPL-SCNC: 138 MMOL/L (ref 136–145)
SP GR UR REFRACTOMETRY: >1.03 (ref 1–1.03)
UROBILINOGEN UR QL STRIP.AUTO: 1 EU/DL (ref 0.2–1)
WBC # BLD AUTO: 10.2 K/UL (ref 4.6–13.2)
WBC URNS QL MICRO: ABNORMAL /HPF (ref 0–4)
WET PREP GENITAL: NORMAL

## 2017-06-14 PROCEDURE — 81001 URINALYSIS AUTO W/SCOPE: CPT | Performed by: EMERGENCY MEDICINE

## 2017-06-14 PROCEDURE — 87210 SMEAR WET MOUNT SALINE/INK: CPT | Performed by: EMERGENCY MEDICINE

## 2017-06-14 PROCEDURE — 99284 EMERGENCY DEPT VISIT MOD MDM: CPT

## 2017-06-14 PROCEDURE — 87491 CHLMYD TRACH DNA AMP PROBE: CPT | Performed by: EMERGENCY MEDICINE

## 2017-06-14 PROCEDURE — 85025 COMPLETE CBC W/AUTO DIFF WBC: CPT | Performed by: PHYSICIAN ASSISTANT

## 2017-06-14 PROCEDURE — 76817 TRANSVAGINAL US OBSTETRIC: CPT

## 2017-06-14 PROCEDURE — 83690 ASSAY OF LIPASE: CPT | Performed by: PHYSICIAN ASSISTANT

## 2017-06-14 PROCEDURE — 96374 THER/PROPH/DIAG INJ IV PUSH: CPT

## 2017-06-14 PROCEDURE — 81025 URINE PREGNANCY TEST: CPT | Performed by: EMERGENCY MEDICINE

## 2017-06-14 PROCEDURE — 74011250636 HC RX REV CODE- 250/636: Performed by: PHYSICIAN ASSISTANT

## 2017-06-14 PROCEDURE — 84702 CHORIONIC GONADOTROPIN TEST: CPT | Performed by: EMERGENCY MEDICINE

## 2017-06-14 PROCEDURE — 80053 COMPREHEN METABOLIC PANEL: CPT | Performed by: PHYSICIAN ASSISTANT

## 2017-06-14 RX ORDER — MORPHINE SULFATE 2 MG/ML
1 INJECTION, SOLUTION INTRAMUSCULAR; INTRAVENOUS
Status: COMPLETED | OUTPATIENT
Start: 2017-06-14 | End: 2017-06-14

## 2017-06-14 RX ADMIN — MORPHINE SULFATE 1 MG: 2 INJECTION, SOLUTION INTRAMUSCULAR; INTRAVENOUS at 23:32

## 2017-06-15 ENCOUNTER — OFFICE VISIT (OUTPATIENT)
Dept: OBGYN CLINIC | Age: 32
End: 2017-06-15

## 2017-06-15 ENCOUNTER — TELEPHONE (OUTPATIENT)
Dept: OBGYN CLINIC | Age: 32
End: 2017-06-15

## 2017-06-15 VITALS
DIASTOLIC BLOOD PRESSURE: 70 MMHG | SYSTOLIC BLOOD PRESSURE: 109 MMHG | HEART RATE: 63 BPM | WEIGHT: 107 LBS | HEIGHT: 66 IN | BODY MASS INDEX: 17.19 KG/M2

## 2017-06-15 DIAGNOSIS — O20.9 VAGINAL BLEEDING IN PREGNANCY, FIRST TRIMESTER: Primary | ICD-10-CM

## 2017-06-15 DIAGNOSIS — O26.891 PELVIC PAIN AFFECTING PREGNANCY IN FIRST TRIMESTER, ANTEPARTUM: ICD-10-CM

## 2017-06-15 DIAGNOSIS — O36.80X0 PREGNANCY OF UNKNOWN ANATOMIC LOCATION: ICD-10-CM

## 2017-06-15 DIAGNOSIS — Z34.90 EARLY STAGE OF PREGNANCY: ICD-10-CM

## 2017-06-15 DIAGNOSIS — R10.2 PELVIC PAIN AFFECTING PREGNANCY IN FIRST TRIMESTER, ANTEPARTUM: ICD-10-CM

## 2017-06-15 LAB — HCG SERPL-ACNC: 910 MIU/ML (ref 0–10)

## 2017-06-15 RX ORDER — ACETAMINOPHEN 325 MG/1
650 TABLET ORAL
Qty: 20 TAB | Refills: 0 | Status: SHIPPED | OUTPATIENT
Start: 2017-06-15 | End: 2017-07-11

## 2017-06-15 NOTE — PROGRESS NOTES
32 y.o. with new pregnancy confirmed early this morning in ER. She states vaginal bleeding is persistent and heavier, associated with pelvic cramping. No fever. +n/v. LMP is exactly 2017 which puts her at 6w 4d with OCHOA of 2018. Review of Systems:   General ROS: negative for fevers. Endocrine ROS: negative for -  breast mass/nipple discharge/galactorrhea, hair pattern changes, hot flashes, skin changes or temperature intolerance. Breast ROS: positive for - breast tenderness  Gastrointestinal ROS: no change in bowel habits, or black or bloody stools  Genito-Urinary ROS: no dysuria, trouble voiding, incontinence or hematuria. Musculoskeletal ROS: negative    Regular menses,denies hx of STI  OB History      Para Term  AB TAB SAB Ectopic Multiple Living    4 2   1  1   2        Past Medical History:   Diagnosis Date    Abnormal Papanicolaou smear of cervix     Asthma     Bradycardia     GERD (gastroesophageal reflux disease)     H/O sinus bradycardia     Headache     Migraine     Photophobia of both eyes     Seizures (HCC)     Stomach pain      Past Surgical History:   Procedure Laterality Date    HX APPENDECTOMY      HX  SECTION      HX GYN      cervical cerclage    HX OTHER SURGICAL  2017    Mendel L4-5, L5-S1 Facet Joint block     Social History     Social History    Marital status: SINGLE     Spouse name: N/A    Number of children: 2    Years of education: 12     Occupational History    Not on file.      Social History Main Topics    Smoking status: Former Smoker     Packs/day: 1.00     Years: 13.00     Types: Cigarettes     Quit date: 2015    Smokeless tobacco: Never Used      Comment: cigarello, once a month    Alcohol use No    Drug use: No    Sexual activity: Yes     Partners: Male     Birth control/ protection: Pill     Other Topics Concern     Service Yes     [facility]    Blood Transfusions No    Caffeine Concern No    Occupational Exposure No    Hobby Hazards No    Sleep Concern Yes    Stress Concern No    Weight Concern No    Special Diet Yes    Back Care Yes    Exercise Yes    Bike Helmet No    Seat Belt Yes    Self-Exams No     Social History Narrative     Allergies   Allergen Reactions    Imitrex [Sumatriptan Succinate] Anaphylaxis    Iodine Cough     Coughing up blood      Sea Food [Seafood] Hives     Per pt report      Current Outpatient Prescriptions on File Prior to Visit   Medication Sig Dispense Refill    acetaminophen (TYLENOL) 325 mg tablet Take 2 Tabs by mouth every four (4) hours as needed for Pain. 20 Tab 0    PARoxetine (PAXIL) 20 mg tablet Take 1 Tab by mouth daily. 30 Tab 3    benzonatate (TESSALON) 100 mg capsule Take 1 Cap by mouth three (3) times daily as needed for Cough. 30 Cap 0    hydrOXYzine pamoate (VISTARIL) 50 mg capsule Take 1 Cap by mouth nightly. 30 Cap 2    divalproex DR (DEPAKOTE) 250 mg tablet Take 1 Tab by mouth two (2) times a day. Indications: MIGRAINE PREVENTION 60 Tab 7    ondansetron (ZOFRAN ODT) 4 mg disintegrating tablet Take 1 Tab by mouth every eight (8) hours as needed for Nausea. 30 Tab 1    loratadine-pseudoephedrine (CLARITIN-D 12 HOUR) 5-120 mg per tablet Take 1 Tab by mouth two (2) times daily as needed.  meloxicam (MOBIC) 7.5 mg tablet Take 1 Tab by mouth daily. 30 Tab 2    food supplemt, lactose-reduced (ENSURE PLUS) 0.05-1.5 gram-kcal/mL liqd Take 237 mL by mouth three (3) times daily. 100 Can 3    norethindrone (MICRONOR) 0.35 mg tab Take 1 Tab by mouth daily. 1 Package 11    ergocalciferol (ERGOCALCIFEROL) 50,000 unit capsule Take 1 Cap by mouth every seven (7) days. 12 Cap 1    Omeprazole delayed release (PRILOSEC D/R) 20 mg tablet Take 1 Tab by mouth daily. 90 Tab 3    albuterol (PROVENTIL HFA, VENTOLIN HFA, PROAIR HFA) 90 mcg/actuation inhaler Take 1 Puff by inhalation every four (4) hours as needed for Wheezing or Shortness of Breath.  2 Inhaler 3    cyproheptadine (PERIACTIN) 4 mg tablet Take 1 Tab by mouth once over twenty-four (24) hours. 90 Tab 3    multivitamin (ONE A DAY) tablet Take 1 Tab by mouth daily. 90 Tab 3     Current Facility-Administered Medications on File Prior to Visit   Medication Dose Route Frequency Provider Last Rate Last Dose    [COMPLETED] morphine injection 1 mg  1 mg IntraVENous NOW TE [place]   1 mg at 06/14/17 2332          Visit Vitals    /70    Pulse 63    Ht 5' 6\" (1.676 m)    Wt 107 lb (48.5 kg)    LMP 06/01/2017    BMI 17.27 kg/m2     Gen:   NAD, WN, WH. Abd:  ND, soft, NT. Pelvic:  Normal external genitalia. Vagina with minimal blood, no clots. Cervix normal.  No POC visualized. Os closed. Uterus AV, approx 4-6 weeks, tender to palpation, no CMT. Bilateral adnexa without tenderness/mass. I personally reviewed available EMR with patient, significant for 910 qt hcg drawn at 2100 on 6/14/17, normal H/H,  B pos, pelvic US report and images without evidence of IUP, +corpus luteum cyst.  A/P:    ICD-10-CM ICD-9-CM    1. Vaginal bleeding in pregnancy, first trimester O46.91 640.93    2. Pelvic pain affecting pregnancy in first trimester, antepartum O26.891 646.83     R10.2 625.9    3. Early stage of pregnancy Z33.1 V22.2    4. Pregnancy of unknown anatomic location Z33.1 V22.2      Discussed unclear dx at this point given low qt hcg and no evidence of IUP on US. Recommend repeat hcg 48 hrs from initial draw and monitor for worsening sx. Patient to return to ER sooner with worsening pelvic pain, fever, or hemorrhage. Follow up TBD. Plan of care discussed. Patient expressed understanding and agreement.

## 2017-06-15 NOTE — ED PROVIDER NOTES
Patient is a 32 y.o. female presenting with abdominal pain and vaginal bleeding. The history is provided by the patient. Abdominal Pain      Vaginal Bleeding   Associated symptoms include abdominal pain. Swathi Harirs is a 32 y.o. female presents with vaginal bleeding and abdominal pain for the past two weeks.  started her menses on the first and has not finished. States bleeding seems to be getting worse. Mild LLQ abdominal pain. Denies N/V. Has an appointment with her GYN on Friday.  is going through 4 pads a day. Past Medical History:   Diagnosis Date    Abnormal Papanicolaou smear of cervix     Asthma     Bradycardia     GERD (gastroesophageal reflux disease)     H/O sinus bradycardia     Headache     Migraine     Photophobia of both eyes     Seizures (HCC)     Stomach pain        Past Surgical History:   Procedure Laterality Date    HX APPENDECTOMY      HX  SECTION      HX GYN      cervical cerclage    HX OTHER SURGICAL  2017    Mendel L4-5, L5-S1 Facet Joint block         Family History:   Problem Relation Age of Onset    Diabetes Mother     Heart Attack Father     Attention Deficit Hyperactivity Disorder Sister     Attention Deficit Hyperactivity Disorder Brother     Cancer Maternal Aunt 45     breast    Diabetes Maternal Grandmother     Attention Deficit Hyperactivity Disorder Brother     No Known Problems Brother     No Known Problems Brother     No Known Problems Brother     Cancer Maternal Aunt      lung    Heart defect Son    Sana March Migraines Son     Seizures Son      h/o    Other Son      h/o jaundice       Social History     Social History    Marital status: SINGLE     Spouse name: N/A    Number of children: 2    Years of education: 12     Occupational History    Not on file.      Social History Main Topics    Smoking status: Former Smoker     Packs/day: 1.00     Years: 13.00     Types: Cigarettes     Quit date: 2015    Smokeless tobacco: Never Used      Comment: cigarello, once a month    Alcohol use No    Drug use: No    Sexual activity: Yes     Partners: Male     Birth control/ protection: Pill     Other Topics Concern     Service Yes     [employer]    Blood Transfusions No    Caffeine Concern No    Occupational Exposure No    Hobby Hazards No    Sleep Concern Yes    Stress Concern No    Weight Concern No    Special Diet Yes    Back Care Yes    Exercise Yes    Bike Helmet No    Seat Belt Yes    Self-Exams No     Social History Narrative         ALLERGIES: Imitrex [sumatriptan succinate]; Iodine; and Sea food [seafood]    Review of Systems   Gastrointestinal: Positive for abdominal pain. Genitourinary: Positive for vaginal bleeding. Constitutional:  Denies malaise, fever, chills. Head:  Denies injury. Face:  Denies injury or pain. ENMT:  Denies sore throat. Neck:  Denies injury or pain. Chest:  Denies injury. Cardiac:  Denies chest pain or palpitations. Respiratory:  Denies cough, wheezing, difficulty breathing, shortness of breath. GI/ABD: Pain  Denies injury, distention, nausea, vomiting, diarrhea. :  Vaginal bleeding Denies injury, pain, dysuria or urgency. Back:  Denies injury or pain. Pelvis:  Denies injury or pain. Extremity/MS:  Denies injury or pain. Neuro:  Denies headache, LOC, dizziness, neurologic symptoms/deficits/paresthesias. Skin: Denies injury, rash, itching or skin changes. Vitals:    06/14/17 2033   BP: 123/75   Pulse: 74   Resp: 18   Temp: 98.1 °F (36.7 °C)   SpO2: 100%            Physical Exam   Nursing note and vitals reviewed. CONSTITUTIONAL: Alert, in no apparent distress; well-developed; well-nourished. HEAD:  Normocephalic, atraumatic. EYES: PERRL; EOM's intact. ENTM: Nose: No rhinorrhea; Throat: mucous membranes moist. Posterior pharynx-normal.  Neck:  No JVD, supple without lymphadenopathy. RESP: Chest clear, equal breath sounds.    CV: S1 and S2 WNL; No murmurs, gallops or rubs. GI: Abdomen soft and non-tender. No masses or organomegaly. UPPER EXT:  Normal inspection. LOWER EXT: Normal inspection. NEURO: strength 5/5 and sym, sensation intact. SKIN: No rashes; Normal for age and stage. PSYCH:  Alert and oriented, normal affect. MDM  Number of Diagnoses or Management Options  Pregnancy, unspecified gestational age:   Threatened miscarriage:   Diagnosis management comments: DDx: preg, ectopic, miscarriage, menstruation, ovarian cyst, ovarian torsion, tubo-ovarian abscess, fibroids, UTI, pyelo, kidney stones, STD,  Lfqv-Hnzb-Xakxko syndrome, hemorroids, dehydration,anemia, malignancy  IMPRESSION AND MEDICAL DECISION MAKING:  Based upon the patient's presentation with noted HPI and PE, along with the work up done in the emergency department, I believe that the patient is having a threatened miscarriage. Will have her follow up with her OB as planned on 6/16/17. The patient will be discharged home. Warning signs of worsening condition were discussed and understood by the patient. Based on patient's age, coexisting illness, exam, and the results of this ED evaluation, the decision to treat as an outpatient was made. Based on the information available at time of discharge, acute pathology requiring immediate intervention was deemed relative unlikely. While it is impossible to completely exclude the possibility of underlying serious disease or worsening of condition, I feel the relative likelihood is extremely low. I discussed this uncertainty with the patient, who understood ED evaluation and treatment and felt comfortable with the outpatient treatment plan. All questions regarding care, test results, and follow up were answered. The patient is stable and appropriate to discharge. They understand that they should return to the emergency department for any new or worsening symptoms.  I stressed the importance of follow up for repeat assessment and possibly further evaluation/treatment. Amount and/or Complexity of Data Reviewed  Clinical lab tests: ordered and reviewed  Tests in the radiology section of CPT®: ordered and reviewed (No IUP seen.)  Tests in the medicine section of CPT®: ordered and reviewed  Independent visualization of images, tracings, or specimens: yes      ED Course       Pelvic Exam  Date/Time: 6/14/2017 9:28 PM  Performed by: PA  Exam assisted by:  Nurse. Type of exam performed: speculum. External genitalia appearance: normal.    Vaginal exam:  bleeding. Bleeding: mild  Cervical exam:  active bleeding from os. Specimen(s) collected:  GC.   Patient tolerance: Patient tolerated the procedure well with no immediate complications            Vitals:  Patient Vitals for the past 12 hrs:   Temp Pulse Resp BP SpO2   06/14/17 2033 98.1 °F (36.7 °C) 74 18 123/75 100 %         Medications ordered:   Medications   morphine injection 1 mg (1 mg IntraVENous Given 6/14/17 2332)         Lab findings:  Recent Results (from the past 12 hour(s))   HCG URINE, QL    Collection Time: 06/14/17  8:42 PM   Result Value Ref Range    HCG urine, Ql. POSITIVE (A) NEG     URINALYSIS W/ RFLX MICROSCOPIC    Collection Time: 06/14/17  8:42 PM   Result Value Ref Range    Color RED      Appearance TURBID      Specific gravity >1.030 (H) 1.005 - 1.030    pH (UA) 5.0 5.0 - 8.0      Protein 300 (A) NEG mg/dL    Glucose 100 (A) NEG mg/dL    Ketone NEGATIVE  NEG mg/dL    Bilirubin SMALL (A) NEG      Blood LARGE (A) NEG      Urobilinogen 1.0 0.2 - 1.0 EU/dL    Nitrites NEGATIVE  NEG      Leukocyte Esterase SMALL (A) NEG     URINE MICROSCOPIC ONLY    Collection Time: 06/14/17  8:42 PM   Result Value Ref Range    WBC 8 to 12 0 - 4 /hpf    RBC TOO NUMEROUS TO COUNT 0 - 5 /hpf    Epithelial cells FEW 0 - 5 /lpf    Bacteria 1+ (A) NEG /hpf   WET PREP    Collection Time: 06/14/17  9:20 PM   Result Value Ref Range    Special Requests: NO SPECIAL REQUESTS      Wet prep NO TRICHOMONAS SEEN      Wet prep FEW  CLUE CELLS PRESENT        Wet prep NO FUNGAL ELEMENTS SEEN     CBC WITH AUTOMATED DIFF    Collection Time: 06/14/17  9:40 PM   Result Value Ref Range    WBC 10.2 4.6 - 13.2 K/uL    RBC 3.99 (L) 4.20 - 5.30 M/uL    HGB 12.0 12.0 - 16.0 g/dL    HCT 35.3 35.0 - 45.0 %    MCV 88.5 74.0 - 97.0 FL    MCH 30.1 24.0 - 34.0 PG    MCHC 34.0 31.0 - 37.0 g/dL    RDW 12.2 11.6 - 14.5 %    PLATELET 242 962 - 655 K/uL    MPV 10.3 9.2 - 11.8 FL    NEUTROPHILS 81 (H) 40 - 73 %    LYMPHOCYTES 14 (L) 21 - 52 %    MONOCYTES 5 3 - 10 %    EOSINOPHILS 0 0 - 5 %    BASOPHILS 0 0 - 2 %    ABS. NEUTROPHILS 8.2 (H) 1.8 - 8.0 K/UL    ABS. LYMPHOCYTES 1.5 0.9 - 3.6 K/UL    ABS. MONOCYTES 0.5 0.05 - 1.2 K/UL    ABS. EOSINOPHILS 0.0 0.0 - 0.4 K/UL    ABS. BASOPHILS 0.0 0.0 - 0.06 K/UL    DF AUTOMATED     METABOLIC PANEL, COMPREHENSIVE    Collection Time: 06/14/17  9:40 PM   Result Value Ref Range    Sodium 138 136 - 145 mmol/L    Potassium 3.6 3.5 - 5.5 mmol/L    Chloride 104 100 - 108 mmol/L    CO2 24 21 - 32 mmol/L    Anion gap 10 3.0 - 18 mmol/L    Glucose 88 74 - 99 mg/dL    BUN 10 7.0 - 18 MG/DL    Creatinine 0.62 0.6 - 1.3 MG/DL    BUN/Creatinine ratio 16 12 - 20      GFR est AA >60 >60 ml/min/1.73m2    GFR est non-AA >60 >60 ml/min/1.73m2    Calcium 8.6 8.5 - 10.1 MG/DL    Bilirubin, total 0.6 0.2 - 1.0 MG/DL    ALT (SGPT) 20 13 - 56 U/L    AST (SGOT) 15 15 - 37 U/L    Alk. phosphatase 53 45 - 117 U/L    Protein, total 7.4 6.4 - 8.2 g/dL    Albumin 4.1 3.4 - 5.0 g/dL    Globulin 3.3 2.0 - 4.0 g/dL    A-G Ratio 1.2 0.8 - 1.7     LIPASE    Collection Time: 06/14/17  9:40 PM   Result Value Ref Range    Lipase 204 73 - 393 U/L   TOTAL HCG, QT.     Collection Time: 06/14/17  9:40 PM   Result Value Ref Range    Beta HCG,  (H) 0 - 10 MIU/ML       EKG interpretation by ED Physician:      X-Ray, CT or other radiology findings or impressions:  US UTS TRANSVAGINAL OB    (Results Pending)       Progress notes, Consult notes or additional Procedure notes:       Disposition:  Diagnosis:   1. Threatened miscarriage    2. Pregnancy, unspecified gestational age        Disposition:   12:27 AM  Pt reevaluated at this time and is resting comfortably in NAD. Discussed results and findings, as well as, diagnosis and plan for discharge. Pt verbalizes understanding and agreement with plan. All questions addressed at this time. Follow-up Information     Follow up With Details Comments [address] [place], [place] Schedule an appointment as soon as possible for a visit As needed [address] [facility] OB Go on 6/16/2017 as planned     [facility] EMERGENCY DEPT  If symptoms worsen [address]  [phone]           Patient's Medications   Start Taking    No medications on file   Continue Taking    ALBUTEROL (PROVENTIL HFA, VENTOLIN HFA, PROAIR HFA) 90 MCG/ACTUATION INHALER    Take 1 Puff by inhalation every four (4) hours as needed for Wheezing or Shortness of Breath. BENZONATATE (TESSALON) 100 MG CAPSULE    Take 1 Cap by mouth three (3) times daily as needed for Cough. CYPROHEPTADINE (PERIACTIN) 4 MG TABLET    Take 1 Tab by mouth once over twenty-four (24) hours. DIVALPROEX DR (DEPAKOTE) 250 MG TABLET    Take 1 Tab by mouth two (2) times a day. Indications: MIGRAINE PREVENTION    ERGOCALCIFEROL (ERGOCALCIFEROL) 50,000 UNIT CAPSULE    Take 1 Cap by mouth every seven (7) days. FOOD SUPPLEMT, LACTOSE-REDUCED (ENSURE PLUS) 0.05-1.5 GRAM-KCAL/ML LIQD    Take 237 mL by mouth three (3) times daily. HYDROXYZINE PAMOATE (VISTARIL) 50 MG CAPSULE    Take 1 Cap by mouth nightly. LORATADINE-PSEUDOEPHEDRINE (CLARITIN-D 12 HOUR) 5-120 MG PER TABLET    Take 1 Tab by mouth two (2) times daily as needed. MELOXICAM (MOBIC) 7.5 MG TABLET    Take 1 Tab by mouth daily.     MULTIVITAMIN (ONE A DAY) TABLET    Take 1 Tab by mouth daily. NORETHINDRONE (MICRONOR) 0.35 MG TAB    Take 1 Tab by mouth daily. OMEPRAZOLE DELAYED RELEASE (PRILOSEC D/R) 20 MG TABLET    Take 1 Tab by mouth daily. ONDANSETRON (ZOFRAN ODT) 4 MG DISINTEGRATING TABLET    Take 1 Tab by mouth every eight (8) hours as needed for Nausea. PAROXETINE (PAXIL) 20 MG TABLET    Take 1 Tab by mouth daily.    These Medications have changed    No medications on file   Stop Taking    PREDNISONE (DELTASONE) 10 MG TABLET    Take 6 tablets on day 1, 5 tablets on day 2, 4 tablets on day 3, 3 tablets on day 4, 2 tablets on day 5, 1 tablets on day 6

## 2017-06-15 NOTE — TELEPHONE ENCOUNTER
PATIENT SEEN IN  ED FOR POSS MISSED AB PATIENT CALLING FOR  INCREASED PAIN  RECOMMENDATION TO PATIENT IS TO BE SEEN IN OFFICE WITH PROVIDER THAT HAS OPENINGS

## 2017-06-15 NOTE — DISCHARGE INSTRUCTIONS

## 2017-06-15 NOTE — ED NOTES
I have reviewed discharge instructions with the patient. The patient verbalized understanding. Pt agreeable to dc plan, pt in nad ambulatory to ED lobby.

## 2017-06-16 ENCOUNTER — APPOINTMENT (OUTPATIENT)
Dept: ULTRASOUND IMAGING | Age: 32
End: 2017-06-16
Attending: EMERGENCY MEDICINE
Payer: MEDICAID

## 2017-06-16 ENCOUNTER — HOSPITAL ENCOUNTER (EMERGENCY)
Age: 32
Discharge: HOME | End: 2017-06-17
Attending: EMERGENCY MEDICINE
Payer: MEDICAID

## 2017-06-16 VITALS
WEIGHT: 106.92 LBS | SYSTOLIC BLOOD PRESSURE: 110 MMHG | BODY MASS INDEX: 17.18 KG/M2 | TEMPERATURE: 97.7 F | HEART RATE: 110 BPM | RESPIRATION RATE: 14 BRPM | OXYGEN SATURATION: 100 % | HEIGHT: 66 IN | DIASTOLIC BLOOD PRESSURE: 64 MMHG

## 2017-06-16 DIAGNOSIS — O20.0 THREATENED MISCARRIAGE IN EARLY PREGNANCY: Primary | ICD-10-CM

## 2017-06-16 LAB
ABO + RH BLD: NORMAL
ALBUMIN SERPL BCP-MCNC: 4.5 G/DL (ref 3.4–5)
ALBUMIN/GLOB SERPL: 1.5 {RATIO} (ref 0.8–1.7)
ALP SERPL-CCNC: 54 U/L (ref 45–117)
ALT SERPL-CCNC: 19 U/L (ref 13–56)
ANION GAP BLD CALC-SCNC: 13 MMOL/L (ref 3–18)
AST SERPL W P-5'-P-CCNC: 12 U/L (ref 15–37)
BASOPHILS # BLD AUTO: 0 K/UL (ref 0–0.06)
BASOPHILS # BLD: 0 % (ref 0–2)
BILIRUB DIRECT SERPL-MCNC: 0.2 MG/DL (ref 0–0.2)
BILIRUB SERPL-MCNC: 0.6 MG/DL (ref 0.2–1)
BUN SERPL-MCNC: 9 MG/DL (ref 7–18)
BUN/CREAT SERPL: 12 (ref 12–20)
CALCIUM SERPL-MCNC: 9.2 MG/DL (ref 8.5–10.1)
CHLORIDE SERPL-SCNC: 108 MMOL/L (ref 100–108)
CO2 SERPL-SCNC: 19 MMOL/L (ref 21–32)
CREAT SERPL-MCNC: 0.73 MG/DL (ref 0.6–1.3)
DIFFERENTIAL METHOD BLD: ABNORMAL
EOSINOPHIL # BLD: 0 K/UL (ref 0–0.4)
EOSINOPHIL NFR BLD: 0 % (ref 0–5)
ERYTHROCYTE [DISTWIDTH] IN BLOOD BY AUTOMATED COUNT: 12.4 % (ref 11.6–14.5)
GLOBULIN SER CALC-MCNC: 3 G/DL (ref 2–4)
GLUCOSE SERPL-MCNC: 95 MG/DL (ref 74–99)
HCG SERPL-ACNC: 592 MIU/ML (ref 0–10)
HCT VFR BLD AUTO: 35.9 % (ref 35–45)
HGB BLD-MCNC: 12.5 G/DL (ref 12–16)
LYMPHOCYTES # BLD AUTO: 20 % (ref 21–52)
LYMPHOCYTES # BLD: 2.4 K/UL (ref 0.9–3.6)
MCH RBC QN AUTO: 30.6 PG (ref 24–34)
MCHC RBC AUTO-ENTMCNC: 34.8 G/DL (ref 31–37)
MCV RBC AUTO: 87.8 FL (ref 74–97)
MONOCYTES # BLD: 0.5 K/UL (ref 0.05–1.2)
MONOCYTES NFR BLD AUTO: 4 % (ref 3–10)
NEUTS SEG # BLD: 9.4 K/UL (ref 1.8–8)
NEUTS SEG NFR BLD AUTO: 76 % (ref 40–73)
PLATELET # BLD AUTO: 239 K/UL (ref 135–420)
PMV BLD AUTO: 10.1 FL (ref 9.2–11.8)
POTASSIUM SERPL-SCNC: 3.4 MMOL/L (ref 3.5–5.5)
PROT SERPL-MCNC: 7.5 G/DL (ref 6.4–8.2)
RBC # BLD AUTO: 4.09 M/UL (ref 4.2–5.3)
SERVICE CMNT-IMP: NORMAL
SODIUM SERPL-SCNC: 140 MMOL/L (ref 136–145)
WBC # BLD AUTO: 12.4 K/UL (ref 4.6–13.2)
WET PREP GENITAL: NORMAL

## 2017-06-16 PROCEDURE — 85025 COMPLETE CBC W/AUTO DIFF WBC: CPT | Performed by: EMERGENCY MEDICINE

## 2017-06-16 PROCEDURE — 80048 BASIC METABOLIC PNL TOTAL CA: CPT | Performed by: EMERGENCY MEDICINE

## 2017-06-16 PROCEDURE — 87210 SMEAR WET MOUNT SALINE/INK: CPT | Performed by: EMERGENCY MEDICINE

## 2017-06-16 PROCEDURE — 96374 THER/PROPH/DIAG INJ IV PUSH: CPT

## 2017-06-16 PROCEDURE — 96375 TX/PRO/DX INJ NEW DRUG ADDON: CPT

## 2017-06-16 PROCEDURE — 99283 EMERGENCY DEPT VISIT LOW MDM: CPT

## 2017-06-16 PROCEDURE — 74011250636 HC RX REV CODE- 250/636: Performed by: EMERGENCY MEDICINE

## 2017-06-16 PROCEDURE — 76817 TRANSVAGINAL US OBSTETRIC: CPT

## 2017-06-16 PROCEDURE — 84702 CHORIONIC GONADOTROPIN TEST: CPT | Performed by: EMERGENCY MEDICINE

## 2017-06-16 PROCEDURE — 96361 HYDRATE IV INFUSION ADD-ON: CPT

## 2017-06-16 PROCEDURE — 86900 BLOOD TYPING SEROLOGIC ABO: CPT | Performed by: EMERGENCY MEDICINE

## 2017-06-16 PROCEDURE — 80076 HEPATIC FUNCTION PANEL: CPT | Performed by: EMERGENCY MEDICINE

## 2017-06-16 RX ORDER — SODIUM CHLORIDE 0.9 % (FLUSH) 0.9 %
5-10 SYRINGE (ML) INJECTION EVERY 8 HOURS
Status: DISCONTINUED | OUTPATIENT
Start: 2017-06-16 | End: 2017-06-17 | Stop reason: HOSPADM

## 2017-06-16 RX ORDER — MORPHINE SULFATE 4 MG/ML
4 INJECTION, SOLUTION INTRAMUSCULAR; INTRAVENOUS
Status: COMPLETED | OUTPATIENT
Start: 2017-06-16 | End: 2017-06-16

## 2017-06-16 RX ORDER — SODIUM CHLORIDE 0.9 % (FLUSH) 0.9 %
5-10 SYRINGE (ML) INJECTION AS NEEDED
Status: DISCONTINUED | OUTPATIENT
Start: 2017-06-16 | End: 2017-06-17 | Stop reason: HOSPADM

## 2017-06-16 RX ORDER — ONDANSETRON 2 MG/ML
4 INJECTION INTRAMUSCULAR; INTRAVENOUS
Status: COMPLETED | OUTPATIENT
Start: 2017-06-16 | End: 2017-06-16

## 2017-06-16 RX ADMIN — Medication 10 ML: at 22:22

## 2017-06-16 RX ADMIN — Medication 4 MG: at 22:39

## 2017-06-16 RX ADMIN — ONDANSETRON 4 MG: 2 INJECTION INTRAMUSCULAR; INTRAVENOUS at 22:39

## 2017-06-16 RX ADMIN — SODIUM CHLORIDE 1000 ML: 900 INJECTION, SOLUTION INTRAVENOUS at 22:39

## 2017-06-17 LAB
C TRACH RRNA SPEC QL NAA+PROBE: NEGATIVE
N GONORRHOEA RRNA SPEC QL NAA+PROBE: NEGATIVE
SPECIMEN SOURCE: NORMAL

## 2017-06-17 PROCEDURE — 74011250637 HC RX REV CODE- 250/637: Performed by: EMERGENCY MEDICINE

## 2017-06-17 RX ORDER — HYDROCODONE BITARTRATE AND ACETAMINOPHEN 5; 325 MG/1; MG/1
TABLET ORAL
Qty: 12 TAB | Refills: 0 | Status: SHIPPED | OUTPATIENT
Start: 2017-06-17 | End: 2017-07-11

## 2017-06-17 RX ORDER — ACETAMINOPHEN 500 MG
1000 TABLET ORAL
Status: COMPLETED | OUTPATIENT
Start: 2017-06-17 | End: 2017-06-17

## 2017-06-17 RX ADMIN — ACETAMINOPHEN 1000 MG: 500 TABLET ORAL at 01:52

## 2017-06-17 NOTE — DISCHARGE INSTRUCTIONS

## 2017-06-17 NOTE — ED PROVIDER NOTES
HPI Comments: 10:19 PM Neri Patel is a 32 y.o. A4 female with h/o appendectomy and  section who presents to ED complaining of left suprapubic abdominal pain acutely worsened 2 days ago. Patient was seen in the ED 2 days ago. She then followed up with ob/gyn but was told she has to wait 48 hours to do blood work to r/o ectopic pregnancy. Patient was instructed to come to the ED if the pain worsened and has an ob/gyn appointment Monday. Patient also admits to heavy vaginal bleeding since 2017. Her LMP was 2017. She had a miscarriage . No other concerns or symptoms at this time. PCP: TE Horton  Ob/gyn: Danita Hoffmann DO     The history is provided by the patient.         Past Medical History:   Diagnosis Date    Abnormal Papanicolaou smear of cervix     Asthma     Bradycardia     GERD (gastroesophageal reflux disease)     H/O sinus bradycardia     Headache     Migraine     Photophobia of both eyes     Seizures (HCC)     Stomach pain        Past Surgical History:   Procedure Laterality Date    HX APPENDECTOMY      HX  SECTION      HX GYN      cervical cerclage    HX OTHER SURGICAL  2017    Mendel L4-5, L5-S1 Facet Joint block         Family History:   Problem Relation Age of Onset    Diabetes Mother     Heart Attack Father     Attention Deficit Hyperactivity Disorder Sister     Attention Deficit Hyperactivity Disorder Brother     Cancer Maternal Aunt 45     breast    Diabetes Maternal Grandmother     Attention Deficit Hyperactivity Disorder Brother     No Known Problems Brother     No Known Problems Brother     No Known Problems Brother     Cancer Maternal Aunt      lung    Heart defect Son    Demian Eans Migraines Son     Seizures Son      h/o    Other Son      h/o jaundice       Social History     Social History    Marital status: SINGLE     Spouse name: N/A    Number of children: 2    Years of education: 12     Occupational History    Not on file. Social History Main Topics    Smoking status: Former Smoker     Packs/day: 1.00     Years: 13.00     Types: Cigarettes     Quit date: 5/5/2015    Smokeless tobacco: Never Used      Comment: cigarello, once a month    Alcohol use No    Drug use: No    Sexual activity: Yes     Partners: Male     Birth control/ protection: Pill     Other Topics Concern     Service Yes     [employer]    Blood Transfusions No    Caffeine Concern No    Occupational Exposure No    Hobby Hazards No    Sleep Concern Yes    Stress Concern No    Weight Concern No    Special Diet Yes    Back Care Yes    Exercise Yes    Bike Helmet No    Seat Belt Yes    Self-Exams No     Social History Narrative         ALLERGIES: Imitrex [sumatriptan succinate]; Iodine; and Sea food [seafood]    Review of Systems   Constitutional: Negative for chills, diaphoresis, fatigue, fever and unexpected weight change. HENT: Negative for congestion, dental problem, ear discharge, ear pain, hearing loss, nosebleeds, postnasal drip, rhinorrhea, sinus pressure, sore throat, trouble swallowing and voice change. Eyes: Negative for photophobia, pain, discharge, redness and visual disturbance. Respiratory: Negative for cough, chest tightness, shortness of breath, wheezing and stridor. Cardiovascular: Negative for chest pain, palpitations and leg swelling. Gastrointestinal: Positive for abdominal pain (left side suprapubic). Negative for abdominal distention, anal bleeding, blood in stool, constipation, diarrhea, nausea and vomiting. Genitourinary: Positive for vaginal bleeding. Negative for difficulty urinating, dysuria, flank pain, frequency, genital sores, hematuria and urgency. Musculoskeletal: Negative for arthralgias, back pain, myalgias, neck pain and neck stiffness. Skin: Negative for color change, pallor, rash and wound. Allergic/Immunologic: Negative for immunocompromised state.    Neurological: Negative for dizziness, tremors, seizures, syncope, weakness, light-headedness, numbness and headaches. Hematological: Negative for adenopathy. Does not bruise/bleed easily. Psychiatric/Behavioral: Negative for agitation, confusion, decreased concentration, hallucinations, sleep disturbance and suicidal ideas. The patient is not nervous/anxious. Vitals:    06/16/17 2104   BP: 110/64   Pulse: (!) 110   Resp: 14   Temp: 97.7 °F (36.5 °C)   SpO2: 100%   Weight: 48.5 kg (106 lb 14.8 oz)   Height: 5' 6\" (1.676 m)            Physical Exam   Constitutional: She is oriented to person, place, and time. She appears well-developed and well-nourished. No distress. 32year old  female in moderate painful distress due to pelvic pain    HENT:   Head: Normocephalic and atraumatic. Right Ear: External ear normal.   Left Ear: External ear normal.   Nose: Nose normal.   Mouth/Throat: Oropharynx is clear and moist. No oropharyngeal exudate. Eyes: Conjunctivae and EOM are normal. Pupils are equal, round, and reactive to light. Right eye exhibits no discharge. Left eye exhibits no discharge. No scleral icterus. Neck: Normal range of motion. Neck supple. No JVD present. No tracheal deviation present. No thyromegaly present. Cardiovascular: Normal rate, regular rhythm, normal heart sounds and intact distal pulses. Exam reveals no gallop and no friction rub. No murmur heard. Pulmonary/Chest: Effort normal and breath sounds normal. No stridor. No respiratory distress. She has no wheezes. She has no rales. She exhibits no tenderness. Abdominal: Soft. Bowel sounds are normal. She exhibits no distension and no mass. There is tenderness (Mild left suprapubic tenderness). There is no rebound and no guarding. Genitourinary: No vaginal discharge found. Genitourinary Comments: Mild blood noted in vaginal vault. Mild bleeding from os, fingertip at os. No CMT. Gravid uterus.      Musculoskeletal: Normal range of motion. She exhibits no edema or tenderness. Lymphadenopathy:     She has no cervical adenopathy. Neurological: She is alert and oriented to person, place, and time. No cranial nerve deficit. Skin: Skin is warm and dry. No rash noted. She is not diaphoretic. No erythema. No pallor. Psychiatric: She has a normal mood and affect. Her behavior is normal. Judgment and thought content normal.   Nursing note and vitals reviewed. MDM  Number of Diagnoses or Management Options  Threatened miscarriage in early pregnancy:   Diagnosis management comments: Differential includes: Ectopic pregnancy, Threatened ab, UTI. GC and Chlamydia were sent on prior visit       Amount and/or Complexity of Data Reviewed  Clinical lab tests: reviewed and ordered  Tests in the radiology section of CPT®: ordered and reviewed  Tests in the medicine section of CPT®: ordered and reviewed  Decide to obtain previous medical records or to obtain history from someone other than the patient: yes  Obtain history from someone other than the patient: yes  Review and summarize past medical records: yes  Independent visualization of images, tracings, or specimens: yes    Risk of Complications, Morbidity, and/or Mortality  Presenting problems: high  Diagnostic procedures: high  Management options: high    Patient Progress  Patient progress: improved    ED Course       Pelvic Exam  Date/Time: 6/16/2017 11:38 PM  Performed by: attending  Procedure duration:  5 minutes. Documented by:  kemi Meadows. As dictated by:  Pineda Florian DO  Type of exam performed: bimanual and speculum. External genitalia appearance: normal.    Vaginal exam:  bleeding. Bleeding: mild  Cervical exam:  normal.    Bimanual exam:  normal.    Patient tolerance: Patient tolerated the procedure well with no immediate complications  Comments: Fingertip at the os. Mild bleeding. No tissue or products of conception.            Vitals:  Patient Vitals for the past 12 hrs:   Temp Pulse Resp BP SpO2   06/16/17 2104 97.7 °F (36.5 °C) (!) 110 14 110/64 100 %   SpO2 reviewed and within normal limits. Medications ordered:   Medications   morphine injection 4 mg (4 mg IntraVENous Given 6/16/17 2239)   sodium chloride 0.9 % bolus infusion 1,000 mL (0 mL IntraVENous IV Completed 6/17/17 0144)   ondansetron (ZOFRAN) injection 4 mg (4 mg IntraVENous Given 6/16/17 2239)   acetaminophen (TYLENOL) tablet 1,000 mg (1,000 mg Oral Given 6/17/17 0152)         Lab findings:  Recent Results (from the past 12 hour(s))   CBC WITH AUTOMATED DIFF    Collection Time: 06/16/17 10:36 PM   Result Value Ref Range    WBC 12.4 4.6 - 13.2 K/uL    RBC 4.09 (L) 4.20 - 5.30 M/uL    HGB 12.5 12.0 - 16.0 g/dL    HCT 35.9 35.0 - 45.0 %    MCV 87.8 74.0 - 97.0 FL    MCH 30.6 24.0 - 34.0 PG    MCHC 34.8 31.0 - 37.0 g/dL    RDW 12.4 11.6 - 14.5 %    PLATELET 921 217 - 814 K/uL    MPV 10.1 9.2 - 11.8 FL    NEUTROPHILS 76 (H) 40 - 73 %    LYMPHOCYTES 20 (L) 21 - 52 %    MONOCYTES 4 3 - 10 %    EOSINOPHILS 0 0 - 5 %    BASOPHILS 0 0 - 2 %    ABS. NEUTROPHILS 9.4 (H) 1.8 - 8.0 K/UL    ABS. LYMPHOCYTES 2.4 0.9 - 3.6 K/UL    ABS. MONOCYTES 0.5 0.05 - 1.2 K/UL    ABS. EOSINOPHILS 0.0 0.0 - 0.4 K/UL    ABS. BASOPHILS 0.0 0.0 - 0.06 K/UL    DF AUTOMATED     METABOLIC PANEL, BASIC    Collection Time: 06/16/17 10:36 PM   Result Value Ref Range    Sodium 140 136 - 145 mmol/L    Potassium 3.4 (L) 3.5 - 5.5 mmol/L    Chloride 108 100 - 108 mmol/L    CO2 19 (L) 21 - 32 mmol/L    Anion gap 13 3.0 - 18 mmol/L    Glucose 95 74 - 99 mg/dL    BUN 9 7.0 - 18 MG/DL    Creatinine 0.73 0.6 - 1.3 MG/DL    BUN/Creatinine ratio 12 12 - 20      GFR est AA >60 >60 ml/min/1.73m2    GFR est non-AA >60 >60 ml/min/1.73m2    Calcium 9.2 8.5 - 10.1 MG/DL   TOTAL HCG, QT.     Collection Time: 06/16/17 10:36 PM   Result Value Ref Range    Beta HCG,  (H) 0 - 10 MIU/ML   HEPATIC FUNCTION PANEL    Collection Time: 06/16/17 10:36 PM Result Value Ref Range    Protein, total 7.5 6.4 - 8.2 g/dL    Albumin 4.5 3.4 - 5.0 g/dL    Globulin 3.0 2.0 - 4.0 g/dL    A-G Ratio 1.5 0.8 - 1.7      Bilirubin, total 0.6 0.2 - 1.0 MG/DL    Bilirubin, direct 0.2 0.0 - 0.2 MG/DL    Alk. phosphatase 54 45 - 117 U/L    AST (SGOT) 12 (L) 15 - 37 U/L    ALT (SGPT) 19 13 - 56 U/L   TYPE, ABO & RH    Collection Time: 06/16/17 10:45 PM   Result Value Ref Range    ABO/Rh(D) B POSITIVE    WET PREP    Collection Time: 06/16/17 11:00 PM   Result Value Ref Range    Special Requests: NO SPECIAL REQUESTS      Wet prep NO YEAST,TRICHOMONAS OR CLUE CELLS NOTED         X-Ray, CT or other radiology findings or impressions:  US UTS TRANSVAGINAL OB     Preliminary Result  Findings:    - Pregnancy of unknown location. No gestational sac identified.  - There is increasing amount of mildly complex pelvic and bilateral adnexal free fluid compared to 6/14/17. Cannot exclude ectopic, but decreasing hcg (910-->592) may suggest miscarriage. Recommend short interval follow up with pelvic ultrasound and beta hcg.   - Uterine fibroid. - Normal doppler flow noted to bilateral ovaries.  - 2.5 cm right ovarian hypoechoic structure, may represent corpus luteum. Results discussed with me, Wil Hu DO, at 1:20 AM       Orders:  Orders Placed This Encounter    WET PREP     Standing Status:   Standing     Number of Occurrences:   1    US UTS TRANSVAGINAL OB     Standing Status:   Standing     Number of Occurrences:   1     Order Specific Question:   Transport     Answer:   Stretcher [5]     Order Specific Question:   Is Patient Pregnant? Answer:    Yes    CBC WITH AUTOMATED DIFF     Standing Status:   Standing     Number of Occurrences:   1    METABOLIC PANEL, BASIC     Standing Status:   Standing     Number of Occurrences:   1    TOTAL HCG, QT.     Standing Status:   Standing     Number of Occurrences:   1    HEPATIC FUNCTION PANEL     Standing Status:   Standing     Number of Occurrences:   1    PELVIC EXAM SET UP     Standing Status:   Standing     Number of Occurrences:   1    PELVIC EXAM  (ASAP ONLY)     This order was created via procedure documentation     Standing Status:   Standing     Number of Occurrences:   1    TYPE, ABO & RH     Standing Status:   Standing     Number of Occurrences:   1    SALINE LOCK IV ONE TIME Routine     Standing Status:   Standing     Number of Occurrences:   1    morphine injection 4 mg    sodium chloride 0.9 % bolus infusion 1,000 mL    ondansetron (ZOFRAN) injection 4 mg    DISCONTD: sodium chloride (NS) flush 5-10 mL    DISCONTD: sodium chloride (NS) flush 5-10 mL    HYDROcodone-acetaminophen (NORCO) 5-325 mg per tablet     Sig: Take 1-2 tablets PO every 4-6 hours as needed for pain control. If over the counter ibuprofen or acetaminophen was suggested, then only take the vicodin for pain not well controlled with the over the counter medication. Dispense:  12 Tab     Refill:  0    acetaminophen (TYLENOL) tablet 1,000 mg       Reevaluation, Progress notes, Consult notes, or additional Procedure notes:   1:23 AM I have reassessed the patient and discussed their results and diagnosis. Pt will be discharged in stable condition. Patient is to return to emergency department if any new or worsening condition. Patient understands and verbalizes agreement with plan. Disposition:  Diagnosis:   1. Threatened miscarriage in early pregnancy        Disposition: Discharged    Follow-up Information     Follow up With Details Comments [id number], DO Schedule an appointment as soon as possible for a visit in 3 days Return to the ED in 48-72 hours for repeat beta-HCG level, and re-evaluation.  [id number] [place] [address] Rd  [phone]             Discharge Medication List as of 6/17/2017  1:23 AM      START taking these medications    Details   HYDROcodone-acetaminophen (NORCO) 5-325 mg per tablet Take 1-2 tablets PO every 4-6 hours as needed for pain control. If over the counter ibuprofen or acetaminophen was suggested, then only take the vicodin for pain not well controlled with the over the counter medication. , Print, Disp-12 Tab, R-0         CONTINUE these medications which have NOT CHANGED    Details   acetaminophen (TYLENOL) 325 mg tablet Take 2 Tabs by mouth every four (4) hours as needed for Pain., Print, Disp-20 Tab, R-0      PARoxetine (PAXIL) 20 mg tablet Take 1 Tab by mouth daily. , Normal, Disp-30 Tab, R-3      benzonatate (TESSALON) 100 mg capsule Take 1 Cap by mouth three (3) times daily as needed for Cough., Normal, Disp-30 Cap, R-0      hydrOXYzine pamoate (VISTARIL) 50 mg capsule Take 1 Cap by mouth nightly., Normal, Disp-30 Cap, R-2      divalproex DR (DEPAKOTE) 250 mg tablet Take 1 Tab by mouth two (2) times a day. Indications: MIGRAINE PREVENTION, Normal, Disp-60 Tab, R-7      ondansetron (ZOFRAN ODT) 4 mg disintegrating tablet Take 1 Tab by mouth every eight (8) hours as needed for Nausea., Normal, Disp-30 Tab, R-1      food supplemt, lactose-reduced (ENSURE PLUS) 0.05-1.5 gram-kcal/mL liqd Take 237 mL by mouth three (3) times daily. , Normal, Disp-100 Can, R-3      ergocalciferol (ERGOCALCIFEROL) 50,000 unit capsule Take 1 Cap by mouth every seven (7) days. , Normal, Disp-12 Cap, R-1      Omeprazole delayed release (PRILOSEC D/R) 20 mg tablet Take 1 Tab by mouth daily. , Normal, Disp-90 Tab, R-3      albuterol (PROVENTIL HFA, VENTOLIN HFA, PROAIR HFA) 90 mcg/actuation inhaler Take 1 Puff by inhalation every four (4) hours as needed for Wheezing or Shortness of Breath., Normal, Disp-2 Inhaler, R-3      cyproheptadine (PERIACTIN) 4 mg tablet Take 1 Tab by mouth once over twenty-four (24) hours. , Normal, Disp-90 Tab, R-3      multivitamin (ONE A DAY) tablet Take 1 Tab by mouth daily. , Normal, Disp-90 Tab, R-3      loratadine-pseudoephedrine (CLARITIN-D 12 HOUR) 5-120 mg per tablet Take 1 Tab by mouth two (2) times daily as needed., Historical Med      meloxicam (MOBIC) 7.5 mg tablet Take 1 Tab by mouth daily. , Normal, Disp-30 Tab, R-2      norethindrone (MICRONOR) 0.35 mg tab Take 1 Tab by mouth daily. , Normal, Disp-1 Package, R-11               [id number] [facility] for and in the presence of Marcial Song DO (06/17/17)      Physician Attestation  I personally performed the services described in this documentation, reviewed and edited the documentation which was dictated to the scribe in my presence, and it accurately records my words and actions.     Marcial Song DO (06/17/17)      Signed by: Avi Henriquez, June 17, 2017 at 6:35 AM

## 2017-06-17 NOTE — ED TRIAGE NOTES
C/o worsening constant, sharp left lower abdominal pain. Pain is worse with movement. Also c/o heavy vaginal bleeding. States she is 6 weeks pregnant.

## 2017-07-05 ENCOUNTER — OFFICE VISIT (OUTPATIENT)
Dept: NEUROLOGY | Age: 32
End: 2017-07-05

## 2017-07-05 ENCOUNTER — TELEPHONE (OUTPATIENT)
Dept: NEUROLOGY | Age: 32
End: 2017-07-05

## 2017-07-05 VITALS
OXYGEN SATURATION: 97 % | BODY MASS INDEX: 17.29 KG/M2 | HEIGHT: 66 IN | TEMPERATURE: 98.5 F | HEART RATE: 68 BPM | WEIGHT: 107.6 LBS | SYSTOLIC BLOOD PRESSURE: 100 MMHG | DIASTOLIC BLOOD PRESSURE: 62 MMHG

## 2017-07-05 DIAGNOSIS — F41.9 ANXIETY AND DEPRESSION: ICD-10-CM

## 2017-07-05 DIAGNOSIS — G47.01 INSOMNIA DUE TO MEDICAL CONDITION: Primary | ICD-10-CM

## 2017-07-05 DIAGNOSIS — R63.6 LOW BODY WEIGHT DUE TO INADEQUATE CALORIC INTAKE: ICD-10-CM

## 2017-07-05 DIAGNOSIS — F41.9 ANXIETY: ICD-10-CM

## 2017-07-05 DIAGNOSIS — F32.A ANXIETY AND DEPRESSION: ICD-10-CM

## 2017-07-05 RX ORDER — PAROXETINE 30 MG/1
30 TABLET, FILM COATED ORAL DAILY
Qty: 30 TAB | Refills: 3 | Status: SHIPPED | OUTPATIENT
Start: 2017-07-05 | End: 2017-07-06 | Stop reason: SDUPTHER

## 2017-07-05 RX ORDER — PRENATAL VIT 96/IRON FUM/FOLIC 27MG-0.8MG
1 TABLET ORAL DAILY
Qty: 30 TAB | Refills: 3 | Status: SHIPPED | OUTPATIENT
Start: 2017-07-05 | End: 2017-12-11 | Stop reason: SDUPTHER

## 2017-07-05 NOTE — TELEPHONE ENCOUNTER
Patient called stating that she had requested for her Vistaril to be increased but that her Paxil was increased instead. Patient is requesting that her Vistaril be increased.

## 2017-07-05 NOTE — PROGRESS NOTES
[place] [id number] [facility], [address]      Khushbu Orourke is right handed [address]  female who presents in evaluation of sleep disorder. Patient describes early adult years Onset was gradual over several years. Patient describes difficulty initiating and maintaining sleep worse over the past several years. She does drink up to 36 ounces of soda a day she goes to bed at 10 can take on average 45 minutes to an hour to fall asleep but at times does not fall asleep at all wakes every several hours up to 10-15 minutes to eat and occasionally to urinate she gets up at 7 AM but does not feel rested she does not schedule naps she does not act out dreams snore or have witnessed apneas. She does admit to sleep hallucinations and sleep paralysis as well as morning headaches but she does have chronic daily headaches as well she also has history of seizure disorder none since 2014 she is aware of discomfort in her leg but denies restless leg symptoms    Patient reports continuing difficulty in headaches and difficulty sleeping she is tolerating the Depakote for headaches. She is not on prenatal vitamins. She did undergo the sleep study which showed no significant movement disorders or sleep apnea. Apnea hypopnea index was 0 throughout. Reviewed alternative therapies for insomnia at length patient would prefer medication rather than therapy cognitive behavioral therapy after reviewed risk benefits. Current Outpatient Prescriptions:     VENTOLIN HFA 90 mcg/actuation inhaler, INHELE 1 PUFF EVERY FOUR (4) HOURS AS NEEDED FOR WHEEZING OR SHORTNESS OF BREATH., Disp: 1 Inhaler, Rfl: 3    ENSURE PLUS 0.05-1.5 gram-kcal/mL liqd, TAKE 237 ML BY MOUTH THREE TIMES DAILY. , Disp: 21902 mL, Rfl: 2    PARoxetine (PAXIL) 30 mg tablet, Take 1 Tab by mouth daily. , Disp: 30 Tab, Rfl: 3    PRENATAL XNKH26/TLLG FUM/FOLIC (PRENATAL VITAMIN) 27 mg iron- 800 mcg tab tablet, Take 1 Tab by mouth daily. , Disp: 30 Tab, Rfl: 3    HYDROcodone-acetaminophen (NORCO) 5-325 mg per tablet, Take 1-2 tablets PO every 4-6 hours as needed for pain control. If over the counter ibuprofen or acetaminophen was suggested, then only take the vicodin for pain not well controlled with the over the counter medication. , Disp: 12 Tab, Rfl: 0    acetaminophen (TYLENOL) 325 mg tablet, Take 2 Tabs by mouth every four (4) hours as needed for Pain., Disp: 20 Tab, Rfl: 0    hydrOXYzine pamoate (VISTARIL) 50 mg capsule, Take 1 Cap by mouth nightly., Disp: 30 Cap, Rfl: 2    divalproex DR (DEPAKOTE) 250 mg tablet, Take 1 Tab by mouth two (2) times a day. Indications: MIGRAINE PREVENTION, Disp: 60 Tab, Rfl: 7    ondansetron (ZOFRAN ODT) 4 mg disintegrating tablet, Take 1 Tab by mouth every eight (8) hours as needed for Nausea., Disp: 30 Tab, Rfl: 1    loratadine-pseudoephedrine (CLARITIN-D 12 HOUR) 5-120 mg per tablet, Take 1 Tab by mouth two (2) times daily as needed. , Disp: , Rfl:     meloxicam (MOBIC) 7.5 mg tablet, Take 1 Tab by mouth daily. , Disp: 30 Tab, Rfl: 2    norethindrone (MICRONOR) 0.35 mg tab, Take 1 Tab by mouth daily. , Disp: 1 Package, Rfl: 11    ergocalciferol (ERGOCALCIFEROL) 50,000 unit capsule, Take 1 Cap by mouth every seven (7) days. , Disp: 12 Cap, Rfl: 1    Omeprazole delayed release (PRILOSEC D/R) 20 mg tablet, Take 1 Tab by mouth daily. , Disp: 90 Tab, Rfl: 3    cyproheptadine (PERIACTIN) 4 mg tablet, Take 1 Tab by mouth once over twenty-four (24) hours. , Disp: 90 Tab, Rfl: 3    benzonatate (TESSALON) 100 mg capsule, Take 1 Cap by mouth three (3) times daily as needed for Cough. , Disp: 30 Cap, Rfl: 0  Past Medical History:   Diagnosis Date    Abnormal Papanicolaou smear of cervix     Asthma     Bradycardia     GERD (gastroesophageal reflux disease)     H/O sinus bradycardia     Headache     Migraine     Miscarriage     Photophobia of both eyes     Seizures (HCC)     Stomach pain      Social History     Social History    Marital status: SINGLE     Spouse name: N/A    Number of children: 2    Years of education: 12     Occupational History    Not on file. Social History Main Topics    Smoking status: Former Smoker     Packs/day: 1.00     Years: 13.00     Types: Cigarettes     Quit date: 5/5/2015    Smokeless tobacco: Never Used      Comment: cigarello, once a month    Alcohol use No    Drug use: No    Sexual activity: Yes     Partners: Male     Birth control/ protection: Pill     Other Topics Concern     Service Yes     [employer]    Blood Transfusions No    Caffeine Concern No    Occupational Exposure No    Hobby Hazards No    Sleep Concern Yes    Stress Concern No    Weight Concern No    Special Diet Yes    Back Care Yes    Exercise Yes    Bike Helmet No    Seat Belt Yes    Self-Exams No     Social History Narrative       Review of Systems:   Constitutional:  gained, 20 lbs. Eyes:  Negative blurred vision  CVS:  Negative chest pain  Pulm:  Negative shortness of breath  GI:  Negative nausea or vomiting  :  Positive nocturia  Musculoskeletal:  Positive significant joint pain at night  Skin:  Negative rashes  Neuro:  Positive dizziness   Psych:  Positive significant mood issues    Sleep Review of Systems: notable for Positive difficulty falling asleep; Positive awakenings at night; Positive percieved regular dreaming; Positive nightmares; Positive early morning headaches; 0 afternoon naps per week; Negative memory problems; Positive concentration issues; Negative history of any automobile or occupational accidents due to daytime drowsiness.     Physical Exam    Visit Vitals    /62    Pulse 68    Temp 98.5 °F (36.9 °C) (Oral)    Ht 5' 6\" (1.676 m)    Wt 48.8 kg (107 lb 9.6 oz)    SpO2 97%    BMI 17.37 kg/m2       Neck Circumference: 31 cm      General:  Well defined, nourished, and groomed individual in no acute distress. HEENT: head :at/nc Throat: no oropharnynx  Crowding noted  Neck: Supple, nontender, thyroid within normal limits, no JVD, no bruits, no pain   with resistance to active range of motion. Heart: Regular rate and rhythm, no murmurs, rub, or gallop. Normal S1S2. Lungs:  Clear to auscultation   Musculoskeletal:  Extremities revealed no edema, clubbing or cyanosis. Psych:  Good mood and normal affect    Neurologic Exam    Mental Status: The patient is awake, alert, and oriented x 3. Speech is fluent and memory appears to be intact, both long and short term. No aphasias or apraxias. Cranial Nerves: II - Visual fields are full to confrontation. Pupils are both equal and reactive to light and accommodation. III, IV, VI - Extraocular movements are intact and there is no nystagmus. VII - Face is symmetrical.   VIII - Hearing is present. Motor: Tone is normal and symmetric. There is no pronator drift present. The strength is intact for all muscle groups tested in all four extremities. Coordination:Gait testing is normal     psg and implications reviewed at  length  Assessment and Plan  Franky Pathak is a 32 y.o. right handed female whose history and physical are consistent with insomnia ?sleep apnea. Franky Pathak who has risk factors including asthma, history of seizures and headaches. Melissa Harrington was seen today for sleep problem. Diagnoses and all orders for this visit:    Insomnia due to medical condition    Anxiety and depression    Low body weight due to inadequate caloric intake    Anxiety  -     PARoxetine (PAXIL) 30 mg tablet; Take 1 Tab by mouth daily. Other orders  -     PRENATAL XXSO60/LAZS FUM/FOLIC (PRENATAL VITAMIN) 27 mg iron- 800 mcg tab tablet; Take 1 Tab by mouth daily. Follow-up Disposition:  Return in about 3 months (around 10/5/2017).    Reviewed work up  I spent 40 minutes with the patient in face-to-face consultation, of which greater than 50% was spent in counseling and coordination of care as described above.         Electronically Signed by:  Moshe Molina MD

## 2017-07-05 NOTE — MR AVS SNAPSHOT
Visit Information Date & Time Provider Department Dept. Phone Encounter #  
 7/5/2017 11:00 AM Kang Callahan, [address] [id number] Follow-up Instructions Return in about 3 months (around 10/5/2017). Follow-up and Disposition History Your Appointments 7/11/2017 10:00 AM  
Follow Up with TE Serrato [facility] ([address]) Appt Note: [facility] [id number]  
[address] [address] 7/17/2017 10:45 AM  
Follow Up with Dana Leger MD  
[facility] ONE [address] [facility]) Appt Note: 3 mnth fu  
 [address] [facility] [address]  
[phone]  
  
   
 [address] [facility],[address] [facility]  
  
    
 7/28/2017  4:00 PM  
Follow Up with Lis Gibbs MD  
[facility] [address]) Appt Note: 3wk f/u; r/s from 6/14 to this date w/pt. ...ywp; r/s from 7/11 to this date & time w/pt; Dr. Alvin King on call [place] [address] [address] [facility] [address] [phone]  
  
   
 [facility] [address]  
  
    
 10/5/2017 10:45 AM  
Follow Up with Kang Callahan MD  
[address]) Appt Note: 3 month fu  CPAP  
 [address] [facility] [address] [facility] [address]  
[phone]  
  
   
 [place] [address] Upcoming Health Maintenance Date Due INFLUENZA AGE 9 TO ADULT 8/1/2017 PAP AKA CERVICAL CYTOLOGY 12/3/2018 DTaP/Tdap/Td series (2 - Td) 5/30/2027 Allergies as of 7/5/2017  Review Complete On: 7/5/2017 By: Kang Callahan MD  
  
 Severity Noted Reaction Type Reactions Imitrex [Sumatriptan Succinate] High 05/25/2015    Anaphylaxis Iodine  02/16/2017    Cough Coughing up blood Sea Food [Seafood]  06/04/2015    Hives Per pt report Current Immunizations  Never Reviewed No immunizations on file. Not reviewed this visit You Were Diagnosed With   
  
 Codes Comments Insomnia due to medical condition    -  Primary ICD-10-CM: G47.01 
ICD-9-CM: 327.01 Anxiety and depression     ICD-10-CM: F41.9, F32.9 ICD-9-CM: 300.00, 311 Low body weight due to inadequate caloric intake     ICD-10-CM: R63.6 ICD-9-CM: 783.22 Anxiety     ICD-10-CM: F41.9 ICD-9-CM: 300.00 Vitals BP Pulse Temp Height(growth percentile) Weight(growth percentile) LMP  
 100/62 68 98.5 °F (36.9 °C) (Oral) 5' 6\" (1.676 m) 107 lb 9.6 oz (48.8 kg) 04/01/2017 SpO2 BMI OB Status Smoking Status 97% 17.37 kg/m2 Unknown Former Smoker BMI and BSA Data Body Mass Index Body Surface Area  
 17.37 kg/m 2 1.51 m 2 Preferred Pharmacy Pharmacy Name Phone [id number] [phone] [phone] Your Updated Medication List  
  
   
This list is accurate as of: 7/5/17 11:58 AM.  Always use your most recent med list.  
  
  
  
  
 acetaminophen 325 mg tablet Commonly known as:  TYLENOL Take 2 Tabs by mouth every four (4) hours as needed for Pain. benzonatate 100 mg capsule Commonly known as:  TESSALON Take 1 Cap by mouth three (3) times daily as needed for Cough. CLARITIN-D 12 HOUR 5-120 mg per tablet Generic drug:  loratadine-pseudoephedrine Take 1 Tab by mouth two (2) times daily as needed. cyproheptadine 4 mg tablet Commonly known as:  PERIACTIN Take 1 Tab by mouth once over twenty-four (24) hours. divalproex  mg tablet Commonly known as:  DEPAKOTE Take 1 Tab by mouth two (2) times a day. Indications: MIGRAINE PREVENTION  
  
 ENSURE PLUS 0.05-1.5 gram-kcal/mL Liqd Generic drug:  food supplemt, lactose-reduced TAKE 237 ML BY MOUTH THREE TIMES DAILY. ergocalciferol 50,000 unit capsule Commonly known as:  ERGOCALCIFEROL Take 1 Cap by mouth every seven (7) days. HYDROcodone-acetaminophen 5-325 mg per tablet Commonly known as:  Momo Arreola Take 1-2 tablets PO every 4-6 hours as needed for pain control. If over the counter ibuprofen or acetaminophen was suggested, then only take the vicodin for pain not well controlled with the over the counter medication. hydrOXYzine pamoate 50 mg capsule Commonly known as:  VISTARIL Take 1 Cap by mouth nightly. meloxicam 7.5 mg tablet Commonly known as:  MOBIC Take 1 Tab by mouth daily. norethindrone 0.35 mg Tab Commonly known as:  Micha & Micha Take 1 Tab by mouth daily. Omeprazole delayed release 20 mg tablet Commonly known as:  PRILOSEC D/R Take 1 Tab by mouth daily. ondansetron 4 mg disintegrating tablet Commonly known as:  ZOFRAN ODT Take 1 Tab by mouth every eight (8) hours as needed for Nausea. PARoxetine 30 mg tablet Commonly known as:  PAXIL Take 1 Tab by mouth daily. prenatal vitamin 27 mg iron- 800 mcg Tab tablet Take 1 Tab by mouth daily. VENTOLIN HFA 90 mcg/actuation inhaler Generic drug:  albuterol INHELE 1 PUFF EVERY FOUR (4) HOURS AS NEEDED FOR WHEEZING OR SHORTNESS OF BREATH. Prescriptions Sent to Pharmacy Refills PARoxetine (PAXIL) 30 mg tablet 3 Sig: Take 1 Tab by mouth daily. Class: Normal  
 Pharmacy: [address] [place] [address] Ph #: [phone] Route: Oral  
 PRENATAL GVGT31/KUAV FUM/FOLIC (PRENATAL VITAMIN) 27 mg iron- 800 mcg tab tablet 3 Sig: Take 1 Tab by mouth daily. Class: Normal  
 Pharmacy: [address] [place] [address] Ph #: [phone] Route: Oral  
  
Follow-up Instructions Return in about 3 months (around 10/5/2017). Patient Instructions No driving drowsy ,reviewed risks and benefits of therapy at length Introducing [facility]! Dear Beryl Cortez: 
Thank you for requesting a [employer] account. Our records indicate that you already have an active [employer] account. You can access your account anytime at [email or web address] Did you know that you can access your hospital and ER discharge instructions at any time in [employer]? You can also review all of your test results from your hospital stay or ER visit. Additional Information If you have questions, please visit the Frequently Asked Questions section of the [employer] website at [email or web address] Remember, [employer] is NOT to be used for urgent needs. For medical emergencies, dial 911. Now available from your iPhone and Android! Please provide this summary of care documentation to your next provider. Your primary care clinician is listed as Magen Armenta. If you have any questions after today's visit, please call [phone].

## 2017-07-06 ENCOUNTER — HOSPITAL ENCOUNTER (OUTPATIENT)
Dept: LAB | Age: 32
Discharge: HOME | End: 2017-07-06

## 2017-07-06 DIAGNOSIS — F41.9 ANXIETY: ICD-10-CM

## 2017-07-06 PROCEDURE — 99001 SPECIMEN HANDLING PT-LAB: CPT | Performed by: PHYSICIAN ASSISTANT

## 2017-07-06 RX ORDER — PAROXETINE HYDROCHLORIDE 20 MG/1
TABLET, FILM COATED ORAL
Qty: 30 TAB | Refills: 3 | Status: SHIPPED | OUTPATIENT
Start: 2017-07-06 | End: 2017-10-16 | Stop reason: SDUPTHER

## 2017-07-11 ENCOUNTER — OFFICE VISIT (OUTPATIENT)
Dept: FAMILY MEDICINE CLINIC | Facility: CLINIC | Age: 32
End: 2017-07-11

## 2017-07-11 VITALS
DIASTOLIC BLOOD PRESSURE: 64 MMHG | SYSTOLIC BLOOD PRESSURE: 95 MMHG | RESPIRATION RATE: 15 BRPM | BODY MASS INDEX: 17.04 KG/M2 | TEMPERATURE: 98.1 F | WEIGHT: 106 LBS | HEART RATE: 66 BPM | OXYGEN SATURATION: 100 % | HEIGHT: 66 IN

## 2017-07-11 DIAGNOSIS — F41.9 ANXIETY: ICD-10-CM

## 2017-07-11 DIAGNOSIS — R59.0 CERVICAL ADENOPATHY: ICD-10-CM

## 2017-07-11 DIAGNOSIS — E55.9 VITAMIN D DEFICIENCY: ICD-10-CM

## 2017-07-11 DIAGNOSIS — R63.6 LOW BODY WEIGHT DUE TO INADEQUATE CALORIC INTAKE: ICD-10-CM

## 2017-07-11 DIAGNOSIS — G47.00 INSOMNIA, UNSPECIFIED TYPE: ICD-10-CM

## 2017-07-11 DIAGNOSIS — R11.0 NAUSEA: ICD-10-CM

## 2017-07-11 DIAGNOSIS — J45.22 MILD INTERMITTENT ASTHMA WITH STATUS ASTHMATICUS: Primary | ICD-10-CM

## 2017-07-11 RX ORDER — ONDANSETRON 4 MG/1
4 TABLET, ORALLY DISINTEGRATING ORAL
Qty: 30 TAB | Refills: 3 | Status: SHIPPED | OUTPATIENT
Start: 2017-07-11 | End: 2017-11-07 | Stop reason: SDUPTHER

## 2017-07-11 RX ORDER — ZOLPIDEM TARTRATE 5 MG/1
5 TABLET ORAL
Qty: 30 TAB | Refills: 0 | Status: SHIPPED | OUTPATIENT
Start: 2017-07-11 | End: 2017-07-21 | Stop reason: ALTCHOICE

## 2017-07-11 NOTE — PATIENT INSTRUCTIONS

## 2017-07-11 NOTE — MR AVS SNAPSHOT
Visit Information Date & Time Provider Department Dept. Phone Encounter #  
 7/11/2017 10:00 AM [address] [phone] [id number] Follow-up Instructions Return in about 2 months (around 9/11/2017) for routine care with me. Your Appointments 7/17/2017 10:45 AM  
Follow Up with Carmen Dumas MD  
[facility]) Appt Note: 3 mnth fu  
 [address] [facility] [address]  
[phone]  
  
   
 [address] [facility],[address]  
  
    
 7/28/2017  4:00 PM  
Follow Up with Hunter Nye MD  
[facility]) Appt Note: 3wk f/u; r/s from 6/14 to this date w/pt. ...ywp; r/s from 7/11 to this date & time w/pt; Dr[facility] [address] [facility] [address] [phone]  
  
   
 [facility] [address]  
  
    
 10/5/2017 10:45 AM  
Follow Up with Anitra Ramirez MD  
[facility]) Appt Note: 3 month fu  CPAP  
 [address] [facility] [address] [facility] [id number] [phone]  
  
   
 [place] [address] Upcoming Health Maintenance Date Due INFLUENZA AGE 9 TO ADULT 8/1/2017 PAP AKA CERVICAL CYTOLOGY 12/3/2018 DTaP/Tdap/Td series (2 - Td) 5/30/2027 Allergies as of 7/11/2017  Review Complete On: 7/11/2017 By: Akilah Leon LPN Severity Noted Reaction Type Reactions Imitrex [Sumatriptan Succinate] High 05/25/2015    Anaphylaxis Iodine  02/16/2017    Cough Coughing up blood Sea Food [Seafood]  06/04/2015    Hives Per pt report Current Immunizations  Never Reviewed No immunizations on file. Not reviewed this visit You Were Diagnosed With   
  
 Codes Comments Mild intermittent asthma with status asthmaticus    -  Primary ICD-10-CM: J45.22 
ICD-9-CM: 493.91 Insomnia, unspecified type     ICD-10-CM: G47.00 ICD-9-CM: 780.52 Low body weight due to inadequate caloric intake     ICD-10-CM: R63.6 ICD-9-CM: 783.22 Cervical adenopathy     ICD-10-CM: R59.0 ICD-9-CM: 005. 6 Vitals BP Pulse Temp Resp Height(growth percentile) Weight(growth percentile) 95/64 (BP 1 Location: Right arm, BP Patient Position: Sitting) 66 98.1 °F (36.7 °C) (Oral) 15 5' 6\" (1.676 m) 106 lb (48.1 kg) LMP SpO2 BMI OB Status Smoking Status 04/30/2017 100% 17.11 kg/m2 Unknown Former Smoker BMI and BSA Data Body Mass Index Body Surface Area  
 17.11 kg/m 2 1.5 m 2 Preferred Pharmacy Pharmacy Name Phone [facility][id number] [phone] Your Updated Medication List  
  
   
This list is accurate as of: 7/11/17 10:36 AM.  Always use your most recent med list.  
  
  
  
  
 CLARITIN-D 12 HOUR 5-120 mg per tablet Generic drug:  loratadine-pseudoephedrine Take 1 Tab by mouth two (2) times daily as needed. cyproheptadine 4 mg tablet Commonly known as:  PERIACTIN Take 1 Tab by mouth once over twenty-four (24) hours. divalproex  mg tablet Commonly known as:  DEPAKOTE Take 1 Tab by mouth two (2) times a day. Indications: MIGRAINE PREVENTION  
  
 ENSURE PLUS 0.05-1.5 gram-kcal/mL Liqd Generic drug:  food supplemt, lactose-reduced TAKE 237 ML BY MOUTH THREE TIMES DAILY. ergocalciferol 50,000 unit capsule Commonly known as:  ERGOCALCIFEROL Take 1 Cap by mouth every seven (7) days. hydrOXYzine pamoate 50 mg capsule Commonly known as:  VISTARIL Take 1 Cap by mouth nightly. meloxicam 7.5 mg tablet Commonly known as:  MOBIC Take 1 Tab by mouth daily. norethindrone 0.35 mg Tab Commonly known as:  Micha & Micha Take 1 Tab by mouth daily. Omeprazole delayed release 20 mg tablet Commonly known as:  PRILOSEC D/R Take 1 Tab by mouth daily. ondansetron 4 mg disintegrating tablet Commonly known as:  ZOFRAN ODT Take 1 Tab by mouth every eight (8) hours as needed for Nausea. PARoxetine 20 mg tablet Commonly known as:  PAXIL  
1 qhs  
  
 prenatal vitamin 27 mg iron- 800 mcg Tab tablet Take 1 Tab by mouth daily. VENTOLIN HFA 90 mcg/actuation inhaler Generic drug:  albuterol INHELE 1 PUFF EVERY FOUR (4) HOURS AS NEEDED FOR WHEEZING OR SHORTNESS OF BREATH.  
  
 zolpidem 5 mg tablet Commonly known as:  AMBIEN Take 1 Tab by mouth nightly as needed for Sleep. Max Daily Amount: 5 mg. Prescriptions Printed Refills  
 zolpidem (AMBIEN) 5 mg tablet 0 Sig: Take 1 Tab by mouth nightly as needed for Sleep. Max Daily Amount: 5 mg. Class: Print Route: Oral  
  
We Performed the Following REFERRAL TO GENERAL SURGERY [REF27 Custom] Comments:  
 Please evaluate patient for cervical adneopathy and low bmi Follow-up Instructions Return in about 2 months (around 9/11/2017) for routine care with me. Referral Information Referral ID Referred By Referred To  
  
 [id number] [facility] Not Available Visits Status Start Date End Date 1 New Request 7/11/17 7/11/18 If your referral has a status of pending review or denied, additional information will be sent to support the outcome of this decision. Patient Instructions Asthma Attack: Care Instructions Your Care Instructions During an asthma attack, the airways swell and narrow. This makes it hard to breathe. Severe asthma attacks can be life-threatening, but you can help prevent them by keeping your asthma under control and treating symptoms before they get bad. Symptoms include being short of breath, having chest tightness, coughing, and wheezing.  Noting and treating these symptoms can also help you avoid future trips to the emergency room. The doctor has checked you carefully, but problems can develop later. If you notice any problems or new symptoms, get medical treatment right away. Follow-up care is a key part of your treatment and safety. Be sure to make and go to all appointments, and call your doctor if you are having problems. It's also a good idea to know your test results and keep a list of the medicines you take. How can you care for yourself at home? · Follow your asthma action plan to prevent and treat attacks. If you don't have an asthma action plan, work with your doctor to create one. · Take your asthma medicines exactly as prescribed. Talk to your doctor right away if you have any questions about how to take them. ¨ Use your quick-relief medicine when you have symptoms of an attack. Quick-relief medicine is usually an albuterol inhaler. Some people need to use quick-relief medicine before they exercise. ¨ Take your controller medicine every day, not just when you have symptoms. Controller medicine is usually an inhaled corticosteroid. The goal is to prevent problems before they occur. Don't use your controller medicine to treat an attack that has already started. It doesn't work fast enough to help. ¨ If your doctor prescribed corticosteroid pills to use during an attack, take them exactly as prescribed. It may take hours for the pills to work, but they may make the episode shorter and help you breathe better. ¨ Keep your quick-relief medicine with you at all times. · Talk to your doctor before using other medicines. Some medicines, such as aspirin, can cause asthma attacks in some people. · If you have a peak flow meter, use it to check how well you are breathing. This can help you predict when an asthma attack is going to occur. Then you can take medicine to prevent the asthma attack or make it less severe. · Do not smoke or allow others to smoke around you. Avoid smoky places. Smoking makes asthma worse. If you need help quitting, talk to your doctor about stop-smoking programs and medicines. These can increase your chances of quitting for good. · Learn what triggers an asthma attack for you, and avoid the triggers when you can. Common triggers include colds, smoke, air pollution, dust, pollen, mold, pets, cockroaches, stress, and cold air. · Avoid colds and the flu. Get a pneumococcal vaccine shot. If you have had one before, ask your doctor if you need a second dose. Get a flu vaccine every fall. If you must be around people with colds or the flu, wash your hands often. When should you call for help? Call 911 anytime you think you may need emergency care. For example, call if: 
· You have severe trouble breathing. Call your doctor now or seek immediate medical care if: 
· Your symptoms do not get better after you have followed your asthma action plan. · You have new or worse trouble breathing. · Your coughing and wheezing get worse. · You cough up dark brown or bloody mucus (sputum). · You have a new or higher fever. Watch closely for changes in your health, and be sure to contact your doctor if: 
· You need to use quick-relief medicine on more than 2 days a week (unless it is just for exercise). · You cough more deeply or more often, especially if you notice more mucus or a change in the color of your mucus. · You are not getting better as expected. Where can you learn more? Go to http://norma-kaley.info/. Enter Q161 in the search box to learn more about \"Asthma Attack: Care Instructions. \" Current as of: March 25, 2017 Content Version: 11.3 © 4995-6433 [employer]. Care instructions adapted under license by [employer] (which disclaims liability or warranty for this information).  If you have questions about a medical condition or this instruction, always ask your healthcare professional. [address] any warranty or liability for your use of this information. Introducing [facility]! Dear Angelica Yan: 
Thank you for requesting a Simmr account. Our records indicate that you already have an active Simmr account. You can access your account anytime at [email or web address] Did you know that you can access your hospital and ER discharge instructions at any time in Simmr? You can also review all of your test results from your hospital stay or ER visit. Additional Information If you have questions, please visit the Frequently Asked Questions section of the Simmr website at [email or web address] Remember, Simmr is NOT to be used for urgent needs. For medical emergencies, dial 911. Now available from your iPhone and Android! Please provide this summary of care documentation to your next provider. Your primary care clinician is listed as Darien Garcia. If you have any questions after today's visit, please call [phone].

## 2017-07-11 NOTE — PROGRESS NOTES
History and Physical    Patient: Kraig Valencia MRN: [id number]  SSN: [id number]    YOB: 1985  Age: 32 y.o. Sex: female      Subjective:      Kraig Valencia is a 32 y.o. female who presents today for follow up h/o asthma, anxiety, vit d def etc    In terms of her asthma, she is on albuterol PRN, is seeing pulmonology. Has noticed since starting Vistaril that she has had improvement in her breathing, only needs to use albuterol every few days or so rather than 4 times daily. In terms of her anxiety and insomnia, she was recently started on Paxil and Vistaril. Has noticed improvement in her anxiety but still struggling with insomnia, she was requesting controlled release Ambien. Patient was referred to general surgery for h/o low BMI and cervical adenopathy, she has yet to be seen, was referred to [facility] but they thought it was for bariatric services. Patient was previously seeing gastroenterology for h/o chronic nausea and h/o blood in her stool. Patient has had complaints of dysphagia,  patient is due for her follow up but not currently scheduled. Patient continues to have chronic nausea, continues to use zofran. Patient has a h/o low BMI, on periactin and boost in addition to regular meals, weight down 1 lbs since last visit but overall stable. Patient states had miscarriage last month, presented with vaginal bleeding, TVUS did not show IUP, was told to follow up for repeat HCG levels, this was not done. Vaginal bleeding has stopped.        Last PAP due: 12/15- was normal by GYN      She is followed by:  GYN  Ortho/spine  neuro    PMH:  Past Medical History:   Diagnosis Date    Abnormal Papanicolaou smear of cervix     Asthma     Bradycardia     GERD (gastroesophageal reflux disease)     H/O sinus bradycardia     Headache     Migraine     Miscarriage     Photophobia of both eyes     Seizures ([address]     Stomach pain      Past Surgical History: Procedure Laterality Date    HX APPENDECTOMY      HX  SECTION      HX GYN      cervical cerclage    HX OTHER SURGICAL  2017    Mendel L4-5, L5-S1 Facet Joint block        FamHx:  Family History   Problem Relation Age of Onset    Diabetes Mother     Heart Attack Father     Attention Deficit Hyperactivity Disorder Sister     Attention Deficit Hyperactivity Disorder Brother     Cancer Maternal Aunt 45     breast    Diabetes Maternal Grandmother     Attention Deficit Hyperactivity Disorder Brother     No Known Problems Brother     No Known Problems Brother     No Known Problems Brother     Cancer Maternal Aunt      lung    Heart defect Son    [facility] Son     Seizures Son      h/o    Other Son      h/o jaundice       SocialHx:  Social History   Substance Use Topics    Smoking status: Former Smoker     Packs/day: 1.00     Years: 13.00     Types: Cigarettes     Quit date: 2015    Smokeless tobacco: Never Used      Comment: cigarello, once a month    Alcohol use No        Meds:  Prior to Admission medications    Medication Sig Start Date End Date Taking? Authorizing Provider   zolpidem (AMBIEN) 5 mg tablet Take 1 Tab by mouth nightly as needed for Sleep. Max Daily Amount: 5 mg. 17  Yes Jessica Pumphrey V, PA   ondansetron (ZOFRAN ODT) 4 mg disintegrating tablet Take 1 Tab by mouth every eight (8) hours as needed for Nausea. 17  Yes Jessica Pumphrey V, PA   PARoxetine (PAXIL) 20 mg tablet 1 qhs 17  Yes Mahesh Hansen MD   VENTOLIN HFA 90 mcg/actuation inhaler INHELE 1 PUFF EVERY FOUR (4) HOURS AS NEEDED FOR WHEEZING OR SHORTNESS OF BREATH. 17  Yes Jessica Pumphrey V, PA   ENSURE PLUS 0.05-1.5 gram-kcal/mL liqd TAKE 237 ML BY MOUTH THREE TIMES DAILY. 17  Yes Jessica Pumphrey V, PA   PRENATAL IZZT98/WLEO FUM/FOLIC (PRENATAL VITAMIN) 27 mg iron- 800 mcg tab tablet Take 1 Tab by mouth daily.  17  Yes Mahesh Hansen MD   hydrOXYzine pamoate (VISTARIL) 50 mg capsule Take 1 Cap by mouth nightly. 5/30/17  Yes Jessica Pumphrey V, PA   divalproex DR (DEPAKOTE) 250 mg tablet Take 1 Tab by mouth two (2) times a day. Indications: MIGRAINE PREVENTION 5/25/17  Yes Lis Gibbs MD   loratadine-pseudoephedrine (CLARITIN-D 12 HOUR) 5-120 mg per tablet Take 1 Tab by mouth two (2) times daily as needed. Yes Historical Provider   meloxicam (MOBIC) 7.5 mg tablet Take 1 Tab by mouth daily. 4/17/17  Yes Dana Leger MD   [facility]) 0.35 mg tab Take 1 Tab by mouth daily. 2/9/17  Yes Alejandra Coe DO   ergocalciferol (ERGOCALCIFEROL) 50,000 unit capsule Take 1 Cap by mouth every seven (7) days. 2/2/17  Yes Jessica Pumphrey V, PA   Omeprazole delayed release (PRILOSEC D/R) 20 mg tablet Take 1 Tab by mouth daily. 9/27/16  Yes Jessica Pumphrey V, PA   cyproheptadine (PERIACTIN) 4 mg tablet Take 1 Tab by mouth once over twenty-four (24) hours. 9/23/16  Yes TE Harrington        Allergies:   Allergies   Allergen Reactions    Imitrex [Sumatriptan Succinate] Anaphylaxis    Iodine Cough     Coughing up blood      Sea Food [Seafood] Hives     Per pt report        Review of Systems:  Items in [place] are positive  Constitutional: negative for fevers, chills and malaise  Eyes: negative for visual disturbance  Ears, Nose, Mouth, Throat, and Face:  negative for nasal congestion  Respiratory: negative for cough or sob  Cardiovascular: negative for chest pain, chest pressure/discomfort  Gastrointestinal: chronic nausea, negative for vomiting, melena, diarrhea, constipation and abdominal pain  Genitourinary:negative for frequency, dysuria or hematuria  Musculoskeletal: chronic low back pain  Neurological: negative for headaches, dizziness and paresthesia  Psych: insomnia, mild anxiety-improved, denies depression, si/hi    Objective:     Visit Vitals    BP 95/64 (BP 1 Location: Right arm, BP Patient Position: Sitting)    Pulse 66    Temp 98.1 °F (36.7 °C) (Oral)    Resp 15    Ht 5' 6\" (1.676 m)    Wt 106 lb (48.1 kg)    LMP 04/30/2017    SpO2 100%    BMI 17.11 kg/m2       Physical Exam:  GENERAL: alert, cooperative, no distress, appears stated age  [de-identified]: EYE: conjunctivae/corneas clear. EOM's intact. NECK: small, mobile left anterior cervical adenopathy  LUNG:  clear to auscultation bilaterally  HEART: regular rate and rhythm, S1, S2 normal, no murmur, click, rub or gallop  NEUROLOGIC: AOx3. Gait normal.  PSYCH: mood: neutral; affect: neutral    Labs  Lab Results   Component Value Date/Time    Sodium 139 07/06/2017 01:17 AM    Potassium 3.9 07/06/2017 01:17 AM    Chloride 99 07/06/2017 01:17 AM    CO2 22 07/06/2017 01:17 AM    Anion gap 13 06/16/2017 10:36 PM    Glucose 103 07/06/2017 01:17 AM    BUN 13 07/06/2017 01:17 AM    Creatinine 0.67 07/06/2017 01:17 AM    BUN/Creatinine ratio 19 07/06/2017 01:17 AM    GFR est  07/06/2017 01:17 AM    GFR est non- 07/06/2017 01:17 AM    Calcium 9.9 07/06/2017 01:17 AM    Bilirubin, total 0.3 07/06/2017 01:17 AM    AST (SGOT) 14 07/06/2017 01:17 AM    Alk. phosphatase 55 07/06/2017 01:17 AM    Protein, total 7.2 07/06/2017 01:17 AM    Albumin 4.6 07/06/2017 01:17 AM    Globulin 3.0 06/16/2017 10:36 PM    A-G Ratio 1.8 07/06/2017 01:17 AM    ALT (SGPT) 11 07/06/2017 01:17 AM     Lab Results   Component Value Date/Time    Vitamin D 25-Hydroxy 27.2 04/20/2016 11:16 AM    VITAMIN D, 25-HYDROXY 30.0 07/06/2017 01:17 AM     HCG, beta, QT <1   mIU/mL Final         Assessment and Plan:       ICD-10-CM ICD-9-CM    1. Mild intermittent asthma with status asthmaticus J45.22 493.91    2. Insomnia, unspecified type G47.00 780.52 zolpidem (AMBIEN) 5 mg tablet   3. Low body weight due to inadequate caloric intake R63.6 783.22 REFERRAL TO GENERAL SURGERY   4. Cervical adenopathy R59.0 785.6 REFERRAL TO GENERAL SURGERY   5. Anxiety F41.9 300.00    6. Vitamin D deficiency E55.9 268.9    7.  Nausea R11.0 787.02 ondansetron (ZOFRAN ODT) 4 mg disintegrating tablet         Medical Decision Making:  Asthma/cough- continue albuterol PRN-symptoms improved    Insomnia- continue Vistaril + start low dose ambien    Low bmi/cervical adenopathy- continue periactin and ensure + referral to different general surgeon for h/o cervical adenopathy and low bmi    Anxiety- continue Paxil and vistaril- much improved    Vitamin D Def- continue current therapy     Follow-up Disposition:  Return in about 2 months (around 9/11/2017) for routine care with me. Patient acknowledges understanding of instructions and acknowledges understanding to call back if current symptoms worsen or new symptoms arise. Patient acknowledges and agrees with plan.         Signed By: TE Gama     July 11, 2017

## 2017-07-11 NOTE — PROGRESS NOTES
Huy Urias is a 32 y.o.  female presents today for office visit for follow up. Pt is in Room # 8      1. Have you been to the ER, urgent care clinic since your last visit? Hospitalized since your last visit? Yes When: June  Where: [facility]  Reason for visit: Miscarriage     2. Have you seen or consulted any other health care providers outside of the [address] [place] [address] since your last visit? Include any pap smears or colon screening. sleep study last week     No Patient Care Coordination Note on file.

## 2017-07-17 ENCOUNTER — OFFICE VISIT (OUTPATIENT)
Dept: ORTHOPEDIC SURGERY | Age: 32
End: 2017-07-17

## 2017-07-17 VITALS
DIASTOLIC BLOOD PRESSURE: 62 MMHG | TEMPERATURE: 98.7 F | HEIGHT: 67 IN | HEART RATE: 60 BPM | SYSTOLIC BLOOD PRESSURE: 107 MMHG | BODY MASS INDEX: 16.64 KG/M2 | RESPIRATION RATE: 16 BRPM | WEIGHT: 106 LBS

## 2017-07-17 DIAGNOSIS — M54.5 CHRONIC MIDLINE LOW BACK PAIN, WITH SCIATICA PRESENCE UNSPECIFIED: ICD-10-CM

## 2017-07-17 DIAGNOSIS — M62.830 MUSCLE SPASM OF BACK: ICD-10-CM

## 2017-07-17 DIAGNOSIS — G89.29 CHRONIC MIDLINE LOW BACK PAIN, WITH SCIATICA PRESENCE UNSPECIFIED: ICD-10-CM

## 2017-07-17 DIAGNOSIS — M47.816 LUMBAR FACET ARTHROPATHY: Primary | ICD-10-CM

## 2017-07-17 RX ORDER — IBUPROFEN 600 MG/1
TABLET ORAL
Qty: 60 TAB | Refills: 3 | Status: SHIPPED | OUTPATIENT
Start: 2017-07-17 | End: 2017-11-28 | Stop reason: SDUPTHER

## 2017-07-17 NOTE — PROGRESS NOTES
[facility]  [address] [place], [address]  Phone: [phone]  Fax: [phone]      ASSESSMENT   [facility] was seen today for back pain. Diagnoses and all orders for this visit:    Lumbar facet arthropathy (HCC)  -     ibuprofen (MOTRIN) 600 mg tablet; 1 po bid as needed for pain; take with food  Indications: Pain    Chronic midline low back pain, with sciatica presence unspecified  -     ibuprofen (MOTRIN) 600 mg tablet; 1 po bid as needed for pain; take with food  Indications: Pain    Muscle spasm of back         IMPRESSION AND PLAN:  Sailaja Laureano is a 32 y.o. female with history of lumbar pain. Pt reports that her lower back pain returned since her last office visit. Of note, she tried a bilateral L4-5 and bilateral L5-S1 facet injections in the past with temporary relief. 1) Pt was given information on lumbar arthritis exercises. 2) Discussed treatment options with the Pt, including steroid injections and a RFA. 3) I recommended the Pt try water exercise. 4) She was prescribed ibuprofen 600 mg 1 tab BID prn pain with food. 5) Pt will discontinue Mobic 7.5 mg since it was not effective. 6) Ms. Ambrocio Michaels has a reminder for a \"due or due soon\" health maintenance. I have asked that she contact her primary care provider, TE Brooke, for follow-up on this health maintenance. 7)  demonstrated consistency with prescribing. 8) Pt will follow-up in 3 months. HISTORY OF PRESENT ILLNESS:  Sailaja Laureano is a 32 y.o. female with history of lumbar pain. Pt reports that her lower back pain returned since her last office visit. Of note, she tried a bilateral L4-5 and bilateral L5-S1 facet injections in the past with temporary relief. She has been prescribed Mobic 7.5 mg but denies any relief with the medication. Pt has a history of acid reflux and takes Prilosec. She did no tolerate Voltaren 75 mg.  Pt at this time desires to proceed with medication evaluation. Pain Scale: 7/10    PCP: TE Gordillo       Past Medical History:   Diagnosis Date    Abnormal Papanicolaou smear of cervix     Asthma     Bradycardia     GERD (gastroesophageal reflux disease)     H/O sinus bradycardia     Headache     Migraine     Miscarriage     Photophobia of both eyes     Seizures (HCC)     Stomach pain         Social History     Social History    Marital status: SINGLE     Spouse name: N/A    Number of children: 2    Years of education: 12     Occupational History    Not on file. Social History Main Topics    Smoking status: Former Smoker     Packs/day: 1.00     Years: 13.00     Types: Cigarettes     Quit date: 5/5/2015    Smokeless tobacco: Never Used      Comment: cigarello, once a month    Alcohol use No    Drug use: No    Sexual activity: Yes     Partners: Male     Birth control/ protection: Pill     Other Topics Concern     Service Yes     [facility]    Blood Transfusions No    Caffeine Concern No    Occupational Exposure No    Hobby Hazards No    Sleep Concern Yes    Stress Concern No    Weight Concern No    Special Diet Yes    Back Care Yes    Exercise Yes    Bike Helmet No    Seat Belt Yes    Self-Exams No     Social History Narrative       Current Outpatient Prescriptions   Medication Sig Dispense Refill    ibuprofen (MOTRIN) 600 mg tablet 1 po bid as needed for pain; take with food  Indications: Pain 60 Tab 3    zolpidem (AMBIEN) 5 mg tablet Take 1 Tab by mouth nightly as needed for Sleep. Max Daily Amount: 5 mg. 30 Tab 0    ondansetron (ZOFRAN ODT) 4 mg disintegrating tablet Take 1 Tab by mouth every eight (8) hours as needed for Nausea.  30 Tab 3    PARoxetine (PAXIL) 20 mg tablet 1 qhs 30 Tab 3    VENTOLIN HFA 90 mcg/actuation inhaler INHELE 1 PUFF EVERY FOUR (4) HOURS AS NEEDED FOR WHEEZING OR SHORTNESS OF BREATH. 1 Inhaler 3    ENSURE PLUS 0.05-1.5 gram-kcal/mL liqd TAKE 237 ML BY MOUTH THREE TIMES DAILY. 89925 mL 2    PRENATAL RLGJ00/AHBO FUM/FOLIC (PRENATAL VITAMIN) 27 mg iron- 800 mcg tab tablet Take 1 Tab by mouth daily. 30 Tab 3    hydrOXYzine pamoate (VISTARIL) 50 mg capsule Take 1 Cap by mouth nightly. 30 Cap 2    divalproex DR (DEPAKOTE) 250 mg tablet Take 1 Tab by mouth two (2) times a day. Indications: MIGRAINE PREVENTION 60 Tab 7    loratadine-pseudoephedrine (CLARITIN-D 12 HOUR) 5-120 mg per tablet Take 1 Tab by mouth two (2) times daily as needed.  norethindrone (MICRONOR) 0.35 mg tab Take 1 Tab by mouth daily. 1 Package 11    ergocalciferol (ERGOCALCIFEROL) 50,000 unit capsule Take 1 Cap by mouth every seven (7) days. 12 Cap 1    Omeprazole delayed release (PRILOSEC D/R) 20 mg tablet Take 1 Tab by mouth daily. 90 Tab 3    cyproheptadine (PERIACTIN) 4 mg tablet Take 1 Tab by mouth once over twenty-four (24) hours. 90 Tab 3    meloxicam (MOBIC) 7.5 mg tablet Take 1 Tab by mouth daily. 30 Tab 2       Allergies   Allergen Reactions    Imitrex [Sumatriptan Succinate] Anaphylaxis    Iodine Cough     Coughing up blood      Sea Food [Seafood] Hives     Per pt report          REVIEW OF SYSTEMS    Constitutional: Negative for fever, chills, or weight change. Respiratory: Negative for cough or shortness of breath. Cardiovascular: Negative for chest pain or palpitations. Gastrointestinal: Positive for acid reflux. Negative for change in bowel habits or constipation. Genitourinary: Negative for dysuria and flank pain. Musculoskeletal: Positive for lumbar pain. Skin: Negative for rash. Neurological: Negative for headaches, dizziness, or numbness. Endo/Heme/Allergies: Negative for increased bruising. Psychiatric/Behavioral: Negative for difficulty with sleep.       PHYSICAL EXAMINATION  Visit Vitals    /62    Pulse 60    Temp 98.7 °F (37.1 °C) (Oral)    Resp 16    Ht 5' 7\" (1.702 m)    Wt 106 lb (48.1 kg)    LMP 04/30/2017    BMI 16.6 kg/m2 Constitutional: Awake, alert, and in no acute distress  Neurological: 1+ symmetrical DTRs in the lower extremities. Sensation to light touch is intact. Skin: warm, dry, and intact. Musculoskeletal: Tenderness to palpation in the lower lumbar region. Moderate pain with extension and axial loading. Better with forward flexion. No pain with internal or external rotation of her hips. Negative straight leg raise bilaterally. Hip Flex  Quads Hamstrings Ankle DF EHL Ankle PF   Right +4/5 +4/5 +4/5 +4/5 +4/5 +4/5   Left +4/5 +4/5 +4/5 +4/5 +4/5 +4/5     IMAGING:    Lumbar spine MRI from 02/02/2017 was personally reviewed with the Pt and demonstrated:  Findings:      There is persistent lordotic curvature of lumbar spine, without listhesis. Intervertebral discs are maintained and well hydrated. Normal vertebral body  morphology. No endplate reactive changes. No fracture. No suspicious osseous  lesion. No pars interarticularis defect. Conus medullaris ends at the T12-L1  disc level.      Correlation of axial and sagittal images reveals the following:      L1-L2: No significant disc pathology. No significant proliferative change. No  central canal or foraminal stenosis.      L2-L3: No significant disc pathology. No significant proliferative change. No  central canal or foraminal stenosis.      L3-L4: No significant disc pathology. No significant proliferative change. No  central canal or foraminal stenosis.      L4-L5: Minimal broad-based disc bulge abuts the ventral thecal sac. Mild  bilateral facet arthropathy and ligamentum flavum buckling. No central canal or  foraminal stenosis.      L5-S1: No significant disc pathology. Mild bilateral facet arthropathy. No  central canal or foraminal stenosis.     The visualized portions of the sacroiliac joints are unremarkable. Incidentally  imaged retroperitoneal structures are unremarkable as well.      IMPRESSION:      No significant degenerative changes.  No acute or focal abnormality to explain  patient's lumbar back pain and lower extremity radiculopathy. Written by Frankie Rojas, as dictated by Angelito Tillman MD.  I, Dr. Angelito Tillman confirm that all documentation is accurate.

## 2017-07-17 NOTE — PATIENT INSTRUCTIONS

## 2017-07-21 ENCOUNTER — OFFICE VISIT (OUTPATIENT)
Dept: FAMILY MEDICINE CLINIC | Facility: CLINIC | Age: 32
End: 2017-07-21

## 2017-07-21 VITALS
WEIGHT: 107 LBS | RESPIRATION RATE: 14 BRPM | HEIGHT: 67 IN | BODY MASS INDEX: 16.79 KG/M2 | TEMPERATURE: 98.1 F | DIASTOLIC BLOOD PRESSURE: 63 MMHG | SYSTOLIC BLOOD PRESSURE: 101 MMHG | HEART RATE: 61 BPM | OXYGEN SATURATION: 99 %

## 2017-07-21 DIAGNOSIS — R63.6 LOW BODY WEIGHT DUE TO INADEQUATE CALORIC INTAKE: ICD-10-CM

## 2017-07-21 DIAGNOSIS — G47.00 INSOMNIA, UNSPECIFIED TYPE: Primary | ICD-10-CM

## 2017-07-21 DIAGNOSIS — R59.0 CERVICAL ADENOPATHY: ICD-10-CM

## 2017-07-21 RX ORDER — ZOLPIDEM TARTRATE 6.25 MG/1
6.25 TABLET, FILM COATED, EXTENDED RELEASE ORAL
Qty: 30 TAB | Refills: 0 | Status: SHIPPED | OUTPATIENT
Start: 2017-07-21 | End: 2017-07-26 | Stop reason: SDUPTHER

## 2017-07-21 NOTE — PROGRESS NOTES
Poppy Beach is a 32 y.o.  female presents today for officw visit for follow up. Pt is in Room # 9      1. Have you been to the ER, urgent care clinic since your last visit? Hospitalized since your last visit? No    2. Have you seen or consulted any other health care providers outside of the [address] [place] [address] since your last visit? Include any pap smears or colon screening. No    No Patient Care Coordination Note on file.

## 2017-07-21 NOTE — PROGRESS NOTES
History and Physical    Patient: Rain Dotson MRN: [id number]  SSN: [id number]    YOB: 1985  Age: 32 y.o. Sex: female      Subjective:      Rain Dotson is a 32 y.o. female who presents today for follow up insomnia. In terms of her insomnia, patient was recently started on Ambien 5 mg but she states it did not help. She tried it twice but she didn't go to sleep at all on the medication. Patient had previously requested the controlled release version of ambien. Patient was referred to general surgery for h/o low BMI and cervical adenopathy, patient has an appointment with [facility] general surgery coming up. Her weight is up 1 lb since last visit. Patient has a h/o low BMI, on periactin and boost in addition to regular meals.     Last PAP due: 12/15- was normal by GYN      She is followed by:  GYN  Ortho/spine  neuro    PMH:  Past Medical History:   Diagnosis Date    Abnormal Papanicolaou smear of cervix     Asthma     Bradycardia     GERD (gastroesophageal reflux disease)     H/O sinus bradycardia     Headache     Migraine     Miscarriage     Photophobia of both eyes     Seizures (HCC)     Stomach pain      Past Surgical History:   Procedure Laterality Date    HX APPENDECTOMY      HX  SECTION      HX GYN      cervical cerclage    HX OTHER SURGICAL  2017    Mendel L4-5, L5-S1 Facet Joint block        FamHx:  Family History   Problem Relation Age of Onset    Diabetes Mother     Heart Attack Father     Attention Deficit Hyperactivity Disorder Sister     Attention Deficit Hyperactivity Disorder Brother     Cancer Maternal Aunt 45     breast    Diabetes Maternal Grandmother     Attention Deficit Hyperactivity Disorder Brother     No Known Problems Brother     No Known Problems Brother     No Known Problems Brother     Cancer Maternal Aunt      lung    Heart defect Son    [facility] Son     Seizures Son      h/o    Other Son      h/o jaundice SocialHx:  Social History   Substance Use Topics    Smoking status: Former Smoker     Packs/day: 1.00     Years: 13.00     Types: Cigarettes     Quit date: 5/5/2015    Smokeless tobacco: Never Used      Comment: cigarello, once a month    Alcohol use No        Meds:  Prior to Admission medications    Medication Sig Start Date End Date Taking? Authorizing Provider   zolpidem CR (AMBIEN CR) 6.25 mg tablet Take 1 Tab by mouth nightly as needed for Sleep. Max Daily Amount: 6.25 mg. 7/21/17  Yes Jessica Pumphrey V, PA   ibuprofen (MOTRIN) 600 mg tablet 1 po bid as needed for pain; take with food  Indications: Pain 7/17/17  Yes Celia Trevizo MD   ondansetron (ZOFRAN ODT) 4 mg disintegrating tablet Take 1 Tab by mouth every eight (8) hours as needed for Nausea. 7/11/17  Yes Jessica Pumphrey V, PA   PARoxetine (PAXIL) 20 mg tablet 1 qhs 7/6/17  Yes Marian Zee MD   VENTOLIN HFA 90 mcg/actuation inhaler INHELE 1 PUFF EVERY FOUR (4) HOURS AS NEEDED FOR WHEEZING OR SHORTNESS OF BREATH. 7/5/17  Yes Jessica Pumphrey V, PA   ENSURE PLUS 0.05-1.5 gram-kcal/mL liqd TAKE 237 ML BY MOUTH THREE TIMES DAILY. 7/5/17  Yes Jessica Pumphrey V, PA   PRENATAL PUJH40/UIKL FUM/FOLIC (PRENATAL VITAMIN) 27 mg iron- 800 mcg tab tablet Take 1 Tab by mouth daily. 7/5/17  Yes Marian Zee MD   hydrOXYzine pamoate (VISTARIL) 50 mg capsule Take 1 Cap by mouth nightly. 5/30/17  Yes Jessica Pumphrey V, PA   divalproex DR (DEPAKOTE) 250 mg tablet Take 1 Tab by mouth two (2) times a day. Indications: MIGRAINE PREVENTION 5/25/17  Yes Garrison Berry MD   loratadine-pseudoephedrine (CLARITIN-D 12 HOUR) 5-120 mg per tablet Take 1 Tab by mouth two (2) times daily as needed. Yes Historical Provider   meloxicam (MOBIC) 7.5 mg tablet Take 1 Tab by mouth daily. 4/17/17  Yes Celia Trevizo MD   [facility]) 0.35 mg tab Take 1 Tab by mouth daily.  2/9/17  Yes Reina Carlson,    ergocalciferol (ERGOCALCIFEROL) 50,000 unit capsule Take 1 Cap by mouth every seven (7) days. 2/2/17  Yes Jessica Pumphrey V, PA   Omeprazole delayed release (PRILOSEC D/R) 20 mg tablet Take 1 Tab by mouth daily. 9/27/16  Yes Jessica Pumphrey V, PA   cyproheptadine (PERIACTIN) 4 mg tablet Take 1 Tab by mouth once over twenty-four (24) hours. 9/23/16  Yes TE Das        Allergies: Allergies   Allergen Reactions    Imitrex [Sumatriptan Succinate] Anaphylaxis    Iodine Cough     Coughing up blood      Sea Food [Seafood] Hives     Per pt report        Review of Systems:  Items in [place] are positive  Constitutional: negative for fevers, chills and malaise  Eyes: negative for visual disturbance  Ears, Nose, Mouth, Throat, and Face:  negative for nasal congestion  Respiratory: negative for cough or sob  Cardiovascular: negative for chest pain, chest pressure/discomfort  Gastrointestinal: chronic nausea, negative for vomiting, melena, diarrhea, constipation and abdominal pain  Genitourinary:negative for frequency, dysuria or hematuria  Musculoskeletal: chronic low back pain  Neurological: negative for headaches, dizziness and paresthesia  Psych: insomnia, mild anxiety-improved, denies depression, si/hi    Objective:     Visit Vitals    /63 (BP 1 Location: Right arm, BP Patient Position: Sitting)    Pulse 61    Temp 98.1 °F (36.7 °C) (Oral)    Resp 14    Ht 5' 7\" (1.702 m)    Wt 107 lb (48.5 kg)    LMP 04/30/2017    SpO2 99%    BMI 16.76 kg/m2       Physical Exam:  GENERAL: alert, cooperative, no distress, appears stated age  HEENT: EYE: conjunctivae/corneas clear. EOM's intact. LUNG:  clear to auscultation bilaterally  HEART: regular rate and rhythm, S1, S2 normal, no murmur, click, rub or gallop  NEUROLOGIC: AOx3. Gait normal.  PSYCH: mood: neutral; affect: neutral              Assessment and Plan:       ICD-10-CM ICD-9-CM    1. Insomnia, unspecified type G47.00 780.52 zolpidem CR (AMBIEN CR) 6.25 mg tablet   2.  Low body weight due to inadequate caloric intake R63.6 783.22    3. Cervical adenopathy R59.0 785. 6          Medical Decision Making:  Insomnia- stop immediate release ambien, start start low dose ambien CR- patient knows not to drink alcohol with this medication, she states she is not a drinker    Low bmi/cervical adenopathy- follow up with general surgery, continue periactin and ensure drinks            Follow-up Disposition: Not on File    Patient acknowledges understanding of instructions and acknowledges understanding to call back if current symptoms worsen or new symptoms arise. Patient acknowledges and agrees with plan.         Signed By: TE Galo     July 21, 2017

## 2017-07-26 ENCOUNTER — TELEPHONE (OUTPATIENT)
Dept: FAMILY MEDICINE CLINIC | Facility: CLINIC | Age: 32
End: 2017-07-26

## 2017-07-26 DIAGNOSIS — G47.00 INSOMNIA, UNSPECIFIED TYPE: ICD-10-CM

## 2017-07-26 RX ORDER — ZOLPIDEM TARTRATE 6.25 MG/1
6.25 TABLET, FILM COATED, EXTENDED RELEASE ORAL
Qty: 30 TAB | Refills: 0 | Status: SHIPPED | OUTPATIENT
Start: 2017-07-26 | End: 2017-12-14 | Stop reason: SDUPTHER

## 2017-07-31 ENCOUNTER — TELEPHONE (OUTPATIENT)
Dept: FAMILY MEDICINE CLINIC | Facility: CLINIC | Age: 32
End: 2017-07-31

## 2017-08-18 ENCOUNTER — OFFICE VISIT (OUTPATIENT)
Dept: NEUROLOGY | Age: 32
End: 2017-08-18

## 2017-08-18 VITALS
DIASTOLIC BLOOD PRESSURE: 70 MMHG | BODY MASS INDEX: 16.92 KG/M2 | HEART RATE: 82 BPM | SYSTOLIC BLOOD PRESSURE: 100 MMHG | HEIGHT: 67 IN | TEMPERATURE: 98.8 F | WEIGHT: 107.8 LBS | RESPIRATION RATE: 18 BRPM | OXYGEN SATURATION: 98 %

## 2017-08-18 DIAGNOSIS — G43.019 INTRACTABLE MIGRAINE WITHOUT AURA AND WITHOUT STATUS MIGRAINOSUS: Primary | ICD-10-CM

## 2017-08-18 DIAGNOSIS — T39.95XA ANALGESIC REBOUND HEADACHE: ICD-10-CM

## 2017-08-18 DIAGNOSIS — Z51.81 THERAPEUTIC DRUG MONITORING: ICD-10-CM

## 2017-08-18 DIAGNOSIS — G44.40 ANALGESIC REBOUND HEADACHE: ICD-10-CM

## 2017-08-18 NOTE — PROGRESS NOTES
Re:  Cinthya Pierre,Follow up visit     8/18/2017 9:53 AM    SSN: [id number]    Subjective:   Izzy Pineda returns for follow up of her headaches. She's noted a significant decrease in the frequency of the headaches with the Depakote, now she's getting headaches lasting only 5 minutes 2 times daily as opposed to numerous spells throughout the day. No side effects. Medications:    Current Outpatient Prescriptions   Medication Sig Dispense Refill    ibuprofen (MOTRIN) 600 mg tablet 1 po bid as needed for pain; take with food  Indications: Pain 60 Tab 3    ondansetron (ZOFRAN ODT) 4 mg disintegrating tablet Take 1 Tab by mouth every eight (8) hours as needed for Nausea. 30 Tab 3    PARoxetine (PAXIL) 20 mg tablet 1 qhs 30 Tab 3    ENSURE PLUS 0.05-1.5 gram-kcal/mL liqd TAKE 237 ML BY MOUTH THREE TIMES DAILY. 03929 mL 2    PRENATAL SFSX15/QRMX FUM/FOLIC (PRENATAL VITAMIN) 27 mg iron- 800 mcg tab tablet Take 1 Tab by mouth daily. 30 Tab 3    hydrOXYzine pamoate (VISTARIL) 50 mg capsule Take 1 Cap by mouth nightly. 30 Cap 2    divalproex DR (DEPAKOTE) 250 mg tablet Take 1 Tab by mouth two (2) times a day. Indications: MIGRAINE PREVENTION 60 Tab 7    loratadine-pseudoephedrine (CLARITIN-D 12 HOUR) 5-120 mg per tablet Take 1 Tab by mouth two (2) times daily as needed.  meloxicam (MOBIC) 7.5 mg tablet Take 1 Tab by mouth daily. 30 Tab 2    norethindrone (MICRONOR) 0.35 mg tab Take 1 Tab by mouth daily. 1 Package 11    ergocalciferol (ERGOCALCIFEROL) 50,000 unit capsule Take 1 Cap by mouth every seven (7) days. 12 Cap 1    Omeprazole delayed release (PRILOSEC D/R) 20 mg tablet Take 1 Tab by mouth daily. 90 Tab 3    cyproheptadine (PERIACTIN) 4 mg tablet Take 1 Tab by mouth once over twenty-four (24) hours. 90 Tab 3    zolpidem CR (AMBIEN CR) 6.25 mg tablet Take 1 Tab by mouth nightly as needed for Sleep.  Max Daily Amount: 6.25 mg. 30 Tab 0    VENTOLIN HFA 90 mcg/actuation inhaler INHELE 1 PUFF EVERY FOUR (4) HOURS AS NEEDED FOR WHEEZING OR SHORTNESS OF BREATH. 1 Inhaler 3       Vital signs:    Visit Vitals    /70    Pulse 82    Temp 98.8 °F (37.1 °C) (Oral)    Resp 18    Ht 5' 7\" (1.702 m)    Wt 48.9 kg (107 lb 12.8 oz)    LMP 2017 (Exact Date)    SpO2 98%    BMI 16.88 kg/m2       Review of Systems:   As above otherwise 11 point review of systems negative including;   Constitutional no fever or chills  Skin denies rash or itching  HEENT  Denies tinnitus, hearing lose  Eyes denies diplopia vision lose  Respiratory denies sortness of breath  Cardiovascular denies chest pain, dyspnea on exertion  Gastrointestinal denies nausea, vomiting, diarrhea, constipation  Genitourinary denies incontinence  Musculoskeletal denies joint pain or swelling  Endocrine denies weight change  Hematology denies easy bruising or bleeding   Neurological as above in HPI      Patient Active Problem List   Diagnosis Code    Spontaneous  O03.9    [facility] G40.909    Chronic midline low back pain M54.5, G89.29    ACP (advance care planning) Z71.89    Bradycardia R00.1    Mild intermittent asthma with status asthmaticus J45.22    Low body weight due to inadequate caloric intake R63.6    Lumbar facet arthropathy M12.88    Vitamin D deficiency E55.9    Trichomonas infection A59.9    Muscle spasm of back M62.830    Intractable migraine without aura and without status migrainosus G43.019    Analgesic rebound headache T39.91XA, G44.40    Anxiety F41.9         Objective: The patient is awake, alert, and oriented x 4. Fund of knowledge is adequate. Speech is fluent and memory is intact. Cranial Nerves: II - Visual fields are full to confrontation. III, IV, VI - Extraocular movements are intact. There is no nystagmus. V - Facial sensation is intact to pinprick. VII - Face is symmetrical.  VIII - Hearing is present.   IX, X, XII - Palate is symmetrical.   XI - Shoulder shrugging and head turning intact  Motor: The patient moves all four limbs fairly well and symmetrically. Tone is normal. Reflexes are 2+ and symmetrical. Plantars are down going. Gait is normal.    CBC:   Lab Results   Component Value Date/Time    WBC 12.4 06/16/2017 10:36 PM    RBC 4.09 06/16/2017 10:36 PM    HGB 12.5 06/16/2017 10:36 PM    HCT 35.9 06/16/2017 10:36 PM    PLATELET 652 99/92/7620 10:36 PM     BMP:   Lab Results   Component Value Date/Time    Glucose 103 07/06/2017 01:17 AM    Sodium 139 07/06/2017 01:17 AM    Potassium 3.9 07/06/2017 01:17 AM    Chloride 99 07/06/2017 01:17 AM    CO2 22 07/06/2017 01:17 AM    BUN 13 07/06/2017 01:17 AM    Creatinine 0.67 07/06/2017 01:17 AM    Calcium 9.9 07/06/2017 01:17 AM     CMP:   Lab Results   Component Value Date/Time    Glucose 103 07/06/2017 01:17 AM    Sodium 139 07/06/2017 01:17 AM    Potassium 3.9 07/06/2017 01:17 AM    Chloride 99 07/06/2017 01:17 AM    CO2 22 07/06/2017 01:17 AM    BUN 13 07/06/2017 01:17 AM    Creatinine 0.67 07/06/2017 01:17 AM    Calcium 9.9 07/06/2017 01:17 AM    Anion gap 13 06/16/2017 10:36 PM    BUN/Creatinine ratio 19 07/06/2017 01:17 AM    Alk. phosphatase 55 07/06/2017 01:17 AM    Protein, total 7.2 07/06/2017 01:17 AM    Albumin 4.6 07/06/2017 01:17 AM    Globulin 3.0 06/16/2017 10:36 PM    A-G Ratio 1.8 07/06/2017 01:17 AM     Coagulation: No results found for: PTP, INR, APTT, PTTT  Cardiac markers: No results found for: CPK, CKND1, DONNA    Assessment:  Good results with very low dose Depakote. Tolerating the medication without any side effects. .    Plan: Will check her Depakote level, alter as needed as an out patient and see her back here in about 4 weeks. Sincerely,        Mikle Ahumada.  Tarsha Krishnan M.D.

## 2017-08-18 NOTE — LETTER
8/18/2017 9:59 AM 
 
Patient:  Mina Aaron YOB: 1985 Date of Visit: 8/18/2017 Dear Sara Paez, PA 
[address] [place] [phone] VIA In Basket 
 : Thank you for referring Ms. Roxy Santiago to me for evaluation/treatment. Below are the relevant portions of my assessment and plan of care. Re:  Nishant Pierre,Follow up visit     8/18/2017 9:53 AM 
 
SSN: [id number] Subjective:   Mina Aaron returns for follow up of her headaches. She's noted a significant decrease in the frequency of the headaches with the Depakote, now she's getting headaches lasting only 5 minutes 2 times daily as opposed to numerous spells throughout the day. No side effects. Medications:   
Current Outpatient Prescriptions Medication Sig Dispense Refill  ibuprofen (MOTRIN) 600 mg tablet 1 po bid as needed for pain; take with food  Indications: Pain 60 Tab 3  
 ondansetron (ZOFRAN ODT) 4 mg disintegrating tablet Take 1 Tab by mouth every eight (8) hours as needed for Nausea. 30 Tab 3  
 PARoxetine (PAXIL) 20 mg tablet 1 qhs 30 Tab 3  
 ENSURE PLUS 0.05-1.5 gram-kcal/mL liqd TAKE 237 ML BY MOUTH THREE TIMES DAILY. 34579 mL 2  
 PRENATAL CULQ97/JIWM FUM/FOLIC (PRENATAL VITAMIN) 27 mg iron- 800 mcg tab tablet Take 1 Tab by mouth daily. 30 Tab 3  
 hydrOXYzine pamoate (VISTARIL) 50 mg capsule Take 1 Cap by mouth nightly. 30 Cap 2  
 divalproex DR (DEPAKOTE) 250 mg tablet Take 1 Tab by mouth two (2) times a day. Indications: MIGRAINE PREVENTION 60 Tab 7  
 loratadine-pseudoephedrine (CLARITIN-D 12 HOUR) 5-120 mg per tablet Take 1 Tab by mouth two (2) times daily as needed.  meloxicam (MOBIC) 7.5 mg tablet Take 1 Tab by mouth daily. 30 Tab 2  
 norethindrone (MICRONOR) 0.35 mg tab Take 1 Tab by mouth daily. 1 Package 11  
 ergocalciferol (ERGOCALCIFEROL) 50,000 unit capsule Take 1 Cap by mouth every seven (7) days.  12 Cap 1  
  Omeprazole delayed release (PRILOSEC D/R) 20 mg tablet Take 1 Tab by mouth daily. 90 Tab 3  cyproheptadine (PERIACTIN) 4 mg tablet Take 1 Tab by mouth once over twenty-four (24) hours. 90 Tab 3  
 zolpidem CR (AMBIEN CR) 6.25 mg tablet Take 1 Tab by mouth nightly as needed for Sleep. Max Daily Amount: 6.25 mg. 30 Tab 0  
 VENTOLIN HFA 90 mcg/actuation inhaler INHELE 1 PUFF EVERY FOUR (4) HOURS AS NEEDED FOR WHEEZING OR SHORTNESS OF BREATH. 1 Inhaler 3 Vital signs:   
Visit Vitals  /70  Pulse 82  Temp 98.8 °F (37.1 °C) (Oral)  Resp 18  Ht 5' 7\" (1.702 m)  Wt 48.9 kg (107 lb 12.8 oz)  LMP 2017 (Exact Date)  SpO2 98%  BMI 16.88 kg/m2 Review of Systems: As above otherwise 11 point review of systems negative including;  
Constitutional no fever or chills Skin denies rash or itching HEENT  Denies tinnitus, hearing lose Eyes denies diplopia vision lose Respiratory denies sortness of breath Cardiovascular denies chest pain, dyspnea on exertion Gastrointestinal denies nausea, vomiting, diarrhea, constipation Genitourinary denies incontinence Musculoskeletal denies joint pain or swelling Endocrine denies weight change Hematology denies easy bruising or bleeding Neurological as above in HPI Patient Active Problem List  
Diagnosis Code  Spontaneous  O03.9  Epilepsy ([facility] [address] G40.909  Chronic midline low back pain M54.5, G89.29  
 ACP (advance care planning) Z71.89  
 Bradycardia R00.1  Mild intermittent asthma with status asthmaticus J45.22  
 Low body weight due to inadequate caloric intake R63.6  Lumbar facet arthropathy M12.88  Vitamin D deficiency E55.9  Trichomonas infection A59.9  Muscle spasm of back M62.830  
 Intractable migraine without aura and without status migrainosus G43.019  
 Analgesic rebound headache T39.91XA, G44.40  Anxiety F41.9 Objective: The patient is awake, alert, and oriented x 4. Fund of knowledge is adequate. Speech is fluent and memory is intact. Cranial Nerves: II  Visual fields are full to confrontation. III, IV, VI  Extraocular movements are intact. There is no nystagmus. V  Facial sensation is intact to pinprick. VII  Face is symmetrical.  VIII - Hearing is present. IX, X, XII  Palate is symmetrical.   XI - Shoulder shrugging and head turning intact Motor: The patient moves all four limbs fairly well and symmetrically. Tone is normal. Reflexes are 2+ and symmetrical. Plantars are down going. Gait is normal. 
 
CBC:  
Lab Results Component Value Date/Time WBC 12.4 06/16/2017 10:36 PM  
 RBC 4.09 06/16/2017 10:36 PM  
 HGB 12.5 06/16/2017 10:36 PM  
 HCT 35.9 06/16/2017 10:36 PM  
 PLATELET 501 09/28/1020 10:36 PM  
 
BMP:  
Lab Results Component Value Date/Time Glucose 103 07/06/2017 01:17 AM  
 Sodium 139 07/06/2017 01:17 AM  
 Potassium 3.9 07/06/2017 01:17 AM  
 Chloride 99 07/06/2017 01:17 AM  
 CO2 22 07/06/2017 01:17 AM  
 BUN 13 07/06/2017 01:17 AM  
 Creatinine 0.67 07/06/2017 01:17 AM  
 Calcium 9.9 07/06/2017 01:17 AM  
 
CMP:  
Lab Results Component Value Date/Time Glucose 103 07/06/2017 01:17 AM  
 Sodium 139 07/06/2017 01:17 AM  
 Potassium 3.9 07/06/2017 01:17 AM  
 Chloride 99 07/06/2017 01:17 AM  
 CO2 22 07/06/2017 01:17 AM  
 BUN 13 07/06/2017 01:17 AM  
 Creatinine 0.67 07/06/2017 01:17 AM  
 Calcium 9.9 07/06/2017 01:17 AM  
 Anion gap 13 06/16/2017 10:36 PM  
 BUN/Creatinine ratio 19 07/06/2017 01:17 AM  
 Alk. phosphatase 55 07/06/2017 01:17 AM  
 Protein, total 7.2 07/06/2017 01:17 AM  
 Albumin 4.6 07/06/2017 01:17 AM  
 Globulin 3.0 06/16/2017 10:36 PM  
 A-G Ratio 1.8 07/06/2017 01:17 AM  
 
Coagulation: No results found for: PTP, INR, APTT, PTTT Cardiac markers: No results found for: CPK, CKND1, DONNA Assessment:  Good results with very low dose Depakote.   Tolerating the medication without any side effects. . 
 
Plan: Will check her Depakote level, alter as needed as an out patient and see her back here in about 4 weeks. Sincerely, 
 
 
 
Josephine Quarles. Regulo Barnett M.D. If you have questions, please do not hesitate to call me. I look forward to following Ms. Demian Cobb along with you.  
 
 
 
Sincerely, 
 
 
Ara Romero MD

## 2017-08-18 NOTE — COMMUNICATION BODY
Re:  Howard Pierre,Follow up visit     8/18/2017 9:53 AM    SSN: [id number]    Subjective:   Camryn Degroot returns for follow up of her headaches. She's noted a significant decrease in the frequency of the headaches with the Depakote, now she's getting headaches lasting only 5 minutes 2 times daily as opposed to numerous spells throughout the day. No side effects. Medications:    Current Outpatient Prescriptions   Medication Sig Dispense Refill    ibuprofen (MOTRIN) 600 mg tablet 1 po bid as needed for pain; take with food  Indications: Pain 60 Tab 3    ondansetron (ZOFRAN ODT) 4 mg disintegrating tablet Take 1 Tab by mouth every eight (8) hours as needed for Nausea. 30 Tab 3    PARoxetine (PAXIL) 20 mg tablet 1 qhs 30 Tab 3    ENSURE PLUS 0.05-1.5 gram-kcal/mL liqd TAKE 237 ML BY MOUTH THREE TIMES DAILY. 66563 mL 2    PRENATAL CMAS62/FXWU FUM/FOLIC (PRENATAL VITAMIN) 27 mg iron- 800 mcg tab tablet Take 1 Tab by mouth daily. 30 Tab 3    hydrOXYzine pamoate (VISTARIL) 50 mg capsule Take 1 Cap by mouth nightly. 30 Cap 2    divalproex DR (DEPAKOTE) 250 mg tablet Take 1 Tab by mouth two (2) times a day. Indications: MIGRAINE PREVENTION 60 Tab 7    loratadine-pseudoephedrine (CLARITIN-D 12 HOUR) 5-120 mg per tablet Take 1 Tab by mouth two (2) times daily as needed.  meloxicam (MOBIC) 7.5 mg tablet Take 1 Tab by mouth daily. 30 Tab 2    norethindrone (MICRONOR) 0.35 mg tab Take 1 Tab by mouth daily. 1 Package 11    ergocalciferol (ERGOCALCIFEROL) 50,000 unit capsule Take 1 Cap by mouth every seven (7) days. 12 Cap 1    Omeprazole delayed release (PRILOSEC D/R) 20 mg tablet Take 1 Tab by mouth daily. 90 Tab 3    cyproheptadine (PERIACTIN) 4 mg tablet Take 1 Tab by mouth once over twenty-four (24) hours. 90 Tab 3    zolpidem CR (AMBIEN CR) 6.25 mg tablet Take 1 Tab by mouth nightly as needed for Sleep.  Max Daily Amount: 6.25 mg. 30 Tab 0    VENTOLIN HFA 90 mcg/actuation inhaler INHELE 1 PUFF EVERY FOUR (4) HOURS AS NEEDED FOR WHEEZING OR SHORTNESS OF BREATH. 1 Inhaler 3       Vital signs:    Visit Vitals    /70    Pulse 82    Temp 98.8 °F (37.1 °C) (Oral)    Resp 18    Ht 5' 7\" (1.702 m)    Wt 48.9 kg (107 lb 12.8 oz)    LMP 2017 (Exact Date)    SpO2 98%    BMI 16.88 kg/m2       Review of Systems:   As above otherwise 11 point review of systems negative including;   Constitutional no fever or chills  Skin denies rash or itching  HEENT  Denies tinnitus, hearing lose  Eyes denies diplopia vision lose  Respiratory denies sortness of breath  Cardiovascular denies chest pain, dyspnea on exertion  Gastrointestinal denies nausea, vomiting, diarrhea, constipation  Genitourinary denies incontinence  Musculoskeletal denies joint pain or swelling  Endocrine denies weight change  Hematology denies easy bruising or bleeding   Neurological as above in HPI      Patient Active Problem List   Diagnosis Code    Spontaneous  O03.9    [facility] G40.909    Chronic midline low back pain M54.5, G89.29    ACP (advance care planning) Z71.89    Bradycardia R00.1    Mild intermittent asthma with status asthmaticus J45.22    Low body weight due to inadequate caloric intake R63.6    Lumbar facet arthropathy M12.88    Vitamin D deficiency E55.9    Trichomonas infection A59.9    Muscle spasm of back M62.830    Intractable migraine without aura and without status migrainosus G43.019    Analgesic rebound headache T39.91XA, G44.40    Anxiety F41.9         Objective: The patient is awake, alert, and oriented x 4. Fund of knowledge is adequate. Speech is fluent and memory is intact. Cranial Nerves: II - Visual fields are full to confrontation. III, IV, VI - Extraocular movements are intact. There is no nystagmus. V - Facial sensation is intact to pinprick. VII - Face is symmetrical.  VIII - Hearing is present.   IX, X, XII - Palate is symmetrical.   XI - Shoulder shrugging and head turning intact  Motor: The patient moves all four limbs fairly well and symmetrically. Tone is normal. Reflexes are 2+ and symmetrical. Plantars are down going. Gait is normal.    CBC:   Lab Results   Component Value Date/Time    WBC 12.4 06/16/2017 10:36 PM    RBC 4.09 06/16/2017 10:36 PM    HGB 12.5 06/16/2017 10:36 PM    HCT 35.9 06/16/2017 10:36 PM    PLATELET 321 76/89/7937 10:36 PM     BMP:   Lab Results   Component Value Date/Time    Glucose 103 07/06/2017 01:17 AM    Sodium 139 07/06/2017 01:17 AM    Potassium 3.9 07/06/2017 01:17 AM    Chloride 99 07/06/2017 01:17 AM    CO2 22 07/06/2017 01:17 AM    BUN 13 07/06/2017 01:17 AM    Creatinine 0.67 07/06/2017 01:17 AM    Calcium 9.9 07/06/2017 01:17 AM     CMP:   Lab Results   Component Value Date/Time    Glucose 103 07/06/2017 01:17 AM    Sodium 139 07/06/2017 01:17 AM    Potassium 3.9 07/06/2017 01:17 AM    Chloride 99 07/06/2017 01:17 AM    CO2 22 07/06/2017 01:17 AM    BUN 13 07/06/2017 01:17 AM    Creatinine 0.67 07/06/2017 01:17 AM    Calcium 9.9 07/06/2017 01:17 AM    Anion gap 13 06/16/2017 10:36 PM    BUN/Creatinine ratio 19 07/06/2017 01:17 AM    Alk. phosphatase 55 07/06/2017 01:17 AM    Protein, total 7.2 07/06/2017 01:17 AM    Albumin 4.6 07/06/2017 01:17 AM    Globulin 3.0 06/16/2017 10:36 PM    A-G Ratio 1.8 07/06/2017 01:17 AM     Coagulation: No results found for: PTP, INR, APTT, PTTT  Cardiac markers: No results found for: CPK, CKND1, DONNA    Assessment:  Good results with very low dose Depakote. Tolerating the medication without any side effects. .    Plan: Will check her Depakote level, alter as needed as an out patient and see her back here in about 4 weeks. Sincerely,        NEIL GaytanD.

## 2017-08-18 NOTE — MR AVS SNAPSHOT
Visit Information Date & Time Provider Department Dept. Phone Encounter #  
 8/18/2017  9:20 AM Yuliet Bui MD [facility] [address] [phone] [id number] Follow-up Instructions Return in about 4 weeks (around 9/15/2017). Follow-up and Disposition History Your Appointments 9/12/2017 11:00 AM  
ROUTINE CARE with [facility] ([address] [place] [address] Road) Appt Note: Return in about 2 months (around 9/11/2017) for routine care with me. [facility] [id number]  
[phone]  
  
   
 [address] [id number]  
  
    
 9/29/2017 11:20 AM  
Follow Up with Yuliet Bui MD  
[facility] [address]) Appt Note: 4wk f/u; Labs [address] [facility] [address]  
[phone]  
  
   
 [place] [address]  
  
    
 10/5/2017 10:45 AM  
Follow Up with Kellee Gibson MD  
[facility] [address]) Appt Note: 3 month fu  CPAP  
 [address] [place] [id number] [phone]  
  
   
 [place] [address]  
  
    
 10/16/2017 11:30 AM  
Follow Up with Jeff Dunbar MD  
[facility] [address] [place] [address]) Appt Note: 3 MO F/U MED CK  
 [address] [facility] [address]  
[phone]  
  
   
 [address] [facility] [address] Upcoming Health Maintenance Date Due INFLUENZA AGE 9 TO ADULT 8/1/2017 PAP AKA CERVICAL CYTOLOGY 12/3/2018 DTaP/Tdap/Td series (2 - Td) 5/30/2027 Allergies as of 8/18/2017  Review Complete On: 8/18/2017 By: Shelly Tamayo LPN Severity Noted Reaction Type Reactions Imitrex [Sumatriptan Succinate] High 05/25/2015    Anaphylaxis Iodine  02/16/2017    Cough Coughing up blood Sea Food [Seafood]  06/04/2015    Hives Per pt report Current Immunizations  Never Reviewed No immunizations on file. Not reviewed this visit You Were Diagnosed With   
  
 Codes Comments Intractable migraine without aura and without status migrainosus    -  Primary ICD-10-CM: G43.019 
ICD-9-CM: 346.11 Analgesic rebound headache     ICD-10-CM: T39.91XA, G44.40 ICD-9-CM: 339.3, E935.9 Therapeutic drug monitoring     ICD-10-CM: Z51.81 
ICD-9-CM: V58.83 Vitals BP Pulse Temp Resp Height(growth percentile) Weight(growth percentile) 100/70 82 98.8 °F (37.1 °C) (Oral) 18 5' 7\" (1.702 m) 107 lb 12.8 oz (48.9 kg) LMP SpO2 BMI OB Status Smoking Status 08/09/2017 (Exact Date) 98% 16.88 kg/m2 Unknown Former Smoker BMI and BSA Data Body Mass Index Body Surface Area  
 16.88 kg/m 2 1.52 m 2 Preferred Pharmacy Pharmacy Name Phone [id number] [phone] [phone] Your Updated Medication List  
  
   
This list is accurate as of: 8/18/17 10:02 AM.  Always use your most recent med list.  
  
  
  
  
 CLARITIN-D 12 HOUR 5-120 mg per tablet Generic drug:  loratadine-pseudoephedrine Take 1 Tab by mouth two (2) times daily as needed. cyproheptadine 4 mg tablet Commonly known as:  PERIACTIN Take 1 Tab by mouth once over twenty-four (24) hours. divalproex  mg tablet Commonly known as:  DEPAKOTE Take 1 Tab by mouth two (2) times a day. Indications: MIGRAINE PREVENTION  
  
 ENSURE PLUS 0.05-1.5 gram-kcal/mL Liqd Generic drug:  food supplemt, lactose-reduced TAKE 237 ML BY MOUTH THREE TIMES DAILY. ergocalciferol 50,000 unit capsule Commonly known as:  ERGOCALCIFEROL Take 1 Cap by mouth every seven (7) days. hydrOXYzine pamoate 50 mg capsule Commonly known as:  VISTARIL Take 1 Cap by mouth nightly. ibuprofen 600 mg tablet Commonly known as:  MOTRIN  
 1 po bid as needed for pain; take with food  Indications: Pain  
  
 meloxicam 7.5 mg tablet Commonly known as:  MOBIC Take 1 Tab by mouth daily. norethindrone 0.35 mg Tab Commonly known as:  Micha & Micha Take 1 Tab by mouth daily. Omeprazole delayed release 20 mg tablet Commonly known as:  PRILOSEC D/R Take 1 Tab by mouth daily. ondansetron 4 mg disintegrating tablet Commonly known as:  ZOFRAN ODT Take 1 Tab by mouth every eight (8) hours as needed for Nausea. PARoxetine 20 mg tablet Commonly known as:  PAXIL  
1 qhs  
  
 prenatal vitamin 27 mg iron- 800 mcg Tab tablet Take 1 Tab by mouth daily. VENTOLIN HFA 90 mcg/actuation inhaler Generic drug:  albuterol INHELE 1 PUFF EVERY FOUR (4) HOURS AS NEEDED FOR WHEEZING OR SHORTNESS OF BREATH.  
  
 zolpidem CR 6.25 mg tablet Commonly known as:  AMBIEN CR Take 1 Tab by mouth nightly as needed for Sleep. Max Daily Amount: 6.25 mg. We Performed the Following VALPROIC ACID [45880 CPT(R)] Follow-up Instructions Return in about 4 weeks (around 9/15/2017). To-Do List   
 08/18/2017 Lab:  HEPATIC FUNCTION PANEL Introducing [facility]! Dear Keagan Tomas: 
Thank you for requesting a [employer] account. Our records indicate that you already have an active [employer] account. You can access your account anytime at [email or web address]. [employer]/[place] Did you know that you can access your hospital and ER discharge instructions at any time in [employer]? You can also review all of your test results from your hospital stay or ER visit. Additional Information If you have questions, please visit the Frequently Asked Questions section of the [employer] website at [email or web address]. [employer]/[place]/. Remember, [employer] is NOT to be used for urgent needs. For medical emergencies, dial 911. Now available from your iPhone and Android! Please provide this summary of care documentation to your next provider. Your primary care clinician is listed as Bibiana Nunez. If you have any questions after today's visit, please call [phone].

## 2017-08-25 DIAGNOSIS — G47.00 INSOMNIA, UNSPECIFIED TYPE: ICD-10-CM

## 2017-08-29 RX ORDER — HYDROXYZINE PAMOATE 50 MG/1
CAPSULE ORAL
Qty: 30 CAP | Refills: 2 | Status: SHIPPED | OUTPATIENT
Start: 2017-08-29 | End: 2018-02-14 | Stop reason: SDUPTHER

## 2017-10-03 DIAGNOSIS — R63.4 WEIGHT LOSS: ICD-10-CM

## 2017-10-03 DIAGNOSIS — R63.0 ANOREXIA: ICD-10-CM

## 2017-10-05 RX ORDER — CYPROHEPTADINE HYDROCHLORIDE 4 MG/1
TABLET ORAL
Qty: 90 TAB | Refills: 2 | OUTPATIENT
Start: 2017-10-05

## 2017-10-05 RX ORDER — LACTOSE-REDUCED FOOD
LIQUID (ML) ORAL
Qty: 21330 ML | Refills: 2 | OUTPATIENT
Start: 2017-10-05

## 2017-10-05 NOTE — TELEPHONE ENCOUNTER
Patient called over a refill request for the following medications. [facility] has also sent a request, but was sent to [facility].

## 2017-10-12 DIAGNOSIS — F41.9 ANXIETY: ICD-10-CM

## 2017-10-13 RX ORDER — PAROXETINE HYDROCHLORIDE 20 MG/1
TABLET, FILM COATED ORAL
Qty: 30 TAB | Refills: 3 | Status: SHIPPED | OUTPATIENT
Start: 2017-10-13 | End: 2017-11-07 | Stop reason: ALTCHOICE

## 2017-10-16 ENCOUNTER — OFFICE VISIT (OUTPATIENT)
Dept: ORTHOPEDIC SURGERY | Age: 32
End: 2017-10-16

## 2017-10-16 VITALS
OXYGEN SATURATION: 99 % | SYSTOLIC BLOOD PRESSURE: 108 MMHG | TEMPERATURE: 98.3 F | WEIGHT: 110 LBS | BODY MASS INDEX: 17.27 KG/M2 | HEIGHT: 67 IN | HEART RATE: 60 BPM | RESPIRATION RATE: 18 BRPM | DIASTOLIC BLOOD PRESSURE: 64 MMHG

## 2017-10-16 DIAGNOSIS — M47.816 LUMBAR FACET ARTHROPATHY: Primary | ICD-10-CM

## 2017-10-16 DIAGNOSIS — M62.830 MUSCLE SPASM OF BACK: ICD-10-CM

## 2017-10-16 DIAGNOSIS — K21.9 GASTROESOPHAGEAL REFLUX DISEASE WITHOUT ESOPHAGITIS: ICD-10-CM

## 2017-10-16 NOTE — PATIENT INSTRUCTIONS
Low Back Arthritis: Exercises  Your Care Instructions  Here are some examples of typical rehabilitation exercises for your condition. Start each exercise slowly. Ease off the exercise if you start to have pain. Your doctor or physical therapist will tell you when you can start these exercises and which ones will work best for you. When you are not being active, find a comfortable position for rest. Some people are comfortable on the floor or a medium-firm bed with a small pillow under their head and another under their knees. Some people prefer to lie on their side with a pillow between their knees. Don't stay in one position for too long. Take short walks (10 to 20 minutes) every 2 to 3 hours. Avoid slopes, hills, and stairs until you feel better. Walk only distances you can manage without pain, especially leg pain. How to do the exercises  Pelvic tilt    1. Lie on your back with your knees bent. 2. \"Brace\" your stomach--tighten your muscles by pulling in and imagining your belly button moving toward your spine. 3. Press your lower back into the floor. You should feel your hips and pelvis rock back. 4. Hold for 6 seconds while breathing smoothly. 5. Relax and allow your pelvis and hips to rock forward. 6. Repeat 8 to 12 times. Back stretches    1. Get down on your hands and knees on the floor. 2. Relax your head and allow it to droop. Round your back up toward the ceiling until you feel a nice stretch in your upper, middle, and lower back. Hold this stretch for as long as it feels comfortable, or about 15 to 30 seconds. 3. Return to the starting position with a flat back while you are on your hands and knees. 4. Let your back sway by pressing your stomach toward the floor. Lift your buttocks toward the ceiling. 5. Hold this position for 15 to 30 seconds. 6. Repeat 2 to 4 times. Follow-up care is a key part of your treatment and safety.  Be sure to make and go to all appointments, and call your doctor if you are having problems. It's also a good idea to know your test results and keep a list of the medicines you take. Where can you learn more? Go to http://norma-kaley.info/. Enter Q446 in the search box to learn more about \"Low Back Arthritis: Exercises. \"  Current as of: March 21, 2017  Content Version: 11.3  © 3293-5946 [employer]. Care instructions adapted under license by [employer] (which disclaims liability or warranty for this information). If you have questions about a medical condition or this instruction, always ask your healthcare professional. [place] [address] any warranty or liability for your use of this information. Learning About Medial Branch Block and Neurotomy  What are medial branch block and neurotomy? Facet joints connect your vertebrae to each other. Problems in these joints can cause chronic (long-term) pain in the neck or back. They can sometimes affect the shoulders, arms, buttocks, or legs. Medial branch nerves are the nerves that carry many of the pain messages from your facet joints. Radiofrequency medial branch neurotomy is a type of medial branch neurotomy that is used to relieve arthritis pain. It uses radio waves to damage nerves in your neck or back so that they can no longer send pain messages to your brain. Before your doctor knows if a neurotomy will help you, he or she will do a medial branch block to find out if certain nerves are the ones that are a source of your pain. You will need two separate visits to the outpatient center or hospital to have both procedures. How is a medial branch block done? The doctor will use a tiny needle to numb the skin where you will get the block. Then he or she puts the block needle into the numbed area. You may feel some pressure, but you should not feel pain.  Using fluoroscopy (live X-ray) to guide the needle, the doctor injects medicine onto one or more nerves to make them numb. If you get relief from your pain in the next 4 to 6 hours, it's a sign that those nerves may be contributing to your pain. The relief will last only a short time. You may then have a medial branch neurotomy at a later visit to try to get longer relief. It takes 20 to 30 minutes to get the block. You can go home after the doctor watches you for about an hour. You will get instructions on how to report how much pain you have when you are at home. You will need someone to drive you home. How is medial branch neurotomy done? The doctor will use a tiny needle to numb the skin where you will get the neurotomy. Then he or she puts the neurotomy needle into the numbed area. You may feel some pressure. Using fluoroscopy (live X-ray) to guide the needle, the doctor sends radio waves through the needle to the nerve for 60 to 90 seconds. The radio waves heat the nerve, which damages it. The doctor may do this several times. And he or she may treat more than one nerve. It takes 45 to 90 minutes to get a neurotomy, depending on how many nerves are heated. You will probably go home 30 to 60 minutes later. You will need someone to drive you home. What can you expect after a neurotomy? You may feel a little sore or tender at the injection site at first. But after a successful neurotomy, most people have pain relief right away. It often lasts for 9 to 12 months or longer. Sometimes the pain relief is permanent. If your pain does come back, it may mean that the damaged nerve has healed and can send pain messages again. Or it can mean that a different nerve is causing pain. Your doctor will discuss your options with you. Follow-up care is a key part of your treatment and safety. Be sure to make and go to all appointments, and call your doctor if you are having problems. It's also a good idea to know your test results and keep a list of the medicines you take. Where can you learn more?   Go to http://nomra-kaley.info/. Enter G212 in the search box to learn more about \"Learning About Medial Branch Block and Neurotomy. \"  Current as of: October 14, 2016  Content Version: 11.3  © 4431-8512 [employer]. Care instructions adapted under license by [employer] (which disclaims liability or warranty for this information). If you have questions about a medical condition or this instruction, always ask your healthcare professional. [address] any warranty or liability for your use of this information.

## 2017-10-16 NOTE — PROGRESS NOTES
[facility]  [address] [place], [facility] [address]  Phone: [phone]  Fax: [phone]      ASSESSMENT   Diagnoses and all orders for this visit:    1. Lumbar facet arthropathy  -     REFERRAL TO PAIN MANAGEMENT    2. Muscle spasm of back  -     REFERRAL TO PAIN MANAGEMENT    3. Gastroesophageal reflux disease without esophagitis         IMPRESSION AND PLAN:  Jaci Estrada is a 32 y.o. female with history of lumbar pain. She reports increased lower back pain since her last office visit and states that her lower back pain is consistent. Of note, she tried a bilateral L4-5 and bilateral L5-S1 facet injections in the past with temporary relief. 1) Pt was given information on lumbar arthritis exercises. 2) Discussed treatment options with the Pt, including steroid injections and a RFA. 3) Pt was given information on radiofrequency ablation. 4) She was referred to Dr. Dusty Dumas for lumbar RFA evaluation. 5) Pt will continue taking ibuprofen 600 mg as prescribed. 6) Ms. Vikki Retana has a reminder for a \"due or due soon\" health maintenance. I have asked that she contact her primary care provider, TE Peoples, for follow-up on this health maintenance. 7)  demonstrated consistency with prescribing. 8) Pt will follow-up in 3 months. HISTORY OF PRESENT ILLNESS:  Jaci Estrada is a 32 y.o. female with history of lumbar pain. She reports increased lower back pain since her last office visit and states that her lower back pain is now consistent. Of note, she tried a bilateral L4-5 and bilateral L5-S1 facet injections in the past with temporary relief. Pt has been taking ibuprofen 600 mg with moderate relief. Pt did not tolerate Voltaren 75 mg and Mobic 7.5 mg was not effective. She has a history of acid reflux and takes Prilosec. Pt at this time desires to proceed with a referral to Dr. Dusty Dumas for lumbar RFA evaluation.     Pain Scale: /10    PCP: TE Lopez       Past Medical History:   Diagnosis Date    Abnormal Papanicolaou smear of cervix     Asthma     Bradycardia     GERD (gastroesophageal reflux disease)     H/O sinus bradycardia     Headache     Migraine     Miscarriage     Photophobia of both eyes     Seizures (HCC)     Stomach pain         Social History     Social History    Marital status: SINGLE     Spouse name: N/A    Number of children: 2    Years of education: 12     Occupational History    Not on file. Social History Main Topics    Smoking status: Former Smoker     Packs/day: 1.00     Years: 13.00     Types: Cigarettes     Quit date: 5/5/2015    Smokeless tobacco: Never Used      Comment: cigarello, once a month    Alcohol use No    Drug use: No    Sexual activity: Yes     Partners: Male     Birth control/ protection: Pill     Other Topics Concern     Service Yes     [employer]    Blood Transfusions No    Caffeine Concern No    Occupational Exposure No    Hobby Hazards No    Sleep Concern Yes    Stress Concern No    Weight Concern No    Special Diet Yes    Back Care Yes    Exercise Yes    Bike Helmet No    Seat Belt Yes    Self-Exams No     Social History Narrative       Current Outpatient Prescriptions   Medication Sig Dispense Refill    DAILY VITAMIN WITH IRON AND CA tab TAKE 1 TABLET BY MOUTH EVERY DAY  0    PARoxetine (PAXIL) 20 mg tablet TAKE 1 TAB BY MOUTH DAILY. 30 Tab 3    ergocalciferol (ERGOCALCIFEROL) 50,000 unit capsule TAKE 1 CAP BY MOUTH EVERY SEVEN (7) DAYS. 12 Cap 0    hydrOXYzine pamoate (VISTARIL) 50 mg capsule TAKE ONE CAPSULE BY MOUTH AT BEDTIME 30 Cap 2    zolpidem CR (AMBIEN CR) 6.25 mg tablet Take 1 Tab by mouth nightly as needed for Sleep.  Max Daily Amount: 6.25 mg. 30 Tab 0    ibuprofen (MOTRIN) 600 mg tablet 1 po bid as needed for pain; take with food  Indications: Pain 60 Tab 3    ondansetron (ZOFRAN ODT) 4 mg disintegrating tablet Take 1 Tab by mouth every eight (8) hours as needed for Nausea. 30 Tab 3    VENTOLIN HFA 90 mcg/actuation inhaler INHELE 1 PUFF EVERY FOUR (4) HOURS AS NEEDED FOR WHEEZING OR SHORTNESS OF BREATH. 1 Inhaler 3    ENSURE PLUS 0.05-1.5 gram-kcal/mL liqd TAKE 237 ML BY MOUTH THREE TIMES DAILY. 23654 mL 2    PRENATAL UMUA21/AJWS FUM/FOLIC (PRENATAL VITAMIN) 27 mg iron- 800 mcg tab tablet Take 1 Tab by mouth daily. 30 Tab 3    divalproex DR (DEPAKOTE) 250 mg tablet Take 1 Tab by mouth two (2) times a day. Indications: MIGRAINE PREVENTION 60 Tab 7    loratadine-pseudoephedrine (CLARITIN-D 12 HOUR) 5-120 mg per tablet Take 1 Tab by mouth two (2) times daily as needed.  norethindrone (MICRONOR) 0.35 mg tab Take 1 Tab by mouth daily. 1 Package 11    Omeprazole delayed release (PRILOSEC D/R) 20 mg tablet Take 1 Tab by mouth daily. 90 Tab 3    cyproheptadine (PERIACTIN) 4 mg tablet Take 1 Tab by mouth once over twenty-four (24) hours. 90 Tab 3    meloxicam (MOBIC) 7.5 mg tablet Take 1 Tab by mouth daily. 30 Tab 2       Allergies   Allergen Reactions    Imitrex [Sumatriptan Succinate] Anaphylaxis    Iodine Cough     Coughing up blood      Sea Food [Seafood] Hives     Per pt report          REVIEW OF SYSTEMS    Constitutional: Negative for fever, chills, or weight change. Respiratory: Negative for cough or shortness of breath. Cardiovascular: Negative for chest pain or palpitations. Gastrointestinal: Positive for acid reflux. Negative for change in bowel habits or constipation. Genitourinary: Negative for dysuria and flank pain. Musculoskeletal: Positive for lumbar pain. Skin: Negative for rash. Neurological: Negative for headaches, dizziness, or numbness. Endo/Heme/Allergies: Negative for increased bruising. Psychiatric/Behavioral: Negative for difficulty with sleep.       PHYSICAL EXAMINATION  Visit Vitals    /64    Pulse 60    Temp 98.3 °F (36.8 °C) (Oral)    Resp 18    Ht 5' 7\" (1.702 m)    Wt 110 lb (49.9 kg)    SpO2 99%    BMI 17.23 kg/m2       Constitutional: Awake, alert, and in no acute distress. Neurological: 1+ symmetrical DTRs in the lower extremities. Sensation to light touch is intact. Skin: warm, dry, and intact. Musculoskeletal: Tenderness to palpation in the lower lumbar region. Moderate pain with extension and axial loading. Better with forward flexion. No pain with internal or external rotation of her hips. Negative straight leg raise bilaterally. Hip Flex  Quads Hamstrings Ankle DF EHL Ankle PF   Right +4/5 +4/5 +4/5 +4/5 +4/5 +4/5   Left +4/5 +4/5 +4/5 +4/5 +4/5 +4/5     IMAGING:    Lumbar spine MRI from 02/02/2017 was personally reviewed with the Pt and demonstrated:  Findings:      There is persistent lordotic curvature of lumbar spine, without listhesis. Intervertebral discs are maintained and well hydrated. Normal vertebral body  morphology. No endplate reactive changes. No fracture. No suspicious osseous  lesion. No pars interarticularis defect. Conus medullaris ends at the T12-L1  disc level.      Correlation of axial and sagittal images reveals the following:      L1-L2: No significant disc pathology. No significant proliferative change. No  central canal or foraminal stenosis.      L2-L3: No significant disc pathology. No significant proliferative change. No  central canal or foraminal stenosis.      L3-L4: No significant disc pathology. No significant proliferative change. No  central canal or foraminal stenosis.      L4-L5: Minimal broad-based disc bulge abuts the ventral thecal sac. Mild  bilateral facet arthropathy and ligamentum flavum buckling. No central canal or  foraminal stenosis.      L5-S1: No significant disc pathology. Mild bilateral facet arthropathy. No  central canal or foraminal stenosis.     The visualized portions of the sacroiliac joints are unremarkable.  Incidentally  imaged retroperitoneal structures are unremarkable as well.      IMPRESSION:      No significant degenerative changes. No acute or focal abnormality to explain  patient's lumbar back pain and lower extremity radiculopathy. Written by Katiuska Fierro, as dictated by Suellen Saunders MD.  I, Dr. Suellen Saunders confirm that all documentation is accurate.

## 2017-10-16 NOTE — MR AVS SNAPSHOT
Visit Information Date & Time Provider Department Dept. Phone Encounter #  
 10/16/2017 11:30 AM Consuelo Pulliam, [facility]  [id number] Follow-up Instructions Return in about 3 months (around 1/16/2018) for follow up. Your Appointments 11/7/2017 10:00 AM  
ROUTINE CARE with [facility] ([address]) Appt Note: Return in about 2 months (around 9/11/2017) for routine care with me.; r/s from 09/12  
 [address] [place] [phone]  
[phone]  
  
   
 [facility] [phone]  
  
    
 11/24/2017 10:20 AM  
Follow Up with Alysa Tellez MD  
[facility] [address] [place] [address]) Appt Note: 4wk f/u; Labs; r/s from 9/29 to this date & time w/pt; r/s due to labs not completed 10/16 [address] [facility] [id number] [phone]  
  
   
 [facility] [id number]  
  
    
 12/14/2017 10:45 AM  
Follow Up with Jaida Mueller MD  
[facility] [address] [place] [address]) Appt Note: 3 month fu; rs'd from 10/05/2017  provider out of [facility] [place] [address] [address] [facility] [id number] [phone]  
  
   
 [facility] [id number] Upcoming Health Maintenance Date Due INFLUENZA AGE 9 TO ADULT 8/1/2017 PAP AKA CERVICAL CYTOLOGY 12/3/2018 DTaP/Tdap/Td series (2 - Td) 5/30/2027 Allergies as of 10/16/2017  Review Complete On: 10/16/2017 By: Santiago Watson LPN Severity Noted Reaction Type Reactions Imitrex [Sumatriptan Succinate] High 05/25/2015    Anaphylaxis Iodine  02/16/2017    Cough Coughing up blood Sea Food [Seafood]  06/04/2015    Hives Per pt report Current Immunizations  Never Reviewed No immunizations on file. Not reviewed this visit You Were Diagnosed With   
  
 Codes Comments Lumbar facet arthropathy    -  Primary ICD-10-CM: M12.88 ICD-9-CM: 721.3 Muscle spasm of back     ICD-10-CM: D32.091 ICD-9-CM: 724.8 Gastroesophageal reflux disease without esophagitis     ICD-10-CM: K21.9 ICD-9-CM: 530.81 Vitals BP Pulse Temp Resp Height(growth percentile) Weight(growth percentile) 108/64 60 98.3 °F (36.8 °C) (Oral) 18 5' 7\" (1.702 m) 110 lb (49.9 kg) SpO2 BMI OB Status Smoking Status 99% 17.23 kg/m2 Unknown Former Smoker Vitals History BMI and BSA Data Body Mass Index Body Surface Area  
 17.23 kg/m 2 1.54 m 2 Preferred Pharmacy Pharmacy Name Phone [id number] [facility], [address] [place] [address] Road [address] [place] [address] [phone] Your Updated Medication List  
  
   
This list is accurate as of: 10/16/17 11:47 AM.  Always use your most recent med list.  
  
  
  
  
 CLARITIN-D 12 HOUR 5-120 mg per tablet Generic drug:  loratadine-pseudoephedrine Take 1 Tab by mouth two (2) times daily as needed. cyproheptadine 4 mg tablet Commonly known as:  PERIACTIN Take 1 Tab by mouth once over twenty-four (24) hours. DAILY VITAMIN WITH IRON AND CA Tab Generic drug:  multivitamins-ca-iron-minerals TAKE 1 TABLET BY MOUTH EVERY DAY  
  
 divalproex  mg tablet Commonly known as:  DEPAKOTE Take 1 Tab by mouth two (2) times a day. Indications: MIGRAINE PREVENTION  
  
 ENSURE PLUS 0.05-1.5 gram-kcal/mL Liqd Generic drug:  food supplemt, lactose-reduced TAKE 237 ML BY MOUTH THREE TIMES DAILY. ergocalciferol 50,000 unit capsule Commonly known as:  ERGOCALCIFEROL  
TAKE 1 CAP BY MOUTH EVERY SEVEN (7) DAYS. hydrOXYzine pamoate 50 mg capsule Commonly known as:  VISTARIL  
TAKE ONE CAPSULE BY MOUTH AT BEDTIME  
  
 ibuprofen 600 mg tablet Commonly known as:  MOTRIN  
1 po bid as needed for pain; take with food  Indications: Pain  
  
 meloxicam 7.5 mg tablet Commonly known as:  MOBIC  
 Take 1 Tab by mouth daily. norethindrone 0.35 mg Tab Commonly known as:  Micha & Micha Take 1 Tab by mouth daily. Omeprazole delayed release 20 mg tablet Commonly known as:  PRILOSEC D/R Take 1 Tab by mouth daily. ondansetron 4 mg disintegrating tablet Commonly known as:  ZOFRAN ODT Take 1 Tab by mouth every eight (8) hours as needed for Nausea. PARoxetine 20 mg tablet Commonly known as:  PAXIL TAKE 1 TAB BY MOUTH DAILY. prenatal vitamin 27 mg iron- 800 mcg Tab tablet Take 1 Tab by mouth daily. VENTOLIN HFA 90 mcg/actuation inhaler Generic drug:  albuterol INHELE 1 PUFF EVERY FOUR (4) HOURS AS NEEDED FOR WHEEZING OR SHORTNESS OF BREATH.  
  
 zolpidem CR 6.25 mg tablet Commonly known as:  AMBIEN CR Take 1 Tab by mouth nightly as needed for Sleep. Max Daily Amount: 6.25 mg. We Performed the Following REFERRAL TO PAIN MANAGEMENT [CNI226 Custom] Comments:  
 Evaluate  Lumbar RFA Follow-up Instructions Return in about 3 months (around 1/16/2018) for follow up. Referral Information Referral ID Referred By Referred To  
  
 [id number] Carloat Aviles MD   
   [address] [place] [address] [facility] [address] Phone: [phone] Fax: [phone] Visits Status Start Date End Date 1 New Request 10/16/17 10/16/18 If your referral has a status of pending review or denied, additional information will be sent to support the outcome of this decision. Patient Instructions Low Back Arthritis: Exercises Your Care Instructions Here are some examples of typical rehabilitation exercises for your condition. Start each exercise slowly. Ease off the exercise if you start to have pain. Your doctor or physical therapist will tell you when you can start these exercises and which ones will work best for you. When you are not being active, find a comfortable position for rest. Some people are comfortable on the floor or a medium-firm bed with a small pillow under their head and another under their knees. Some people prefer to lie on their side with a pillow between their knees. Don't stay in one position for too long. Take short walks (10 to 20 minutes) every 2 to 3 hours. Avoid slopes, hills, and stairs until you feel better. Walk only distances you can manage without pain, especially leg pain. How to do the exercises Pelvic tilt 1. Lie on your back with your knees bent. 2. \"Brace\" your stomachtighten your muscles by pulling in and imagining your belly button moving toward your spine. 3. Press your lower back into the floor. You should feel your hips and pelvis rock back. 4. Hold for 6 seconds while breathing smoothly. 5. Relax and allow your pelvis and hips to rock forward. 6. Repeat 8 to 12 times. Back stretches 1. Get down on your hands and knees on the floor. 2. Relax your head and allow it to droop. Round your back up toward the ceiling until you feel a nice stretch in your upper, middle, and lower back. Hold this stretch for as long as it feels comfortable, or about 15 to 30 seconds. 3. Return to the starting position with a flat back while you are on your hands and knees. 4. Let your back sway by pressing your stomach toward the floor. Lift your buttocks toward the ceiling. 5. Hold this position for 15 to 30 seconds. 6. Repeat 2 to 4 times. Follow-up care is a key part of your treatment and safety. Be sure to make and go to all appointments, and call your doctor if you are having problems. It's also a good idea to know your test results and keep a list of the medicines you take. Where can you learn more? Go to http://norma-kaley.info/. Enter W792 in the search box to learn more about \"Low Back Arthritis: Exercises. \" Current as of: March 21, 2017 Content Version: 11.3 © 7170-2111 [employer]. Care instructions adapted under license by [employer] (which disclaims liability or warranty for this information). If you have questions about a medical condition or this instruction, always ask your healthcare professional. [address] any warranty or liability for your use of this information. Learning About Medial Branch Block and Neurotomy What are medial branch block and neurotomy? Facet joints connect your vertebrae to each other. Problems in these joints can cause chronic (long-term) pain in the neck or back. They can sometimes affect the shoulders, arms, buttocks, or legs. Medial branch nerves are the nerves that carry many of the pain messages from your facet joints. Radiofrequency medial branch neurotomy is a type of medial branch neurotomy that is used to relieve arthritis pain. It uses radio waves to damage nerves in your neck or back so that they can no longer send pain messages to your brain. Before your doctor knows if a neurotomy will help you, he or she will do a medial branch block to find out if certain nerves are the ones that are a source of your pain. You will need two separate visits to the outpatient center or hospital to have both procedures. How is a medial branch block done? The doctor will use a tiny needle to numb the skin where you will get the block. Then he or she puts the block needle into the numbed area. You may feel some pressure, but you should not feel pain. Using fluoroscopy (live X-ray) to guide the needle, the doctor injects medicine onto one or more nerves to make them numb. If you get relief from your pain in the next 4 to 6 hours, it's a sign that those nerves may be contributing to your pain. The relief will last only a short time. You may then have a medial branch neurotomy at a later visit to try to get longer relief. It takes 20 to 30 minutes to get the block. You can go home after the doctor watches you for about an hour. You will get instructions on how to report how much pain you have when you are at home. You will need someone to drive you home. How is medial branch neurotomy done? The doctor will use a tiny needle to numb the skin where you will get the neurotomy. Then he or she puts the neurotomy needle into the numbed area. You may feel some pressure. Using fluoroscopy (live X-ray) to guide the needle, the doctor sends radio waves through the needle to the nerve for 60 to 90 seconds. The radio waves heat the nerve, which damages it. The doctor may do this several times. And he or she may treat more than one nerve. It takes 45 to 90 minutes to get a neurotomy, depending on how many nerves are heated. You will probably go home 30 to 60 minutes later. You will need someone to drive you home. What can you expect after a neurotomy? You may feel a little sore or tender at the injection site at first. But after a successful neurotomy, most people have pain relief right away. It often lasts for 9 to 12 months or longer. Sometimes the pain relief is permanent. If your pain does come back, it may mean that the damaged nerve has healed and can send pain messages again. Or it can mean that a different nerve is causing pain. Your doctor will discuss your options with you. Follow-up care is a key part of your treatment and safety. Be sure to make and go to all appointments, and call your doctor if you are having problems. It's also a good idea to know your test results and keep a list of the medicines you take. Where can you learn more? Go to http://norma-kaley.info/. Enter H543 in the search box to learn more about \"Learning About Medial Branch Block and Neurotomy. \" Current as of: October 14, 2016 Content Version: 11.3 © 4776-2543 [employer].  Care instructions adapted under license by [address] (which disclaims liability or warranty for this information). If you have questions about a medical condition or this instruction, always ask your healthcare professional. [address] any warranty or liability for your use of this information. Introducing [facility]! Dear Srinivasanshamika Dickson: 
Thank you for requesting a [employer] account. Our records indicate that you already have an active [employer] account. You can access your account anytime at [email or web address] Did you know that you can access your hospital and ER discharge instructions at any time in [employer]? You can also review all of your test results from your hospital stay or ER visit. Additional Information If you have questions, please visit the Frequently Asked Questions section of the [employer] website at [email or web address] Remember, [employer] is NOT to be used for urgent needs. For medical emergencies, dial 911. Now available from your iPhone and Android! Please provide this summary of care documentation to your next provider. Your primary care clinician is listed as Zulay Blanco. If you have any questions after today's visit, please call [phone].

## 2017-10-26 ENCOUNTER — OFFICE VISIT (OUTPATIENT)
Dept: PAIN MANAGEMENT | Age: 32
End: 2017-10-26

## 2017-10-26 VITALS
HEIGHT: 67 IN | HEART RATE: 72 BPM | BODY MASS INDEX: 17.27 KG/M2 | SYSTOLIC BLOOD PRESSURE: 100 MMHG | WEIGHT: 110 LBS | DIASTOLIC BLOOD PRESSURE: 63 MMHG | TEMPERATURE: 99.1 F | RESPIRATION RATE: 18 BRPM

## 2017-10-26 DIAGNOSIS — M47.816 LUMBAR SPONDYLOSIS: Primary | ICD-10-CM

## 2017-10-26 DIAGNOSIS — G89.4 CHRONIC PAIN SYNDROME: ICD-10-CM

## 2017-10-26 DIAGNOSIS — M47.816 LUMBAR FACET ARTHROPATHY: ICD-10-CM

## 2017-10-26 DIAGNOSIS — M51.36 LUMBAR DEGENERATIVE DISC DISEASE: ICD-10-CM

## 2017-10-26 DIAGNOSIS — G89.29 CHRONIC MIDLINE LOW BACK PAIN, WITH SCIATICA PRESENCE UNSPECIFIED: ICD-10-CM

## 2017-10-26 DIAGNOSIS — M54.5 CHRONIC MIDLINE LOW BACK PAIN, WITH SCIATICA PRESENCE UNSPECIFIED: ICD-10-CM

## 2017-10-26 NOTE — PROGRESS NOTES
[address] [facility]  Interventional Pain Management Consultation History & Physical    PATIENT NAME:  Maurilio Gilford OF BIRTH:   1985    DATE OF SERVICE:   10/26/2017      CHIEF COMPLAINT:  Back Pain (lower) and Shoulder Pain      REASON FOR VISIT:   Rosalie Christian presents to the pain clinic today for initial evaluation and to consider interventional pain management options as indicated for the type and location of the pain the patient is presenting with. HISTORY OF PRESENT ILLNESS:   Patient presents for initial evaluation and consideration for interventional procedures as indicated. She is referred to us by Dr. Jamie Silveira for consideration for lumbar radiofrequency neurotomy procedures at bilateral L3-4, L4-5, L5-S1 levels as indicated. Patient today endorses chronic low back pain of over 2 years duration. She denies any antecedent trauma injury or accident. She does endorse that she has a history of seizures that may have contributed to her low back pain. She endorses aching throbbing deep low back pain. She denies radicular symptoms. Patient endorses lumbar facet loading maneuvers that increase lumbar paraspinal pain symptoms, including lumbar twisting and turning, flexion and extension of the lumbar spine. These increase lumbar and low back pain symptoms. She has had some injections by Dr. Jamie Silveira that have helped with her pain briefly. She has not had previous lumbar spine surgery. She does not take blood thinners. She is tried physical therapy which has actually increased her pain. She is tried medications that have temporized her pain but nothing prolonged relief. By review of available medical records, progress note dated October 16, 2017 by Dr. Jamie Silveira is reviewed. History low back pain. Lumbar facet arthropathy.   Patient has had previous bilateral L4-5 and L5-S1 facet injections that have provided temporary relief. History of asthma, bradycardia, GERD, headache, migraine, seizure disorder. We reviewed the patient's lumbar MRI from February 2, 2017. This is significant for lumbar facet arthropathy lumbar facet hypertrophy. No significant neural impingement or compression is otherwise noted. ASSESSMENT/OPTIONS: as follows. We discussed options. Patient presents with chronic long-standing low back pain is her primary pain complaint. She denies radicular symptoms. I am in agreement with Dr. Nael Becerra and that I believe the patient has signs and symptoms as well as exam and imaging evidence suggestive of low back pain that is lumbar facet mediated and due to lumbar facet arthropathy and lumbar facet hypertrophy. Her MRI confirms this there are no other indications for nerve root compression or impingement on her imaging findings. I believe she will benefit from lumbar radiofrequency neurotomy procedures at bilateral L3-4, L4-5, L5-S1 levels with IV conscious sedation. I will set her up for these procedures. I have discussed the risks and benefits, indications, contraindications, and side effects of intended procedure with the patient. I have used skeleton spine model to describe and discuss the procedure with the patient. I have answered all questions relating to the procedure. Patient understands the nature of the procedure and wishes to proceed. Patient has no further questions. MRI Results (most recent):    Results from Abstract encounter on 02/08/17   MRI LUMB SPINE W CONT        PAST MEDICAL HISTORY:   The patient  has a past medical history of Abnormal Papanicolaou smear of cervix; Asthma; Bradycardia; GERD (gastroesophageal reflux disease); H/O sinus bradycardia; Headache; Migraine; Miscarriage; Photophobia of both eyes; Seizures ([address] and Stomach pain.     PAST SURGICAL HISTORY:   The patient  has a past surgical history that includes appendectomy; gyn;  section; and other surgical (2017). CURRENT MEDICATIONS:   The patient has a current medication list which includes the following prescription(s): daily vitamin with iron and ca, paroxetine, ergocalciferol, hydroxyzine pamoate, zolpidem cr, ibuprofen, ondansetron, ventolin hfa, ensure plus, prenatal vitamin, divalproex dr, loratadine-pseudoephedrine, meloxicam, norethindrone, omeprazole delayed release, and cyproheptadine. ALLERGIES:     Allergies   Allergen Reactions    Imitrex [Sumatriptan Succinate] Anaphylaxis    Iodine Cough     Coughing up blood      Sea Food [Seafood] Hives     Per pt report        FAMILY HISTORY:   The patient family history includes Attention Deficit Hyperactivity Disorder in her brother, brother, and sister; Cancer in her maternal aunt; Cancer (age of onset: 45) in her maternal aunt; Diabetes in her maternal grandmother and mother; Heart Attack in her father; Heart defect in her son; Migraines in her son; No Known Problems in her brother, brother, and brother; Other in her son; Seizures in her son. SOCIAL HISTORY:   The patient  reports that she quit smoking about 2 years ago. Her smoking use included Cigarettes. She has a 13.00 pack-year smoking history. She has never used smokeless tobacco. The patient  reports that she does not drink alcohol. She also  reports that she does not use illicit drugs. REVIEW OF SYSTEMS:    The patient denies fever, chills, weight loss (Constitutional), rash, itching (Skin), tinnitus, congestion (HENT), blurred vision, photophobia (Eyes), palpitations, orthopnea (Cardiovascular), hemoptysis, wheezing (Respiratory), nausea, vomiting, diarrhea (Gastrointestinal), dysuria, hematuria, urgency (Genitourinary), bowel or bladder incontinence, loss of consciousness (Neurologic), suicidal or homicidal ideation or hallucinations (Psychiatric). Denies swelling, axillary or groin masses (Lymphatic).            PHYSICAL EXAM:  VS:   Visit Vitals    /63 (BP 1 Location: Left arm, BP Patient Position: Sitting)    Pulse 72    Temp 99.1 °F (37.3 °C) (Oral)    Resp 18    Ht 5' 7\" (1.702 m)    Wt 49.9 kg (110 lb)    BMI 17.23 kg/m2     General: Well-developed and well-nourished. Body habitus consistent with recorded height and weight and the calculated BMI. Apparent distress due to low back pain. Head: Normocephalic, atraumatic. Skin: Inspection of the skin reveals no rashes, lesions or infection. CV: Regular rate. No murmurs or rubs noted. No peripheral edema noted. Pulm: Respirations are even and unlabored. Extr: No clubbing, cyanosis, or edema noted. Musculoskeletal:  1. Cervical spine  Full ROM. No paraspinous tenderness at any level. There is no scoliosis, asymmetry, or musculoskeletal defect. 2. Thoracic spine  Full ROM. No paraspinous tenderness at any level. There is no scoliosis, asymmetry, or musculoskeletal defect. 3. Lumbar spine decreased range of motion all axes . Paraspinous tenderness bilateral .  SI joints are nontender bilaterally. There is no scoliosis, asymmetry, or musculoskeletal defect. 4. Right upper extremity  Full ROM. 5/5 muscle strength in all muscle groups. No pain or tenderness in shoulder, elbow, wrist, or hand. 5. Left upper extremity  Full ROM. 5/5 muscle strength in all muscle groups. No pain or tenderness in shoulder, elbow, wrist, or hand. 6. Right lower extremity  Full ROM. 5/5 muscle strength in all muscle groups. No pain, tenderness, or swelling in the hip, knee, ankle or foot. 7. Left lower extremity  Full ROM. 5/5 muscle strength in all muscle groups. No pain, tenderness, or swelling in the hip, knee, ankle or foot. Neurological:  1. Mental Status - Alert, awake and oriented. Speech is clear and appropriate. 2. Cranial Nerves - Extraocular muscles intact bilaterally. Cranial nerves II-XII grossly intact bilaterally. 3. Gait - antalgic   4.  Reflexes - 2+ and symmetric throughout. 5. Sensation - Intact to light touch and pin prick. 6. Provocative Tests - Straight leg raise negative bilaterally. Psychological:  1. Mood and affect  Appropriate. 2. Speech  Appropriate. 3. Though content  Appropriate. 4. Judgment  Appropriate. ASSESSMENT:      ICD-10-CM ICD-9-CM    1. Lumbar spondylosis M47.816 721.3    2. Lumbar facet arthropathy M12.88 721.3    3. Lumbar degenerative disc disease M51.36 722.52    4. Chronic pain syndrome G89.4 338.4    5. Chronic midline low back pain, with sciatica presence unspecified M54.5 724.2     G89.29 338.29            PLAN:    1.    I have thoroughly discussed the risks and benefits, indications, contraindications, and side effects of any and procedures that were mentioned at today's patient visit. I have used a skeleton model to explain all procedures, as well as to provide added emphasis regarding procedures and as well for patient education purposes. I have answered all questions in great detail, and I have obtained verbal confirmation for all procedures planned with the patient. 3.    I have reviewed in great detail today the patient's MRI and other imaging studies with the patient. I have explained to the patient their condition using both actual recent and relevant images insofar as I am able to obtain actual images. I have used a skeleton model for added emphasis as well as patient education. 4.    I have advised patient to have a primary care provider continue to care for their health maintenance and general medical conditions. 5,    I have placed appropriate referrals to specialty care providers as I have deemed necessary through today's clinical consultation with the patient.   5.    I have explained to the patient that if any significant side effects, issues, problems, concerns, or perceived complications may have arisen at around the time of the patient's procedures, they should either call the pain management clinic or go to the emergency room immediately for medical provider evaluation. 6.   I have encouraged all patients to call the pain management clinic with any questions or concerns that they may have pertaining to their procedures. DISPOSITION:   The patients condition and plan were discussed at length and all questions were answered. The patient agrees with the plan. A total of 40 minutes was spent with the patient of which over half of the time was spent counseling the patient. Devon Kirkland MD 10/26/2017 5:46 PM    Note: Although these clinic notes were documented by the provider at the time of the exam, they have not been proofed and are subject to transcription variance.

## 2017-11-07 ENCOUNTER — OFFICE VISIT (OUTPATIENT)
Dept: FAMILY MEDICINE CLINIC | Facility: CLINIC | Age: 32
End: 2017-11-07

## 2017-11-07 VITALS
OXYGEN SATURATION: 97 % | TEMPERATURE: 98 F | BODY MASS INDEX: 17.23 KG/M2 | HEART RATE: 88 BPM | SYSTOLIC BLOOD PRESSURE: 108 MMHG | RESPIRATION RATE: 16 BRPM | DIASTOLIC BLOOD PRESSURE: 74 MMHG | WEIGHT: 109.8 LBS | HEIGHT: 67 IN

## 2017-11-07 DIAGNOSIS — D72.9 NEUTROPHILIA: ICD-10-CM

## 2017-11-07 DIAGNOSIS — F41.9 ANXIETY: Primary | ICD-10-CM

## 2017-11-07 DIAGNOSIS — J45.41 RAD (REACTIVE AIRWAY DISEASE), MODERATE PERSISTENT, WITH ACUTE EXACERBATION: ICD-10-CM

## 2017-11-07 DIAGNOSIS — R11.2 NAUSEA AND VOMITING, INTRACTABILITY OF VOMITING NOT SPECIFIED, UNSPECIFIED VOMITING TYPE: ICD-10-CM

## 2017-11-07 DIAGNOSIS — R63.6 LOW WEIGHT: ICD-10-CM

## 2017-11-07 DIAGNOSIS — R59.0 POSTERIOR CERVICAL ADENOPATHY: ICD-10-CM

## 2017-11-07 DIAGNOSIS — F51.01 PRIMARY INSOMNIA: ICD-10-CM

## 2017-11-07 DIAGNOSIS — E55.9 VITAMIN D DEFICIENCY: ICD-10-CM

## 2017-11-07 RX ORDER — ONDANSETRON 4 MG/1
4 TABLET, ORALLY DISINTEGRATING ORAL
Qty: 30 TAB | Refills: 3 | Status: SHIPPED | OUTPATIENT
Start: 2017-11-07 | End: 2018-05-15

## 2017-11-07 RX ORDER — PAROXETINE 30 MG/1
30 TABLET, FILM COATED ORAL DAILY
Qty: 90 TAB | Refills: 1 | Status: SHIPPED | OUTPATIENT
Start: 2017-11-07 | End: 2018-05-16 | Stop reason: ALTCHOICE

## 2017-11-07 RX ORDER — ALBUTEROL SULFATE 90 UG/1
AEROSOL, METERED RESPIRATORY (INHALATION)
Qty: 1 INHALER | Refills: 3 | Status: SHIPPED | OUTPATIENT
Start: 2017-11-07 | End: 2018-03-07 | Stop reason: SDUPTHER

## 2017-11-07 NOTE — PATIENT INSTRUCTIONS
Asthma Attack: Care Instructions  Your Care Instructions    During an asthma attack, the airways swell and narrow. This makes it hard to breathe. Severe asthma attacks can be life-threatening, but you can help prevent them by keeping your asthma under control and treating symptoms before they get bad. Symptoms include being short of breath, having chest tightness, coughing, and wheezing. Noting and treating these symptoms can also help you avoid future trips to the emergency room. The doctor has checked you carefully, but problems can develop later. If you notice any problems or new symptoms, get medical treatment right away. Follow-up care is a key part of your treatment and safety. Be sure to make and go to all appointments, and call your doctor if you are having problems. It's also a good idea to know your test results and keep a list of the medicines you take. How can you care for yourself at home? · Follow your asthma action plan to prevent and treat attacks. If you don't have an asthma action plan, work with your doctor to create one. · Take your asthma medicines exactly as prescribed. Talk to your doctor right away if you have any questions about how to take them. ¨ Use your quick-relief medicine when you have symptoms of an attack. Quick-relief medicine is usually an albuterol inhaler. Some people need to use quick-relief medicine before they exercise. ¨ Take your controller medicine every day, not just when you have symptoms. Controller medicine is usually an inhaled corticosteroid. The goal is to prevent problems before they occur. Don't use your controller medicine to treat an attack that has already started. It doesn't work fast enough to help. ¨ If your doctor prescribed corticosteroid pills to use during an attack, take them exactly as prescribed. It may take hours for the pills to work, but they may make the episode shorter and help you breathe better.   ¨ Keep your quick-relief medicine with you at all times. · Talk to your doctor before using other medicines. Some medicines, such as aspirin, can cause asthma attacks in some people. · If you have a peak flow meter, use it to check how well you are breathing. This can help you predict when an asthma attack is going to occur. Then you can take medicine to prevent the asthma attack or make it less severe. · Do not smoke or allow others to smoke around you. Avoid smoky places. Smoking makes asthma worse. If you need help quitting, talk to your doctor about stop-smoking programs and medicines. These can increase your chances of quitting for good. · Learn what triggers an asthma attack for you, and avoid the triggers when you can. Common triggers include colds, smoke, air pollution, dust, pollen, mold, pets, cockroaches, stress, and cold air. · Avoid colds and the flu. Get a pneumococcal vaccine shot. If you have had one before, ask your doctor if you need a second dose. Get a flu vaccine every fall. If you must be around people with colds or the flu, wash your hands often. When should you call for help? Call 911 anytime you think you may need emergency care. For example, call if:  ? · You have severe trouble breathing. ?Call your doctor now or seek immediate medical care if:  ? · Your symptoms do not get better after you have followed your asthma action plan. ? · You have new or worse trouble breathing. ? · Your coughing and wheezing get worse. ? · You cough up dark brown or bloody mucus (sputum). ? · You have a new or higher fever. ? Watch closely for changes in your health, and be sure to contact your doctor if:  ? · You need to use quick-relief medicine on more than 2 days a week (unless it is just for exercise). ? · You cough more deeply or more often, especially if you notice more mucus or a change in the color of your mucus. ? · You are not getting better as expected. Where can you learn more?   Go to http://norma-kaley.info/. Enter Y512 in the search box to learn more about \"Asthma Attack: Care Instructions. \"  Current as of: May 12, 2017  Content Version: 11.4  © 3462-2180 [employer]wise, [employer] Care instructions adapted under license by [employer] (which disclaims liability or warranty for this information). If you have questions about a medical condition or this instruction, always ask your healthcare professional. [place] [address] any warranty or liability for your use of this information.

## 2017-11-07 NOTE — PROGRESS NOTES
History and Physical    Patient: Thomas Mao MRN: [id number]  SSN: [id number]    YOB: 1985  Age: 32 y.o. Sex: female      Subjective:      Thomas Mao is a 32 y.o. female who presents today for follow up insomnia, asthma, anxiety etc    In terms of her insomnia, she is on Ambien as needed, but she does not take it very often. She otherwise will take hydroxyzine which usually works well. In terms of her asthma, she rarely has to use her albuterol inhaler, she has not seen pulmonology in a while as she has not felt the need to. In terms of her anxiety, she is on Paxil and Vistaril. She continues to feel anxiety and states she has been having panic attacks, having 3 episodes weekly since last month, no known reason for change. She denies depression or si/hi. She was not aware she could use Vistaril PRN for anxiety. In terms of her low BMI, she continues to use Boost and periactin, her weight is overall stable. She still has problems with nausea and vomiting. She is on Zofran, she states that works well for her. She has a follow up with gastro next week, she states they are aware of her cyclical nausea and vomiting. She continues to have migraines, she is on Depakote but she states it is not working well, she has an appointment at the end of the month with neurology but knows she will have to reschedule. She is c/o a several day h/o left sided neck stiffness/pain. She states that she did see general surgeon whom she states said to keep an eye on the adenopathy.        Last PAP due: 12/15- was normal by GYN      She is followed by:  GYN  Ortho/spine  neuro      PMH:  Past Medical History:   Diagnosis Date    Abnormal Papanicolaou smear of cervix     Asthma     Bradycardia     GERD (gastroesophageal reflux disease)     H/O sinus bradycardia     Headache     Migraine     Miscarriage     Photophobia of both eyes     Seizures (HCC)     Stomach pain      Past Surgical History:   Procedure Laterality Date    HX APPENDECTOMY      HX  SECTION      HX GYN      cervical cerclage    HX OTHER SURGICAL  2017    Mendel L4-5, L5-S1 Facet Joint block        FamHx:  Family History   Problem Relation Age of Onset    Diabetes Mother     Heart Attack Father     Attention Deficit Hyperactivity Disorder Sister     Attention Deficit Hyperactivity Disorder Brother     Cancer Maternal Aunt 45     breast    Diabetes Maternal Grandmother     Attention Deficit Hyperactivity Disorder Brother     No Known Problems Brother     No Known Problems Brother     No Known Problems Brother     Cancer Maternal Aunt      lung    Heart defect Son    [facility] Migra[facility] Son     Seizures Son      h/o    Other Son      h/o jaundice       SocialHx:  Social History   Substance Use Topics    Smoking status: Former Smoker     Packs/day: 1.00     Years: 13.00     Types: Cigarettes     Quit date: 2015    Smokeless tobacco: Never Used      Comment: cigarello, once a month    Alcohol use No        Meds:  Prior to Admission medications    Medication Sig Start Date End Date Taking? Authorizing Provider   albuterol (VENTOLIN HFA) 90 mcg/actuation inhaler INHELE 1 PUFF EVERY FOUR (4) HOURS AS NEEDED FOR WHEEZING OR SHORTNESS OF BREATH. 17  Yes Jessica Pumphrey V, PA   PARoxetine (PAXIL) 30 mg tablet Take 1 Tab by mouth daily. 17  Yes Jessica Pumphrey V, PA   ondansetron (ZOFRAN ODT) 4 mg disintegrating tablet Take 1 Tab by mouth every eight (8) hours as needed for Nausea.  17  Yes Jessica Pumphrey V, PA   DAILY VITAMIN WITH IRON AND CA tab TAKE 1 TABLET BY MOUTH EVERY DAY 10/10/17  Yes Historical Provider   ergocalciferol (ERGOCALCIFEROL) 50,000 unit capsule TAKE 1 CAP BY MOUTH EVERY SEVEN (7) DAYS. 17  Yes AYANNA Acosta   hydrOXYzine pamoate (VISTARIL) 50 mg capsule TAKE ONE CAPSULE BY MOUTH AT BEDTIME 17  Yes AYANNA Acosta   zolpidem CR (AMBIEN CR) 6.25 mg tablet Take 1 Tab by mouth nightly as needed for Sleep. Max Daily Amount: 6.25 mg. 7/26/17  Yes Jessica Pumphrey V, PA   ibuprofen (MOTRIN) 600 mg tablet 1 po bid as needed for pain; take with food  Indications: Pain 7/17/17  Yes Chris Barclay MD   ENSURE PLUS 0.05-1.5 gram-kcal/mL liqd TAKE 237 ML BY MOUTH THREE TIMES DAILY. 7/5/17  Yes Jessica Pumphrey V, PA   PRENATAL AVIQ08/XYHA FUM/FOLIC (PRENATAL VITAMIN) 27 mg iron- 800 mcg tab tablet Take 1 Tab by mouth daily. 7/5/17  Yes Alee Troy MD   divalproex DR (DEPAKOTE) 250 mg tablet Take 1 Tab by mouth two (2) times a day. Indications: MIGRAINE PREVENTION 5/25/17  Yes Myla Woodruff MD   loratadine-pseudoephedrine (CLARITIN-D 12 HOUR) 5-120 mg per tablet Take 1 Tab by mouth two (2) times daily as needed. Yes Historical Provider   norethindrone (MICRONOR) 0.35 mg tab Take 1 Tab by mouth daily. 2/9/17  Yes Sol Vizcaino,    Omeprazole delayed release (PRILOSEC D/R) 20 mg tablet Take 1 Tab by mouth daily. 9/27/16  Yes Jessica Pumphrey V, PA   cyproheptadine (PERIACTIN) 4 mg tablet Take 1 Tab by mouth once over twenty-four (24) hours. 9/23/16  Yes TE Hernandez        Allergies:   Allergies   Allergen Reactions    Imitrex [Sumatriptan Succinate] Anaphylaxis    Iodine Cough     Coughing up blood      Sea Food [Seafood] Hives     Per pt report        Review of Systems:  Items in [place] are positive  Constitutional: negative for fevers, chills and malaise  Eyes: negative for visual disturbance  Ears, Nose, Mouth, Throat, and Face: negative for nasal congestion  Respiratory: negative for cough or SOB  Cardiovascular: negative for chest pain, chest pressure/discomfort  Gastrointestinal: nausea and vomiting, negative for  melena, diarrhea, constipation and abdominal pain  Genitourinary:negative for frequency, dysuria or hematuria  Musculoskeletal: left neck stiffness/pain, negative for myalgias and arthralgias  Neurological: headaches, negative for dizziness and paresthesia  Psych: anxiety, panic attacks, insomnia denies si/hi    Objective:     Visit Vitals    /74 (BP 1 Location: Right arm, BP Patient Position: Sitting)    Pulse 88    Temp 98 °F (36.7 °C) (Oral)    Resp 16    Ht 5' 7\" (1.702 m)    Wt 109 lb 12.8 oz (49.8 kg)    LMP 10/28/2017    SpO2 97%    BMI 17.2 kg/m2       Physical Exam:  GENERAL: alert, cooperative, no distress, appears stated age  [de-identified]: EYE: conjunctivae/corneas clear. EAR: TM's pearly gray bilaterally NOSE: Nasal mucosa pink and moist bilaterally, THROAT: no erythema or edema  THYROID: no thyromegaly  NECK: tender, mobile, small left posterior cervical adenopathy  LUNG: clear to auscultation bilaterally  HEART: regular rate and rhythm, S1, S2 normal, no murmur, click, rub or gallop  ABDOMEN: soft, non-tender. Bowel sounds normal. No masses  EXTREMITIES:  extremities normal, atraumatic, no cyanosis or edema  NEUROLOGIC: AOx3. Gait normal.  PSYCH: mood: anxious affect: neutral        Assessment and Plan:       ICD-10-CM ICD-9-CM    1. Anxiety F41.9 300.00 PARoxetine (PAXIL) 30 mg tablet   2. Primary insomnia F51.01 307.42    3. RAD (reactive airway disease), moderate persistent, with acute exacerbation J45.41 493.92 albuterol (VENTOLIN HFA) 90 mcg/actuation inhaler   4. Nausea and vomiting, intractability of vomiting not specified, unspecified vomiting type R11.2 787.01 ondansetron (ZOFRAN ODT) 4 mg disintegrating tablet   5. Low weight R63.6 783.22    6. Posterior cervical adenopathy R59.0 785.6    7. Vitamin D deficiency E55.9 268.9 VITAMIN D, 25 HYDROXY      VITAMIN D, 25 HYDROXY   8.  Neutrophilia D72.9 288.8 CBC WITH AUTOMATED DIFF      METABOLIC PANEL, COMPREHENSIVE      CBC WITH AUTOMATED DIFF      METABOLIC PANEL, COMPREHENSIVE         Medical Decision Making:  Anxiety- increase Paxil to 30 mg, advised may use Vistaril PRN for anxiety    Insomnia- continue current therapy    RAD- continue to monitor- well controlled    Nausea and vomiting- refill Zofran + follow up with gastro as scheduled    Low Weight- continue current therapy- stable    Posterior cervical adenopathy- continue to monitor- size stable, advised if it changes to contact office        Follow-up Disposition:  Return in about 3 months (around 2/7/2018) for routine care with me. Patient acknowledges understanding of instructions and acknowledges understanding to call back if current symptoms worsen or new symptoms arise. Patient acknowledges and agrees with plan.         Signed By: TE Andrew     November 7, 2017

## 2017-11-07 NOTE — PROGRESS NOTES
Tobin Ohara is a 32 y.o.  female presents today for office visit for follow up. Pt would also like to discuss HM. Pt is  fasting. Pt is in Room # 9      1. Have you been to the ER, urgent care clinic since your last visit? Hospitalized since your last visit? No    2. Have you seen or consulted any other health care providers outside of the [address] [place] [address] since your last visit? Include any pap smears or colon screening. Pain management    Upcoming Appts  none    Health Maintenance reviewed - Patient declined flu vx today       VORB: No orders of the defined types were placed in this encounter.   Hildegarde Scheuermann, LPN

## 2017-11-07 NOTE — MR AVS SNAPSHOT
Visit Information Date & Time Provider Department Dept. Phone Encounter #  
 11/7/2017 10:00 AM SHELLEY Velazquez [facility] [phone] [id number] Follow-up Instructions Return in about 3 months (around 2/7/2018) for routine care with me. Your Appointments 11/24/2017 10:20 AM  
Follow Up with Emmy Coates MD  
[address] [place] [address] Appt Note: 4wk f/u; Labs; r/s from 9/29 to this date & time w/pt; r/s due to labs not completed 10/16 [facility]  
 [address] [facility] [address]  
[phone]  
  
   
 [facility] [address]  
  
    
 12/14/2017 10:45 AM  
Follow Up with Emilia Mary MD  
[address] [place] [address] Appt Note: 3 month fu; rs'd from 10/05/2017  provider out of [facility] [address] [facility] [address]  
[phone]  
  
   
 [place] [address]  
  
    
 1/15/2018 11:00 AM  
Follow Up with Sulma Valerio NP  
[facility] ([facility]) Appt Note: 3  mo f/u  
 [address] [facility] [address]  
[phone]  
  
   
 [address] [facility] [address] Upcoming Health Maintenance Date Due  
 PAP AKA CERVICAL CYTOLOGY 12/3/2018 DTaP/Tdap/Td series (2 - Td) 5/30/2027 Allergies as of 11/7/2017  Review Complete On: 11/7/2017 By: Rakel Carranza LPN Severity Noted Reaction Type Reactions Imitrex [Sumatriptan Succinate] High 05/25/2015    Anaphylaxis Iodine  02/16/2017    Cough Coughing up blood Sea Food [Seafood]  06/04/2015    Hives Per pt report Current Immunizations  Never Reviewed No immunizations on file. Not reviewed this visit You Were Diagnosed With   
  
 Codes Comments Neutrophilia    -  Primary ICD-10-CM: D72.9 ICD-9-CM: 288.8 RAD (reactive airway disease), moderate persistent, with acute exacerbation     ICD-10-CM: J45.41 
ICD-9-CM: 493.92 Vitamin D deficiency     ICD-10-CM: E55.9 ICD-9-CM: 268.9 Nausea and vomiting, intractability of vomiting not specified, unspecified vomiting type     ICD-10-CM: R11.2 ICD-9-CM: 787.01 Nausea     ICD-10-CM: R11.0 ICD-9-CM: 787.02 Vitals BP Pulse Temp Resp Height(growth percentile) Weight(growth percentile) 108/74 (BP 1 Location: Right arm, BP Patient Position: Sitting) 88 98 °F (36.7 °C) (Oral) 16 5' 7\" (1.702 m) 109 lb 12.8 oz (49.8 kg) LMP SpO2 BMI OB Status Smoking Status 10/28/2017 97% 17.2 kg/m2 Having regular periods Former Smoker BMI and BSA Data Body Mass Index Body Surface Area  
 17.2 kg/m 2 1.53 m 2 Preferred Pharmacy Pharmacy Name Phone [facility]/PHARMACY #[id number]- [address] [phone] Your Updated Medication List  
  
   
This list is accurate as of: 11/7/17 10:28 AM.  Always use your most recent med list.  
  
  
  
  
 albuterol 90 mcg/actuation inhaler Commonly known as:  VENTOLIN HFA INHELE 1 PUFF EVERY FOUR (4) HOURS AS NEEDED FOR WHEEZING OR SHORTNESS OF BREATH. CLARITIN-D 12 HOUR 5-120 mg per tablet Generic drug:  loratadine-pseudoephedrine Take 1 Tab by mouth two (2) times daily as needed. cyproheptadine 4 mg tablet Commonly known as:  PERIACTIN Take 1 Tab by mouth once over twenty-four (24) hours. DAILY VITAMIN WITH IRON AND CA Tab Generic drug:  multivitamins-ca-iron-minerals TAKE 1 TABLET BY MOUTH EVERY DAY  
  
 divalproex  mg tablet Commonly known as:  DEPAKOTE Take 1 Tab by mouth two (2) times a day. Indications: MIGRAINE PREVENTION  
  
 ENSURE PLUS 0.05-1.5 gram-kcal/mL Liqd Generic drug:  food supplemt, lactose-reduced TAKE 237 ML BY MOUTH THREE TIMES DAILY. ergocalciferol 50,000 unit capsule Commonly known as:  ERGOCALCIFEROL TAKE 1 CAP BY MOUTH EVERY SEVEN (7) DAYS. hydrOXYzine pamoate 50 mg capsule Commonly known as:  VISTARIL  
TAKE ONE CAPSULE BY MOUTH AT BEDTIME  
  
 ibuprofen 600 mg tablet Commonly known as:  MOTRIN  
1 po bid as needed for pain; take with food  Indications: Pain  
  
 meloxicam 7.5 mg tablet Commonly known as:  MOBIC Take 1 Tab by mouth daily. norethindrone 0.35 mg Tab Commonly known as:  Micha & Micha Take 1 Tab by mouth daily. Omeprazole delayed release 20 mg tablet Commonly known as:  PRILOSEC D/R Take 1 Tab by mouth daily. ondansetron 4 mg disintegrating tablet Commonly known as:  ZOFRAN ODT Take 1 Tab by mouth every eight (8) hours as needed for Nausea. PARoxetine 30 mg tablet Commonly known as:  PAXIL Take 1 Tab by mouth daily. prenatal vitamin 27 mg iron- 800 mcg Tab tablet Take 1 Tab by mouth daily. zolpidem CR 6.25 mg tablet Commonly known as:  AMBIEN CR Take 1 Tab by mouth nightly as needed for Sleep. Max Daily Amount: 6.25 mg.  
  
  
  
  
Prescriptions Sent to Pharmacy Refills  
 albuterol (VENTOLIN HFA) 90 mcg/actuation inhaler 3 Sig: INHELE 1 PUFF EVERY FOUR (4) HOURS AS NEEDED FOR WHEEZING OR SHORTNESS OF BREATH. Class: Normal  
 Pharmacy: [address] [place] [address] Ph #: [phone] PARoxetine (PAXIL) 30 mg tablet 1 Sig: Take 1 Tab by mouth daily. Class: Normal  
 Pharmacy: [address] [place] [address] Ph #: [phone] Route: Oral  
 ondansetron (ZOFRAN ODT) 4 mg disintegrating tablet 3 Sig: Take 1 Tab by mouth every eight (8) hours as needed for Nausea. Class: Normal  
 Pharmacy: [address],[facility] Ph #: [phone] Route: Oral  
  
Follow-up Instructions Return in about 3 months (around 2/7/2018) for routine care with me. To-Do List   
 11/07/2017   Lab:  CBC WITH AUTOMATED DIFF   
  
 11/07/2017 Lab:  METABOLIC PANEL, COMPREHENSIVE   
  
 11/07/2017 Lab:  VITAMIN D, 25 HYDROXY Patient Instructions Asthma Attack: Care Instructions Your Care Instructions During an asthma attack, the airways swell and narrow. This makes it hard to breathe. Severe asthma attacks can be life-threatening, but you can help prevent them by keeping your asthma under control and treating symptoms before they get bad. Symptoms include being short of breath, having chest tightness, coughing, and wheezing. Noting and treating these symptoms can also help you avoid future trips to the emergency room. The doctor has checked you carefully, but problems can develop later. If you notice any problems or new symptoms, get medical treatment right away. Follow-up care is a key part of your treatment and safety. Be sure to make and go to all appointments, and call your doctor if you are having problems. It's also a good idea to know your test results and keep a list of the medicines you take. How can you care for yourself at home? · Follow your asthma action plan to prevent and treat attacks. If you don't have an asthma action plan, work with your doctor to create one. · Take your asthma medicines exactly as prescribed. Talk to your doctor right away if you have any questions about how to take them. ¨ Use your quick-relief medicine when you have symptoms of an attack. Quick-relief medicine is usually an albuterol inhaler. Some people need to use quick-relief medicine before they exercise. ¨ Take your controller medicine every day, not just when you have symptoms. Controller medicine is usually an inhaled corticosteroid. The goal is to prevent problems before they occur. Don't use your controller medicine to treat an attack that has already started. It doesn't work fast enough to help.  
¨ If your doctor prescribed corticosteroid pills to use during an attack, take them exactly as prescribed. It may take hours for the pills to work, but they may make the episode shorter and help you breathe better. ¨ Keep your quick-relief medicine with you at all times. · Talk to your doctor before using other medicines. Some medicines, such as aspirin, can cause asthma attacks in some people. · If you have a peak flow meter, use it to check how well you are breathing. This can help you predict when an asthma attack is going to occur. Then you can take medicine to prevent the asthma attack or make it less severe. · Do not smoke or allow others to smoke around you. Avoid smoky places. Smoking makes asthma worse. If you need help quitting, talk to your doctor about stop-smoking programs and medicines. These can increase your chances of quitting for good. · Learn what triggers an asthma attack for you, and avoid the triggers when you can. Common triggers include colds, smoke, air pollution, dust, pollen, mold, pets, cockroaches, stress, and cold air. · Avoid colds and the flu. Get a pneumococcal vaccine shot. If you have had one before, ask your doctor if you need a second dose. Get a flu vaccine every fall. If you must be around people with colds or the flu, wash your hands often. When should you call for help? Call 911 anytime you think you may need emergency care. For example, call if: 
? · You have severe trouble breathing. ?Call your doctor now or seek immediate medical care if: 
? · Your symptoms do not get better after you have followed your asthma action plan. ? · You have new or worse trouble breathing. ? · Your coughing and wheezing get worse. ? · You cough up dark brown or bloody mucus (sputum). ? · You have a new or higher fever. ? Watch closely for changes in your health, and be sure to contact your doctor if: 
? · You need to use quick-relief medicine on more than 2 days a week (unless it is just for exercise). ? · You cough more deeply or more often, especially if you notice more mucus or a change in the color of your mucus. ? · You are not getting better as expected. Where can you learn more? Go to http://norma-kaley.info/. Enter E488 in the search box to learn more about \"Asthma Attack: Care Instructions. \" Current as of: May 12, 2017 Content Version: 11.4 © 7063-5287 [employer]. Care instructions adapted under license by [employer] (which disclaims liability or warranty for this information). If you have questions about a medical condition or this instruction, always ask your healthcare professional. [address] any warranty or liability for your use of this information. Introducing [facility] & HEALTH SERVICES! [facility]: 
Thank you for requesting a [employer] account. Our records indicate that you already have an active [employer] account. You can access your account anytime at [email or web address] Did you know that you can access your hospital and ER discharge instructions at any time in [employer]? You can also review all of your test results from your hospital stay or ER visit. Additional Information If you have questions, please visit the Frequently Asked Questions section of the [employer] website at [email or web address] Remember, [employer] is NOT to be used for urgent needs. For medical emergencies, dial 911. Now available from your iPhone and Android! Please provide this summary of care documentation to your next provider. Your primary care clinician is listed as Kita Najjar. If you have any questions after today's visit, please call [phone].

## 2017-11-08 ENCOUNTER — TELEPHONE (OUTPATIENT)
Dept: FAMILY MEDICINE CLINIC | Facility: CLINIC | Age: 32
End: 2017-11-08

## 2017-11-08 LAB
25(OH)D3+25(OH)D2 SERPL-MCNC: 20.5 NG/ML (ref 30–100)
ALBUMIN SERPL-MCNC: 4.6 G/DL (ref 3.5–5.5)
ALBUMIN/GLOB SERPL: 1.9 {RATIO} (ref 1.2–2.2)
ALP SERPL-CCNC: 51 IU/L (ref 39–117)
ALT SERPL-CCNC: 8 IU/L (ref 0–32)
AST SERPL-CCNC: 11 IU/L (ref 0–40)
BASOPHILS # BLD AUTO: 0 X10E3/UL (ref 0–0.2)
BASOPHILS NFR BLD AUTO: 0 %
BILIRUB SERPL-MCNC: 0.4 MG/DL (ref 0–1.2)
BUN SERPL-MCNC: 10 MG/DL (ref 6–20)
BUN/CREAT SERPL: 17 (ref 9–23)
CALCIUM SERPL-MCNC: 9.4 MG/DL (ref 8.7–10.2)
CHLORIDE SERPL-SCNC: 103 MMOL/L (ref 96–106)
CO2 SERPL-SCNC: 23 MMOL/L (ref 18–29)
CREAT SERPL-MCNC: 0.58 MG/DL (ref 0.57–1)
EOSINOPHIL # BLD AUTO: 0 X10E3/UL (ref 0–0.4)
EOSINOPHIL NFR BLD AUTO: 0 %
ERYTHROCYTE [DISTWIDTH] IN BLOOD BY AUTOMATED COUNT: 13.6 % (ref 12.3–15.4)
GFR SERPLBLD CREATININE-BSD FMLA CKD-EPI: 123 ML/MIN/1.73
GFR SERPLBLD CREATININE-BSD FMLA CKD-EPI: 142 ML/MIN/1.73
GLOBULIN SER CALC-MCNC: 2.4 G/DL (ref 1.5–4.5)
GLUCOSE SERPL-MCNC: 86 MG/DL (ref 65–99)
HCT VFR BLD AUTO: 38.4 % (ref 34–46.6)
HGB BLD-MCNC: 12.1 G/DL (ref 11.1–15.9)
IMM GRANULOCYTES # BLD: 0 X10E3/UL (ref 0–0.1)
IMM GRANULOCYTES NFR BLD: 0 %
LYMPHOCYTES # BLD AUTO: 1.4 X10E3/UL (ref 0.7–3.1)
LYMPHOCYTES NFR BLD AUTO: 19 %
MCH RBC QN AUTO: 28.8 PG (ref 26.6–33)
MCHC RBC AUTO-ENTMCNC: 31.5 G/DL (ref 31.5–35.7)
MCV RBC AUTO: 91 FL (ref 79–97)
MONOCYTES # BLD AUTO: 0.5 X10E3/UL (ref 0.1–0.9)
MONOCYTES NFR BLD AUTO: 7 %
NEUTROPHILS # BLD AUTO: 5.4 X10E3/UL (ref 1.4–7)
NEUTROPHILS NFR BLD AUTO: 74 %
PLATELET # BLD AUTO: 286 X10E3/UL (ref 150–379)
POTASSIUM SERPL-SCNC: 4.4 MMOL/L (ref 3.5–5.2)
PROT SERPL-MCNC: 7 G/DL (ref 6–8.5)
RBC # BLD AUTO: 4.2 X10E6/UL (ref 3.77–5.28)
SODIUM SERPL-SCNC: 141 MMOL/L (ref 134–144)
WBC # BLD AUTO: 7.4 X10E3/UL (ref 3.4–10.8)

## 2017-11-08 NOTE — PROGRESS NOTES
Please advise her vitamin D is slightly low, if she is taking the 50K intl units, she needs to continue, if not taking anything, she should take 5000 intl units daily

## 2017-11-08 NOTE — TELEPHONE ENCOUNTER
Please advise her vitamin D is slightly low, if she is taking the 50K intl units, she needs to continue, if not taking anything, she should take 5000 intl units daily    Spoke with pt in regards to results. Pt acknowledges understanding and voices no concerns at this time. Patient is taking 50K intl units a week.

## 2017-12-07 RX ORDER — SODIUM CHLORIDE 0.9 % (FLUSH) 0.9 %
5-10 SYRINGE (ML) INJECTION AS NEEDED
Status: CANCELLED | OUTPATIENT
Start: 2017-12-07

## 2017-12-07 RX ORDER — DIAZEPAM 5 MG/1
10 TABLET ORAL ONCE
Status: CANCELLED | OUTPATIENT
Start: 2017-12-11 | End: 2017-12-11

## 2017-12-14 ENCOUNTER — OFFICE VISIT (OUTPATIENT)
Dept: NEUROLOGY | Age: 32
End: 2017-12-14

## 2017-12-14 VITALS
WEIGHT: 113.6 LBS | OXYGEN SATURATION: 98 % | RESPIRATION RATE: 16 BRPM | BODY MASS INDEX: 18.26 KG/M2 | DIASTOLIC BLOOD PRESSURE: 66 MMHG | HEART RATE: 75 BPM | SYSTOLIC BLOOD PRESSURE: 90 MMHG | HEIGHT: 66 IN | TEMPERATURE: 98.7 F

## 2017-12-14 DIAGNOSIS — T39.95XA ANALGESIC REBOUND HEADACHE: ICD-10-CM

## 2017-12-14 DIAGNOSIS — F32.A ANXIETY AND DEPRESSION: ICD-10-CM

## 2017-12-14 DIAGNOSIS — G47.00 INSOMNIA, UNSPECIFIED TYPE: ICD-10-CM

## 2017-12-14 DIAGNOSIS — G47.01 INSOMNIA DUE TO MEDICAL CONDITION: ICD-10-CM

## 2017-12-14 DIAGNOSIS — G44.40 ANALGESIC REBOUND HEADACHE: ICD-10-CM

## 2017-12-14 DIAGNOSIS — G40.909 SEIZURE DISORDER (HCC): ICD-10-CM

## 2017-12-14 DIAGNOSIS — G43.019 INTRACTABLE MIGRAINE WITHOUT AURA AND WITHOUT STATUS MIGRAINOSUS: Primary | ICD-10-CM

## 2017-12-14 DIAGNOSIS — F41.9 ANXIETY AND DEPRESSION: ICD-10-CM

## 2017-12-14 RX ORDER — MULTIVITAMIN WITH IRON
1 TABLET ORAL DAILY
COMMUNITY
End: 2017-12-14 | Stop reason: SDUPTHER

## 2017-12-14 RX ORDER — ZOLPIDEM TARTRATE 6.25 MG/1
6.25 TABLET, FILM COATED, EXTENDED RELEASE ORAL
Qty: 30 TAB | Refills: 0 | Status: SHIPPED | OUTPATIENT
Start: 2017-12-14 | End: 2018-09-12

## 2017-12-14 RX ORDER — DIVALPROEX SODIUM 250 MG/1
250 TABLET, DELAYED RELEASE ORAL 2 TIMES DAILY
Qty: 60 TAB | Refills: 5 | Status: SHIPPED | OUTPATIENT
Start: 2017-12-14 | End: 2018-06-20 | Stop reason: SDUPTHER

## 2017-12-14 NOTE — MR AVS SNAPSHOT
Visit Information Date & Time Provider Department Dept. Phone Encounter #  
 12/14/2017 10:45 AM Dax Cheung  [facility] [address] [address] [id number] Follow-up Instructions Return in about 6 months (around 6/14/2018). Follow-up and Disposition History Your Appointments 1/15/2018 11:00 AM  
Follow Up with Muna Garcia NP  
[facility]) Appt Note: 3  mo f/u  
 [address] [facility] [address]  
[phone]  
  
   
 [address] [facility] [address] Upcoming Health Maintenance Date Due Pneumococcal 19-64 Highest Risk (1 of 3 - PCV13) 11/30/2004 PAP AKA CERVICAL CYTOLOGY 12/3/2018 DTaP/Tdap/Td series (2 - Td) 5/30/2027 Allergies as of 12/14/2017  Review Complete On: 12/14/2017 By: Jinny Puente LPN Severity Noted Reaction Type Reactions Imitrex [Sumatriptan Succinate] High 05/25/2015    Anaphylaxis Iodine  02/16/2017    Cough Coughing up blood Sea Food [Seafood]  06/04/2015    Hives Per pt report Current Immunizations  Never Reviewed No immunizations on file. Not reviewed this visit You Were Diagnosed With   
  
 Codes Comments Intractable migraine without aura and without status migrainosus    -  Primary ICD-10-CM: G43.019 
ICD-9-CM: 346.11 Analgesic rebound headache     ICD-10-CM: T39.91XA, G44.40 ICD-9-CM: 339.3, E935.9 Insomnia due to medical condition     ICD-10-CM: G47.01 
ICD-9-CM: 327.01 Anxiety and depression     ICD-10-CM: F41.8 ICD-9-CM: 300.00, 311 Seizure disorder ([address]     ICD-10-CM: G40.909 ICD-9-CM: 345.90 Insomnia, unspecified type     ICD-10-CM: G47.00 ICD-9-CM: 780.52 Vitals BP Pulse Temp Resp Height(growth percentile) Weight(growth percentile)  90/66 (BP 1 Location: Right arm, BP Patient Position: Sitting) 75 98.7 °F (37.1 °C) (Oral) 16 5' 6\" (1.676 m) 113 lb 9.6 oz (51.5 kg) LMP SpO2 BMI OB Status Smoking Status 12/12/2017 (Exact Date) 98% 18.34 kg/m2 Having regular periods Former Smoker Vitals History BMI and BSA Data Body Mass Index Body Surface Area  
 18.34 kg/m 2 1.55 m 2 Preferred Pharmacy Pharmacy Name Phone [facility]/PHARMACY #[id number]- [address], [address] [phone] Your Updated Medication List  
  
   
This list is accurate as of: 12/14/17 11:21 AM.  Always use your most recent med list.  
  
  
  
  
 albuterol 90 mcg/actuation inhaler Commonly known as:  VENTOLIN HFA INHELE 1 PUFF EVERY FOUR (4) HOURS AS NEEDED FOR WHEEZING OR SHORTNESS OF BREATH. CLARITIN-D 12 HOUR 5-120 mg per tablet Generic drug:  loratadine-pseudoephedrine Take 1 Tab by mouth two (2) times daily as needed. cyproheptadine 4 mg tablet Commonly known as:  PERIACTIN Take 1 Tab by mouth once over twenty-four (24) hours. divalproex  mg tablet Commonly known as:  DEPAKOTE Take 1 Tab by mouth two (2) times a day. Indications: MIGRAINE PREVENTION  
  
 ENSURE PLUS 0.05-1.5 gram-kcal/mL Liqd Generic drug:  food supplemt, lactose-reduced TAKE 237 ML BY MOUTH THREE TIMES DAILY. ergocalciferol 50,000 unit capsule Commonly known as:  ERGOCALCIFEROL  
TAKE 1 CAP BY MOUTH EVERY SEVEN (7) DAYS. hydrOXYzine pamoate 50 mg capsule Commonly known as:  VISTARIL  
TAKE ONE CAPSULE BY MOUTH AT BEDTIME  
  
 ibuprofen 600 mg tablet Commonly known as:  MOTRIN  
TAKE 1 TABLET BY MOUTH TWICE A DAY AS NEEDED FOR PAIN TAKE WITH FOOD  
  
 norethindrone 0.35 mg Tab Commonly known as:  Micha & Micha Take 1 Tab by mouth daily. Omeprazole delayed release 20 mg tablet Commonly known as:  PRILOSEC D/R Take 1 Tab by mouth daily. ondansetron 4 mg disintegrating tablet Commonly known as:  ZOFRAN ODT  
 Take 1 Tab by mouth every eight (8) hours as needed for Nausea. PARoxetine 30 mg tablet Commonly known as:  PAXIL Take 1 Tab by mouth daily. prenatal multivit-ca-min-fe-fa Tab Commonly known as:  PRENATAL VITAMIN  
TAKE 1 TAB BY MOUTH DAILY. zolpidem CR 6.25 mg tablet Commonly known as:  AMBIEN CR Take 1 Tab by mouth nightly as needed for Sleep. Max Daily Amount: 6.25 mg.  
  
  
  
  
Prescriptions Printed Refills  
 zolpidem CR (AMBIEN CR) 6.25 mg tablet 0 Sig: Take 1 Tab by mouth nightly as needed for Sleep. Max Daily Amount: 6.25 mg.  
 Class: Print Route: Oral  
  
Prescriptions Sent to Pharmacy Refills  
 prenatal multivit-ca-min-fe-fa (PRENATAL VITAMIN) tab 5 Sig: TAKE 1 TAB BY MOUTH DAILY. Class: Normal  
 Pharmacy: [facility]/pharmacy #[id number]- [place], [address] [facility] Ph #: [phone]  
 divalproex DR (DEPAKOTE) 250 mg tablet 5 Sig: Take 1 Tab by mouth two (2) times a day. Indications: MIGRAINE PREVENTION Class: Normal  
 Pharmacy: [address], [address] [facility] Ph #: [phone] Route: Oral  
  
Follow-up Instructions Return in about 6 months (around 6/14/2018). Patient Instructions Patient to take extra folic acid when on depakote at least 2mg daily,avoid pregnancy on depakote Patient Instructions History Introducing [facility] & HEALTH SERVICES! Dear Helga Stage: 
Thank you for requesting a [employer] account. Our records indicate that you already have an active [employer] account. You can access your account anytime at [email or web address] Did you know that you can access your hospital and ER discharge instructions at any time in [employer]? You can also review all of your test results from your hospital stay or ER visit. Additional Information If you have questions, please visit the Frequently Asked Questions section of the [employer] website at [email or web address] Remember, [employer] is NOT to be used for urgent needs. For medical emergencies, dial 911. Now available from your iPhone and Android! Please provide this summary of care documentation to your next provider. Your primary care clinician is listed as Tova Tomas. If you have any questions after today's visit, please call [phone].

## 2017-12-14 NOTE — PROGRESS NOTES
[facility]             [address] [facility], [address] [facility], [address] [place] [address]      Shai Lainez is right handed [address]  female who presents in evaluation of sleep disorder. Patient describes early adult years Onset was gradual over several years. Patient describes difficulty initiating and maintaining sleep worse over the past several years. She does drink up to 36 ounces of soda a day she goes to bed at 10 can take on average 45 minutes to an hour to fall asleep but at times does not fall asleep at all wakes every several hours up to 10-15 minutes to eat and occasionally to urinate she gets up at 7 AM but does not feel rested she does not schedule naps she does not act out dreams snore or have witnessed apneas. She does admit to sleep hallucinations and sleep paralysis as well as morning headaches but she does have chronic daily headaches as well she also has history of seizure disorder none since 2014 she is aware of discomfort in her leg but denies restless leg symptoms    Patient reports continuing difficulty in headaches and difficulty sleeping she is tolerating the Depakote for headaches. She is not on prenatal vitamins. She did undergo the sleep study which showed no significant movement disorders or sleep apnea. Apnea hypopnea index was 0 throughout. Reviewed alternative therapies for insomnia at length patient would prefer medication rather than therapy cognitive behavioral therapy after reviewed risk benefits. Patient has done well in regards to the insomnia using the Ambien CR as needed.   He continues to have daily headaches but denies any seizures she reports taking the Depakote for her seizure disorder she is also on prenatal vitamins folic for the reduce the risk of birth defects is also on birth control      Current Outpatient Prescriptions:     prenatal multivit-ca-min-fe-fa (PRENATAL VITAMIN) tab, TAKE 1 TAB BY MOUTH DAILY., Disp: 30 Tab, Rfl: 5    zolpidem CR (AMBIEN CR) 6.25 mg tablet, Take 1 Tab by mouth nightly as needed for Sleep. Max Daily Amount: 6.25 mg., Disp: 30 Tab, Rfl: 0    divalproex DR (DEPAKOTE) 250 mg tablet, Take 1 Tab by mouth two (2) times a day. Indications: MIGRAINE PREVENTION, Disp: 60 Tab, Rfl: 5    ibuprofen (MOTRIN) 600 mg tablet, TAKE 1 TABLET BY MOUTH TWICE A DAY AS NEEDED FOR PAIN TAKE WITH FOOD, Disp: 60 Tab, Rfl: 0    albuterol (VENTOLIN HFA) 90 mcg/actuation inhaler, INHELE 1 PUFF EVERY FOUR (4) HOURS AS NEEDED FOR WHEEZING OR SHORTNESS OF BREATH., Disp: 1 Inhaler, Rfl: 3    PARoxetine (PAXIL) 30 mg tablet, Take 1 Tab by mouth daily. , Disp: 90 Tab, Rfl: 1    ondansetron (ZOFRAN ODT) 4 mg disintegrating tablet, Take 1 Tab by mouth every eight (8) hours as needed for Nausea., Disp: 30 Tab, Rfl: 3    ergocalciferol (ERGOCALCIFEROL) 50,000 unit capsule, TAKE 1 CAP BY MOUTH EVERY SEVEN (7) DAYS., Disp: 12 Cap, Rfl: 0    hydrOXYzine pamoate (VISTARIL) 50 mg capsule, TAKE ONE CAPSULE BY MOUTH AT BEDTIME, Disp: 30 Cap, Rfl: 2    ENSURE PLUS 0.05-1.5 gram-kcal/mL liqd, TAKE 237 ML BY MOUTH THREE TIMES DAILY. , Disp: 54704 mL, Rfl: 2    loratadine-pseudoephedrine (CLARITIN-D 12 HOUR) 5-120 mg per tablet, Take 1 Tab by mouth two (2) times daily as needed. , Disp: , Rfl:     norethindrone (MICRONOR) 0.35 mg tab, Take 1 Tab by mouth daily. , Disp: 1 Package, Rfl: 11    Omeprazole delayed release (PRILOSEC D/R) 20 mg tablet, Take 1 Tab by mouth daily. , Disp: 90 Tab, Rfl: 3    cyproheptadine (PERIACTIN) 4 mg tablet, Take 1 Tab by mouth once over twenty-four (24) hours. , Disp: 80 Tab, Rfl: 3  Past Medical History:   Diagnosis Date    Abnormal Papanicolaou smear of cervix     Asthma     Bradycardia     GERD (gastroesophageal reflux disease)     H/O sinus bradycardia     Headache     Migraine     Miscarriage     Photophobia of both eyes     Psychiatric disorder     ANXIETY    Seizures ([facility] [address]  Stomach pain      Social History     Social History    Marital status: SINGLE     Spouse name: N/A    Number of children: 2    Years of education: 12     Occupational History    Not on file. Social History Main Topics    Smoking status: Former Smoker     Packs/day: 1.00     Years: 13.00     Types: Cigarettes     Quit date: 5/5/2015    Smokeless tobacco: Never Used      Comment: cigarello, once a month    Alcohol use No    Drug use: No    Sexual activity: Yes     Partners: Male     Birth control/ protection: Pill     Other Topics Concern     Service Yes     [employer]    Blood Transfusions No    Caffeine Concern No    Occupational Exposure No    Hobby Hazards No    Sleep Concern Yes    Stress Concern No    Weight Concern No    Special Diet Yes    Back Care Yes    Exercise Yes    Bike Helmet No    Seat Belt Yes    Self-Exams No     Social History Narrative       Review of Systems:   Constitutional:  gained, 20 lbs. Eyes:  Negative blurred vision  CVS:  Negative chest pain  Pulm:  Negative shortness of breath  GI:  Negative nausea or vomiting  :  Positive nocturia  Musculoskeletal:  Positive significant joint pain at night  Skin:  Negative rashes  Neuro:  Positive dizziness   Psych:  Positive significant mood issues    Sleep Review of Systems: notable for Positive difficulty falling asleep; Positive awakenings at night; Positive percieved regular dreaming; Positive nightmares; Positive early morning headaches; 0 afternoon naps per week; Negative memory problems; Positive concentration issues; Negative history of any automobile or occupational accidents due to daytime drowsiness.     Physical Exam    Visit Vitals    BP 90/66 (BP 1 Location: Right arm, BP Patient Position: Sitting)    Pulse 75    Temp 98.7 °F (37.1 °C) (Oral)    Resp 16    Ht 5' 6\" (1.676 m)    Wt 51.5 kg (113 lb 9.6 oz)    SpO2 98%    BMI 18.34 kg/m2       Neck Circumference: 31 cm      General:  Well defined, nourished, and groomed individual in no acute distress. HEENT: head :at/nc Throat: no oropharnynx  Crowding noted  Neck: Supple, nontender, thyroid within normal limits, no JVD, no bruits, no pain   with resistance to active range of motion. Heart: Regular rate and rhythm, no murmurs, rub, or gallop. Normal S1S2. Lungs:  Clear to auscultation   Musculoskeletal:  Extremities revealed no edema, clubbing or cyanosis. Psych:  Good mood and normal affect    Neurologic Exam    Mental Status: The patient is awake, alert, and oriented x 3. Speech is fluent and memory appears to be intact, both long and short term. No aphasias or apraxias. Cranial Nerves: II - Visual fields are full to confrontation. Pupils are both equal and reactive to light and accommodation. III, IV, VI - Extraocular movements are intact and there is no nystagmus. VII - Face is symmetrical.   VIII - Hearing is present. Motor: Tone is normal and symmetric. There is no pronator drift present. The strength is intact for all muscle groups tested in all four extremities. Coordination:Gait testing is normal     psg and implications reviewed at  length  Assessment and Plan  Jess De Leon is a 28 y.o. right handed female whose history and physical are consistent with insomnia ?sleep apnea. Jess De Leon who has risk factors including asthma, history of seizures and headaches. Diagnoses and all orders for this visit:    1. Intractable migraine without aura and without status migrainosus  -     divalproex DR (DEPAKOTE) 250 mg tablet; Take 1 Tab by mouth two (2) times a day. Indications: MIGRAINE PREVENTION    2. Analgesic rebound headache    3. Insomnia due to medical condition    4. Anxiety and depression    5. Seizure disorder ([facility] [address]    6. Insomnia, unspecified type  -     zolpidem CR (AMBIEN CR) 6.25 mg tablet; Take 1 Tab by mouth nightly as needed for Sleep. Max Daily Amount: 6.25 mg.     Other orders  -     prenatal multivit-ca-min-fe-fa (PRENATAL VITAMIN) tab; TAKE 1 TAB BY MOUTH DAILY. Follow-up Disposition:  Return in about 6 months (around 6/14/2018). Reviewed work up  Medications need for prenatal and to avoid pregnancy while on depkote I spent 30 minutes with the patient in face-to-face consultation, of which greater than 50% was spent in counseling and coordination of care as described above.         Electronically Signed by:  Lorraine Arauz MD

## 2017-12-14 NOTE — PROGRESS NOTES
1. Have you been to the ER, urgent care clinic since your last visit? Hospitalized since your last visit? No    2. Have you seen or consulted any other health care providers outside of the [address] [place] [address] since your last visit? Include any pap smears or colon screening.   provider for yearly physical    Chief Complaint   Patient presents with    Follow-up    Sleep Problem     insomnia

## 2017-12-27 DIAGNOSIS — M54.5 CHRONIC MIDLINE LOW BACK PAIN, WITH SCIATICA PRESENCE UNSPECIFIED: ICD-10-CM

## 2017-12-27 DIAGNOSIS — G89.29 CHRONIC MIDLINE LOW BACK PAIN, WITH SCIATICA PRESENCE UNSPECIFIED: ICD-10-CM

## 2017-12-27 DIAGNOSIS — M47.816 LUMBAR FACET ARTHROPATHY: ICD-10-CM

## 2017-12-27 RX ORDER — IBUPROFEN 600 MG/1
TABLET ORAL
Qty: 60 TAB | Refills: 0 | Status: SHIPPED | OUTPATIENT
Start: 2017-12-27 | End: 2018-02-13 | Stop reason: SDUPTHER

## 2018-01-03 DIAGNOSIS — R63.0 ANOREXIA: ICD-10-CM

## 2018-01-03 DIAGNOSIS — R62.7 FAILURE TO THRIVE IN ADULT: ICD-10-CM

## 2018-01-03 DIAGNOSIS — E63.9 NUTRITION DISORDER: Primary | ICD-10-CM

## 2018-01-03 DIAGNOSIS — R63.4 WEIGHT LOSS: ICD-10-CM

## 2018-01-10 DIAGNOSIS — G47.00 INSOMNIA, UNSPECIFIED TYPE: ICD-10-CM

## 2018-01-10 RX ORDER — HYDROXYZINE PAMOATE 50 MG/1
CAPSULE ORAL
Qty: 30 CAP | Refills: 2 | OUTPATIENT
Start: 2018-01-10

## 2018-01-10 RX ORDER — ERGOCALCIFEROL 1.25 MG/1
CAPSULE ORAL
Qty: 12 CAP | Refills: 0 | OUTPATIENT
Start: 2018-01-10

## 2018-01-11 RX ORDER — ERGOCALCIFEROL 1.25 MG/1
CAPSULE ORAL
Qty: 12 CAP | Refills: 0 | Status: SHIPPED | OUTPATIENT
Start: 2018-01-11 | End: 2018-04-23 | Stop reason: SDUPTHER

## 2018-01-18 RX ORDER — DIAZEPAM 5 MG/1
10 TABLET ORAL ONCE
Status: CANCELLED | OUTPATIENT
Start: 2018-01-24 | End: 2018-01-24

## 2018-01-18 RX ORDER — SODIUM CHLORIDE 0.9 % (FLUSH) 0.9 %
5-10 SYRINGE (ML) INJECTION AS NEEDED
Status: CANCELLED | OUTPATIENT
Start: 2018-01-18

## 2018-01-26 RX ORDER — DIAZEPAM 5 MG/1
10 TABLET ORAL ONCE
Status: CANCELLED | OUTPATIENT
Start: 2018-01-29 | End: 2018-01-29

## 2018-01-29 ENCOUNTER — APPOINTMENT (OUTPATIENT)
Dept: GENERAL RADIOLOGY | Age: 33
End: 2018-01-29
Attending: PHYSICAL MEDICINE & REHABILITATION
Payer: MEDICAID

## 2018-01-29 ENCOUNTER — HOSPITAL ENCOUNTER (OUTPATIENT)
Age: 33
Setting detail: OUTPATIENT SURGERY
Discharge: HOME | End: 2018-01-29
Attending: PHYSICAL MEDICINE & REHABILITATION | Admitting: PHYSICAL MEDICINE & REHABILITATION
Payer: MEDICAID

## 2018-01-29 VITALS
HEIGHT: 66 IN | SYSTOLIC BLOOD PRESSURE: 100 MMHG | TEMPERATURE: 98.7 F | RESPIRATION RATE: 16 BRPM | DIASTOLIC BLOOD PRESSURE: 64 MMHG | WEIGHT: 113 LBS | OXYGEN SATURATION: 100 % | HEART RATE: 86 BPM | BODY MASS INDEX: 18.16 KG/M2

## 2018-01-29 LAB — HCG UR QL: NEGATIVE

## 2018-01-29 PROCEDURE — 74011250636 HC RX REV CODE- 250/636

## 2018-01-29 PROCEDURE — 74011250636 HC RX REV CODE- 250/636: Performed by: PHYSICAL MEDICINE & REHABILITATION

## 2018-01-29 PROCEDURE — 77030003672 HC NDL SPN HALY -A: Performed by: PHYSICAL MEDICINE & REHABILITATION

## 2018-01-29 PROCEDURE — 76010000009 HC PAIN MGT 0 TO 30 MIN PROC: Performed by: PHYSICAL MEDICINE & REHABILITATION

## 2018-01-29 PROCEDURE — 74011250637 HC RX REV CODE- 250/637: Performed by: PHYSICAL MEDICINE & REHABILITATION

## 2018-01-29 PROCEDURE — 81025 URINE PREGNANCY TEST: CPT

## 2018-01-29 PROCEDURE — 74011000250 HC RX REV CODE- 250

## 2018-01-29 RX ORDER — SODIUM CHLORIDE 0.9 % (FLUSH) 0.9 %
5-10 SYRINGE (ML) INJECTION AS NEEDED
Status: DISCONTINUED | OUTPATIENT
Start: 2018-01-29 | End: 2018-01-29 | Stop reason: HOSPADM

## 2018-01-29 RX ORDER — DIAZEPAM 5 MG/1
10 TABLET ORAL ONCE
Status: DISCONTINUED | OUTPATIENT
Start: 2018-01-29 | End: 2018-01-29 | Stop reason: HOSPADM

## 2018-01-29 RX ORDER — DIAZEPAM 5 MG/1
10 TABLET ORAL ONCE
Status: COMPLETED | OUTPATIENT
Start: 2018-01-29 | End: 2018-01-29

## 2018-01-29 RX ORDER — ROPIVACAINE HYDROCHLORIDE 2 MG/ML
INJECTION, SOLUTION EPIDURAL; INFILTRATION; PERINEURAL AS NEEDED
Status: DISCONTINUED | OUTPATIENT
Start: 2018-01-29 | End: 2018-01-29 | Stop reason: HOSPADM

## 2018-01-29 RX ADMIN — DIAZEPAM 10 MG: 5 TABLET ORAL at 10:02

## 2018-01-29 NOTE — PROCEDURES
[facility]    DIAG LUMBAR FACET INJECTIONS  PROCEDURE REPORT      PATIENT:  Dougie Esquivel OF BIRTH:  1985  DATE OF SERVICE:  1/29/2018  [facility]    PRE-PROCEDURE DIAGNOSIS:  See Above    POST-PROCEDURE DIAGNOSIS:  See Above                PROCEDURE:  1. Bilateral diagnostic lumbar medial branch blocks via the   L3/L4,  L4/L5,  L5/S1 medial branch nerves ( 91459, 50;  53750, 50;  32507, 50 )  2. Fluoroscopic needle guidance (71579)      LEVELS TREATED:  Bilateral sided  L3/L4,  L4/L5,  L5/S1 medial branch nerves     ANESTHESIA:  See Medication Administration Record    COMPLICATIONS: None. PHYSICIAN:  Hillary Carranza MD    PRE-PROCEDURE NOTE:  Pre-procedural assessment of the patient was performed including a limited history and physical examination. The details of the procedure were discussed with the patient, including the risks, benefits and alternative options and an informed consent was obtained. The patients NPO status, if necessary for the specific procedure and/or administration of moderate intravenous sedation, if utilized, and availability of a responsible adult to escort the patient following the procedure were confirmed. PROCEDURE NOTE:  The patient was brought to the procedure suite and positioned on the fluoroscopy table in the prone position. Physiologic monitors were applied and supplemental oxygen was administered via nasal cannula. The skin was prepped in the standard surgical fashion and sterile drapes were applied over the procedure site. Please refer to the Flowsheet for documentation of the patients vital signs and the Medication Administration Record for any oral and/or intravenous sedation administered prior to or during the procedure. 1% Lidocaine was utilized for local anesthesia.  Under AP fluoroscopic guidance a 25-gauge, 3-1/2 inch short bevel spinal needle was advanced to the junction of the superior articular process and transverse process of each vertebral level immediately inferior to the above-mentioned dorsal rami medial branch nerves. A needle was also placed along the sacral ala to block the L5 medial branch nerve. After all needles were placed,  0.5 mL of 0.2% ropivacaine was injected at each location after the negative aspiration of blood, air or CSF. The needles were removed and the stilets were replaced. The procedure was performed on the contralateral side in the same fashion and at the same levels using the same volume of local anesthetic following negative aspiration of blood, air or CSF. The needles were removed intact. The area was thoroughly cleaned and sterile bandages applied as necessary. The patient tolerated the procedure well and vital signs remained stable throughout the procedure. The patient was assessed immediately following the procedure and was noted to have greater than 50% reduction in pain (a reduction from 7/10 to 2/10 in severity of lumbar pain). Based on these results, this procedure will be repeated to assess if the patient is an appropriate candidate for radiofrequency ablation of the above-mentioned medial branch nerves. Based on these results, the patient is considered an appropriate candidate for radiofrequency ablation of the above-mentioned medial branch nerves and will be scheduled for that procedure. The total levels successfully blocked were 3 levels, both sides. POST-PROCEDURE COURSE:  The patient was escorted from the procedure suite in satisfactory condition and recovered per facility protocol based on the type of procedure performed and/or the sedation utilized. The patient did not experience any adverse events and remained hemodynamically stable during the post-procedure period.       DISCHARGE NOTE:  Upon discharge, the patient was able to tolerate fluids and was in no acute distress. The patient was oriented to person, place and time and vital signs were stable. Appropriate post-procedure instructions were provided and explained to the patient in detail and all questions were answered.     Celena Marin MD 1/29/2018 11:37 AM

## 2018-01-29 NOTE — DISCHARGE INSTRUCTIONS
[address] [place] [address] for Pain Management      Post Procedures Instructions    *Resume Diet and Activity as tolerated. Rest for the remainder of the day. *You may fell worse before you feel better as the numbing medications wear off before the steroids take effect if used for your procedures. *Do not use affected extremity until numbness or loss of sensation has completely resolved without assistance. *DO NOT DRIVE, operate machinery/heavey equipment for 24 hours. *DO NOT DRINK ALCOHOL for 24 hours as it may interact with the sedation if you received it and also thins your blood and may cause you to bleed. *WAIT 24 hours before starting back ANY Blood thinning medications:   (Heparin, Coumadin, Warfarin, Lovenox, Plavix, Aggrenox)    *Resume Pre-Procedure Medications as prescribed except Blood Thinners unless directed by your Physician or Cardiologist.     *Avoid Hot tubs and Heating pad for 24 hours to prevent dissipation of medications, you may shower to remove bandages and remaining prep residue on the skin. * If you develop a Headache, drink plenty of fluids including beverages with caffeine (Coffee, Mt. Dew etc.) and rest.  If the headache persists longer than 24 hoursor intensifies - Please call [facility] ([facility]) [phone]    * If you are DIABETIC, check your blood sugar three times a day for the next three days, the steroids will increase your blood sugar. If your blood sugar is greater than 400 have someone drive you to the nearest [address]. * If you experience any of the following problems, call the [facility] for [facility] [phone] between 8:00 am - 4:30pm or After Hours [phone].     Shortness of breath    Fever of 101 F or higher    Nausea / Vomiting (not normal to you)    Increasing stiffness in the neck    Weakness or numbness in the arms or legs that is not resolving    Prolonged and increasing pain > than 4 days    ANYTHING OUT of the ORDINARY TO YOU    If YOU are experiencing a severe reaction / complication that you have never had before post procedure, call 911 or go to the nearest emergency room! All patients must have a  for transportation [place] regardless if you do or do not receive sedation. DISCHARGE SUMMARY from Nurse      PATIENT INSTRUCTIONS:    After Oral  or intravenous sedation, for 24 hours or while taking prescription Narcotics:  · Limit your activities  · Do not drive and operate hazardous machinery  · Do not make important personal or business decisions  · Do  not drink alcoholic beverages  · If you have not urinated within 8 hours after discharge, please contact your surgeon on call. Report the following to your surgeon:  · Excessive pain, swelling, redness or odor of or around the surgical area  · Temperature over 101  · Nausea and vomiting lasting longer than 4 hours or if unable to take medications  · Any signs of decreased circulation or nerve impairment to extremity: change in color, persistent  numbness, tingling, coldness or increase pain  · Any questions        What to do at Home:  Recommended activity: Activity as tolerated, NO DRIVING FOR 24 Hours post injection          *  Please give a list of your current medications to your Primary Care Provider. *  Please update this list whenever your medications are discontinued, doses are      changed, or new medications (including over-the-counter products) are added. *  Please carry medication information at all times in case of emergency situations. These are general instructions for a healthy lifestyle:    No smoking/ No tobacco products/ Avoid exposure to second hand smoke    Surgeon General's Warning:  Quitting smoking now greatly reduces serious risk to your health.     Obesity, smoking, and sedentary lifestyle greatly increases your risk for illness    A healthy diet, regular physical exercise & weight monitoring are important for maintaining a healthy lifestyle    You may be retaining fluid if you have a history of heart failure or if you experience any of the following symptoms:  Weight gain of 3 pounds or more overnight or 5 pounds in a week, increased swelling in our hands or feet or shortness of breath while lying flat in bed. Please call your doctor as soon as you notice any of these symptoms; do not wait until your next office visit. Recognize signs and symptoms of STROKE:    F-face looks uneven    A-arms unable to move or move unevenly    S-speech slurred or non-existent    T-time-call 911 as soon as signs and symptoms begin-DO NOT go       Back to bed or wait to see if you get better-TIME IS BRAIN.

## 2018-01-29 NOTE — H&P
29 Jan 2018, 9:27AM. Patient seen and examined prior to procedure. Chart and data reviewed. No significant interval changes from previous evaluation noted. Stable for procedure as intended and discussed.  [facility]

## 2018-01-30 ENCOUNTER — TELEPHONE (OUTPATIENT)
Dept: PAIN MANAGEMENT | Age: 33
End: 2018-01-30

## 2018-01-30 NOTE — TELEPHONE ENCOUNTER
Ms. Francisco Medellin was contacted for follow-up status post Bilateral diagnostic lumbar medial branch blocks via the   L3/L4,  L4/L5,  L5/S1 medial branch nerves   on January 29, 2018.  She reports:  Epre-procedure numerical pain score: 8/10  Post-procedure numerical pain score immediately after: 3/10  Duration of relief post-procedure (if applicable): 6 hrs  Improvement in functional activities (if applicable): Yes  Percentage of overall improvement: 60%    COMMENTS:

## 2018-02-07 ENCOUNTER — HOSPITAL ENCOUNTER (EMERGENCY)
Age: 33
Discharge: HOME | End: 2018-02-08
Attending: EMERGENCY MEDICINE
Payer: MEDICAID

## 2018-02-07 ENCOUNTER — TELEPHONE (OUTPATIENT)
Dept: ORTHOPEDIC SURGERY | Age: 33
End: 2018-02-07

## 2018-02-07 DIAGNOSIS — R07.9 LEFT SIDED CHEST PAIN: Primary | ICD-10-CM

## 2018-02-07 DIAGNOSIS — M54.5 CHRONIC MIDLINE LOW BACK PAIN, WITH SCIATICA PRESENCE UNSPECIFIED: ICD-10-CM

## 2018-02-07 DIAGNOSIS — G89.29 CHRONIC MIDLINE LOW BACK PAIN, WITH SCIATICA PRESENCE UNSPECIFIED: ICD-10-CM

## 2018-02-07 DIAGNOSIS — M47.816 LUMBAR FACET ARTHROPATHY: ICD-10-CM

## 2018-02-07 DIAGNOSIS — R06.02 SOB (SHORTNESS OF BREATH): ICD-10-CM

## 2018-02-07 DIAGNOSIS — Z30.09 FAMILY PLANNING: ICD-10-CM

## 2018-02-07 PROCEDURE — 99285 EMERGENCY DEPT VISIT HI MDM: CPT

## 2018-02-07 PROCEDURE — 93005 ELECTROCARDIOGRAM TRACING: CPT

## 2018-02-07 NOTE — TELEPHONE ENCOUNTER
I called the pharmacists and explained to her that the patient will have to make a F/U to receive any refills on meds.

## 2018-02-07 NOTE — TELEPHONE ENCOUNTER
[id number] [facility] [phone] requesting  mg Qt 60   Please sent electronic or fax.  Thank  you

## 2018-02-07 NOTE — TELEPHONE ENCOUNTER
Patient called in a refill request for her birth control. This was being prescribed to her by Ping Alex. She has made a GYN appointment with Dr. Shruthi Rudd, but it is not until March 2018, and she is currently out of medication, and Dr. Marie Murillo will not prescribe it to her until she is seen. Can you please call in 2 months of birth control for her so she is notwithout it. She also has a follow up appointment scheduled with you for Tuesday 2/13. Please call the patient at #[phone] with any questions or concerns.

## 2018-02-08 ENCOUNTER — APPOINTMENT (OUTPATIENT)
Dept: GENERAL RADIOLOGY | Age: 33
End: 2018-02-08
Attending: EMERGENCY MEDICINE
Payer: MEDICAID

## 2018-02-08 VITALS
DIASTOLIC BLOOD PRESSURE: 70 MMHG | HEIGHT: 66 IN | OXYGEN SATURATION: 99 % | BODY MASS INDEX: 17.04 KG/M2 | HEART RATE: 79 BPM | TEMPERATURE: 97.9 F | RESPIRATION RATE: 13 BRPM | SYSTOLIC BLOOD PRESSURE: 109 MMHG | WEIGHT: 106 LBS

## 2018-02-08 LAB
ANION GAP SERPL CALC-SCNC: 9 MMOL/L (ref 3–18)
ATRIAL RATE: 53 BPM
ATRIAL RATE: 77 BPM
BASOPHILS # BLD: 0 K/UL (ref 0–0.06)
BASOPHILS NFR BLD: 0 % (ref 0–2)
BUN SERPL-MCNC: 13 MG/DL (ref 7–18)
BUN/CREAT SERPL: 15 (ref 12–20)
CALCIUM SERPL-MCNC: 9.2 MG/DL (ref 8.5–10.1)
CALCULATED P AXIS, ECG09: 108 DEGREES
CALCULATED P AXIS, ECG09: 52 DEGREES
CALCULATED R AXIS, ECG10: 61 DEGREES
CALCULATED R AXIS, ECG10: 66 DEGREES
CALCULATED T AXIS, ECG11: 4 DEGREES
CALCULATED T AXIS, ECG11: 59 DEGREES
CHLORIDE SERPL-SCNC: 107 MMOL/L (ref 100–108)
CO2 SERPL-SCNC: 28 MMOL/L (ref 21–32)
CREAT SERPL-MCNC: 0.85 MG/DL (ref 0.6–1.3)
D DIMER PPP FEU-MCNC: 0.3 UG/ML(FEU)
DIAGNOSIS, 93000: NORMAL
DIAGNOSIS, 93000: NORMAL
DIFFERENTIAL METHOD BLD: NORMAL
EOSINOPHIL # BLD: 0.1 K/UL (ref 0–0.4)
EOSINOPHIL NFR BLD: 1 % (ref 0–5)
ERYTHROCYTE [DISTWIDTH] IN BLOOD BY AUTOMATED COUNT: 11.9 % (ref 11.6–14.5)
GLUCOSE SERPL-MCNC: 76 MG/DL (ref 74–99)
HCG SERPL QL: NEGATIVE
HCT VFR BLD AUTO: 39.2 % (ref 35–45)
HGB BLD-MCNC: 13.2 G/DL (ref 12–16)
LYMPHOCYTES # BLD: 2.9 K/UL (ref 0.9–3.6)
LYMPHOCYTES NFR BLD: 37 % (ref 21–52)
MCH RBC QN AUTO: 30.3 PG (ref 24–34)
MCHC RBC AUTO-ENTMCNC: 33.7 G/DL (ref 31–37)
MCV RBC AUTO: 89.9 FL (ref 74–97)
MONOCYTES # BLD: 0.7 K/UL (ref 0.05–1.2)
MONOCYTES NFR BLD: 9 % (ref 3–10)
NEUTS SEG # BLD: 4.2 K/UL (ref 1.8–8)
NEUTS SEG NFR BLD: 53 % (ref 40–73)
P-R INTERVAL, ECG05: 114 MS
P-R INTERVAL, ECG05: 116 MS
PLATELET # BLD AUTO: 264 K/UL (ref 135–420)
PMV BLD AUTO: 10.6 FL (ref 9.2–11.8)
POTASSIUM SERPL-SCNC: 3.8 MMOL/L (ref 3.5–5.5)
Q-T INTERVAL, ECG07: 380 MS
Q-T INTERVAL, ECG07: 462 MS
QRS DURATION, ECG06: 74 MS
QRS DURATION, ECG06: 88 MS
QTC CALCULATION (BEZET), ECG08: 430 MS
QTC CALCULATION (BEZET), ECG08: 433 MS
RBC # BLD AUTO: 4.36 M/UL (ref 4.2–5.3)
SODIUM SERPL-SCNC: 144 MMOL/L (ref 136–145)
TROPONIN I BLD-MCNC: <0.04 NG/ML (ref 0–0.08)
TROPONIN I BLD-MCNC: <0.04 NG/ML (ref 0–0.08)
VENTRICULAR RATE, ECG03: 53 BPM
VENTRICULAR RATE, ECG03: 77 BPM
WBC # BLD AUTO: 7.8 K/UL (ref 4.6–13.2)

## 2018-02-08 PROCEDURE — 74011250636 HC RX REV CODE- 250/636: Performed by: EMERGENCY MEDICINE

## 2018-02-08 PROCEDURE — 85025 COMPLETE CBC W/AUTO DIFF WBC: CPT | Performed by: EMERGENCY MEDICINE

## 2018-02-08 PROCEDURE — 84484 ASSAY OF TROPONIN QUANT: CPT

## 2018-02-08 PROCEDURE — 80048 BASIC METABOLIC PNL TOTAL CA: CPT | Performed by: EMERGENCY MEDICINE

## 2018-02-08 PROCEDURE — 71046 X-RAY EXAM CHEST 2 VIEWS: CPT

## 2018-02-08 PROCEDURE — 84703 CHORIONIC GONADOTROPIN ASSAY: CPT | Performed by: EMERGENCY MEDICINE

## 2018-02-08 PROCEDURE — 93005 ELECTROCARDIOGRAM TRACING: CPT

## 2018-02-08 PROCEDURE — 85379 FIBRIN DEGRADATION QUANT: CPT | Performed by: EMERGENCY MEDICINE

## 2018-02-08 PROCEDURE — 96374 THER/PROPH/DIAG INJ IV PUSH: CPT

## 2018-02-08 RX ORDER — ACETAMINOPHEN 500 MG
1000 TABLET ORAL
Qty: 50 TAB | Refills: 0 | Status: SHIPPED | OUTPATIENT
Start: 2018-02-08 | End: 2018-03-16

## 2018-02-08 RX ORDER — ACETAMINOPHEN AND CODEINE PHOSPHATE 120; 12 MG/5ML; MG/5ML
1 SOLUTION ORAL DAILY
Qty: 1 PACKAGE | Refills: 1 | Status: CANCELLED | OUTPATIENT
Start: 2018-02-08

## 2018-02-08 RX ORDER — KETOROLAC TROMETHAMINE 30 MG/ML
15 INJECTION, SOLUTION INTRAMUSCULAR; INTRAVENOUS
Status: COMPLETED | OUTPATIENT
Start: 2018-02-08 | End: 2018-02-08

## 2018-02-08 RX ADMIN — KETOROLAC TROMETHAMINE 15 MG: 30 INJECTION, SOLUTION INTRAMUSCULAR at 02:51

## 2018-02-08 NOTE — ED PROVIDER NOTES
HPI Comments: Mariusz Giles is a 28 y.o. Female with c/o onset of left sided cp, sharp, radiating down left arm with sweats, sob that started about 6 hours ago and has since resolved while she was waiting to come back to room. Did notice that her left ankle, foot was swollen within last day. No recent patel, exertional cp, fcs, nvd, syncope. No similar sx in past. Nothing taken. Not exertionally related. Is on estrogen bcp. Non smoker. No h/o vte, cancer, hemoptysis, htn, dm, chol, cocaine, chronic inflammatory disease    The history is provided by the patient. Past Medical History:   Diagnosis Date    Abnormal Papanicolaou smear of cervix     Asthma     Bradycardia     GERD (gastroesophageal reflux disease)     H/O sinus bradycardia     Headache     Migraine     Miscarriage     Photophobia of both eyes     Psychiatric disorder     ANXIETY    Seizures ([address]     last episode     Stomach pain        Past Surgical History:   Procedure Laterality Date    HX APPENDECTOMY      HX  SECTION      HX GYN      cervical cerclage    HX OTHER SURGICAL  2017    Mendel L4-5, L5-S1 Facet Joint block         Family History:   Problem Relation Age of Onset    Diabetes Mother     Heart Attack Father     Attention Deficit Hyperactivity Disorder Sister     Attention Deficit Hyperactivity Disorder Brother     Cancer Maternal Aunt 45     breast    Diabetes Maternal Grandmother     Attention Deficit Hyperactivity Disorder Brother     No Known Problems Brother     No Known Problems Brother     No Known Problems Brother     Cancer Maternal Aunt      lung    Heart defect Son    [facility] Migraines Son     Seizures Son      h/o    Other Son      h/o jaundice       Social History     Social History    Marital status: SINGLE     Spouse name: N/A    Number of children: 2    Years of education: 12     Occupational History    Not on file.      Social History Main Topics    Smoking status: Former Smoker     Packs/day: 1.00     Years: 13.00     Types: Cigarettes     Quit date: 5/5/2015    Smokeless tobacco: Never Used      Comment: cigarello, once a month    Alcohol use No    Drug use: No    Sexual activity: Yes     Partners: Male     Birth control/ protection: Pill     Other Topics Concern     Service Yes     [employer]    Blood Transfusions No    Caffeine Concern No    Occupational Exposure No    Hobby Hazards No    Sleep Concern Yes    Stress Concern No    Weight Concern No    Special Diet Yes    Back Care Yes    Exercise Yes    Bike Helmet No    Seat Belt Yes    Self-Exams No     Social History Narrative         ALLERGIES: Imitrex [sumatriptan succinate]; Iodine; and Sea food [seafood]    Review of Systems   Constitutional: Negative for fever. HENT: Negative for sore throat and trouble swallowing. Eyes: Negative for visual disturbance. Respiratory: Positive for shortness of breath. Negative for cough and stridor. Cardiovascular: Positive for leg swelling (resolved). Gastrointestinal: Negative for abdominal pain. Endocrine: Negative for polyuria. Genitourinary: Negative for difficulty urinating. Musculoskeletal: Negative for gait problem. Skin: Negative for rash. Allergic/Immunologic: Negative for immunocompromised state. Neurological: Negative for syncope. Psychiatric/Behavioral: Positive for sleep disturbance. Vitals:    02/07/18 2151 02/08/18 0200 02/08/18 0230   BP: 116/75 103/67 109/65   Pulse: 79     Resp: 13     Temp: 97.9 °F (36.6 °C)     SpO2: 100% 99% 100%   Weight: 48.1 kg (106 lb)     Height: 5' 6\" (1.676 m)              Physical Exam   Constitutional: She is oriented to person, place, and time. She appears well-developed and well-nourished. No distress. HENT:   Head: Normocephalic and atraumatic.    Right Ear: External ear normal.   Left Ear: External ear normal.   Nose: Nose normal.   Mouth/Throat: Uvula is midline, oropharynx is clear and moist and mucous membranes are normal.   Eyes: Conjunctivae are normal. No scleral icterus. Neck: Neck supple. Cardiovascular: Normal rate, regular rhythm, normal heart sounds and intact distal pulses. Pulmonary/Chest: Effort normal and breath sounds normal. She exhibits no tenderness. Slight pain on left side with deep breath     Abdominal: Soft. There is no tenderness. Musculoskeletal: She exhibits no edema. Neurological: She is alert and oriented to person, place, and time. Gait normal.   Skin: Skin is warm and dry. She is not diaphoretic. Psychiatric: Her behavior is normal.   Nursing note and vitals reviewed.        [facility]      ED Course       Procedures    Vitals:  Patient Vitals for the past 12 hrs:   Temp Pulse Resp BP SpO2   02/08/18 0230 - - - 109/65 100 %   02/08/18 0200 - - - 103/67 99 %   02/07/18 2151 97.9 °F (36.6 °C) 79 13 116/75 100 %         Medications ordered:   Medications   ketorolac (TORADOL) injection 15 mg (15 mg IntraVENous Given 2/8/18 0251)         Lab findings:  Recent Results (from the past 12 hour(s))   EKG, 12 LEAD, INITIAL    Collection Time: 02/07/18  9:24 PM   Result Value Ref Range    Ventricular Rate 77 BPM    Atrial Rate 77 BPM    P-R Interval 116 ms    QRS Duration 74 ms    Q-T Interval 380 ms    QTC Calculation (Bezet) 430 ms    Calculated P Axis 108 degrees    Calculated R Axis 61 degrees    Calculated T Axis 4 degrees    Diagnosis       Normal sinus rhythm with sinus arrhythmia  Normal ECG  No previous ECGs available     CBC WITH AUTOMATED DIFF    Collection Time: 02/08/18  1:00 AM   Result Value Ref Range    WBC 7.8 4.6 - 13.2 K/uL    RBC 4.36 4.20 - 5.30 M/uL    HGB 13.2 12.0 - 16.0 g/dL    HCT 39.2 35.0 - 45.0 %    MCV 89.9 74.0 - 97.0 FL    MCH 30.3 24.0 - 34.0 PG    MCHC 33.7 31.0 - 37.0 g/dL    RDW 11.9 11.6 - 14.5 %    PLATELET 960 644 - 753 K/uL    MPV 10.6 9.2 - 11.8 FL    NEUTROPHILS 53 40 - 73 %    LYMPHOCYTES 37 21 - 52 %    MONOCYTES 9 3 - 10 % EOSINOPHILS 1 0 - 5 %    BASOPHILS 0 0 - 2 %    ABS. NEUTROPHILS 4.2 1.8 - 8.0 K/UL    ABS. LYMPHOCYTES 2.9 0.9 - 3.6 K/UL    ABS. MONOCYTES 0.7 0.05 - 1.2 K/UL    ABS. EOSINOPHILS 0.1 0.0 - 0.4 K/UL    ABS.  BASOPHILS 0.0 0.0 - 0.06 K/UL    DF AUTOMATED     HCG QL SERUM    Collection Time: 02/08/18  1:00 AM   Result Value Ref Range    HCG, Ql. NEGATIVE  NEG     METABOLIC PANEL, BASIC    Collection Time: 02/08/18  1:00 AM   Result Value Ref Range    Sodium 144 136 - 145 mmol/L    Potassium 3.8 3.5 - 5.5 mmol/L    Chloride 107 100 - 108 mmol/L    CO2 28 21 - 32 mmol/L    Anion gap 9 3.0 - 18 mmol/L    Glucose 76 74 - 99 mg/dL    BUN 13 7.0 - 18 MG/DL    Creatinine 0.85 0.6 - 1.3 MG/DL    BUN/Creatinine ratio 15 12 - 20      GFR est AA >60 >60 ml/min/1.73m2    GFR est non-AA >60 >60 ml/min/1.73m2    Calcium 9.2 8.5 - 10.1 MG/DL   D DIMER    Collection Time: 02/08/18  1:00 AM   Result Value Ref Range    D DIMER 0.30 <0.46 ug/ml(FEU)   POC TROPONIN-I    Collection Time: 02/08/18  1:07 AM   Result Value Ref Range    Troponin-I (POC) <0.04 0.00 - 0.08 ng/mL   POC TROPONIN-I    Collection Time: 02/08/18  4:20 AM   Result Value Ref Range    Troponin-I (POC) <0.04 0.00 - 0.08 ng/mL   EKG, 12 LEAD, SUBSEQUENT    Collection Time: 02/08/18  4:23 AM   Result Value Ref Range    Ventricular Rate 53 BPM    Atrial Rate 53 BPM    P-R Interval 114 ms    QRS Duration 88 ms    Q-T Interval 462 ms    QTC Calculation (Bezet) 433 ms    Calculated P Axis 52 degrees    Calculated R Axis 66 degrees    Calculated T Axis 59 degrees    Diagnosis       Sinus bradycardia  Otherwise normal ECG  When compared with ECG of 07-FEB-2018 21:24,  T wave inversion no longer evident in Inferior leads         EKG interpretation by ED Physician:  nsr with no acute st tw changes  Rate 77, pr 116, qtc 430  No previous    Repeat: sinus juan miguel with no acute changes, st tw from previous  Rate 53, pr 114, qtc 433      X-Ray, CT or other radiology findings or impressions:  XR CHEST PA LAT    (Results Pending)   nap ep interp    Progress notes, Consult notes or additional Procedure notes:   No furhter pain. Doubt pe, mi, dissection need for other work up   I have discussed with patient and/or family/sig other the results, interpretation of any imaging if performed, suspected diagnosis and treatment plan to include instructions regarding the diagnoses listed to which understanding was expressed with all questions answered      Reevaluation of patient:   stable    Disposition:  Diagnosis:   1. Left sided chest pain    2. SOB (shortness of breath)    3. Lumbar facet arthropathy    4. Chronic midline low back pain, with sciatica presence unspecified        Disposition: home    Follow-up Information     Follow up With Details Comments [address] [place], [place] Schedule an appointment as soon as possible for a visit  [address]      [facility] EMERGENCY DEPT  If symptoms worsen [address] [facility] [address].  [phone]            Patient's Medications   Start Taking    ACETAMINOPHEN (TYLENOL EXTRA STRENGTH) 500 MG TABLET    Take 2 Tabs by mouth every six (6) hours as needed for Pain. Continue Taking    ALBUTEROL (VENTOLIN HFA) 90 MCG/ACTUATION INHALER    INHELE 1 PUFF EVERY FOUR (4) HOURS AS NEEDED FOR WHEEZING OR SHORTNESS OF BREATH. CYPROHEPTADINE (PERIACTIN) 4 MG TABLET    Take 1 Tab by mouth once over twenty-four (24) hours. DIVALPROEX DR (DEPAKOTE) 250 MG TABLET    Take 1 Tab by mouth two (2) times a day. Indications: MIGRAINE PREVENTION    ERGOCALCIFEROL (ERGOCALCIFEROL) 50,000 UNIT CAPSULE    TAKE 1 CAP BY MOUTH EVERY SEVEN (7) DAYS. FOOD SUPPLEMT, LACTOSE-REDUCED (ENSURE PLUS) 0.05-1.5 GRAM-KCAL/ML LIQD    TAKE 237 ML BY MOUTH THREE TIMES DAILY.     HYDROXYZINE PAMOATE (VISTARIL) 50 MG CAPSULE    TAKE ONE CAPSULE BY MOUTH AT BEDTIME    IBUPROFEN (MOTRIN) 600 MG TABLET    TAKE 1 TABLET BY MOUTH TWICE A DAY AS NEEDED FOR PAIN TAKE WITH FOOD    LORATADINE-PSEUDOEPHEDRINE (CLARITIN-D 12 HOUR) 5-120 MG PER TABLET    Take 1 Tab by mouth two (2) times daily as needed. MULTIVITAMIN WITH IRON (DAILY MULTIVITAMINS/IRON PO)    Take  by mouth. NORETHINDRONE (MICRONOR) 0.35 MG TAB    Take 1 Tab by mouth daily. OMEPRAZOLE DELAYED RELEASE (PRILOSEC D/R) 20 MG TABLET    Take 1 Tab by mouth daily. ONDANSETRON (ZOFRAN ODT) 4 MG DISINTEGRATING TABLET    Take 1 Tab by mouth every eight (8) hours as needed for Nausea. PAROXETINE (PAXIL) 30 MG TABLET    Take 1 Tab by mouth daily. PRENATAL MULTIVIT-CA-MIN-FE-FA (PRENATAL VITAMIN) TAB    TAKE 1 TAB BY MOUTH DAILY. ZOLPIDEM CR (AMBIEN CR) 6.25 MG TABLET    Take 1 Tab by mouth nightly as needed for Sleep. Max Daily Amount: 6.25 mg.    These Medications have changed    No medications on file   Stop Taking    No medications on file

## 2018-02-08 NOTE — ED TRIAGE NOTES
C/o constant \"electricuting\" left-sided chest/rib pain that radiates to left arm and left side of jaw that began 25 minutes ago with shortness of breath and nausea.

## 2018-02-13 ENCOUNTER — OFFICE VISIT (OUTPATIENT)
Dept: FAMILY MEDICINE CLINIC | Facility: CLINIC | Age: 33
End: 2018-02-13

## 2018-02-13 VITALS
TEMPERATURE: 97.7 F | HEART RATE: 72 BPM | OXYGEN SATURATION: 100 % | WEIGHT: 112 LBS | HEIGHT: 66 IN | SYSTOLIC BLOOD PRESSURE: 111 MMHG | DIASTOLIC BLOOD PRESSURE: 59 MMHG | BODY MASS INDEX: 18 KG/M2 | RESPIRATION RATE: 16 BRPM

## 2018-02-13 DIAGNOSIS — Z00.00 ROUTINE GENERAL MEDICAL EXAMINATION AT HEALTH CARE FACILITY: Primary | ICD-10-CM

## 2018-02-13 DIAGNOSIS — M54.5 CHRONIC MIDLINE LOW BACK PAIN, WITH SCIATICA PRESENCE UNSPECIFIED: ICD-10-CM

## 2018-02-13 DIAGNOSIS — M79.671 RIGHT FOOT PAIN: ICD-10-CM

## 2018-02-13 DIAGNOSIS — R07.9 CHEST PAIN OF UNKNOWN ETIOLOGY: ICD-10-CM

## 2018-02-13 DIAGNOSIS — M47.816 LUMBAR FACET ARTHROPATHY: ICD-10-CM

## 2018-02-13 DIAGNOSIS — Z30.09 FAMILY PLANNING: ICD-10-CM

## 2018-02-13 DIAGNOSIS — G89.29 CHRONIC MIDLINE LOW BACK PAIN, WITH SCIATICA PRESENCE UNSPECIFIED: ICD-10-CM

## 2018-02-13 RX ORDER — ACETAMINOPHEN AND CODEINE PHOSPHATE 120; 12 MG/5ML; MG/5ML
1 SOLUTION ORAL DAILY
Qty: 1 PACKAGE | Refills: 2 | Status: SHIPPED | OUTPATIENT
Start: 2018-02-13 | End: 2018-03-19

## 2018-02-13 RX ORDER — IBUPROFEN 600 MG/1
TABLET ORAL
Qty: 60 TAB | Refills: 0 | Status: SHIPPED | OUTPATIENT
Start: 2018-02-13 | End: 2018-03-19 | Stop reason: ALTCHOICE

## 2018-02-13 NOTE — PROGRESS NOTES
Pain in left arm, swelling in right foot, chest pain on and off on for 20 minutes. Since Thursday. Today's Date:  2018   Patient:  Angy Saavedra  Patient :  1985    Subjective:   Angy Saavedra is a 28 y.o. female who presents for follow up for multiple conditions. Her main concerns today are:  ongoing chest pain and left arm pain. She was seen at the ED on 18 for chest pain, left arm pain, and shortness of breath. Her diagnostic workup was negative for cardiac etiology. Right foot swelling - She reports swelling to top of her right foot on and off for the past several weeks. She denies injury. No swelling today. Current Outpatient Meds and Allergies     Current Outpatient Prescriptions on File Prior to Visit   Medication Sig Dispense Refill    acetaminophen (TYLENOL EXTRA STRENGTH) 500 mg tablet Take 2 Tabs by mouth every six (6) hours as needed for Pain. 50 Tab 0    MULTIVITAMIN WITH IRON (DAILY MULTIVITAMINS/IRON PO) Take  by mouth.  ergocalciferol (ERGOCALCIFEROL) 50,000 unit capsule TAKE 1 CAP BY MOUTH EVERY SEVEN (7) DAYS. 12 Cap 0    food supplemt, lactose-reduced (ENSURE PLUS) 0.05-1.5 gram-kcal/mL liqd TAKE 237 ML BY MOUTH THREE TIMES DAILY. 66872 mL 2    ibuprofen (MOTRIN) 600 mg tablet TAKE 1 TABLET BY MOUTH TWICE A DAY AS NEEDED FOR PAIN TAKE WITH FOOD 60 Tab 0    prenatal multivit-ca-min-fe-fa (PRENATAL VITAMIN) tab TAKE 1 TAB BY MOUTH DAILY. 30 Tab 5    zolpidem CR (AMBIEN CR) 6.25 mg tablet Take 1 Tab by mouth nightly as needed for Sleep. Max Daily Amount: 6.25 mg. 30 Tab 0    divalproex DR (DEPAKOTE) 250 mg tablet Take 1 Tab by mouth two (2) times a day. Indications: MIGRAINE PREVENTION 60 Tab 5    albuterol (VENTOLIN HFA) 90 mcg/actuation inhaler INHELE 1 PUFF EVERY FOUR (4) HOURS AS NEEDED FOR WHEEZING OR SHORTNESS OF BREATH. 1 Inhaler 3    PARoxetine (PAXIL) 30 mg tablet Take 1 Tab by mouth daily.  90 Tab 1    ondansetron (ZOFRAN ODT) 4 mg disintegrating tablet Take 1 Tab by mouth every eight (8) hours as needed for Nausea. 30 Tab 3    hydrOXYzine pamoate (VISTARIL) 50 mg capsule TAKE ONE CAPSULE BY MOUTH AT BEDTIME 30 Cap 2    loratadine-pseudoephedrine (CLARITIN-D 12 HOUR) 5-120 mg per tablet Take 1 Tab by mouth two (2) times daily as needed.  norethindrone (MICRONOR) 0.35 mg tab Take 1 Tab by mouth daily. 1 Package 11    Omeprazole delayed release (PRILOSEC D/R) 20 mg tablet Take 1 Tab by mouth daily. 90 Tab 3    cyproheptadine (PERIACTIN) 4 mg tablet Take 1 Tab by mouth once over twenty-four (24) hours. 90 Tab 3     No current facility-administered medications on file prior to visit. These medications have been reviewed and reconciled with the patient during today's visit. Allergies   Allergen Reactions    Imitrex [Sumatriptan Succinate] Anaphylaxis    Iodine Cough     Coughing up blood      Sea Food [Seafood] Hives     Per pt report          ROS:     CONST:   Denies weight change, appetite change, + fatigue  NEURO:   Denies headaches, vision changes, dizziness, loss of consciousness  CV:      Denies  palpitations, orthopnea, PND. + chest pain  PULM:  Denies SOB, wheezing, cough, hemoptysis  GI:             Denies nausea, vomiting, abdominal pain, greasy stools, blood in stool,     diarrhea, constipation  :       Denies dysuria, hematuria, change in urine  MS:      Denies muscle/joint pain, + swelling to top of right foot. SKIN:        Denies rashes, skin changes  ALLERGY: Denies seasonal allergies, itchy eyes  HEME: Denies easy bleeding/bruising    Objective:     VS:    Visit Vitals    /59 (BP 1 Location: Right arm, BP Patient Position: Sitting)    Pulse 72    Temp 97.7 °F (36.5 °C) (Oral)    Resp 16    Ht 5' 6\" (1.676 m)    Wt 112 lb (50.8 kg)    LMP 02/05/2018    SpO2 100%    BMI 18.08 kg/m2       General:   Well-nourished, well-groomed, pleasant, alert, in no acute distress.      Head:  Normocephalic, atraumatic, MMM,   Ears:  External ears WNL, TMs WNL,   Eyes:  EOMI, PERRL,   Nose:  External nares WNL  Neck:  Neck supple with normal ROM for age, no thyromegaly,  Cardiovasc:   Regular rate and rhythm, no murmurs, no rubs, no gallops,   Pulmonary:   Clear breath sounds bilaterally, good air movement, no wheezing, no rales, no rhonchi, normal respiratory effort  Abdomen:   Abdomen soft, nontender, nondistended, NABS,  Extremities:   No edema, no tenderness with palpation of calves, warm and well-perfused  Neuro:   Alert, conversant, appropriate, following commands, no focal deficits. Pertinent diagnostic procedures include:  No results found for this or any previous visit (from the past 24 hour(s)). Assessment:       1. Routine general medical examination at health care facility    2. Family planning    3. Lumbar facet arthropathy    4. Chronic midline low back pain, with sciatica presence unspecified    5. Right foot pain    6. Chest pain of unknown etiology        Plan:       Orders Placed This Encounter    XR FOOT RT MIN 3 V     Standing Status:   Future     Standing Expiration Date:   3/13/2019     Order Specific Question:   Reason for Exam     Answer:   right foot pain     Order Specific Question:   Is Patient Pregnant? Answer:   No    HEMOGLOBIN A1C WITH EAG     Standing Status:   Future     Number of Occurrences:   1     Standing Expiration Date:   2/14/2019    LIPID PANEL     Standing Status:   Future     Number of Occurrences:   1     Standing Expiration Date:   2/14/2019    CVD REPORT    REFERRAL TO CARDIOLOGY     Referral Priority:   Urgent     Referral Type:   Consultation     Referral Reason:   Specialty Services Required     Requested Specialty:   Cardiology    norethindrone [facility]) 0.35 mg tab     Sig: Take 1 Tab by mouth daily.  Indications: Pregnancy Contraception     Dispense:  1 Package     Refill:  2    ibuprofen (MOTRIN) 600 mg tablet     Sig: TAKE 1 TABLET BY MOUTH TWICE A DAY AS NEEDED FOR PAIN TAKE WITH FOOD     Dispense:  60 Tab     Refill:  0       Follow up in three months. I have discussed the diagnosis with the patient and the intended plan as seen in the above orders. The patient has received an after-visit summary along with patient information handout. I have discussed medication side effects and warnings with the patient as well. Pt verbalized understanding.     Doroteo Ayers NP  2/13/2018, 10:51 AM

## 2018-02-13 NOTE — PROGRESS NOTES
Tigre Love is a 28 y.o.  female presents today for office visit for follow up. Pt would also like to discuss medication refill . Pt is not fasting. Pt is in Room # 9      1. Have you been to the ER, urgent care clinic since your last visit? [facility] since your last visit[facility] [facility] [facility] [address] [facility] Chest pain and left arm pain edema in  Right foot. 2. Have you seen or consulted any other health care providers outside of the [address] [place] [address] since your last visit? Include any pap smears or colon screening. No    Upcoming Appts  OBGYN-March  Health Maintenance reviewed     VORB: No orders of the defined types were placed in this encounter.   Anat Fritz LPN

## 2018-02-14 DIAGNOSIS — G47.00 INSOMNIA, UNSPECIFIED TYPE: ICD-10-CM

## 2018-02-14 LAB
CHOLEST SERPL-MCNC: 163 MG/DL (ref 100–199)
EST. AVERAGE GLUCOSE BLD GHB EST-MCNC: 111 MG/DL
HBA1C MFR BLD: 5.5 % (ref 4.8–5.6)
HDLC SERPL-MCNC: 57 MG/DL
INTERPRETATION, 910389: NORMAL
LDLC SERPL CALC-MCNC: 97 MG/DL (ref 0–99)
TRIGL SERPL-MCNC: 47 MG/DL (ref 0–149)
VLDLC SERPL CALC-MCNC: 9 MG/DL (ref 5–40)

## 2018-02-14 NOTE — TELEPHONE ENCOUNTER
Requested Prescriptions     Pending Prescriptions Disp Refills    hydrOXYzine pamoate (VISTARIL) 50 mg capsule 30 Cap 2     Sig: TAKE ONE CAPSULE BY MOUTH AT BEDTIME   Please advise.

## 2018-02-15 RX ORDER — HYDROXYZINE PAMOATE 50 MG/1
CAPSULE ORAL
Qty: 30 CAP | Refills: 2 | Status: SHIPPED | OUTPATIENT
Start: 2018-02-15 | End: 2018-05-16 | Stop reason: ALTCHOICE

## 2018-02-15 NOTE — PROGRESS NOTES
Your lipid panel and hemoglobin Alc are normal.  Your HDL  cholesterol improved from 64 to 57 from one year and is now in the normal range.

## 2018-02-16 ENCOUNTER — TELEPHONE (OUTPATIENT)
Dept: FAMILY MEDICINE CLINIC | Facility: CLINIC | Age: 33
End: 2018-02-16

## 2018-02-16 NOTE — TELEPHONE ENCOUNTER
Your lipid panel and hemoglobin Alc are normal.   Your HDL  cholesterol improved from 64 to 57 from one year and is now in the normal range. Spoke with pt in regards to results. Pt acknowledges understanding and voices no concerns at this time.

## 2018-02-26 ENCOUNTER — OFFICE VISIT (OUTPATIENT)
Dept: CARDIOLOGY CLINIC | Age: 33
End: 2018-02-26

## 2018-02-26 VITALS
BODY MASS INDEX: 18.32 KG/M2 | OXYGEN SATURATION: 98 % | SYSTOLIC BLOOD PRESSURE: 108 MMHG | DIASTOLIC BLOOD PRESSURE: 69 MMHG | HEART RATE: 77 BPM | WEIGHT: 114 LBS | HEIGHT: 66 IN

## 2018-02-26 DIAGNOSIS — R07.9 CHEST PAIN, UNSPECIFIED TYPE: Primary | ICD-10-CM

## 2018-02-26 RX ORDER — CYCLOBENZAPRINE HCL 10 MG
TABLET ORAL
COMMUNITY
End: 2018-09-12

## 2018-02-26 NOTE — PROGRESS NOTES
[facility]    Ms. Clementina Mcdermott is a 28year old female with a history of anxiety disorder, gastroesophageal reflux disease, migraine headache and remote history of tobacco abuse disorder. Ms. Clementina Mcdermott is here today to establish care with me. Ms. Clementina Mcdermott tells me that for the last one year she has been having chest pain almost on a daily basis, sometimes it feels like pins and needles and shock-like sensation in the heart, sometimes feels tight and heavy. Last episode prompted her to go to the ED. She tells me that her father had a heart attack in his 35s. She says occasionally she feels like she has some choking sensation while she has this pain. She feels a little short of breath along with this chest pain episode. Sometimes it lasts 10-15 minutes, sometimes the entire day. There is no exertional component to it. Denies any nausea, vomiting, abdominal pain, fever, chills, sputum production. No hematuria or other bleeding complaints    Past Medical History:   Diagnosis Date    Abnormal Papanicolaou smear of cervix     Anxiety     ANXIETY    Asthma     GERD (gastroesophageal reflux disease)     Migraine     Miscarriage     Seizures ([facility] [address]     last episode     Tobacco abuse, in remission          Past Surgical History:   Procedure Laterality Date    HX APPENDECTOMY      HX  SECTION      HX GYN      cervical cerclage    HX OTHER SURGICAL  2017    Mendel L4-5, L5-S1 Facet Joint block       Current Outpatient Prescriptions   Medication Sig    cyclobenzaprine (FLEXERIL) 10 mg tablet Take  by mouth three (3) times daily as needed for Muscle Spasm(s).  hydrOXYzine pamoate (VISTARIL) 50 mg capsule TAKE ONE CAPSULE BY MOUTH AT BEDTIME    norethindrone (MICRONOR) 0.35 mg tab Take 1 Tab by mouth daily.  Indications: Pregnancy Contraception    ibuprofen (MOTRIN) 600 mg tablet TAKE 1 TABLET BY MOUTH TWICE A DAY AS NEEDED FOR PAIN TAKE WITH FOOD    acetaminophen (TYLENOL EXTRA STRENGTH) 500 mg tablet Take 2 Tabs by mouth every six (6) hours as needed for Pain.  MULTIVITAMIN WITH IRON (DAILY MULTIVITAMINS/IRON PO) Take  by mouth.  ergocalciferol (ERGOCALCIFEROL) 50,000 unit capsule TAKE 1 CAP BY MOUTH EVERY SEVEN (7) DAYS.  food supplemt, lactose-reduced (ENSURE PLUS) 0.05-1.5 gram-kcal/mL liqd TAKE 237 ML BY MOUTH THREE TIMES DAILY.  prenatal multivit-ca-min-fe-fa (PRENATAL VITAMIN) tab TAKE 1 TAB BY MOUTH DAILY.  zolpidem CR (AMBIEN CR) 6.25 mg tablet Take 1 Tab by mouth nightly as needed for Sleep. Max Daily Amount: 6.25 mg.    divalproex DR (DEPAKOTE) 250 mg tablet Take 1 Tab by mouth two (2) times a day. Indications: MIGRAINE PREVENTION    albuterol (VENTOLIN HFA) 90 mcg/actuation inhaler INHELE 1 PUFF EVERY FOUR (4) HOURS AS NEEDED FOR WHEEZING OR SHORTNESS OF BREATH.  PARoxetine (PAXIL) 30 mg tablet Take 1 Tab by mouth daily.  ondansetron (ZOFRAN ODT) 4 mg disintegrating tablet Take 1 Tab by mouth every eight (8) hours as needed for Nausea.  Omeprazole delayed release (PRILOSEC D/R) 20 mg tablet Take 1 Tab by mouth daily.  cyproheptadine (PERIACTIN) 4 mg tablet Take 1 Tab by mouth once over twenty-four (24) hours. No current facility-administered medications for this visit.         Allergies and Sensitivities:  Allergies   Allergen Reactions    Imitrex [Sumatriptan Succinate] Anaphylaxis    Iodine Cough     Coughing up blood      Sea Food [Seafood] Hives     Per pt report        Family History:  Family History   Problem Relation Age of Onset    Diabetes Mother     Heart Attack Father     Attention Deficit Hyperactivity Disorder Sister     Attention Deficit Hyperactivity Disorder Brother     Cancer Maternal Aunt 38     breast    Diabetes Maternal Grandmother     Attention Deficit Hyperactivity Disorder Brother     No Known Problems Brother     No Known Problems Brother     No Known Problems Brother     Cancer Maternal Aunt      lung    Heart defect Son    Juanita [place] Migraines Son     Seizures Son      h/o    Other Son      h/o jaundice       Social History:  Social History   Substance Use Topics    Smoking status: Former Smoker     Packs/day: 1.00     Years: 13.00     Types: Cigarettes     Quit date: 5/5/2015    Smokeless tobacco: Never Used      Comment: cigarello, once a month    Alcohol use No     She  reports that she quit smoking about 2 years ago. Her smoking use included Cigarettes. She has a 13.00 pack-year smoking history. She has never used smokeless tobacco.  She  reports that she does not drink alcohol. Review of Systems:  Cardiac symptoms as noted above in HPI. All others negative. Denies fatigue, malaise, skin rash, blurring vision, photophobia, neck pain, hemoptysis, chronic cough, nausea, vomiting, hematuria, burning micturition, BRBPR, chronic headaches. Physical Exam:  BP Readings from Last 3 Encounters:   02/26/18 108/69   02/13/18 111/59   02/08/18 109/70         Pulse Readings from Last 3 Encounters:   02/26/18 77   02/13/18 72   02/07/18 79          Wt Readings from Last 3 Encounters:   02/26/18 114 lb (51.7 kg)   02/13/18 112 lb (50.8 kg)   02/07/18 106 lb (48.1 kg)       Constitutional: Oriented to person, place, and time. HENT: Head: Normocephalic and atraumatic. Neck: No JVD present. Carotid bruit is not appreciated. Cardiovascular: Regular rhythm. No murmur, gallop or rubs appreciated  Lung: Breath sounds normal. No respiratory distress. No ronchi or rales appreciated  Abdominal: No tenderness. No rebound and no guarding. Musculoskeletal: There is no lower extremity edema. No cynosis  Lymphadenopathy:  No cervical or supraclavicular adenopathy appriciated. Neurological: No gross motor deficit noted. Skin: No visible skin rash noted. No Ear discharge noted  Psychiatric: Normal mood and affect.    Good distal pulse    Review of Data  LABS: Lab Results   Component Value Date/Time    Sodium 144 02/08/2018 01:00 AM    Potassium 3.8 02/08/2018 01:00 AM    Chloride 107 02/08/2018 01:00 AM    CO2 28 02/08/2018 01:00 AM    Glucose 76 02/08/2018 01:00 AM    BUN 13 02/08/2018 01:00 AM    Creatinine 0.85 02/08/2018 01:00 AM     Lipids Latest Ref Rng & Units 2/13/2018 2/9/2017 4/20/2016   Chol, Total 100 - 199 mg/dL 163 150 164   HDL >39 mg/dL 57 58 64(H)   LDL 0 - 99 mg/dL 97 84 92.2   Trig 0 - 149 mg/dL 47 42 39   Chol/HDL Ratio 0 - 5.0   - - 2.6     Lab Results   Component Value Date/Time    ALT (SGPT) 8 11/07/2017 10:38 AM     Lab Results   Component Value Date/Time    Hemoglobin A1c 5.5 02/13/2018 12:53 PM       EKG    ECHO (05/17)  SUMMARY:  Left ventricle: Systolic function was normal by visual assessment. Ejection fraction was estimated to be 55 %. No obvious wall motion  abnormalities identified in the views obtained. Right ventricle: Systolic function was normal.  Mitral valve: There was no evidence for stenosis. There was trivial regurgitation. Aortic valve: There was no stenosis. There was no regurgitation. Tricuspid valve: There was no evidence for tricuspid stenosis. There was trivial regurgitation. IMPRESSION & PLAN:  Ms. Derick Saint is a 28year old female with multiple medical problems. Chest pain:  Ms. Derick Saint has been experiencing chest pain for the last one year, almost on a daily basis. Her pre-test probability to have underlying coronary artery disease is low-intermediate. Because of her ongoing symptoms, I am going to ask her that she undergo stress echocardiogram for further risk stratification. Her blood pressure is well controlled at this time. I also believe that there may be some anxiety component to this chest pain. She is already taking Paxil. We will make further recommendations based on stress test findings. Risk factor modification was discussed with the patient, including diet and exercise plan.     Rest of the medical problems will be addressed by PCP. Importance of diet was discussed with patient. This plan was discussed with patient who is in agreement. Thank you for allowing me to participate in patient care. Please feel free to call me if you have any question or concern. Deejay Rock MD  Please note: This document has been produced using voice recognition software. Unrecognized errors in transcription may be present.

## 2018-02-26 NOTE — MR AVS SNAPSHOT
[address] [facility] [address] [phone] 
[phone] Patient: Rhett Jorgensen MRN: [id number] Visit Information Date & Time Provider Department Dept. Phone Encounter #  
 2/26/2018 10:00 AM Dhruv Krishnan  [facility] Specialist at [facility] [phone] [id number] Follow-up Instructions Return in about 2 weeks (around 3/12/2018). Your Appointments 3/19/2018 11:00 AM  
ANNUAL with Evy Allen,   
[address] [place] [address] ([facility] Appt Note: annual  
 [facility] [phone]  
[phone]  
  
   
 [phone] 5/15/2018 10:00 AM  
Follow Up with Monica Mosqueda NP [facility] ([facility]) Appt Note: 3 month follow up appt [address] [place] [address] [address] [phone]  
[address] [facility] [address] 6/14/2018 10:45 AM  
Follow Up with Lamont Fraire MD  
[facility]) Appt Note: 6mon f/u  
 [address] [id number] [phone]  
  
   
 [facility] [id number] Upcoming Health Maintenance Date Due  
 PAP AKA CERVICAL CYTOLOGY 12/3/2018 Pneumococcal 19-64 Highest Risk (2 of 3 - PCV13) 2/13/2019 DTaP/Tdap/Td series (2 - Td) 5/30/2027 Allergies as of 2/26/2018  Review Complete On: 2/26/2018 By: Srinivasan Haley RN Severity Noted Reaction Type Reactions Imitrex [Sumatriptan Succinate] High 05/25/2015    Anaphylaxis Iodine  02/16/2017    Cough Coughing up blood Sea Food [Seafood]  06/04/2015    Hives Per pt report Current Immunizations  Never Reviewed No immunizations on file. Not reviewed this visit You Were Diagnosed With   
  
 Codes Comments Chest pain, unspecified type    -  Primary ICD-10-CM: R07.9 ICD-9-CM: 786.50 Vitals BP Pulse Height(growth percentile) Weight(growth percentile) LMP SpO2  
 108/69 77 5' 6\" (1.676 m) 114 lb (51.7 kg) 02/05/2018 98% BMI OB Status Smoking Status 18.4 kg/m2 Having regular periods Former Smoker Vitals History BMI and BSA Data Body Mass Index Body Surface Area  
 18.4 kg/m 2 1.55 m 2 Preferred Pharmacy Pharmacy Name Phone [id number] [address] [place] [address] [phone] Your Updated Medication List  
  
   
This list is accurate as of 2/26/18 10:35 AM.  Always use your most recent med list.  
  
  
  
  
 acetaminophen 500 mg tablet Commonly known as:  [address] Take 2 Tabs by mouth every six (6) hours as needed for Pain. albuterol 90 mcg/actuation inhaler Commonly known as:  VENTOLIN HFA INHELE 1 PUFF EVERY FOUR (4) HOURS AS NEEDED FOR WHEEZING OR SHORTNESS OF BREATH. cyclobenzaprine 10 mg tablet Commonly known as:  FLEXERIL Take  by mouth three (3) times daily as needed for Muscle Spasm(s). cyproheptadine 4 mg tablet Commonly known as:  PERIACTIN Take 1 Tab by mouth once over twenty-four (24) hours. DAILY MULTIVITAMINS/IRON PO Take  by mouth. divalproex  mg tablet Commonly known as:  DEPAKOTE Take 1 Tab by mouth two (2) times a day. Indications: MIGRAINE PREVENTION  
  
 ergocalciferol 50,000 unit capsule Commonly known as:  ERGOCALCIFEROL  
TAKE 1 CAP BY MOUTH EVERY SEVEN (7) DAYS. food supplemt, lactose-reduced 0.05-1.5 gram-kcal/mL Liqd Commonly known as:  ENSURE PLUS  
TAKE 237 ML BY MOUTH THREE TIMES DAILY. hydrOXYzine pamoate 50 mg capsule Commonly known as:  VISTARIL  
TAKE ONE CAPSULE BY MOUTH AT BEDTIME  
  
 ibuprofen 600 mg tablet Commonly known as:  MOTRIN  
TAKE 1 TABLET BY MOUTH TWICE A DAY AS NEEDED FOR PAIN TAKE WITH FOOD norethindrone 0.35 mg Tab Commonly known as:  Micha & Micha Take 1 Tab by mouth daily. Indications: Pregnancy Contraception Omeprazole delayed release 20 mg tablet Commonly known as:  PRILOSEC D/R Take 1 Tab by mouth daily. ondansetron 4 mg disintegrating tablet Commonly known as:  ZOFRAN ODT Take 1 Tab by mouth every eight (8) hours as needed for Nausea. PARoxetine 30 mg tablet Commonly known as:  PAXIL Take 1 Tab by mouth daily. prenatal multivit-ca-min-fe-fa Tab Commonly known as:  PRENATAL VITAMIN  
TAKE 1 TAB BY MOUTH DAILY. zolpidem CR 6.25 mg tablet Commonly known as:  AMBIEN CR Take 1 Tab by mouth nightly as needed for Sleep. Max Daily Amount: 6.25 mg. Follow-up Instructions Return in about 2 weeks (around 3/12/2018). Introducing [facility] SERVICES! Dear Kary Yang: 
Thank you for requesting a [employer] account. Our records indicate that you already have an active [employer] account. You can access your account anytime at [email or web address] Did you know that you can access your hospital and ER discharge instructions at any time in [employer]? You can also review all of your test results from your hospital stay or ER visit. Additional Information If you have questions, please visit the Frequently Asked Questions section of the [employer] website at [email or web address] Remember, [employer] is NOT to be used for urgent needs. For medical emergencies, dial 911. Now available from your iPhone and Android! Please provide this summary of care documentation to your next provider. Your primary care clinician is listed as Kike Wheatley. If you have any questions after today's visit, please call [phone].

## 2018-02-26 NOTE — PROGRESS NOTES
1. Have you been to the ER, urgent care clinic since your last visit? Hospitalized since your last visit? No    2. Have you seen or consulted any other health care providers outside of the [address] [place] [address] since your last visit? Include any pap smears or colon screening.  No

## 2018-03-07 DIAGNOSIS — J45.41 RAD (REACTIVE AIRWAY DISEASE), MODERATE PERSISTENT, WITH ACUTE EXACERBATION: ICD-10-CM

## 2018-03-07 RX ORDER — ALBUTEROL SULFATE 90 UG/1
AEROSOL, METERED RESPIRATORY (INHALATION)
Qty: 1 INHALER | Refills: 3 | Status: SHIPPED | OUTPATIENT
Start: 2018-03-07 | End: 2018-07-09 | Stop reason: SDUPTHER

## 2018-03-09 ENCOUNTER — TELEPHONE (OUTPATIENT)
Dept: CARDIOLOGY CLINIC | Age: 33
End: 2018-03-09

## 2018-03-09 NOTE — TELEPHONE ENCOUNTER
Dr. Salo Russo did peer to peer yesterday with insurance and it was approved.  Hellen Elliott should be re-scheduling the test.

## 2018-03-09 NOTE — TELEPHONE ENCOUNTER
Called pt to cancel f/u appt on 2/13/18 due to not having completed necessary testing. Pt stated that she was told the tests were put on hold until complications with her insurance were figured out. I informed her that I would let the clinical staff know, if they were not already aware.

## 2018-03-16 ENCOUNTER — APPOINTMENT (OUTPATIENT)
Dept: ULTRASOUND IMAGING | Age: 33
End: 2018-03-16
Attending: EMERGENCY MEDICINE
Payer: MEDICAID

## 2018-03-16 ENCOUNTER — HOSPITAL ENCOUNTER (EMERGENCY)
Age: 33
Discharge: HOME | End: 2018-03-16
Attending: EMERGENCY MEDICINE | Admitting: EMERGENCY MEDICINE
Payer: MEDICAID

## 2018-03-16 VITALS
BODY MASS INDEX: 18.16 KG/M2 | HEIGHT: 66 IN | HEART RATE: 67 BPM | SYSTOLIC BLOOD PRESSURE: 113 MMHG | DIASTOLIC BLOOD PRESSURE: 72 MMHG | WEIGHT: 113 LBS | OXYGEN SATURATION: 99 % | RESPIRATION RATE: 16 BRPM

## 2018-03-16 DIAGNOSIS — O20.9 VAGINAL BLEEDING IN PREGNANCY, FIRST TRIMESTER: ICD-10-CM

## 2018-03-16 DIAGNOSIS — Z32.01 PREGNANCY TEST PERFORMED, PREGNANCY CONFIRMED: Primary | ICD-10-CM

## 2018-03-16 LAB
APPEARANCE UR: CLEAR
BACTERIA URNS QL MICRO: ABNORMAL /HPF
BILIRUB UR QL: NEGATIVE
COLOR UR: YELLOW
EPITH CASTS URNS QL MICRO: ABNORMAL /LPF (ref 0–5)
GLUCOSE UR STRIP.AUTO-MCNC: 500 MG/DL
HCG SERPL-ACNC: 38 MIU/ML (ref 0–10)
HCG UR QL: POSITIVE
HGB UR QL STRIP: ABNORMAL
KETONES UR QL STRIP.AUTO: ABNORMAL MG/DL
LEUKOCYTE ESTERASE UR QL STRIP.AUTO: NEGATIVE
NITRITE UR QL STRIP.AUTO: NEGATIVE
PH UR STRIP: 6 [PH] (ref 5–8)
PROT UR STRIP-MCNC: NEGATIVE MG/DL
RBC #/AREA URNS HPF: ABNORMAL /HPF (ref 0–5)
SERVICE CMNT-IMP: NORMAL
SP GR UR REFRACTOMETRY: >1.03 (ref 1–1.03)
UROBILINOGEN UR QL STRIP.AUTO: 1 EU/DL (ref 0.2–1)
WBC URNS QL MICRO: ABNORMAL /HPF (ref 0–4)
WET PREP GENITAL: NORMAL

## 2018-03-16 PROCEDURE — 87491 CHLMYD TRACH DNA AMP PROBE: CPT | Performed by: EMERGENCY MEDICINE

## 2018-03-16 PROCEDURE — 87210 SMEAR WET MOUNT SALINE/INK: CPT | Performed by: EMERGENCY MEDICINE

## 2018-03-16 PROCEDURE — 99283 EMERGENCY DEPT VISIT LOW MDM: CPT

## 2018-03-16 PROCEDURE — 84702 CHORIONIC GONADOTROPIN TEST: CPT | Performed by: EMERGENCY MEDICINE

## 2018-03-16 PROCEDURE — 81025 URINE PREGNANCY TEST: CPT | Performed by: EMERGENCY MEDICINE

## 2018-03-16 PROCEDURE — 81001 URINALYSIS AUTO W/SCOPE: CPT | Performed by: EMERGENCY MEDICINE

## 2018-03-16 PROCEDURE — 76817 TRANSVAGINAL US OBSTETRIC: CPT

## 2018-03-16 RX ORDER — ACETAMINOPHEN 325 MG/1
650 TABLET ORAL
Qty: 60 TAB | Refills: 0 | Status: SHIPPED | OUTPATIENT
Start: 2018-03-16 | End: 2018-09-12

## 2018-03-16 NOTE — DISCHARGE INSTRUCTIONS
Learning About Pregnancy  Your Care Instructions    Your health in the early weeks of your pregnancy is particularly important for your baby's health. Take good care of yourself. Anything you do that harms your body can also harm your baby. Make sure to go to all of your doctor appointments. Regular checkups will help keep you and your baby healthy. How can you care for yourself at home? Diet  ? · Eat a balanced diet. Make sure your diet includes plenty of beans, peas, and leafy green vegetables. ? · Do not skip meals or go for many hours without eating. If you are nauseated, try to eat a small, healthy snack every 2 to 3 hours. ? · Do not eat fish that has a high level of mercury, such as shark, swordfish, or mackerel. Do not eat more than one can of tuna each week. ? · Drink plenty of fluids, enough so that your urine is light yellow or clear like water. If you have kidney, heart, or liver disease and have to limit fluids, talk with your doctor before you increase the amount of fluids you drink. ? · Cut down on caffeine, such as coffee, tea, and cola. ? · Do not drink alcohol, such as beer, wine, or hard liquor. ? · Take a multivitamin that contains at least 400 micrograms (mcg) of folic acid to help prevent birth defects. Fortified cereal and whole wheat bread are good additional sources of folic acid. ? · Increase the calcium in your diet. Try to drink a quart of skim milk each day. You may also take calcium supplements and choose foods such as cheese and yogurt. ? Lifestyle  ? · Make sure you go to your follow-up appointments. ? · Get plenty of rest. You may be unusually tired while you are pregnant. ? · Get at least 30 minutes of exercise on most days of the week. Walking is a good choice. If you have not exercised in the past, start out slowly. Take several short walks each day. ? · Do not smoke. If you need help quitting, talk to your doctor about stop-smoking programs.  These can increase your chances of quitting for good. ? · Do not touch cat feces or litter boxes. Also, wash your hands after you handle raw meat, and fully cook all meat before you eat it. Wear gloves when you work in the yard or garden, and wash your hands well when you are done. Cat feces, raw or undercooked meat, and contaminated dirt can cause an infection that may harm your baby or lead to a miscarriage. ? · Do not use saunas or hot tubs. Raising your body temperature may harm your baby. ? · Avoid chemical fumes, paint fumes, or poisons. ? · Do not use illegal drugs or alcohol. Medicines  ? · Review all of your medicines with your doctor. Some of your routine medicines may need to be changed to protect your baby. ? · Use acetaminophen (Tylenol) to relieve minor problems, such as a mild headache or backache or a mild fever with cold symptoms. Do not use nonsteroidal anti-inflammatory drugs (NSAIDs), such as ibuprofen (Advil, Motrin) or naproxen (Aleve), unless your doctor says it is okay. ? · Do not take two or more pain medicines at the same time unless the doctor told you to. Many pain medicines have acetaminophen, which is Tylenol. Too much acetaminophen (Tylenol) can be harmful. ? · Take your medicines exactly as prescribed. Call your doctor if you think you are having a problem with your medicine. ?To manage morning sickness  ? · If you feel sick when you first wake up, try eating a small snack (such as crackers) before you get out of bed. Allow some time to digest the snack, and then get out of bed slowly. ? · Do not skip meals or go for long periods without eating. An empty stomach can make nausea worse. ? · Eat small, frequent meals instead of three large meals each day. ? · Drink plenty of fluids. Sports drinks, such as Gatorade or Powerade, are good choices. ? · Eat foods that are high in protein but low in fat.    ? · If you are taking iron supplements, ask your doctor if they are necessary. Iron can make nausea worse. ? · Avoid any smells, such as coffee, that make you feel sick. ? · Get lots of rest. Morning sickness may be worse when you are tired. Follow-up care is a key part of your treatment and safety. Be sure to make and go to all appointments, and call your doctor if you are having problems. It's also a good idea to know your test results and keep a list of the medicines you take. Where can you learn more? Go to http://norma-kaley.info/. Enter T860 in the search box to learn more about \"Learning About Pregnancy. \"  Current as of: March 16, 2017  Content Version: 11.4  © 1255-9470 [employer]. Care instructions adapted under license by [employer] (which disclaims liability or warranty for this information). If you have questions about a medical condition or this instruction, always ask your healthcare professional. [place] [address] any warranty or liability for your use of this information. Vaginal Bleeding During Pregnancy: Care Instructions  Your Care Instructions    Many women have some vaginal bleeding when they are pregnant. In some cases, the bleeding is not serious. And there aren't any more problems with the pregnancy. But sometimes bleeding is a sign of a more serious problem. This is more common if the bleeding is heavy or painful. Examples of more serious problems include miscarriage, an ectopic pregnancy, or a problem with the placenta. You may have to see your doctor again to be sure everything is okay. You may also need more tests to find the cause of the bleeding. For some women, home treatment is all they need. But it depends on what is causing the bleeding. Be sure to tell your doctor if you have any new symptoms or if your symptoms get worse. The doctor has checked you carefully, but problems can develop later.  If you notice any problems or new symptoms, get medical treatment right away.  Follow-up care is a key part of your treatment and safety. Be sure to make and go to all appointments, and call your doctor if you are having problems. It's also a good idea to know your test results and keep a list of the medicines you take. How can you care for yourself at home? · If your doctor prescribed medicines, take them exactly as directed. Call your doctor if you think you are having a problem with your medicine. · Do not have sex until the bleeding stops and your doctor says it's okay. · Ask your doctor about other activities you can or can't do. · Get a lot of rest. Being pregnant can make you tired. · Eat a healthy diet. Include a lot of peas, beans, and leafy green vegetables. Talk to a dietitian if you need help planning your diet. · Do not use nonsteroidal anti-inflammatory drugs (NSAIDs), such as ibuprofen (Advil, Motrin), naproxen (Aleve), or aspirin, unless your doctor says it is okay. When should you call for help? Call 911 anytime you think you may need emergency care. For example, call if:  ? · You passed out (lost consciousness). ? · You have severe vaginal bleeding. ?Call your doctor now or seek immediate medical care if:  ? · You have any vaginal bleeding. ? · You have pain in your belly or pelvis. ? · You think that you are in labor. ? · You have a sudden release of fluid from your vagina. ? · You notice that your baby has stopped moving or is moving much less than normal.   ? Watch closely for changes in your health, and be sure to contact your doctor if you have any questions or concerns. Where can you learn more? Go to http://norma-kaley.info/. Enter Q228 in the search box to learn more about \"Vaginal Bleeding During Pregnancy: Care Instructions. \"  Current as of: March 16, 2017  Content Version: 11.4  © 7276-4561 [employer].  Care instructions adapted under license by [employer] (which disclaims liability or warranty for this information). If you have questions about a medical condition or this instruction, always ask your healthcare professional. [place] [address] any warranty or liability for your use of this information.

## 2018-03-16 NOTE — ED PROVIDER NOTES
EMERGENCY DEPARTMENT HISTORY AND PHYSICAL EXAM    11:50 AM      Date: 3/16/2018  Patient Name: Ramo Churchill    History of Presenting Illness     Chief Complaint   Patient presents with    Vaginal Discharge    Abdominal Pain         History Provided By: Patient    Chief Complaint: vaginal discharge  Duration: 2.5 Days  Timing:  Constant  Quality: described as \"gunk\"  Associated Symptoms: suprapubic pain , \" chunky\" vaginal bleeding     Additional History (Context): Ramo Churchill, a 28 y.o. Female, former smoker, non-alcohol drinker, Jocelin Peri, with h/o trichomonas  (treated 1 year ago), sz, migraine, anxiety, low blood sugar, GERD, asthma and bradycardia but not with h/o bleeding disorders, on birth control but not on other hormonal medications, presents to the ED with 2.5 days of constant vaginal discharge that is associated with vaginal bleeding, lower back and suprapubic pain x 5 days but not with dysuria. Pt has had 2 miscarriages but is unsure of past ectopic pregnancies. Pt's LNP was at the beginning of this month. No other complaints at this time. PCP: Waqar Leon NP    Current Outpatient Prescriptions   Medication Sig Dispense Refill    albuterol (VENTOLIN HFA) 90 mcg/actuation inhaler INHELE 1 PUFF EVERY FOUR (4) HOURS AS NEEDED FOR WHEEZING OR SHORTNESS OF BREATH. 1 Inhaler 3    cyclobenzaprine (FLEXERIL) 10 mg tablet Take  by mouth three (3) times daily as needed for Muscle Spasm(s).  hydrOXYzine pamoate (VISTARIL) 50 mg capsule TAKE ONE CAPSULE BY MOUTH AT BEDTIME 30 Cap 2    norethindrone (MICRONOR) 0.35 mg tab Take 1 Tab by mouth daily. Indications: Pregnancy Contraception 1 Package 2    ibuprofen (MOTRIN) 600 mg tablet TAKE 1 TABLET BY MOUTH TWICE A DAY AS NEEDED FOR PAIN TAKE WITH FOOD 60 Tab 0    MULTIVITAMIN WITH IRON (DAILY MULTIVITAMINS/IRON PO) Take  by mouth.  ergocalciferol (ERGOCALCIFEROL) 50,000 unit capsule TAKE 1 CAP BY MOUTH EVERY SEVEN (7) DAYS.  12 Cap 0    food supplemt, lactose-reduced (ENSURE PLUS) 0.05-1.5 gram-kcal/mL liqd TAKE 237 ML BY MOUTH THREE TIMES DAILY. 25782 mL 2    prenatal multivit-ca-min-fe-fa (PRENATAL VITAMIN) tab TAKE 1 TAB BY MOUTH DAILY. 30 Tab 5    zolpidem CR (AMBIEN CR) 6.25 mg tablet Take 1 Tab by mouth nightly as needed for Sleep. Max Daily Amount: 6.25 mg. 30 Tab 0    divalproex DR (DEPAKOTE) 250 mg tablet Take 1 Tab by mouth two (2) times a day. Indications: MIGRAINE PREVENTION 60 Tab 5    PARoxetine (PAXIL) 30 mg tablet Take 1 Tab by mouth daily. 90 Tab 1    ondansetron (ZOFRAN ODT) 4 mg disintegrating tablet Take 1 Tab by mouth every eight (8) hours as needed for Nausea. 30 Tab 3    Omeprazole delayed release (PRILOSEC D/R) 20 mg tablet Take 1 Tab by mouth daily. 90 Tab 3    cyproheptadine (PERIACTIN) 4 mg tablet Take 1 Tab by mouth once over twenty-four (24) hours.  80 Tab 3       Past History     Past Medical History:  Past Medical History:   Diagnosis Date    Abnormal Papanicolaou smear of cervix     Anxiety     ANXIETY    Asthma     GERD (gastroesophageal reflux disease)     Migraine     Miscarriage     Seizures (HCC)     last episode     Tobacco abuse, in remission        Past Surgical History:  Past Surgical History:   Procedure Laterality Date    HX APPENDECTOMY      HX  SECTION      HX GYN      cervical cerclage    HX OTHER SURGICAL  2017    Mendel L4-5, L5-S1 Facet Joint block       Family History:  Family History   Problem Relation Age of Onset    Diabetes Mother     Heart Attack Father     Attention Deficit Hyperactivity Disorder Sister     Attention Deficit Hyperactivity Disorder Brother     Cancer Maternal Aunt 45     breast    Diabetes Maternal Grandmother     Attention Deficit Hyperactivity Disorder Brother     No Known Problems Brother     No Known Problems Brother     No Known Problems Brother     Cancer Maternal Aunt      lung    Heart defect Son     Migraines Son  Seizures Son      h/o    Other Son      h/o jaundice       Social History:  Social History   Substance Use Topics    Smoking status: Former Smoker     Packs/day: 1.00     Years: 13.00     Types: Cigarettes     Quit date: 5/5/2015    Smokeless tobacco: Never Used      Comment: cigarello, once a month    Alcohol use No       Allergies: Allergies   Allergen Reactions    Imitrex [Sumatriptan Succinate] Anaphylaxis    Iodine Cough     Coughing up blood      Sea Food [Seafood] Hives     Per pt report          Review of Systems       Review of Systems   Constitutional: Negative for fever. Gastrointestinal: Positive for abdominal pain (suprapubic ). Negative for nausea and vomiting. Genitourinary: Positive for vaginal bleeding and vaginal discharge. Negative for dysuria. Musculoskeletal: Positive for back pain. All other systems reviewed and are negative. Physical Exam     Visit Vitals    /70 (BP 1 Location: Left arm, BP Patient Position: Sitting)    Pulse 86    Resp 16    Ht 5' 6\" (1.676 m)    Wt 51.3 kg (113 lb)    LMP 03/01/2018    SpO2 99%    BMI 18.24 kg/m2         Physical Exam   Constitutional: She is oriented to person, place, and time. She appears well-developed and well-nourished. HENT:   Head: Normocephalic and atraumatic. Neck: Neck supple. No JVD present. Abdominal: Soft. She exhibits no distension. There is tenderness. There is no rebound and no guarding. Mild BL lower tenderness   Musculoskeletal: She exhibits no edema. Neurological: She is alert and oriented to person, place, and time. Skin: Skin is warm and dry. No erythema.    Psychiatric: Judgment normal.         Diagnostic Study Results     Labs -  Recent Results (from the past 12 hour(s))   URINALYSIS W/ RFLX MICROSCOPIC    Collection Time: 03/16/18 11:13 AM   Result Value Ref Range    Color YELLOW      Appearance CLEAR      Specific gravity >1.030 (H) 1.005 - 1.030    pH (UA) 6.0 5.0 - 8.0 Protein NEGATIVE  NEG mg/dL    Glucose 500 (A) NEG mg/dL    Ketone TRACE (A) NEG mg/dL    Bilirubin NEGATIVE  NEG      Blood LARGE (A) NEG      Urobilinogen 1.0 0.2 - 1.0 EU/dL    Nitrites NEGATIVE  NEG      Leukocyte Esterase NEGATIVE  NEG     HCG URINE, QL    Collection Time: 03/16/18 11:13 AM   Result Value Ref Range    HCG urine, QL POSITIVE (A) NEG     URINE MICROSCOPIC ONLY    Collection Time: 03/16/18 11:13 AM   Result Value Ref Range    WBC 0 to 3 0 - 4 /hpf    RBC 4 to 10 0 - 5 /hpf    Epithelial cells 4+ 0 - 5 /lpf    Bacteria 2+ (A) NEG /hpf   BETA HCG, QT    Collection Time: 03/16/18 11:30 AM   Result Value Ref Range    Beta HCG, QT 38 (H) 0 - 10 MIU/ML   WET PREP    Collection Time: 03/16/18 11:41 AM   Result Value Ref Range    Special Requests: NO SPECIAL REQUESTS      Wet prep NO YEAST,TRICHOMONAS OR CLUE CELLS NOTED         Radiologic Studies -   US UTS TRANSVAGINAL OB   Final Result        Us Uts Transvaginal Ob    IMPRESSION: 1. Pregnancy of unknown location. No sonographically identified IUP, although this is not an unexpected finding given the patient's LMP and the beta hCG value of 38. Recommend short interval clinical follow-up with repeat serial beta hCG values and pelvic ultrasound to document a living IUP. 2. Suspected left ovarian corpus luteum. Medical Decision Making   I am the first provider for this patient. I reviewed the vital signs, available nursing notes, past medical history, past surgical history, family history and social history. Vital Signs-Reviewed the patient's vital signs.     Pulse Oximetry Analysis -  99% on room air (Interpretation) Non-hypoxic       Records Reviewed: Nursing Notes and Old Medical Records (Time of Review: 11:50 AM)    ED Course: Progress Notes, Reevaluation, and Consults:    Provider Notes (Medical Decision Making): pt with vaginal bleeding and discharge, positive pregnancy test here, suspect early pregnancy as pt was here 5 weeks ago and had negative pregnancy test at that time. Will check quat and US to r/o ectopic. Prior records indicate pt is B+, no indication for rhogham.     2:29 PM Discussed results with her. Discussed pregnancy of unknown location vs early miscarriage. Advised GYN follow up in 3-4 days. Discussed return precautions for worsening pain. Procedures: Pelvic Exam  Date/Time: 3/16/2018 11:53 AM  Performed by: attending  Procedure duration:  2 minutes. Exam assisted by:  Rocío Acevedo RN. Type of exam performed: bimanual and speculum. External genitalia appearance: normal.    Vaginal exam:  bleeding. Bleeding: mild  Cervical exam:  os closed and no cervical motion tenderness. Specimen(s) collected:  chlamydia, GC and vaginal culture. Bimanual exam:  normal.    Patient tolerance: Patient tolerated the procedure well with no immediate complications            Diagnosis     Clinical Impression:   1. Pregnancy test performed, pregnancy confirmed    2. Vaginal bleeding in pregnancy, first trimester        Disposition: Discharged    Follow-up Information     None           Patient's Medications   Start Taking    No medications on file   Continue Taking    ALBUTEROL (VENTOLIN HFA) 90 MCG/ACTUATION INHALER    INHELE 1 PUFF EVERY FOUR (4) HOURS AS NEEDED FOR WHEEZING OR SHORTNESS OF BREATH. CYCLOBENZAPRINE (FLEXERIL) 10 MG TABLET    Take  by mouth three (3) times daily as needed for Muscle Spasm(s). CYPROHEPTADINE (PERIACTIN) 4 MG TABLET    Take 1 Tab by mouth once over twenty-four (24) hours. DIVALPROEX DR (DEPAKOTE) 250 MG TABLET    Take 1 Tab by mouth two (2) times a day. Indications: MIGRAINE PREVENTION    ERGOCALCIFEROL (ERGOCALCIFEROL) 50,000 UNIT CAPSULE    TAKE 1 CAP BY MOUTH EVERY SEVEN (7) DAYS. FOOD SUPPLEMT, LACTOSE-REDUCED (ENSURE PLUS) 0.05-1.5 GRAM-KCAL/ML LIQD    TAKE 237 ML BY MOUTH THREE TIMES DAILY.     HYDROXYZINE PAMOATE (VISTARIL) 50 MG CAPSULE    TAKE ONE CAPSULE BY MOUTH AT BEDTIME    IBUPROFEN (MOTRIN) 600 MG TABLET    TAKE 1 TABLET BY MOUTH TWICE A DAY AS NEEDED FOR PAIN TAKE WITH FOOD    MULTIVITAMIN WITH IRON (DAILY MULTIVITAMINS/IRON PO)    Take  by mouth. NORETHINDRONE (MICRONOR) 0.35 MG TAB    Take 1 Tab by mouth daily. Indications: Pregnancy Contraception    OMEPRAZOLE DELAYED RELEASE (PRILOSEC D/R) 20 MG TABLET    Take 1 Tab by mouth daily. ONDANSETRON (ZOFRAN ODT) 4 MG DISINTEGRATING TABLET    Take 1 Tab by mouth every eight (8) hours as needed for Nausea. PAROXETINE (PAXIL) 30 MG TABLET    Take 1 Tab by mouth daily. PRENATAL MULTIVIT-CA-MIN-FE-FA (PRENATAL VITAMIN) TAB    TAKE 1 TAB BY MOUTH DAILY. ZOLPIDEM CR (AMBIEN CR) 6.25 MG TABLET    Take 1 Tab by mouth nightly as needed for Sleep. Max Daily Amount: 6.25 mg. These Medications have changed    No medications on file   Stop Taking    ACETAMINOPHEN ([address]) 500 MG TABLET    Take 2 Tabs by mouth every six (6) hours as needed for Pain.     _______________________________    Attestations:  Scribe Attestation     Karrin Frankel acting as a scribe for and in the presence of Mindi Yip DO      March 16, 2018 at 11:50 AM       Provider Attestation:      I personally performed the services described in the documentation, reviewed the documentation, as recorded by the scribe in my presence, and it accurately and completely records my words and actions.  March 16, 2018 at 11:50 AM - Mindi Yip DO    _______________________________

## 2018-03-19 ENCOUNTER — HOSPITAL ENCOUNTER (OUTPATIENT)
Dept: LAB | Age: 33
Discharge: HOME | End: 2018-03-19

## 2018-03-19 ENCOUNTER — OFFICE VISIT (OUTPATIENT)
Dept: OBGYN CLINIC | Age: 33
End: 2018-03-19

## 2018-03-19 VITALS
HEART RATE: 98 BPM | BODY MASS INDEX: 19.13 KG/M2 | SYSTOLIC BLOOD PRESSURE: 107 MMHG | HEIGHT: 66 IN | DIASTOLIC BLOOD PRESSURE: 71 MMHG | WEIGHT: 119 LBS

## 2018-03-19 DIAGNOSIS — O20.9 VAGINAL BLEEDING IN PREGNANCY, FIRST TRIMESTER: ICD-10-CM

## 2018-03-19 DIAGNOSIS — Z30.09 FAMILY PLANNING: ICD-10-CM

## 2018-03-19 DIAGNOSIS — Z01.419 WELL WOMAN EXAM WITH ROUTINE GYNECOLOGICAL EXAM: Primary | ICD-10-CM

## 2018-03-19 PROCEDURE — 99001 SPECIMEN HANDLING PT-LAB: CPT | Performed by: OBSTETRICS & GYNECOLOGY

## 2018-03-19 NOTE — PROGRESS NOTES
Subjective:   28 y.o. female for annual routine Pap and checkup. Patient's last menstrual period was 2018. Last pap smear:  2015 NILM  Menstrual history: regular, LMP 3/1/2018, has a recent SAb. States that prior to 3/1, LMP 2018. Dysmenorrhea no  H/o STIs:  no  Social History: single partner, contraception - OCP (Oral Contraceptive Pills)     Was recently seen in ED for vaginal bleeding, found to have HCG quant 38. Denies any missed pills on OCPs. States still bleeding today not heavy, no pain at this time. [unfilled]  OB History    Para Term  AB Living   6 2 2  4 2   SAB TAB Ectopic Molar Multiple Live Births   3 1          # Outcome Date GA Lbr Luan/2nd Weight Sex Delivery Anes PTL Lv   6 TAB            5 SAB            4 SAB            3 Term            2 Term            1 SAB                   Pertinent past medical hstory: migraines, seizure disorder; no history of HTN, DVT, CAD, DM, liver disease, or smoking.     Patient Active Problem List   Diagnosis Code    Spontaneous  O03.9    Epilepsy ([facility] [address] G40.909    Chronic midline low back pain M54.5, G89.29    ACP (advance care planning) Z71.89    Bradycardia R00.1    Mild intermittent asthma with status asthmaticus J45.22    Low body weight due to inadequate caloric intake R63.6    Lumbar facet arthropathy M12.88    Vitamin D deficiency E55.9    Trichomonas infection A59.9    Muscle spasm of back M62.830    Intractable migraine without aura and without status migrainosus G43.019    Analgesic rebound headache T39.91XA, G44.40    Anxiety F41.9    Therapeutic drug monitoring Z51.81    Lumbar spondylosis M47.816    Lumbar degenerative disc disease M51.36    Chronic pain syndrome G89.4     Patient Active Problem List    Diagnosis Date Noted    Lumbar spondylosis 10/26/2017    Lumbar degenerative disc disease 10/26/2017    Chronic pain syndrome 10/26/2017    Therapeutic drug monitoring 2017    Anxiety 2017    Intractable migraine without aura and without status migrainosus 2017    Analgesic rebound headache 2017    Muscle spasm of back 2017    Trichomonas infection 2017    Vitamin D deficiency 2017    Lumbar facet arthropathy 2017    Mild intermittent asthma with status asthmaticus 2016    Low body weight due to inadequate caloric intake 2016    Bradycardia 2016    Epilepsy ([facility] [address] 2016    Chronic midline low back pain 2016    ACP (advance care planning) 2016    Spontaneous  2015     Current Outpatient Prescriptions   Medication Sig Dispense Refill    acetaminophen (TYLENOL) 325 mg tablet Take 2 Tabs by mouth every four (4) hours as needed for Pain. 60 Tab 0    albuterol (VENTOLIN HFA) 90 mcg/actuation inhaler INHELE 1 PUFF EVERY FOUR (4) HOURS AS NEEDED FOR WHEEZING OR SHORTNESS OF BREATH. 1 Inhaler 3    cyclobenzaprine (FLEXERIL) 10 mg tablet Take  by mouth three (3) times daily as needed for Muscle Spasm(s).  hydrOXYzine pamoate (VISTARIL) 50 mg capsule TAKE ONE CAPSULE BY MOUTH AT BEDTIME 30 Cap 2    MULTIVITAMIN WITH IRON (DAILY MULTIVITAMINS/IRON PO) Take  by mouth.  ergocalciferol (ERGOCALCIFEROL) 50,000 unit capsule TAKE 1 CAP BY MOUTH EVERY SEVEN (7) DAYS. 12 Cap 0    food supplemt, lactose-reduced (ENSURE PLUS) 0.05-1.5 gram-kcal/mL liqd TAKE 237 ML BY MOUTH THREE TIMES DAILY. 62639 mL 2    prenatal multivit-ca-min-fe-fa (PRENATAL VITAMIN) tab TAKE 1 TAB BY MOUTH DAILY. 30 Tab 5    zolpidem CR (AMBIEN CR) 6.25 mg tablet Take 1 Tab by mouth nightly as needed for Sleep. Max Daily Amount: 6.25 mg. 30 Tab 0    divalproex DR (DEPAKOTE) 250 mg tablet Take 1 Tab by mouth two (2) times a day. Indications: MIGRAINE PREVENTION 60 Tab 5    PARoxetine (PAXIL) 30 mg tablet Take 1 Tab by mouth daily.  90 Tab 1    ondansetron (ZOFRAN ODT) 4 mg disintegrating tablet Take 1 Tab by mouth every eight (8) hours as needed for Nausea. 30 Tab 3    Omeprazole delayed release (PRILOSEC D/R) 20 mg tablet Take 1 Tab by mouth daily. 90 Tab 3    cyproheptadine (PERIACTIN) 4 mg tablet Take 1 Tab by mouth once over twenty-four (24) hours. 90 Tab 3     Allergies   Allergen Reactions    Imitrex [Sumatriptan Succinate] Anaphylaxis    Iodine Cough     Coughing up blood      Sea Food [Seafood] Hives     Per pt report      Past Medical History:   Diagnosis Date    Abnormal Papanicolaou smear of cervix     Anxiety     ANXIETY    Asthma     GERD (gastroesophageal reflux disease)     Migraine     Miscarriage     Seizures (HCC)     last episode     Tobacco abuse, in remission      Past Surgical History:   Procedure Laterality Date    HX APPENDECTOMY      HX  SECTION      HX GYN      cervical cerclage    HX OTHER SURGICAL  2017    Mendel L4-5, L5-S1 Facet Joint block     Family History   Problem Relation Age of Onset    Diabetes Mother     Heart Attack Father     Attention Deficit Hyperactivity Disorder Sister     Attention Deficit Hyperactivity Disorder Brother     Cancer Maternal Aunt 45     breast    Diabetes Maternal Grandmother     Attention Deficit Hyperactivity Disorder Brother     No Known Problems Brother     No Known Problems Brother     No Known Problems Brother     Cancer Maternal Aunt      lung    Heart defect Son    Lexy Rosa Migraines Son     Seizures Son      h/o    Other Son      h/o jaundice     Social History   Substance Use Topics    Smoking status: Former Smoker     Packs/day: 1.00     Years: 13.00     Types: Cigarettes     Quit date: 2015    Smokeless tobacco: Never Used      Comment: cigarello, once a month    Alcohol use No        ROS:  Feeling well. No dyspnea or chest pain on exertion. No abdominal pain, change in bowel habits, black or bloody stools. No urinary tract symptoms.  GYN ROS: normal menses, no pelvic pain or discharge, no breast pain or new or enlarging lumps on self exam. No neurological complaints. Objective:     Visit Vitals    /71    Pulse 98    Ht 5' 6\" (1.676 m)    Wt 119 lb (54 kg)    LMP 03/01/2018    BMI 19.21 kg/m2     The patient appears well, alert, oriented x 3, in no distress. ENT normal.  Neck supple. No adenopathy or thyromegaly. TASHA. Lungs are clear, good air entry, no wheezes, rhonchi or rales. S1 and S2 normal, no murmurs, regular rate and rhythm. Abdomen soft without tenderness, guarding, mass or organomegaly. Extremities show no edema, normal peripheral pulses. Neurological is normal, no focal findings. BREAST EXAM: right breast normal without mass, skin or nipple changes or axillary nodes, left breast normal without mass, skin or nipple changes or axillary nodes    PELVIC EXAM: VULVA: normal appearing vulva with no masses, tenderness or lesions, VAGINA: normal appearing vagina with normal color and discharge, no lesions, CERVIX: normal appearing cervix without discharge or lesions, UTERUS: uterus is normal size, shape, consistency and nontender, ADNEXA: normal adnexa in size, nontender and no masses, PAP: Pap smear done today, DNA probe for chlamydia and GC obtained    Assessment/Plan:   well woman  pap smear  counseled on breast self exam, use and side effects of OCP's and family planning choices  additional lab tests per orders  return annually or prn    ICD-10-CM ICD-9-CM    1. Well woman exam with routine gynecological exam Z01.419 V72.31 PAP IG, CT-NG TV HPV 16&18,45 ([id number], [id number])   2. Vaginal bleeding in pregnancy, first trimester O20.9 640.93 BETA HCG, QT      BETA HCG, QT   3. Family planning Z30.09 V25.09    . Will call patient with results of HCG quant. Discussed correct use of OCP to avoid unintended pregnancies. Will call patient for refills on OCPs pending results of HCG quant. Discussed with patient that our office will call for abnormal lab results.   Normal results can be reviewed through MyChart. If patient has not received a phone call from our office or there are no results found on Mychart, the patient has been instructed to call our office to follow up on results. I have verbalized the plan of care with patient and the patient expressed understanding.    All questions were answered

## 2018-03-19 NOTE — PATIENT INSTRUCTIONS

## 2018-03-20 ENCOUNTER — TELEPHONE (OUTPATIENT)
Dept: PAIN MANAGEMENT | Age: 33
End: 2018-03-20

## 2018-03-20 LAB — HCG INTACT+B SERPL-ACNC: 50 MIU/ML

## 2018-03-20 NOTE — TELEPHONE ENCOUNTER
Returned call to inform patient of need to reschedule her RFA for one month following her recent miscarriage per Dr Angel Caba.

## 2018-03-21 ENCOUNTER — TELEPHONE (OUTPATIENT)
Dept: OBGYN CLINIC | Age: 33
End: 2018-03-21

## 2018-03-21 ENCOUNTER — HOSPITAL ENCOUNTER (OUTPATIENT)
Dept: NON INVASIVE DIAGNOSTICS | Age: 33
Discharge: HOME | End: 2018-03-21
Attending: INTERNAL MEDICINE
Payer: MEDICAID

## 2018-03-21 DIAGNOSIS — R07.9 CHEST PAIN, UNSPECIFIED TYPE: ICD-10-CM

## 2018-03-21 PROCEDURE — 93351 STRESS TTE COMPLETE: CPT

## 2018-03-22 PROBLEM — R87.613 HGSIL (HIGH GRADE SQUAMOUS INTRAEPITHELIAL LESION) ON PAP SMEAR OF CERVIX: Status: ACTIVE | Noted: 2018-03-22

## 2018-03-22 LAB
C TRACH RRNA CVX QL NAA+PROBE: NEGATIVE
CYTOLOGIST CVX/VAG CYTO: ABNORMAL
CYTOLOGY CVX/VAG DOC THIN PREP: ABNORMAL
DX ICD CODE: ABNORMAL
DX ICD CODE: ABNORMAL
HPV I/H RISK 1 DNA CVX QL PROBE+SIG AMP: POSITIVE
Lab: ABNORMAL
N GONORRHOEA RRNA CVX QL NAA+PROBE: NEGATIVE
OTHER STN SPEC: ABNORMAL
PATH REPORT.FINAL DX SPEC: ABNORMAL
PATHOLOGIST CVX/VAG CYTO: ABNORMAL
STAT OF ADQ CVX/VAG CYTO-IMP: ABNORMAL
T VAGINALIS RRNA SPEC QL NAA+PROBE: NEGATIVE

## 2018-03-22 NOTE — TELEPHONE ENCOUNTER
S/w patient, ocp would be called in based on her lab results. Beta is currently at 50. Scheduled patient to repeat labs on 03-. Patient voice understanding.

## 2018-03-23 ENCOUNTER — HOSPITAL ENCOUNTER (OUTPATIENT)
Dept: LAB | Age: 33
Discharge: HOME | End: 2018-03-23

## 2018-03-23 PROCEDURE — 99001 SPECIMEN HANDLING PT-LAB: CPT | Performed by: OBSTETRICS & GYNECOLOGY

## 2018-03-23 NOTE — PROGRESS NOTES
Patient notified and voices understanding:  Abn pap, HGSIL.  wull need colposcopy.  Patient coming today for repeat labs and agrees to schedule her appt

## 2018-04-04 RX ORDER — SODIUM CHLORIDE 0.9 % (FLUSH) 0.9 %
5-10 SYRINGE (ML) INJECTION AS NEEDED
Status: CANCELLED | OUTPATIENT
Start: 2018-04-09

## 2018-04-04 RX ORDER — MIDAZOLAM HYDROCHLORIDE 1 MG/ML
.5-6 INJECTION, SOLUTION INTRAMUSCULAR; INTRAVENOUS
Status: CANCELLED | OUTPATIENT
Start: 2018-04-09

## 2018-05-15 ENCOUNTER — OFFICE VISIT (OUTPATIENT)
Dept: FAMILY MEDICINE CLINIC | Facility: CLINIC | Age: 33
End: 2018-05-15

## 2018-05-15 VITALS
HEART RATE: 66 BPM | WEIGHT: 121 LBS | BODY MASS INDEX: 19.44 KG/M2 | RESPIRATION RATE: 17 BRPM | HEIGHT: 66 IN | OXYGEN SATURATION: 100 % | TEMPERATURE: 98.4 F | DIASTOLIC BLOOD PRESSURE: 71 MMHG | SYSTOLIC BLOOD PRESSURE: 110 MMHG

## 2018-05-15 DIAGNOSIS — Z86.69 HISTORY OF BLURRY VISION: ICD-10-CM

## 2018-05-15 DIAGNOSIS — R63.6 LOW BODY WEIGHT DUE TO INADEQUATE CALORIC INTAKE: ICD-10-CM

## 2018-05-15 DIAGNOSIS — F41.9 ANXIETY: ICD-10-CM

## 2018-05-15 DIAGNOSIS — R60.0 EDEMA OF FOOT: ICD-10-CM

## 2018-05-15 DIAGNOSIS — Z00.00 ROUTINE GENERAL MEDICAL EXAMINATION AT A HEALTH CARE FACILITY: Primary | ICD-10-CM

## 2018-05-15 PROBLEM — K21.9 GASTROESOPHAGEAL REFLUX DISEASE WITHOUT ESOPHAGITIS: Status: ACTIVE | Noted: 2018-05-15

## 2018-05-15 RX ORDER — PROMETHAZINE HYDROCHLORIDE 12.5 MG/1
TABLET ORAL
Refills: 0 | Status: CANCELLED | OUTPATIENT
Start: 2018-05-15

## 2018-05-15 RX ORDER — PROMETHAZINE HYDROCHLORIDE 12.5 MG/1
TABLET ORAL
Refills: 0 | COMMUNITY
Start: 2018-04-20 | End: 2019-05-30

## 2018-05-15 NOTE — PROGRESS NOTES
Javier Clemons is a 28 y.o.  female presents today for office visit for follow up. Pt would also like to discuss insomnia,asthma. Pt is not fasting. Pt is in Room # 9      1. Have you been to the ER, urgent care clinic since your last visit? Hospitalized since your last visit? [facility] 3/16/18 Miscarriage    2. Have you seen or consulted any other health care providers outside of the [address] [place] [address] since your last visit? Include any pap smears or colon screening. GYN,Gastro    Upcoming Appts  [facility]    Health Maintenance reviewed    VORB: No orders of the defined types were placed in this encounter.   Nita Riley LPN

## 2018-05-15 NOTE — PROGRESS NOTES
Progress Note  Today's Date:  5/15/2018   Patient:  Marquita Rangel  Patient :  1985    Subjective:   Marquita Rangel is a 28 y.o. female who presents for follow up for her multiple conditions. Chest pain-  She is still experiencing daily  midsternal chest pain which at times radiates to her left arm. She is followed by Cardiology who she saw on  18. She  had a stress test  with negative results. Seizures/ Migraine She is followed  By Neuro.-She is currently taking Depakote. No recent seizure activity. Her last seizure was in . Next  Neuro. Claudio. is in July. Right Foot swelling -she is still complaining of right foot swelling she is waiting  evaluation by podiatry. Anxiety- history of anxiety which started in  then resolved without evaluation or treatment. She states her symptoms of anxiety returned in , and  progressively worsened. She recently started to see a counselor two months ago. She was started on paxil 30 mg and vistaril 50 mg by her previous PCP. She was never evaluated by psych. She states Paxil is not helping and Vistaril only makes her drowsy. She requests a change in medication, but declined psych evaluation  at this time. Low body weight- she is trying to put on weight she is on ensure, she takes phenergan  Shortness of breath - she saw pulmo- several testing was done with negative results  think it;s related to anxiety  Follow up in one year. Back pain - she sees pain management and back specialist.    Vitaminm D deficiency- she is taking vitamin S supplement weekly    Perla Eli- followed by GI  Takes phenergan, and prilosec  Scheduled forsurgery  To stretch her esophagus on 19  . Insomnia- she sees sleep specialist- she had a negative sleep study.  she takes [place]  Next claudio is      Current Outpatient Meds and Allergies     Current Outpatient Prescriptions on File Prior to Visit   Medication Sig Dispense Refill    norethindrone ([place] Linker) 0.35 mg tab Take 1 Tab by mouth daily. 1 Package 11    ergocalciferol (ERGOCALCIFEROL) 50,000 unit capsule TAKE 1 CAP BY MOUTH EVERY SEVEN (7) DAYS. 12 Cap 0    acetaminophen (TYLENOL) 325 mg tablet Take 2 Tabs by mouth every four (4) hours as needed for Pain. 60 Tab 0    albuterol (VENTOLIN HFA) 90 mcg/actuation inhaler INHELE 1 PUFF EVERY FOUR (4) HOURS AS NEEDED FOR WHEEZING OR SHORTNESS OF BREATH. 1 Inhaler 3    cyclobenzaprine (FLEXERIL) 10 mg tablet Take  by mouth three (3) times daily as needed for Muscle Spasm(s).  hydrOXYzine pamoate (VISTARIL) 50 mg capsule TAKE ONE CAPSULE BY MOUTH AT BEDTIME 30 Cap 2    MULTIVITAMIN WITH IRON (DAILY MULTIVITAMINS/IRON PO) Take  by mouth.  food supplemt, lactose-reduced (ENSURE PLUS) 0.05-1.5 gram-kcal/mL liqd TAKE 237 ML BY MOUTH THREE TIMES DAILY. 34623 mL 2    prenatal multivit-ca-min-fe-fa (PRENATAL VITAMIN) tab TAKE 1 TAB BY MOUTH DAILY. 30 Tab 5    zolpidem CR (AMBIEN CR) 6.25 mg tablet Take 1 Tab by mouth nightly as needed for Sleep. Max Daily Amount: 6.25 mg. 30 Tab 0    divalproex DR (DEPAKOTE) 250 mg tablet Take 1 Tab by mouth two (2) times a day. Indications: MIGRAINE PREVENTION 60 Tab 5    PARoxetine (PAXIL) 30 mg tablet Take 1 Tab by mouth daily. 90 Tab 1    ondansetron (ZOFRAN ODT) 4 mg disintegrating tablet Take 1 Tab by mouth every eight (8) hours as needed for Nausea. 30 Tab 3    Omeprazole delayed release (PRILOSEC D/R) 20 mg tablet Take 1 Tab by mouth daily. 90 Tab 3    cyproheptadine (PERIACTIN) 4 mg tablet Take 1 Tab by mouth once over twenty-four (24) hours. 90 Tab 3     No current facility-administered medications on file prior to visit. These medications have been reviewed and reconciled with the patient during today's visit.       Allergies   Allergen Reactions    Imitrex [Sumatriptan Succinate] Anaphylaxis    Iodine Cough     Coughing up blood      Sea Food [Seafood] Hives     Per pt report        ROS:       Positive symptoms are BOLDED:    CONST:   Fatigue, weight change, appetite change  NEURO:   Headaches, vision changes, dizziness, loss of consciousness  CV:      Chest pain, palpitations, orthopnea, PND  PULM:             SOB, wheezing, cough, hemoptysis  GI:             Nausea, vomiting, abdominal pain, greasy stools, blood in stool,     diarrhea, constipation  :       Dysuria, hematuria, change in urine  MS:      Muscle/joint pain, joint swelling  SKIN:        Rashes, skin changes  ALLERGY: Seasonal allergies, itchy eyes  HEME: Easy bleeding/bruising  PSYCH: Anxiety      Objective:     VS:    Visit Vitals    /71 (BP 1 Location: Right arm, BP Patient Position: Sitting)    Pulse 66    Temp 98.4 °F (36.9 °C) (Oral)    Resp 17    Ht 5' 6\" (1.676 m)    Wt 121 lb (54.9 kg)    SpO2 100%    BMI 19.53 kg/m2       General:   Well-nourished, well-groomed, pleasant, alert, in no acute distress. Head:  Normocephalic, atraumatic, MMM, normal dentition  Ears:  External ears normaL, BilateralTMs normal  Eyes:  EOMI, PERRL,  Nose:  External nares WNL  Neck:  Neck supple with normal ROM for age, no thyromegaly, No LAD  Throat: Clear  Cardiovasc:   Regular rate and rhythm, no murmurs, no rubs, no gallops,   Pulmonary:   Clear breath sounds bilaterally, good air movement, no wheezing, no rales, no rhonchi, normal respiratory effort  Abdomen:   Abdomen soft, nontender, nondistended, NABS  Extremities:   No edema, no tenderness with palpation of calves, warm and well-perfused  Neuro:   Alert, conversant, appropriate, following commands, no focal deficits. Pertinent diagnostic procedures include:  No results found for this or any previous visit (from the past 24 hour(s)). Assessment:       1. Routine general medical examination at a health care facility    2. Anxiety    3. Edema of foot    4. History of blurry vision    5.  Low body weight due to inadequate caloric intake        Plan:       Orders Placed This Encounter  REFERRAL TO OPHTHALMOLOGY     Referral Priority:   Routine     Referral Type:   Consultation     Referral Reason:   Specialty Services Required    REFERRAL TO PODIATRY     Referral Priority:   Routine     Referral Type:   Consultation     Referral Reason:   Specialty Services Required    REFERRAL TO NUTRITION     Referral Priority:   Routine     Referral Type:   Consultation     Referral Reason:   Specialty Services Required     Requested Specialty:   Nutrition    promethazine (PHENERGAN) 12.5 mg tablet     Sig: TAKE 1 TABLET BY MOUTH EVERY 6 HOURS AS NEEDED     Refill:  0    multivitamin-min-iron-FA-vit K 18 mg iron-400 mcg-25 mcg tab     Sig: Take 1 Tab by mouth daily. Dispense:  90 Tab     Refill:  3    venlafaxine-SR (EFFEXOR-XR) 37.5 mg capsule     Sig: Take 1 Cap by mouth daily. Indications: Generalized Anxiety Disorder     Dispense:  30 Cap     Refill:  0    busPIRone (BUSPAR) 10 mg tablet     Sig: Take 1 Tab by mouth daily. Dispense:  30 Tab     Refill:  1     Follow up in 3-6 months for routine care or prn for acute care. Call for concerns or questions regarding new medications. Healthy lifestyle has been encouraged including avoidance of tobacco, limiting or avoiding alcohol intake, heart healthy diet which is low in cholesterol and saturated fat and contains fresh fruits, vegetables and whole grains and fiber, regular exercise with goals of 20-30 minutes 3-5 days weekly and maintaining an optimal BMI. I have discussed the diagnosis with the patient and the intended plan as seen in the above orders. The patient has received an after-visit summary along with patient information handout. I have discussed medication side effects and warnings with the patient as well. Pt verbalized understanding.     Nadine JON  [facility]  [address] [place], [address]  Phone [phone]  Fax [phone]

## 2018-05-16 RX ORDER — BUSPIRONE HYDROCHLORIDE 10 MG/1
10 TABLET ORAL DAILY
Qty: 30 TAB | Refills: 1 | Status: SHIPPED | OUTPATIENT
Start: 2018-05-16 | End: 2018-07-24 | Stop reason: SDUPTHER

## 2018-05-16 RX ORDER — VENLAFAXINE HYDROCHLORIDE 37.5 MG/1
37.5 CAPSULE, EXTENDED RELEASE ORAL DAILY
Qty: 30 CAP | Refills: 0 | Status: SHIPPED | OUTPATIENT
Start: 2018-05-16 | End: 2018-06-06 | Stop reason: SINTOL

## 2018-05-23 ENCOUNTER — ANESTHESIA EVENT (OUTPATIENT)
Dept: ENDOSCOPY | Age: 33
End: 2018-05-23
Payer: MEDICAID

## 2018-05-24 ENCOUNTER — ANESTHESIA (OUTPATIENT)
Dept: ENDOSCOPY | Age: 33
End: 2018-05-24
Payer: MEDICAID

## 2018-05-24 ENCOUNTER — HOSPITAL ENCOUNTER (OUTPATIENT)
Age: 33
Setting detail: OUTPATIENT SURGERY
Discharge: HOME | End: 2018-05-24
Attending: INTERNAL MEDICINE | Admitting: INTERNAL MEDICINE
Payer: MEDICAID

## 2018-05-24 VITALS
HEIGHT: 66 IN | HEART RATE: 85 BPM | WEIGHT: 116 LBS | OXYGEN SATURATION: 99 % | BODY MASS INDEX: 18.64 KG/M2 | SYSTOLIC BLOOD PRESSURE: 108 MMHG | RESPIRATION RATE: 16 BRPM | TEMPERATURE: 98.4 F | DIASTOLIC BLOOD PRESSURE: 57 MMHG

## 2018-05-24 LAB — HCG UR QL: NEGATIVE

## 2018-05-24 PROCEDURE — 76040000019: Performed by: INTERNAL MEDICINE

## 2018-05-24 PROCEDURE — 76060000031 HC ANESTHESIA FIRST 0.5 HR: Performed by: INTERNAL MEDICINE

## 2018-05-24 PROCEDURE — 74011250636 HC RX REV CODE- 250/636

## 2018-05-24 PROCEDURE — 81025 URINE PREGNANCY TEST: CPT

## 2018-05-24 PROCEDURE — 74011250636 HC RX REV CODE- 250/636: Performed by: NURSE ANESTHETIST, CERTIFIED REGISTERED

## 2018-05-24 RX ORDER — SODIUM CHLORIDE 0.9 % (FLUSH) 0.9 %
5-10 SYRINGE (ML) INJECTION EVERY 8 HOURS
Status: DISCONTINUED | OUTPATIENT
Start: 2018-05-24 | End: 2018-05-24 | Stop reason: HOSPADM

## 2018-05-24 RX ORDER — PROPOFOL 10 MG/ML
INJECTION, EMULSION INTRAVENOUS AS NEEDED
Status: DISCONTINUED | OUTPATIENT
Start: 2018-05-24 | End: 2018-05-24 | Stop reason: HOSPADM

## 2018-05-24 RX ORDER — SODIUM CHLORIDE 0.9 % (FLUSH) 0.9 %
5-10 SYRINGE (ML) INJECTION AS NEEDED
Status: DISCONTINUED | OUTPATIENT
Start: 2018-05-24 | End: 2018-05-24 | Stop reason: HOSPADM

## 2018-05-24 RX ORDER — SODIUM CHLORIDE, SODIUM LACTATE, POTASSIUM CHLORIDE, CALCIUM CHLORIDE 600; 310; 30; 20 MG/100ML; MG/100ML; MG/100ML; MG/100ML
50 INJECTION, SOLUTION INTRAVENOUS CONTINUOUS
Status: DISCONTINUED | OUTPATIENT
Start: 2018-05-25 | End: 2018-05-24 | Stop reason: HOSPADM

## 2018-05-24 RX ORDER — FAMOTIDINE 20 MG/1
20 TABLET, FILM COATED ORAL ONCE
Status: DISCONTINUED | OUTPATIENT
Start: 2018-05-25 | End: 2018-05-24 | Stop reason: HOSPADM

## 2018-05-24 RX ADMIN — PROPOFOL 50 MG: 10 INJECTION, EMULSION INTRAVENOUS at 11:07

## 2018-05-24 RX ADMIN — PROPOFOL 50 MG: 10 INJECTION, EMULSION INTRAVENOUS at 11:05

## 2018-05-24 RX ADMIN — SODIUM CHLORIDE, SODIUM LACTATE, POTASSIUM CHLORIDE, AND CALCIUM CHLORIDE 50 ML/HR: 600; 310; 30; 20 INJECTION, SOLUTION INTRAVENOUS at 10:42

## 2018-05-24 RX ADMIN — PROPOFOL 100 MG: 10 INJECTION, EMULSION INTRAVENOUS at 11:03

## 2018-05-24 NOTE — ANESTHESIA PREPROCEDURE EVALUATION
Anesthetic History               Review of Systems / Medical History  Patient summary reviewed and pertinent labs reviewed    Pulmonary            Asthma : well controlled       Neuro/Psych     seizures (Last seizure 2015): well controlled         Cardiovascular            Dysrhythmias (Sinus bradycardia)       Exercise tolerance: >4 METS     GI/Hepatic/Renal     GERD: poorly controlled           Endo/Other        Arthritis     Other Findings   Comments: Documentation of current medication  Current medications obtained, documented and obtained? YES      Risk Factors for Postoperative nausea/vomiting:       History of postoperative nausea/vomiting? NO       Female? YES       Motion sickness? NO       Intended opioid administration for postoperative analgesia? NO      Smoking Abstinence:  Current Smoker? NO  Elective Surgery? YES  Seen preoperatively by anesthesiologist or proxy prior to day of surgery? YES  Pt abstained from smoking 24 hours prior to anesthesia?  N/A    Preventive care/screening for High Blood Pressure:  Aged 18 years and older: YES  Screened for high blood pressure: YES  Patients with high blood pressure referred to primary care provider   for BP management: YES                 Physical Exam    Airway  Mallampati: II  TM Distance: 4 - 6 cm  Neck ROM: normal range of motion   Mouth opening: Normal     Cardiovascular  Regular rate and rhythm,  S1 and S2 normal,  no murmur, click, rub, or gallop             Dental  No notable dental hx       Pulmonary  Breath sounds clear to auscultation               Abdominal  GI exam deferred       Other Findings            Anesthetic Plan    ASA: 2  Anesthesia type: MAC          Induction: Intravenous  Anesthetic plan and risks discussed with: Patient

## 2018-05-24 NOTE — H&P
Gastroenterology Consult     Referring Physician:  Diana Sabillon Date: 2018     Subjective:     Chief Complaint: dysphagia    History of Present Illness: Salty Duncan is a 28 y.o. female who is seen in consultation for dysphagia. Patient was evaluated and examined in the office. Please see scanned note. No interval changes in medical status or examination.      Past Medical History:   Diagnosis Date    Abnormal Papanicolaou smear of cervix     Anxiety     ANXIETY    Asthma     Bradycardia, sinus     Degenerative disc disease, lumbar     Edema of foot 5/15/2018    Gastroesophageal reflux disease without esophagitis 5/15/2018    GERD (gastroesophageal reflux disease)     HGSIL (high grade squamous intraepithelial lesion) on Pap smear of cervix 3/22/2018    Migraine     Miscarriage     Seizures (HCC)     last episode     Tobacco abuse, in remission      Past Surgical History:   Procedure Laterality Date    HX APPENDECTOMY      HX  SECTION      HX ENDOSCOPY      HX GYN      cervical cerclage    HX OTHER SURGICAL  2017    Mendel L4-5, L5-S1 Facet Joint block      Family History   Problem Relation Age of Onset    Diabetes Mother     Heart Attack Father     Attention Deficit Hyperactivity Disorder Sister     Attention Deficit Hyperactivity Disorder Brother     Cancer Maternal Aunt 45     breast    Diabetes Maternal Grandmother     Attention Deficit Hyperactivity Disorder Brother     No Known Problems Brother     No Known Problems Brother     No Known Problems Brother     Cancer Maternal Aunt      lung    Heart defect Son    [facility] Mig[facility] Son     Seizures Son      h/o    Other Son      h/o jaundice     Social History   Substance Use Topics    Smoking status: Former Smoker     Packs/day: 1.00     Years: 13.00     Types: Cigarettes     Quit date: 2015    Smokeless tobacco: Never Used      Comment: cigarello, once a month    Alcohol use No      Allergies Allergen Reactions    Imitrex [Sumatriptan Succinate] Anaphylaxis    Iodine Cough     Coughing up blood      Sea Food [Seafood] Hives     Per pt report      Current Facility-Administered Medications   Medication Dose Route Frequency    [START ON 5/25/2018] lactated Ringers infusion  50 mL/hr IntraVENous CONTINUOUS    sodium chloride (NS) flush 5-10 mL  5-10 mL IntraVENous Q8H    sodium chloride (NS) flush 5-10 mL  5-10 mL IntraVENous PRN    [START ON 5/25/2018] famotidine (PEPCID) tablet 20 mg  20 mg Oral ONCE     Current Outpatient Prescriptions   Medication Sig    busPIRone (BUSPAR) 10 mg tablet Take 1 Tab by mouth daily.  promethazine (PHENERGAN) 12.5 mg tablet TAKE 1 TABLET BY MOUTH EVERY 6 HOURS AS NEEDED    multivitamin-min-iron-FA-vit K 18 mg iron-400 mcg-25 mcg tab Take 1 Tab by mouth daily.  norethindrone (MICRONOR) 0.35 mg tab Take 1 Tab by mouth daily.  acetaminophen (TYLENOL) 325 mg tablet Take 2 Tabs by mouth every four (4) hours as needed for Pain.  albuterol (VENTOLIN HFA) 90 mcg/actuation inhaler INHELE 1 PUFF EVERY FOUR (4) HOURS AS NEEDED FOR WHEEZING OR SHORTNESS OF BREATH.  food supplemt, lactose-reduced (ENSURE PLUS) 0.05-1.5 gram-kcal/mL liqd TAKE 237 ML BY MOUTH THREE TIMES DAILY.  prenatal multivit-ca-min-fe-fa (PRENATAL VITAMIN) tab TAKE 1 TAB BY MOUTH DAILY.  zolpidem CR (AMBIEN CR) 6.25 mg tablet Take 1 Tab by mouth nightly as needed for Sleep. Max Daily Amount: 6.25 mg.    divalproex DR (DEPAKOTE) 250 mg tablet Take 1 Tab by mouth two (2) times a day. Indications: MIGRAINE PREVENTION    Omeprazole delayed release (PRILOSEC D/R) 20 mg tablet Take 1 Tab by mouth daily.  venlafaxine-SR (EFFEXOR-XR) 37.5 mg capsule Take 1 Cap by mouth daily. Indications: Generalized Anxiety Disorder    ergocalciferol (ERGOCALCIFEROL) 50,000 unit capsule TAKE 1 CAP BY MOUTH EVERY SEVEN (7) DAYS.     cyclobenzaprine (FLEXERIL) 10 mg tablet Take  by mouth three (3) times daily as needed for Muscle Spasm(s).  cyproheptadine (PERIACTIN) 4 mg tablet Take 1 Tab by mouth once over twenty-four (24) hours. Review of Systems:  A detailed 10 organ review of systems is obtained with pertinent positives as listed in the History of Present Illness and Past Medical History. All others are negative. Objective:     Physical Exam:  Visit Vitals    /57    Pulse 85    Temp 98.4 °F (36.9 °C)    Resp 16    Ht 5' 6\" (1.676 m)    Wt 52.6 kg (116 lb)    SpO2 99%    BMI 18.72 kg/m2        Skin:  Extremities and face reveal no rashes. No riley erythema. No telangiectasias on the chest wall. HEENT: Sclerae anicteric. Extra-occular muscles are intact. No oral ulcers. No abnormal pigmentation of the lips. The neck is supple. Cardiovascular: Regular rate and rhythm. No murmurs, gallops, or rubs. PMI nondisplaced. Carotids without bruits. Respiratory:  Comfortable breathing with no accessory muscle use. Clear breath sounds with no wheezes, rales, or rhonchi. GI:  Abdomen nondistended, soft, and nontender. Normal active bowel sounds. No enlargement of the liver or spleen. No masses palpable. Rectal:  Deferred  Musculoskeletal:  No pitting edema of the lower legs. Extremities have good range of motion. No costovertebral tenderness. Neurological:  Gross memory appears intact. Patient is alert and oriented. Psychiatric:  Mood appears appropriate with judgement intact. Lymphatic:  No cervical or supraclavicular adenopathy.           Assessment/Plan:     EGD as planned

## 2018-05-24 NOTE — DISCHARGE INSTRUCTIONS
Upper GI Endoscopy: What to Expect at [address] [place] [address]  After you have an endoscopy, you will stay at the hospital or clinic for 1 to 2 hours. This will allow the medicine to wear off. You will be able to go home after your doctor or nurse checks to make sure you are not having any problems. You may have to stay overnight if you had treatment during the test. You may have a sore throat for a day or two after the test.  This care sheet gives you a general idea about what to expect after the test.  How can you care for yourself at home? Activity  · Rest as much as you need to after you go home. · You should be able to go back to your usual activities the day after the test.  Diet  · Follow your doctor's directions for eating after the test.  · Drink plenty of fluids (unless your doctor has told you not to). Medications  · If you have a sore throat the day after the test, use an over-the-counter spray to numb your throat. Follow-up care is a key part of your treatment and safety. Be sure to make and go to all appointments, and call your doctor if you are having problems. It's also a good idea to know your test results and keep a list of the medicines you take. When should you call for help? Call 911 anytime you think you may need emergency care. For example, call if:  ? · You passed out (loses consciousness). ? · You have trouble breathing. ? · You pass maroon or bloody stools. ?Call your doctor now or seek immediate medical care if:  ? · You have pain that does not get better after your take pain medicine. ? · You have new or worse belly pain. ? · You have blood in your stools. ? · You are sick to your stomach and cannot keep fluids down. ? · You have a fever. ? · You cannot pass stools or gas. ? Watch closely for changes in your health, and be sure to contact your doctor if:  ? · Your throat still hurts after a day or two. ? · You do not get better as expected.    Where can you learn more?  Go to http://norma-kaley.info/. Enter [phone] [phone] in the search box to learn more about \"Upper GI Endoscopy: What to Expect at Home. \"  Current as of: May 12, 2017  Content Version: 11.4  © 4915-0596 [employer]. Care instructions adapted under license by [employer] (which disclaims liability or warranty for this information). If you have questions about a medical condition or this instruction, always ask your healthcare professional. [place] [address] any warranty or liability for your use of this information. DISCHARGE SUMMARY from Nurse    PATIENT INSTRUCTIONS:    After general anesthesia or intravenous sedation, for 24 hours or while taking prescription Narcotics:  · Limit your activities  · Do not drive and operate hazardous machinery  · Do not make important personal or business decisions  · Do  not drink alcoholic beverages  · If you have not urinated within 8 hours after discharge, please contact your surgeon on call. Report the following to your surgeon:  · Excessive pain, swelling, redness or odor of or around the surgical area  · Temperature over 100.5  · Nausea and vomiting lasting longer than 4 hours or if unable to take medications  · Any signs of decreased circulation or nerve impairment to extremity: change in color, persistent  numbness, tingling, coldness or increase pain  · Any questions    What to do at Home:  Recommended activity: Activity as tolerated and no driving for today. These are general instructions for a healthy lifestyle:    No smoking/ No tobacco products/ Avoid exposure to second hand smoke  Surgeon General's Warning:  Quitting smoking now greatly reduces serious risk to your health.     Obesity, smoking, and sedentary lifestyle greatly increases your risk for illness    A healthy diet, regular physical exercise & weight monitoring are important for maintaining a healthy lifestyle    You may be retaining fluid if you have a history of heart failure or if you experience any of the following symptoms:  Weight gain of 3 pounds or more overnight or 5 pounds in a week, increased swelling in our hands or feet or shortness of breath while lying flat in bed. Please call your doctor as soon as you notice any of these symptoms; do not wait until your next office visit. Recognize signs and symptoms of STROKE:    F-face looks uneven    A-arms unable to move or move unevenly    S-speech slurred or non-existent    T-time-call 911 as soon as signs and symptoms begin-DO NOT go       Back to bed or wait to see if you get better-TIME IS BRAIN. Warning Signs of HEART ATTACK     Call 911 if you have these symptoms:   Chest discomfort. Most heart attacks involve discomfort in the center of the chest that lasts more than a few minutes, or that goes away and comes back. It can feel like uncomfortable pressure, squeezing, fullness, or pain.  Discomfort in other areas of the upper body. Symptoms can include pain or discomfort in one or both arms, the back, neck, jaw, or stomach.  Shortness of breath with or without chest discomfort.  Other signs may include breaking out in a cold sweat, nausea, or lightheadedness. Don't wait more than five minutes to call 911 - MINUTES MATTER! Fast action can save your life. Calling 911 is almost always the fastest way to get lifesaving treatment. Emergency Medical Services staff can begin treatment when they arrive -- up to an hour sooner than if someone gets to the hospital by car. The discharge information has been reviewed with the patient. The patient verbalized understanding. Discharge medications reviewed with the patient and appropriate educational materials and side effects teaching were provided. ____  Patient armband removed and given to patient to take home.   Patient was informed of the privacy risks if armband lost or stolen  ______________________________________________________________________________________________________________________________

## 2018-05-24 NOTE — PROCEDURES
Endoscopy Procedure Note    Patient: Izzy Pineda MRN: [id number]  SSN: [id number]    YOB: 1985  Age: 28 y.o. Sex: female      Date/Time:  5/24/2018 11:23 AM    Esophagogastroduodenoscopy (EGD) Procedure Note    Procedure: Esophagogastroduodenoscopy with esophageal dilation    IMPRESSION:   1. Normal appearing proxima and mid esophagus  2. Mild distal esophagus erythema consistent with reflux esophagitis  3. Normal stomach  4. Normal duodenum  5. After careful visual inspection Savary dilation, 46 F, was performed over a guidewire that was endoscopically placed in to the stomach. RECOMMENDATIONS:  1. Resume regular diet. 2. Antireflux diet  3. Continue current medications    Indication: Dysphagia/odynophagia  :  Yamila Dunlap MD  Assistants: Endoscopy Technician-1: May Almaguer  Endoscopy RN-1: Lieutenant Domi RN    Referring Provider:   Laura Forrester NP  History: The history and physical exam were reviewed and updated. Endoscope: Olympus [id number]-190 diagnostic endoscope  Extent of Exam: third portion of the duodenum  ASA: ASA 2 - Patient with mild systemic disease with no functional limitations  Anethesia/Sedation:  MAC anesthesia Propofol    Description of the procedure: The procedure was discussed with the patient including risks, benefits, alternatives including risks of iv sedation, bleeding, perforation and aspiration. A safety timeout was performed. The patient was placed in the left lateral decubitus position. A bite block was placed. The patient was given incremental doses of intravenous sedation until moderate sedation was achieved. The patients vital signs were monitored at all times including heart rate/rhythm, blood pressure and oxygen saturation. The endoscope was then passed under direct visualization to the third portion of the duodenum. The endoscope was then slowly withdrawn while visualizing the mucosa.   In the stomach a retroflexion was performed and gastric fundus and cardia visualized. The endoscope was then slowly withdrawn. The patient was then transferred to recovery in stable condition. Findings:   1. Normal appearing proxima and mid esophagus  2. Mild distal esophagus erythema consistent with reflux esophagitis  3. Normal stomach  4. Normal duodenum  5. After careful visual inspection Savary dilation, 46 F, was performed over a guidewire that was endoscopically placed in to the stomach. Therapies:  Dilation with savary    Specimens: * No specimens in log *            Complications:   None; patient tolerated the procedure well.     EBL:Minimal    Discharge disposition:  Home in the company of  when able to ambulate    Jai Stokes MD  May 24, 2018  11:23 AM

## 2018-05-24 NOTE — ANESTHESIA POSTPROCEDURE EVALUATION
Post-Anesthesia Evaluation and Assessment    Patient: Gerri Baez MRN: [id number]  SSN: [id number]    YOB: 1985  Age: 28 y.o. Sex: female       Cardiovascular Function/Vital Signs  Visit Vitals    /70    Pulse (!) 57    Temp 36.9 °C (98.4 °F)    Resp 16    Ht 5' 6\" (1.676 m)    Wt 52.6 kg (116 lb)    SpO2 100%    BMI 18.72 kg/m2       Patient is status post MAC anesthesia for Procedure(s):  ESOPHAGOGASTRODUODENOSCOPY (EGD). Nausea/Vomiting: None    Postoperative hydration reviewed and adequate. Pain:      none    Neurological Status: At baseline    Mental Status and Level of Consciousness: Arousable    Pulmonary Status:   O2 Device: Room air (05/24/18 1112)   Adequate oxygenation and airway patent    Complications related to anesthesia: None    Post-anesthesia assessment completed.  No concerns    Signed By: Wayne Vargas CRNA     May 24, 2018

## 2018-05-24 NOTE — IP AVS SNAPSHOT
Kanchan Pillai 
 
 
 [address] 
[phone] Patient: Rain Dotson MRN: [id number] About your hospitalization You were admitted on:  May 24, 2018 You last received care in the:  [facility] PHASE 2 RECOVERY You were discharged on:  May 24, 2018 Why you were hospitalized Your primary diagnosis was:  Not on File Follow-up Information Follow up With Details Comments Contact Info Lary Mccracken NP   [address]  [address] [phone] 
[phone] Your Scheduled Appointments Thursday May 24, 2018 ESOPHAGOGASTRODUODENOSCOPY (EGD) with Lennox Reveles MD  
[facility] ENDOSCOPY [facility]) [address]  
[phone] Thursday May 31, 2018 11:30 AM EDT NUTRITION ASSESSMENT with Olivia Valentine RD  
[facility] OP NUTRITION [facility]) [address] [place] [address] Wednesday June 06, 2018 11:00 AM EDT PROCEDURE with Patricia Paige [facility] OB[facility]N ([address] [facility]) [address] [id number]  
[phone] Thursday June 14, 2018 10:45 AM EDT Follow Up with Kalyan Degroot MD  
[employer] [address] [facility]) [address] [facility] [address]  
[phone] Discharge Orders None A check kenyetta indicates which time of day the medication should be taken. My Medications ASK your doctor about these medications Instructions Each Dose to Equal  
 Morning Noon Evening Bedtime  
 acetaminophen 325 mg tablet Commonly known as:  TYLENOL Your last dose was: Your next dose is: Take 2 Tabs by mouth every four (4) hours as needed for Pain. 650 mg  
    
   
   
   
  
 albuterol 90 mcg/actuation inhaler Commonly known as:  VENTOLIN HFA Your last dose was: Your next dose is:    
   
   
 INHELE 1 PUFF EVERY FOUR (4) HOURS AS NEEDED FOR WHEEZING OR SHORTNESS OF BREATH.  
     
   
   
   
  
 busPIRone 10 mg tablet Commonly known as:  BUSPAR Your last dose was: Your next dose is: Take 1 Tab by mouth daily. 10 mg  
    
   
   
   
  
 cyclobenzaprine 10 mg tablet Commonly known as:  FLEXERIL Your last dose was: Your next dose is: Take  by mouth three (3) times daily as needed for Muscle Spasm(s). cyproheptadine 4 mg tablet Commonly known as:  PERIACTIN Your last dose was: Your next dose is: Take 1 Tab by mouth once over twenty-four (24) hours. 4 mg  
    
   
   
   
  
 divalproex  mg tablet Commonly known as:  DEPAKOTE Your last dose was: Your next dose is: Take 1 Tab by mouth two (2) times a day. Indications: MIGRAINE PREVENTION  
 250 mg  
    
   
   
   
  
 ergocalciferol 50,000 unit capsule Commonly known as:  ERGOCALCIFEROL Your last dose was: Your next dose is: TAKE 1 CAP BY MOUTH EVERY SEVEN (7) DAYS. food supplemt, lactose-reduced 0.05-1.5 gram-kcal/mL Liqd Commonly known as:  ENSURE PLUS Your last dose was: Your next dose is: TAKE 237 ML BY MOUTH THREE TIMES DAILY. multivitamin-min-iron-FA-vit K 18 mg iron-400 mcg-25 mcg Tab Your last dose was: Your next dose is: Take 1 Tab by mouth daily. 1 Tab  
    
   
   
   
  
 norethindrone 0.35 mg Tab Commonly known as:  Micha & Micha Your last dose was: Your next dose is: Take 1 Tab by mouth daily. 1 Tab Omeprazole delayed release 20 mg tablet Commonly known as:  PRILOSEC D/R Your last dose was: Your next dose is: Take 1 Tab by mouth daily.   
 20 mg  
 prenatal multivit-ca-min-fe-fa Tab Commonly known as:  PRENATAL VITAMIN Your last dose was: Your next dose is: TAKE 1 TAB BY MOUTH DAILY. promethazine 12.5 mg tablet Commonly known as:  PHENERGAN Your last dose was: Your next dose is: TAKE 1 TABLET BY MOUTH EVERY 6 HOURS AS NEEDED  
     
   
   
   
  
 venlafaxine-SR 37.5 mg capsule Commonly known as:  EFFEXOR-XR Your last dose was: Your next dose is: Take 1 Cap by mouth daily. Indications: Generalized Anxiety Disorder 37.5 mg  
    
   
   
   
  
 zolpidem CR 6.25 mg tablet Commonly known as:  AMBIEN CR Your last dose was: Your next dose is: Take 1 Tab by mouth nightly as needed for Sleep. Max Daily Amount: 6.25 mg.  
 6.25 mg Discharge Instructions Upper GI Endoscopy: What to Expect at [facility] Your Recovery After you have an endoscopy, you will stay at the hospital or clinic for 1 to 2 hours. This will allow the medicine to wear off. You will be able to go home after your doctor or nurse checks to make sure you are not having any problems. You may have to stay overnight if you had treatment during the test. You may have a sore throat for a day or two after the test. 
This care sheet gives you a general idea about what to expect after the test. 
How can you care for yourself at home? Activity · Rest as much as you need to after you go home. · You should be able to go back to your usual activities the day after the test. 
Diet · Follow your doctor's directions for eating after the test. 
· Drink plenty of fluids (unless your doctor has told you not to). Medications · If you have a sore throat the day after the test, use an over-the-counter spray to numb your throat. Follow-up care is a key part of your treatment and safety.  Be sure to make and go to all appointments, and call your doctor if you are having problems. It's also a good idea to know your test results and keep a list of the medicines you take. When should you call for help? Call 911 anytime you think you may need emergency care. For example, call if: 
? · You passed out (loses consciousness). ? · You have trouble breathing. ? · You pass maroon or bloody stools. ?Call your doctor now or seek immediate medical care if: 
? · You have pain that does not get better after your take pain medicine. ? · You have new or worse belly pain. ? · You have blood in your stools. ? · You are sick to your stomach and cannot keep fluids down. ? · You have a fever. ? · You cannot pass stools or gas. ? Watch closely for changes in your health, and be sure to contact your doctor if: 
? · Your throat still hurts after a day or two. ? · You do not get better as expected. Where can you learn more? Go to http://norma-kaley.info/. Enter ([phone] [phone] in the search box to learn more about \"Upper GI Endoscopy: What to Expect at Home. \" Current as of: May 12, 2017 Content Version: 11.4 © 7687-6188 Healthwise, Incorporated. Care instructions adapted under license by [employer] (which disclaims liability or warranty for this information). If you have questions about a medical condition or this instruction, always ask your healthcare professional. [place] [address] any warranty or liability for your use of this information. DISCHARGE SUMMARY from Nurse PATIENT INSTRUCTIONS: 
 
After general anesthesia or intravenous sedation, for 24 hours or while taking prescription Narcotics: · Limit your activities · Do not drive and operate hazardous machinery · Do not make important personal or business decisions · Do  not drink alcoholic beverages · If you have not urinated within 8 hours after discharge, please contact your surgeon on call. Report the following to your surgeon: 
· Excessive pain, swelling, redness or odor of or around the surgical area · Temperature over 100.5 · Nausea and vomiting lasting longer than 4 hours or if unable to take medications · Any signs of decreased circulation or nerve impairment to extremity: change in color, persistent  numbness, tingling, coldness or increase pain · Any questions What to do at Home: 
Recommended activity: Activity as tolerated and no driving for today. These are general instructions for a healthy lifestyle: No smoking/ No tobacco products/ Avoid exposure to second hand smoke Surgeon General's Warning:  Quitting smoking now greatly reduces serious risk to your health. Obesity, smoking, and sedentary lifestyle greatly increases your risk for illness A healthy diet, regular physical exercise & weight monitoring are important for maintaining a healthy lifestyle You may be retaining fluid if you have a history of heart failure or if you experience any of the following symptoms:  Weight gain of 3 pounds or more overnight or 5 pounds in a week, increased swelling in our hands or feet or shortness of breath while lying flat in bed. Please call your doctor as soon as you notice any of these symptoms; do not wait until your next office visit. Recognize signs and symptoms of STROKE: 
 
F-face looks uneven A-arms unable to move or move unevenly S-speech slurred or non-existent T-time-call 911 as soon as signs and symptoms begin-DO NOT go Back to bed or wait to see if you get better-TIME IS BRAIN. Warning Signs of HEART ATTACK Call 911 if you have these symptoms: 
? Chest discomfort. Most heart attacks involve discomfort in the center of the chest that lasts more than a few minutes, or that goes away and comes back. It can feel like uncomfortable pressure, squeezing, fullness, or pain. ? Discomfort in other areas of the upper body.  Symptoms can include pain or discomfort in one or both arms, the back, neck, jaw, or stomach. ? Shortness of breath with or without chest discomfort. ? Other signs may include breaking out in a cold sweat, nausea, or lightheadedness. Don't wait more than five minutes to call [address]! Fast action can save your life. Calling 911 is almost always the fastest way to get lifesaving treatment. Emergency Medical Services staff can begin treatment when they arrive  up to an hour sooner than if someone gets to the hospital by car. The discharge information has been reviewed with the patient. The patient verbalized understanding. Discharge medications reviewed with the patient and appropriate educational materials and side effects teaching were provided. ____ Patient armband removed and given to patient to take home. Patient was informed of the privacy risks if armband lost or stolen 
______________________________________________________________________________________________________________________________ Introducing [facility]! Dear Paulo Gonsales: 
Thank you for requesting a NeuroQuest account. Our records indicate that you already have an active NeuroQuest account. You can access your account anytime at [email or web address] Did you know that you can access your hospital and ER discharge instructions at any time in NeuroQuest? You can also review all of your test results from your hospital stay or ER visit. Additional Information If you have questions, please visit the Frequently Asked Questions section of the NeuroQuest website at [email or web address] Remember, NeuroQuest is NOT to be used for urgent needs. For medical emergencies, dial 911. Now available from your iPhone and Android! Introducing Alex Arroyo As a [employer] patient, I wanted to make you aware of our electronic visit tool called Alex Arroyo. [employer] allows you to connect within minutes with a medical provider 24 hours a day, seven days a week via a mobile device or tablet or logging into a secure website from your computer. You can access ChallengePost from anywhere in the [place]. A virtual visit might be right for you when you have a simple condition and feel like you just dont want to get out of bed, or cant get away from work for an appointment, when your regular [employer] provider is not available (evenings, weekends or holidays), or when youre out of town and need minor care. Electronic visits cost only $49 and if the [employer] provider determines a prescription is needed to treat your condition, one can be electronically transmitted to a nearby pharmacy*. Please take a moment to enroll today if you have not already done so. The enrollment process is free and takes just a few minutes. To enroll, please download the [employer] oralia to your tablet or phone, or visit [email or web address] to enroll on your computer. And, as an [address] [place] [address] patient with a [employer] account, the results of your visits will be scanned into your electronic medical record and your primary care provider will be able to view the scanned results. We urge you to continue to see your regular [employer] provider for your ongoing medical care. And while your primary care provider may not be the one available when you seek a ChallengePost virtual visit, the peace of mind you get from getting a real diagnosis real time can be priceless. For more information on ChallengePost, view our Frequently Asked Questions (FAQs) at [email or web address]. Sincerely, 
 
Esther Hernandez MD 
Chief Medical Officer Tiff Contreras *:  certain medications cannot be prescribed via ChallengePost Providers Seen During Your Hospitalization Provider Specialty Primary office phone Jayne Simmons MD Gastroenterology [phone] Your Primary Care Physician (PCP) Primary Care Physician Office Phone Office Fax Jason Blank [phone] You are allergic to the following Allergen Reactions Imitrex (Sumatriptan Succinate) Anaphylaxis Iodine Cough Coughing up blood [address] (Seafood) Hives Per pt report Recent Documentation Height Weight BMI OB Status Smoking Status 1.676 m 52.6 kg 18.72 kg/m2 Having regular periods Former Smoker Emergency Contacts Name Discharge Info Relation Home Work Mobile [employer] DISCHARGE CAREGIVER [3] Boyfriend [17] [phone] CAREGIVER [3] Mother [14] [phone] Patient Belongings The following personal items are in your possession at time of discharge: 
  Dental Appliances: None Please provide this summary of care documentation to your next provider. Signatures-by signing, you are acknowledging that this After Visit Summary has been reviewed with you and you have received a copy. Patient Signature:  ____________________________________________________________ Date:  ____________________________________________________________  
  
Adebayo Ventura Provider Signature:  ____________________________________________________________ Date:  ____________________________________________________________

## 2018-05-31 ENCOUNTER — HOSPITAL ENCOUNTER (OUTPATIENT)
Dept: NUTRITION | Age: 33
Discharge: HOME | End: 2018-05-31
Payer: MEDICAID

## 2018-05-31 PROCEDURE — 97802 MEDICAL NUTRITION INDIV IN: CPT

## 2018-05-31 NOTE — PROGRESS NOTES
Gege Kumar [facility] Nutrition Services  [address] [facility], [place], [place] [address]  Phone: [phone]  Fax: [phone]   Nutrition Assessment  Medical Nutrition Therapy   Outpatient Initial Evaluation         Patient Name: Juan Jose Fragsoo : 1985   Treatment Diagnosis: Low body weight due to inadequate caloric intake   Referral Source: Erna Lewis MD [facility]): 2018     Gender: female Age: 28 y.o. Ht: 66 in Wt:  118 lb  kg   BMI: 19.0 BMR   Male  BMR Female 1261     Past Medical History includes: Abnormal wt loss, GERD with esophagitis, anxiety, epilepsy, arthritis, seafood allergy     Pertinent Medications:   Phenergan, MVI w/ iron, Vit D, Omeprazole delayed release, anxiety med   Biochemical Data:   Lab Results   Component Value Date/Time    Hemoglobin A1c 5.5 2018 12:53 PM     Lab Results   Component Value Date/Time    Sodium 144 2018 01:00 AM    Potassium 3.8 2018 01:00 AM    Chloride 107 2018 01:00 AM    CO2 28 2018 01:00 AM    Anion gap 9 2018 01:00 AM    Glucose 76 2018 01:00 AM    BUN 13 2018 01:00 AM    Creatinine 0.85 2018 01:00 AM    BUN/Creatinine ratio 15 2018 01:00 AM    GFR est AA >60 2018 01:00 AM    GFR est non-AA >60 2018 01:00 AM    Calcium 9.2 2018 01:00 AM    Bilirubin, total 0.4 2017 10:38 AM    AST (SGOT) 11 2017 10:38 AM    Alk.  phosphatase 51 2017 10:38 AM    Protein, total 7.0 2017 10:38 AM    Albumin 4.6 2017 10:38 AM    Globulin 3.0 2017 10:36 PM    A-G Ratio 1.9 2017 10:38 AM    ALT (SGPT) 8 2017 10:38 AM     Lab Results   Component Value Date/Time    Cholesterol, total 163 2018 12:53 PM    HDL Cholesterol 57 2018 12:53 PM    LDL, calculated 97 2018 12:53 PM    VLDL, calculated 9 2018 12:53 PM    Triglyceride 47 2018 12:53 PM    CHOL/HDL Ratio 2.6 2016 11:16 AM Subjective/Assessment:   29 yo female referred for help with gaining weight. She has struggled with this issue for some time which has been largely related to severe anxiety symptoms that affect food intake and toleration (increase nausea and vomiting). Pt reports her UBW was 87# which increased to 105# prior to pregnancy, during recent pregnancy she got up to 118# and has been able to maintain this since miscarriage in March 2018. Her goal is to keep this wt on and she has been working hard to do so. Her goal is to get to 127#. Pt is a mother of 3 and is currently in school online for her Masters daily from 6-10pm. She reports having some really stressful days, and is meeting with a therapist who recommended seeing a psychiatrist, but pt has not found one yet. Therapist also recommended pt start an exercise program to help reduce anxiety; pt is concerned about losing wt with increased exercise - discussed plan. Current Eating Patterns: Having 3 meals, snacks and 6 Ensures between meals each day, drinking soda, sports drinks and water. The problem for pt is on high anxiety days when she does not get hungry, does not think about eating anything, and has trouble keeping foods down. She knows that spicy foods increase reflux and she experiences gas and bloating with dairy foods (probable lactose intolerance). Estimate Needs   Calories: 5682-2488  Protein: 120 Carbs: 215 Fat: 63   Kcal/day  g/day  g/day  g/day        percent: 25  45  30               Education & Recommendations provided: Educated pt on GERD causing foods and strategies to reduce reflux symptoms. Discussed wt gain strategies and plans for days that she is able to maximize nutrition, as well as, strategies for high stress/poor food toleration and low intake days. Also, discussed appropriate exercises to help pt maintain & continue to gain wt while working on positive coping strategies for anxiety.     Handouts Provided: [] Carbohydrates  []  Protein  []  Non-starchy Vegatbles  []  Food Label  [x]  Meal and Snack Ideas  []  Food Journals []  Diabetes  []  Cholesterol  []  Sodium  []  Gen Nutr Guidelines  []  SBGM Guidelines  [x]  Others: GERD, wt gain strategies, low-lactose MNT, Plan it out sheets, psychiatrists in the area list   Information Reviewed with: pt   Readiness to Change Stage: []  Pre-contemplative    []  Contemplative  []  Preparation               [x]  Action                  []  Maintenance   Potential Barriers to Learning: []  Decline in memory    []  Language barrier   []  Other:  []  Emotional                  []  Limited mobility  []  Lack of motivation     [] Vision, hearing or cognitive impairment   Expected Compliance: Fair due to eating habits largely controlled by psychological state     Nutritional Goal - To promote lifestyle changes to result in:    []  Weight loss  []  Improved diabetic control  []  Decreased cholesterol levels  []  Decreased blood pressure  []  Weight maintenance []  Preventing any interactions associated with food allergies  [x]  Adequate weight gain toward goal weight  [x]  Other: Reducing GERD/IBS symptoms     Patient Goals:   1. Optimize caloric intake on well-days; focus on incorporating wt gain strategies discussed on goals sheet. 2. On anxious days:   -Remember to eat, use plan it out sheets, and set phone alarm reminders as needed. -Try to have smaller, more frequent meals (4-6x per day) that you know are tolerated well (cereal, pasta, toast, bagel, soup, etc.). -Maintain hydration status with caloric beverages, like sports drinks and juices. 3. Start exercise program as discussed, progressing gradually and focusing on activities you would enjoy (walking outdoors, yoga, resistance exercise). 4. Work on getting an appointment scheduled with psychiatry. Dietitian Signature: Nanette Mcpherson MS, RD Date: 5/31/2018   Follow-up: Tues.  7/3/18 10:30a Time: 1:31 PM

## 2018-06-05 ENCOUNTER — TELEPHONE (OUTPATIENT)
Dept: FAMILY MEDICINE CLINIC | Facility: CLINIC | Age: 33
End: 2018-06-05

## 2018-06-05 ENCOUNTER — HOSPITAL ENCOUNTER (EMERGENCY)
Age: 33
Discharge: HOME | End: 2018-06-05
Attending: EMERGENCY MEDICINE | Admitting: EMERGENCY MEDICINE
Payer: MEDICAID

## 2018-06-05 VITALS
OXYGEN SATURATION: 100 % | SYSTOLIC BLOOD PRESSURE: 121 MMHG | HEART RATE: 58 BPM | WEIGHT: 118 LBS | RESPIRATION RATE: 16 BRPM | TEMPERATURE: 98.1 F | BODY MASS INDEX: 18.96 KG/M2 | HEIGHT: 66 IN | DIASTOLIC BLOOD PRESSURE: 74 MMHG

## 2018-06-05 DIAGNOSIS — S06.0X9A CONCUSSION WITH LOSS OF CONSCIOUSNESS, INITIAL ENCOUNTER: Primary | ICD-10-CM

## 2018-06-05 DIAGNOSIS — G43.009 MIGRAINE WITHOUT AURA AND WITHOUT STATUS MIGRAINOSUS, NOT INTRACTABLE: ICD-10-CM

## 2018-06-05 PROCEDURE — 99283 EMERGENCY DEPT VISIT LOW MDM: CPT

## 2018-06-05 PROCEDURE — 74011250636 HC RX REV CODE- 250/636: Performed by: EMERGENCY MEDICINE

## 2018-06-05 PROCEDURE — 74011250637 HC RX REV CODE- 250/637: Performed by: EMERGENCY MEDICINE

## 2018-06-05 PROCEDURE — 96361 HYDRATE IV INFUSION ADD-ON: CPT

## 2018-06-05 PROCEDURE — 96374 THER/PROPH/DIAG INJ IV PUSH: CPT

## 2018-06-05 PROCEDURE — 93005 ELECTROCARDIOGRAM TRACING: CPT

## 2018-06-05 RX ORDER — BUTALBITAL, ACETAMINOPHEN AND CAFFEINE 300; 40; 50 MG/1; MG/1; MG/1
1 CAPSULE ORAL
Qty: 12 CAP | Refills: 0 | Status: SHIPPED | OUTPATIENT
Start: 2018-06-05 | End: 2018-09-12

## 2018-06-05 RX ORDER — PROCHLORPERAZINE EDISYLATE 5 MG/ML
10 INJECTION INTRAMUSCULAR; INTRAVENOUS ONCE
Status: COMPLETED | OUTPATIENT
Start: 2018-06-05 | End: 2018-06-05

## 2018-06-05 RX ORDER — DIPHENHYDRAMINE HCL 25 MG
25 CAPSULE ORAL
Status: COMPLETED | OUTPATIENT
Start: 2018-06-05 | End: 2018-06-05

## 2018-06-05 RX ORDER — ACETAMINOPHEN 500 MG
1000 TABLET ORAL ONCE
Status: COMPLETED | OUTPATIENT
Start: 2018-06-05 | End: 2018-06-05

## 2018-06-05 RX ADMIN — PROCHLORPERAZINE EDISYLATE 10 MG: 5 INJECTION INTRAMUSCULAR; INTRAVENOUS at 12:17

## 2018-06-05 RX ADMIN — ACETAMINOPHEN 1000 MG: 500 TABLET, FILM COATED ORAL at 12:17

## 2018-06-05 RX ADMIN — DIPHENHYDRAMINE HYDROCHLORIDE 25 MG: 25 CAPSULE ORAL at 12:17

## 2018-06-05 RX ADMIN — SODIUM CHLORIDE 1000 ML: 900 INJECTION, SOLUTION INTRAVENOUS at 12:16

## 2018-06-05 NOTE — LETTER
NOTIFICATION RETURN TO WORK / SCHOOL 
 
6/5/2018 12:57 PM 
 
Ms. Luke Gregory [address] [facility] [address] [phone] To Whom It May Concern: 
 
Luke Gregory is currently under the care of [facility] EMERGENCY DEPT. She has had a medical condition which requires light duty in terms of concentration. If she can delay her exams or other strenuous mental activities for 1-2 weeks or until she is cleared by her primary care doctor, it would help her recovery. If there are questions or concerns please have the patient contact our office. Sincerely, Erik Sam MD

## 2018-06-05 NOTE — LETTER
NOTIFICATION RETURN TO WORK / SCHOOL 
 
6/5/2018 12:57 PM 
 
Ms. Camryn Degroot [address] [facility] [address] [phone] To Whom It May Concern: 
 
Camryn Degroot is currently under the care of [facility] EMERGENCY DEPT. She has had a medical condition which requires light duty in terms of concentration. If she can delay her exams or other strenuous mental activities for 1-2 weeks or until she is cleared by her primary care doctor, it would help her recovery. If there are questions or concerns please have the patient contact our office. Sincerely, Jackeline Arce MD

## 2018-06-05 NOTE — DISCHARGE INSTRUCTIONS
Concussion: Care Instructions  Your Care Instructions    A concussion is a kind of injury to the brain. It happens when the head receives a hard blow. The impact can jar or shake the brain against the skull. This interrupts the brain's normal activities. Although you may have cuts or bruises on your head or face, you may have no other visible signs of a brain injury. In most cases, damage to the brain from a concussion can't be seen in tests such as a CT or MRI scan. For a few weeks, you may have low energy, dizziness, trouble sleeping, a headache, ringing in your ears, or nausea. You may also feel anxious, grumpy, or depressed. You may have problems with memory and concentration. These symptoms are common after a concussion. They should slowly improve over time. Sometimes this takes weeks or even months. Someone who lives with you should know how to care for you. Please share this and all information with a caregiver who will be available to help if needed. Follow-up care is a key part of your treatment and safety. Be sure to make and go to all appointments, and call your doctor if you are having problems. It's also a good idea to know your test results and keep a list of the medicines you take. How can you care for yourself at home? Pain control  · Put ice or a cold pack on the part of your head that hurts for 10 to 20 minutes at a time. Put a thin cloth between the ice and your skin. · Be safe with medicines. Read and follow all instructions on the label. ¨ If the doctor gave you a prescription medicine for pain, take it as prescribed. ¨ If you are not taking a prescription pain medicine, ask your doctor if you can take an over-the-counter medicine. Recovery  · Follow your doctor's instructions. He or she will tell you if you need someone to watch you closely for the next 24 hours or longer. · Rest is the best way to recover from a concussion.  You need to rest your body and your brain:  ¨ Get plenty of sleep at night. And take rest breaks during the day. ¨ Avoid activities that take a lot of physical or mental work. This includes housework, exercise, schoolwork, video games, text messaging, and using the computer. ¨ You may need to change your school or work schedule while you recover. ¨ Return to your normal activities slowly. Do not try to do too much at once. · Do not drink alcohol or use illegal drugs. Alcohol and illegal drugs can slow your recovery. And they can increase your risk of a second brain injury. · Avoid activities that could lead to another concussion. Follow your doctor's instructions for a gradual return to activity and sports. · Ask your doctor when it's okay for you to drive a car, ride a bike, or operate machinery. How should you return to activity? Your return to sports or activity should be gradual. It should only begin when all symptoms of a concussion are gone, both while at rest and during exercise or exertion. Doctors and concussion specialists suggest steps to follow for returning to sports after a concussion. Use these steps as a guide. You should slowly progress through the following levels of activity:  1. No activity. This means complete physical and mental rest.  2. Light aerobic activity. This can include walking, swimming, or other exercise at less than 70% of maximum heart rate. No resistance training is included in this step. 3. Sport-specific exercise. This includes running drills or skating drills (depending on the sport), but no head impact. 4. Noncontact training drills. This includes more complex training drills such as passing. The athlete may also begin light resistance training. 5. Full-contact practice. The athlete can participate in normal training. 6. Return to normal game play. This is the final step and allows the athlete to join in normal game play. Watch and keep track of your progress.  It should take at least 6 days for you to go from light activity to normal game play. Make sure that you can stay at each new level of activity for at least 24 hours without symptoms, or as long as your doctor says, before doing more. If one or more symptoms come back, return to a lower level of activity for at least 24 hours. Don't move on until all symptoms are gone. When should you call for help? Call 911 anytime you think you may need emergency care. For example, call if:  ? · You have a seizure. ? · You passed out (lost consciousness). ? · You are confused or can't stay awake. ?Call your doctor now or seek immediate medical care if:  ? · You have new or worse vomiting. ? · You feel less alert. ? · You have new weakness or numbness in any part of your body. ? Watch closely for changes in your health, and be sure to contact your doctor if:  ? · You do not get better as expected. ? · You have new symptoms, such as headaches, trouble concentrating, or changes in mood. Where can you learn more? Go to http://norma-kaley.info/. Enter S857 in the search box to learn more about \"Concussion: Care Instructions. \"  Current as of: October 14, 2016  Content Version: 11.4  © 8724-5596 [employer]. Care instructions adapted under license by [employer] (which disclaims liability or warranty for this information). If you have questions about a medical condition or this instruction, always ask your healthcare professional. [place] [address] any warranty or liability for your use of this information.

## 2018-06-05 NOTE — ED TRIAGE NOTES
Sunday hit head on bunkbed. Woke on floor. Remen=bers everything black. Now headache, blurred vision and nausea.

## 2018-06-05 NOTE — ED NOTES
EMERGENCY DEPARTMENT HISTORY AND PHYSICAL EXAM    11:27 AM      Date: 6/5/2018  Patient Name: Ania Penaloza    History of Presenting Illness     Chief Complaint   Patient presents with    Headache    Head Injury         History Provided By: Patient    Chief Complaint: headache  Duration:  Days  Timing:  Acute  Location: left head  Quality: Aching  Severity: Moderate  Modifying Factors: fall/trauma  Associated Symptoms: nausea      Additional History (Context): Ania Penaloza is a 28 y.o. female with hx of bradycardia, seizures, migraines, here with headache after fall. Pt states she felt dizziness then woke up and thinks she hit her head on her bunk bed. Her seizures are usually well controlled on depakote, last was in 2015, and she usually feels dizzy prior to seizures. She presents today after cont headache on her left, and feels a migraine coming on as well. She has difficulty concentrating and feels intermittently nauseous without any ab pain, diarrhea, fever/chills. PCP: Kay Taylor NP    Current Facility-Administered Medications   Medication Dose Route Frequency Provider Last Rate Last Dose    acetaminophen (TYLENOL) tablet 1,000 mg  1,000 mg Oral ONCE Dominic Shaikh MD        sodium chloride 0.9 % bolus infusion 1,000 mL  1,000 mL IntraVENous ONCE Dominic Shaikh MD        diphenhydrAMINE (BENADRYL) capsule 25 mg  25 mg Oral NOW Dominic Shaikh MD        prochlorperazine (COMPAZINE) injection 10 mg  10 mg IntraVENous ONCE Dominic Shaikh MD         Current Outpatient Prescriptions   Medication Sig Dispense Refill    venlafaxine-SR (EFFEXOR-XR) 37.5 mg capsule Take 1 Cap by mouth daily. Indications: Generalized Anxiety Disorder 30 Cap 0    busPIRone (BUSPAR) 10 mg tablet Take 1 Tab by mouth daily. 30 Tab 1    promethazine (PHENERGAN) 12.5 mg tablet TAKE 1 TABLET BY MOUTH EVERY 6 HOURS AS NEEDED  0    multivitamin-min-iron-FA-vit K 18 mg iron-400 mcg-25 mcg tab Take 1 Tab by mouth daily.  90 Tab 3    norethindrone (MICRONOR) 0.35 mg tab Take 1 Tab by mouth daily. 1 Package 11    ergocalciferol (ERGOCALCIFEROL) 50,000 unit capsule TAKE 1 CAP BY MOUTH EVERY SEVEN (7) DAYS. 12 Cap 0    acetaminophen (TYLENOL) 325 mg tablet Take 2 Tabs by mouth every four (4) hours as needed for Pain. 60 Tab 0    albuterol (VENTOLIN HFA) 90 mcg/actuation inhaler INHELE 1 PUFF EVERY FOUR (4) HOURS AS NEEDED FOR WHEEZING OR SHORTNESS OF BREATH. 1 Inhaler 3    cyclobenzaprine (FLEXERIL) 10 mg tablet Take  by mouth three (3) times daily as needed for Muscle Spasm(s).  food supplemt, lactose-reduced (ENSURE PLUS) 0.05-1.5 gram-kcal/mL liqd TAKE 237 ML BY MOUTH THREE TIMES DAILY. 50794 mL 2    prenatal multivit-ca-min-fe-fa (PRENATAL VITAMIN) tab TAKE 1 TAB BY MOUTH DAILY. 30 Tab 5    zolpidem CR (AMBIEN CR) 6.25 mg tablet Take 1 Tab by mouth nightly as needed for Sleep. Max Daily Amount: 6.25 mg. 30 Tab 0    divalproex DR (DEPAKOTE) 250 mg tablet Take 1 Tab by mouth two (2) times a day. Indications: MIGRAINE PREVENTION 60 Tab 5    Omeprazole delayed release (PRILOSEC D/R) 20 mg tablet Take 1 Tab by mouth daily. 90 Tab 3    cyproheptadine (PERIACTIN) 4 mg tablet Take 1 Tab by mouth once over twenty-four (24) hours.  80 Tab 3       Past History     Past Medical History:  Past Medical History:   Diagnosis Date    Abnormal Papanicolaou smear of cervix     Anxiety     ANXIETY    Asthma     Bradycardia, sinus     Degenerative disc disease, lumbar     Edema of foot 5/15/2018    Gastroesophageal reflux disease without esophagitis 5/15/2018    GERD (gastroesophageal reflux disease)     HGSIL (high grade squamous intraepithelial lesion) on Pap smear of cervix 3/22/2018    Migraine     Miscarriage     Seizures ([facility] [address]     last episode     Tobacco abuse, in remission        Past Surgical History:  Past Surgical History:   Procedure Laterality Date    HX APPENDECTOMY      HX  SECTION      HX ENDOSCOPY      HX GYN      cervical cerclage    HX OTHER SURGICAL  03/22/2017    Mendel L4-5, L5-S1 Facet Joint block       Family History:  Family History   Problem Relation Age of Onset    Diabetes Mother     Heart Attack Father     Attention Deficit Hyperactivity Disorder Sister     Attention Deficit Hyperactivity Disorder Brother     Cancer Maternal Aunt 45     breast    Diabetes Maternal Grandmother     Attention Deficit Hyperactivity Disorder Brother     No Known Problems Brother     No Known Problems Brother     No Known Problems Brother     Cancer Maternal Aunt      lung    Heart defect Son    Kimberley Fare Migraines Son     Seizures Son      h/o    Other Son      h/o jaundice       Social History:  Social History   Substance Use Topics    Smoking status: Former Smoker     Packs/day: 1.00     Years: 13.00     Types: Cigarettes     Quit date: 5/5/2015    Smokeless tobacco: Never Used      Comment: cigarello, once a month    Alcohol use No       Allergies: Allergies   Allergen Reactions    Imitrex [Sumatriptan Succinate] Anaphylaxis    Iodine Cough     Coughing up blood      Sea Food [Seafood] Hives     Per pt report          Review of Systems       Review of Systems   Respiratory: Negative for cough and chest tightness. Cardiovascular: Negative for chest pain. Genitourinary: Negative for difficulty urinating. Neurological: Positive for headaches. Negative for seizures, speech difficulty, weakness and numbness. All other systems reviewed and are negative. Physical Exam     Patient Vitals for the past 12 hrs:   Temp Pulse Resp BP SpO2   06/05/18 1046 98.1 °F (36.7 °C) (!) 58 16 121/74 100 %           Physical Exam   Constitutional: She is oriented to person, place, and time. She appears well-developed. HENT:   Head: Normocephalic. Left parietal tenderness, no step offs or crepitus or lacerations/bleeding. Eyes: Conjunctivae and EOM are normal.   Neck: Normal range of motion.    Cardiovascular: Regular rhythm and normal heart sounds. Exam reveals no gallop and no friction rub. No murmur heard. bradycardic   Pulmonary/Chest: Effort normal and breath sounds normal. No stridor. No respiratory distress. Abdominal: Soft. There is no tenderness. Musculoskeletal: Normal range of motion. She exhibits no tenderness. Neurological: She is alert and oriented to person, place, and time. No cranial nerve deficit. She exhibits normal muscle tone. Coordination normal.   Skin: Skin is warm and dry. She is not diaphoretic. Psychiatric: She has a normal mood and affect. Her behavior is normal.   Nursing note and vitals reviewed. Diagnostic Study Results     Labs -  No results found for this or any previous visit (from the past 12 hour(s)). Radiologic Studies -   No orders to display         Medical Decision Making     Provider Notes (Medical Decision Making): Pt with prior hx of seizures, very well controlled with occasional break through every several years, here with what sounds like a seizure that led to head trauma. She now has a concussion and perhaps early migraine due to the above. EKG reassuring, and low concern for intra cranial issues requiring OR/neurosurg, so after shared decision making with pt- will defer CT for now. After treatment of her migriane- she feels much better now. She is agreeable for outpt follow up, for her concussion, seizure. Until then will need to observe concussion precautions so school now written to defer finals for 1-2 weeks until her sx improve.     -She verbally convey understanding and agreement of the signs, symptoms, diagnosis, treatment and prognosis and additionally agree to follow up as discussed. All questions were answered, and we reviewed pertinent return precautions as seen in the discharge paperwork. Pt understood follow up instructions, and would return to the ED if any worsening or concerns.      Carey Mares MD  1:24 PM      I am the first provider for this patient. I reviewed the vital signs, available nursing notes, past medical history, past surgical history, family history and social history. Vital Signs-Reviewed the patient's vital signs. Pulse Oximetry Analysis -  100% on room air (Interpretation)    Cardiac Monitor:  Rate: 58  Rhythm:  Sinus juan miguel    EKG: Interpreted by the EP. Time Interpreted: 1135   Rate: 54   Rhythm: sinus bradycardia   Interpretation: , no delta waves, nonspec ST    Records Reviewed: Nursing Notes and Old Medical Records (Time of Review: 11:27 AM)          Diagnosis     Clinical Impression:   1. Concussion with loss of consciousness, initial encounter    2. Migraine without aura and without status migrainosus, not intractable        Disposition: Discharge    Follow-up Information     None           Patient's Medications   Start Taking    No medications on file   Continue Taking    ACETAMINOPHEN (TYLENOL) 325 MG TABLET    Take 2 Tabs by mouth every four (4) hours as needed for Pain. ALBUTEROL (VENTOLIN HFA) 90 MCG/ACTUATION INHALER    INHELE 1 PUFF EVERY FOUR (4) HOURS AS NEEDED FOR WHEEZING OR SHORTNESS OF BREATH. BUSPIRONE (BUSPAR) 10 MG TABLET    Take 1 Tab by mouth daily. CYCLOBENZAPRINE (FLEXERIL) 10 MG TABLET    Take  by mouth three (3) times daily as needed for Muscle Spasm(s). CYPROHEPTADINE (PERIACTIN) 4 MG TABLET    Take 1 Tab by mouth once over twenty-four (24) hours. DIVALPROEX DR (DEPAKOTE) 250 MG TABLET    Take 1 Tab by mouth two (2) times a day. Indications: MIGRAINE PREVENTION    ERGOCALCIFEROL (ERGOCALCIFEROL) 50,000 UNIT CAPSULE    TAKE 1 CAP BY MOUTH EVERY SEVEN (7) DAYS. FOOD SUPPLEMT, LACTOSE-REDUCED (ENSURE PLUS) 0.05-1.5 GRAM-KCAL/ML LIQD    TAKE 237 ML BY MOUTH THREE TIMES DAILY. MULTIVITAMIN-MIN-IRON-FA-VIT K 18 MG IRON-400 MCG-25 MCG TAB    Take 1 Tab by mouth daily. NORETHINDRONE (MICRONOR) 0.35 MG TAB    Take 1 Tab by mouth daily.     OMEPRAZOLE DELAYED RELEASE (PRILOSEC D/R) 20 MG TABLET    Take 1 Tab by mouth daily. PRENATAL MULTIVIT-CA-MIN-FE-FA (PRENATAL VITAMIN) TAB    TAKE 1 TAB BY MOUTH DAILY. PROMETHAZINE (PHENERGAN) 12.5 MG TABLET    TAKE 1 TABLET BY MOUTH EVERY 6 HOURS AS NEEDED    VENLAFAXINE-SR (EFFEXOR-XR) 37.5 MG CAPSULE    Take 1 Cap by mouth daily. Indications: Generalized Anxiety Disorder    ZOLPIDEM CR (AMBIEN CR) 6.25 MG TABLET    Take 1 Tab by mouth nightly as needed for Sleep. Max Daily Amount: 6.25 mg. These Medications have changed    No medications on file   Stop Taking    No medications on file     _______________________________    Attestations:  Hiren Fermin MD acting as a scribe for and in the presence of Baylee Morgan MD      June 05, 2018 at 11:27 AM       Provider Attestation:      I personally performed the services described in the documentation, reviewed the documentation, as recorded by the scribe in my presence, and it accurately and completely records my words and actions.  June 05, 2018 at 11:27 AM - Baylee Morgan MD    _______________________________

## 2018-06-06 ENCOUNTER — OFFICE VISIT (OUTPATIENT)
Dept: OBGYN CLINIC | Age: 33
End: 2018-06-06

## 2018-06-06 VITALS
BODY MASS INDEX: 19.06 KG/M2 | HEIGHT: 66 IN | SYSTOLIC BLOOD PRESSURE: 103 MMHG | WEIGHT: 118.6 LBS | DIASTOLIC BLOOD PRESSURE: 63 MMHG | HEART RATE: 69 BPM

## 2018-06-06 DIAGNOSIS — F41.9 ANXIETY: Primary | ICD-10-CM

## 2018-06-06 DIAGNOSIS — R87.613 HGSIL (HIGH GRADE SQUAMOUS INTRAEPITHELIAL LESION) ON PAP SMEAR OF CERVIX: Primary | ICD-10-CM

## 2018-06-06 LAB
ATRIAL RATE: 54 BPM
CALCULATED P AXIS, ECG09: 67 DEGREES
CALCULATED R AXIS, ECG10: 72 DEGREES
CALCULATED T AXIS, ECG11: 46 DEGREES
DIAGNOSIS, 93000: NORMAL
P-R INTERVAL, ECG05: 108 MS
Q-T INTERVAL, ECG07: 434 MS
QRS DURATION, ECG06: 76 MS
QTC CALCULATION (BEZET), ECG08: 411 MS
VENTRICULAR RATE, ECG03: 54 BPM

## 2018-06-06 RX ORDER — PAROXETINE HYDROCHLORIDE 40 MG/1
40 TABLET, FILM COATED ORAL DAILY
Qty: 30 TAB | Refills: 3 | Status: SHIPPED | OUTPATIENT
Start: 2018-06-06 | End: 2018-07-10

## 2018-06-06 NOTE — MR AVS SNAPSHOT
José Manuel Leong 
 
 
 [address] [facility] 83 [id number] 
[phone] Patient: Anatoliy Macedo MRN: [id number] SEV:[id number] Visit Information Date & Time Provider Department Dept. Phone Encounter #  
 6/6/2018 11:00 AM Jac Sal, Neyda Tian [id number] Follow-up Instructions Return in about 2 weeks (around 6/20/2018). Your Appointments 6/13/2018 11:00 AM  
Follow Up with Elver Ch MD  
[address] ([facility]) Appt Note: from 5/16/17  
 [address] [place] [address], [address] [address]  
[phone]  
  
   
 [address] [place] [address], [address] [place] [address] Drive [facility]  
  
    
 6/20/2018 10:45 AM  
PAP ONLY with Jac Sal [facility] ([facility]) Appt Note: Results [address] [facility] 83 [id number]  
[phone]  
  
   
 [address] [facility] 83 [phone]  
  
    
 6/20/2018  2:20 PM  
Follow Up with Al Dunbar MD  
[address] [place] [address] [facility]) Appt Note: f/u; pt last seen 8/17; pt informed to complete labs; pt seen in ED for migraine & concussion; notes in cc. Rachana Armstrong .. Rachana Nathaniel ywp  
 [facility] [address] [id number] [phone]  
  
   
 [place] [id number]  
  
    
 7/18/2018 12:00 PM  
Follow Up with Alex Rubi MD  
[address] [place] [address] [facility]) Appt Note: 6 mth f/u  
 [address] [facility] [address] [id number] [phone]  
  
   
 [place] [id number] 8/15/2018 11:00 AM  
Follow Up with Aleyda May NP [facility] ([facility]) Appt Note: F/U 5/15/18 [address] [address] [phone]  
[address] [facility] [address] Upcoming Health Maintenance Date Due Influenza Age 5 to Adult 8/1/2018 PAP AKA CERVICAL CYTOLOGY 3/19/2021 Allergies as of 6/6/2018  Review Complete On: 6/6/2018 By: Mejia Tomlin LPN Severity Noted Reaction Type Reactions Imitrex [Sumatriptan Succinate] High 05/25/2015    Anaphylaxis Iodine  02/16/2017    Cough Coughing up blood Sea Food [Seafood]  06/04/2015    Hives Per pt report Current Immunizations  Never Reviewed No immunizations on file. Not reviewed this visit You Were Diagnosed With   
  
 Codes Comments HGSIL (high grade squamous intraepithelial lesion) on Pap smear of cervix    -  Primary ICD-10-CM: R87.613 ICD-9-CM: 795.04 Vitals BP Pulse Height(growth percentile) Weight(growth percentile) LMP BMI  
 103/63 69 5' 6\" (1.676 m) 118 lb 9.6 oz (53.8 kg) 05/16/2018 19.14 kg/m2 OB Status Smoking Status Having regular periods Former Smoker Vitals History BMI and BSA Data Body Mass Index Body Surface Area  
 19.14 kg/m 2 1.58 m 2 Preferred Pharmacy Pharmacy Name Phone [id number] [phone] [facility], [address] [place] [address] [phone] Your Updated Medication List  
  
   
This list is accurate as of 6/6/18 11:35 AM.  Always use your most recent med list.  
  
  
  
  
 acetaminophen 325 mg tablet Commonly known as:  TYLENOL Take 2 Tabs by mouth every four (4) hours as needed for Pain. albuterol 90 mcg/actuation inhaler Commonly known as:  VENTOLIN HFA INHELE 1 PUFF EVERY FOUR (4) HOURS AS NEEDED FOR WHEEZING OR SHORTNESS OF BREATH.  
  
 busPIRone 10 mg tablet Commonly known as:  BUSPAR Take 1 Tab by mouth daily. butalbital-acetaminophen-caff -40 mg per capsule Commonly known as:  [employer] Take 1 Cap by mouth every four (4) hours as needed for Pain or Headache. cyclobenzaprine 10 mg tablet Commonly known as:  FLEXERIL Take  by mouth three (3) times daily as needed for Muscle Spasm(s). cyproheptadine 4 mg tablet Commonly known as:  PERIACTIN Take 1 Tab by mouth once over twenty-four (24) hours. divalproex  mg tablet Commonly known as:  DEPAKOTE Take 1 Tab by mouth two (2) times a day. Indications: MIGRAINE PREVENTION  
  
 ergocalciferol 50,000 unit capsule Commonly known as:  ERGOCALCIFEROL  
TAKE 1 CAP BY MOUTH EVERY SEVEN (7) DAYS. food supplemt, lactose-reduced 0.05-1.5 gram-kcal/mL Liqd Commonly known as:  ENSURE PLUS  
TAKE 237 ML BY MOUTH THREE TIMES DAILY. multivitamin-min-iron-FA-vit K 18 mg iron-400 mcg-25 mcg Tab Take 1 Tab by mouth daily. norethindrone 0.35 mg Tab Commonly known as:  [employer] Take 1 Tab by mouth daily. Omeprazole delayed release 20 mg tablet Commonly known as:  PRILOSEC D/R Take 1 Tab by mouth daily. PARoxetine 40 mg tablet Commonly known as:  PAXIL Take 1 Tab by mouth daily. prenatal multivit-ca-min-fe-fa Tab Commonly known as:  PRENATAL VITAMIN  
TAKE 1 TAB BY MOUTH DAILY. promethazine 12.5 mg tablet Commonly known as:  PHENERGAN  
TAKE 1 TABLET BY MOUTH EVERY 6 HOURS AS NEEDED  
  
 zolpidem CR 6.25 mg tablet Commonly known as:  AMBIEN CR Take 1 Tab by mouth nightly as needed for Sleep. Max Daily Amount: 6.25 mg. Follow-up Instructions Return in about 2 weeks (around 6/20/2018). To-Do List   
 07/03/2018 10:30 AM  
  Appointment with Tigre Bess RD at [facility] ([phone]) Patient Instructions Colposcopy: What to Expect at [facility] Your Recovery You may feel some soreness in your vagina for a day or two if you had a biopsy. Some vaginal bleeding or discharge is normal for up to a week after a biopsy. The discharge may be dark-colored if a solution was put on your cervix. You can use a sanitary pad for the bleeding. It may take a week or two for you to get the test results.  
This care sheet gives you a general idea about how long it will take for you to recover. But each person recovers at a different pace. Follow the steps below to feel better as quickly as possible. How can you care for yourself at home? Activity ? · You can return to work and most daily activities right after the test.  
Exercise ? · Do not exercise for 1 day after the test.  
Medicines ? · Your doctor will tell you if and when you can restart your medicines. He or she will also give you instructions about taking any new medicines. ? · If you take blood thinners, such as warfarin (Coumadin), clopidogrel (Plavix), or aspirin, be sure to talk to your doctor. He or she will tell you if and when to start taking those medicines again. Make sure that you understand exactly what your doctor wants you to do. ? · Take an over-the-counter pain medicine, such as acetaminophen (Tylenol), ibuprofen (Advil, Motrin), or naproxen (Aleve). Be safe with medicines. Read and follow all instructions on the label. Do not take two or more pain medicines at the same time unless the doctor told you to. Many pain medicines have acetaminophen, which is Tylenol. Too much acetaminophen (Tylenol) can be harmful. Other instructions ? · Use a pad if you have some bleeding. ? · Do not douche, have sexual intercourse, or use tampons for 1 week if you had a biopsy. This will allow time for your cervix to heal.  
? · You can take a bath or shower anytime after the test.  
Follow-up care is a key part of your treatment and safety. Be sure to make and go to all appointments, and call your doctor if you are having problems. It's also a good idea to know your test results and keep a list of the medicines you take. When should you call for help? Call your doctor now or seek immediate medical care if: 
? · You have severe vaginal bleeding. This means that you are soaking through your usual pads or tampons each hour for 2 or more hours. ? · You have pain that does not get better after you take pain medicine. ? · You have signs of infection, such as: 
¨ Increased pain. ¨ Bad-smelling vaginal discharge. ¨ A fever. ? Watch closely for any changes in your health, and be sure to contact your doctor if: 
? · You have questions or concerns. Where can you learn more? Go to http://norma-kaley.info/. Enter M523 in the search box to learn more about \"Colposcopy: What to Expect at Home. \" Current as of: May 12, 2017 Content Version: 11.4 © 8192-9927 [employer]. Care instructions adapted under license by [employer] (which disclaims liability or warranty for this information). If you have questions about a medical condition or this instruction, always ask your healthcare professional. [address] any warranty or liability for your use of this information. Introducing [facility]! Dear Stiven Rocha: 
Thank you for requesting a Crestock account. Our records indicate that you already have an active Crestock account. You can access your account anytime at [email or web address] Did you know that you can access your hospital and ER discharge instructions at any time in Crestock? You can also review all of your test results from your hospital stay or ER visit. Additional Information If you have questions, please visit the Frequently Asked Questions section of the Crestock website at [email or web address] Remember, Crestock is NOT to be used for urgent needs. For medical emergencies, dial 911. Now available from your iPhone and Android! Please provide this summary of care documentation to your next provider. Your primary care clinician is listed as Saira Cohen. If you have any questions after today's visit, please call [phone].

## 2018-06-06 NOTE — PROGRESS NOTES
Patient in office today for Colposcopy  procedure performed by Dr.Emily Canseco,Patient consent signed and witness by nurse   Time out [phone]  start time 1124end time 1132 Pre procedural pain 0/10   Post procedural pain 2/10  Biopsy  sent to lab   Follow up instruction given , [patient instructed to follow up in 2 weeks for results Pap

## 2018-06-06 NOTE — PATIENT INSTRUCTIONS
Colposcopy: What to Expect at [address] [place] [address] may feel some soreness in your vagina for a day or two if you had a biopsy. Some vaginal bleeding or discharge is normal for up to a week after a biopsy. The discharge may be dark-colored if a solution was put on your cervix. You can use a sanitary pad for the bleeding. It may take a week or two for you to get the test results. This care sheet gives you a general idea about how long it will take for you to recover. But each person recovers at a different pace. Follow the steps below to feel better as quickly as possible. How can you care for yourself at home? Activity  ? · You can return to work and most daily activities right after the test.   Exercise  ? · Do not exercise for 1 day after the test.   Medicines  ? · Your doctor will tell you if and when you can restart your medicines. He or she will also give you instructions about taking any new medicines. ? · If you take blood thinners, such as warfarin (Coumadin), clopidogrel (Plavix), or aspirin, be sure to talk to your doctor. He or she will tell you if and when to start taking those medicines again. Make sure that you understand exactly what your doctor wants you to do. ? · Take an over-the-counter pain medicine, such as acetaminophen (Tylenol), ibuprofen (Advil, Motrin), or naproxen (Aleve). Be safe with medicines. Read and follow all instructions on the label. Do not take two or more pain medicines at the same time unless the doctor told you to. Many pain medicines have acetaminophen, which is Tylenol. Too much acetaminophen (Tylenol) can be harmful. Other instructions  ? · Use a pad if you have some bleeding. ? · Do not douche, have sexual intercourse, or use tampons for 1 week if you had a biopsy. This will allow time for your cervix to heal.   ? · You can take a bath or shower anytime after the test.   Follow-up care is a key part of your treatment and safety.  Be sure to make and go to all appointments, and call your doctor if you are having problems. It's also a good idea to know your test results and keep a list of the medicines you take. When should you call for help? Call your doctor now or seek immediate medical care if:  ? · You have severe vaginal bleeding. This means that you are soaking through your usual pads or tampons each hour for 2 or more hours. ? · You have pain that does not get better after you take pain medicine. ? · You have signs of infection, such as:  ¨ Increased pain. ¨ Bad-smelling vaginal discharge. ¨ A fever. ? Watch closely for any changes in your health, and be sure to contact your doctor if:  ? · You have questions or concerns. Where can you learn more? Go to http://norma-kaley.info/. Enter M523 in the search box to learn more about \"Colposcopy: What to Expect at Home. \"  Current as of: May 12, 2017  Content Version: 11.4  © 0487-1128 [employer]wise[employer]. Care instructions adapted under license by [employer] (which disclaims liability or warranty for this information). If you have questions about a medical condition or this instruction, always ask your healthcare professional. [address] any warranty or liability for your use of this information.

## 2018-06-10 NOTE — PROGRESS NOTES
Colposcopy Procedure Note    Indications:   Most recent Pap smear 3/2018 HGSIL  The prior Pap result: 12/2015 [facility]    Procedure Details   The risks and benefits of the procedure and written informed consent obtained. Speculum placed in vagina and excellent visualization of cervix achieved, cervix swabbed x 3 with acetic acid solution. Findings:  Cervix: acetowhite lesion(s) noted at 12 and 7 o'clock  SCJ visualized, endocervical curettage performed, cervical biopsies taken at 12 and 7 o'clock, specimen labelled and sent to pathology and hemostasis achieved with silver nitrate. Vaginal inspection: normal without visible lesions. Vulvar colposcopy: vulvar colposcopy not performed. Specimens: ECC, cervical biopsies x 2    Complications: none. Plan:  Specimens labelled and sent to Pathology. Will base further treatment on Pathology findings. Return to discuss Pathology results in 1 weeks. I have verbalized the plan of care with patient. The patient was given a full opportunity to ask questions and indicated that her questions had been answered. Michelle Pena

## 2018-06-11 LAB
DX ICD CODE: NORMAL
DX ICD CODE: NORMAL
PATH REPORT.FINAL DX SPEC: NORMAL
PATH REPORT.GROSS SPEC: NORMAL
PATH REPORT.RELEVANT HX SPEC: NORMAL
PATH REPORT.SITE OF ORIGIN SPEC: NORMAL
PATHOLOGIST NAME: NORMAL
PAYMENT PROCEDURE: NORMAL

## 2018-06-11 NOTE — PROGRESS NOTES
Patient notified and voices understanding:  Rosa Conway, will need appt to discuss treatment plan.  Patient driving but agrees to call back to schedule appt

## 2018-06-11 NOTE — PROGRESS NOTES
JUSTEN II. Patient needs a LEEP. Prachi Mcgregor LPN to call patient to notify her. Will further discuss with patient at nv.

## 2018-06-13 ENCOUNTER — OFFICE VISIT (OUTPATIENT)
Dept: PULMONOLOGY | Age: 33
End: 2018-06-13

## 2018-06-13 VITALS
BODY MASS INDEX: 19.13 KG/M2 | TEMPERATURE: 98.7 F | OXYGEN SATURATION: 98 % | DIASTOLIC BLOOD PRESSURE: 68 MMHG | RESPIRATION RATE: 16 BRPM | HEIGHT: 66 IN | WEIGHT: 119 LBS | SYSTOLIC BLOOD PRESSURE: 100 MMHG | HEART RATE: 70 BPM

## 2018-06-13 DIAGNOSIS — M54.5 CHRONIC MIDLINE LOW BACK PAIN, WITH SCIATICA PRESENCE UNSPECIFIED: ICD-10-CM

## 2018-06-13 DIAGNOSIS — R06.02 SOB (SHORTNESS OF BREATH): Primary | ICD-10-CM

## 2018-06-13 DIAGNOSIS — M47.816 LUMBAR FACET ARTHROPATHY: ICD-10-CM

## 2018-06-13 DIAGNOSIS — G89.29 CHRONIC MIDLINE LOW BACK PAIN, WITH SCIATICA PRESENCE UNSPECIFIED: ICD-10-CM

## 2018-06-13 RX ORDER — IBUPROFEN 600 MG/1
TABLET ORAL
Qty: 60 TAB | Refills: 0 | OUTPATIENT
Start: 2018-06-13

## 2018-06-13 NOTE — PATIENT INSTRUCTIONS
Please do not take the albuterol on the day of the breathing test    Call for worsening symptoms    For treatment of gastroesophageal reflux  Continue Prilosec  Try not eating for 2 hours before bedtime  Sleep with the head of your bed elevated by putting blocks under your head post.  The block should have a place for the head post to fit into so the bed does not slip off.   There are commercially obtainable blocks at [facility] [employer] called \"bed risers\"

## 2018-06-13 NOTE — PROGRESS NOTES
[facility]  Pulmonary, Critical Care, and Sleep Medicine      Chief complaint:  Shortness of breath    HPI:    Lucien Stock    is 28years old returns the office today for symptoms of shortness of breath which are episodic. The patient also feels a fluttering in her chest usually in her lower posterior chest at times as well. She has discontinuedBreo and reports no change in symptoms but still uses albuterol for relief of symptoms. She continues to have a cough is dry. She also has chest pains. She reports no allergy symptoms but has symptoms of gastroesophageal reflux                Allergies   Allergen Reactions    Imitrex [Sumatriptan Succinate] Anaphylaxis    Iodine Cough     Coughing up blood      Sea Food [Seafood] Hives     Per pt report      Current Outpatient Prescriptions   Medication Sig    PARoxetine (PAXIL) 40 mg tablet Take 1 Tab by mouth daily.  butalbital-acetaminophen-caff (FIORICET) -40 mg per capsule Take 1 Cap by mouth every four (4) hours as needed for Pain or Headache.  busPIRone (BUSPAR) 10 mg tablet Take 1 Tab by mouth daily.  promethazine (PHENERGAN) 12.5 mg tablet TAKE 1 TABLET BY MOUTH EVERY 6 HOURS AS NEEDED    multivitamin-min-iron-FA-vit K 18 mg iron-400 mcg-25 mcg tab Take 1 Tab by mouth daily.  norethindrone (MICRONOR) 0.35 mg tab Take 1 Tab by mouth daily.  ergocalciferol (ERGOCALCIFEROL) 50,000 unit capsule TAKE 1 CAP BY MOUTH EVERY SEVEN (7) DAYS.  acetaminophen (TYLENOL) 325 mg tablet Take 2 Tabs by mouth every four (4) hours as needed for Pain.  albuterol (VENTOLIN HFA) 90 mcg/actuation inhaler INHELE 1 PUFF EVERY FOUR (4) HOURS AS NEEDED FOR WHEEZING OR SHORTNESS OF BREATH.  cyclobenzaprine (FLEXERIL) 10 mg tablet Take  by mouth three (3) times daily as needed for Muscle Spasm(s).  food supplemt, lactose-reduced (ENSURE PLUS) 0.05-1.5 gram-kcal/mL liqd TAKE 237 ML BY MOUTH THREE TIMES DAILY.     prenatal multivit-ca-min-fe-fa (PRENATAL VITAMIN) tab TAKE 1 TAB BY MOUTH DAILY.  zolpidem CR (AMBIEN CR) 6.25 mg tablet Take 1 Tab by mouth nightly as needed for Sleep. Max Daily Amount: 6.25 mg.    divalproex DR (DEPAKOTE) 250 mg tablet Take 1 Tab by mouth two (2) times a day. Indications: MIGRAINE PREVENTION    Omeprazole delayed release (PRILOSEC D/R) 20 mg tablet Take 1 Tab by mouth daily.  cyproheptadine (PERIACTIN) 4 mg tablet Take 1 Tab by mouth once over twenty-four (24) hours. No current facility-administered medications for this visit. Past Medical History:   Diagnosis Date    Abnormal Papanicolaou smear of cervix     Anxiety     ANXIETY    Asthma     Bradycardia, sinus     Degenerative disc disease, lumbar     Edema of foot 5/15/2018    Gastroesophageal reflux disease without esophagitis 5/15/2018    GERD (gastroesophageal reflux disease)     HGSIL (high grade squamous intraepithelial lesion) on Pap smear of cervix 3/22/2018    Migraine     Miscarriage     Seizures ([facility] [address]     last episode     Tobacco abuse, in remission      Past Surgical History:   Procedure Laterality Date    HX APPENDECTOMY      HX  SECTION      HX ENDOSCOPY      HX GYN      cervical cerclage    HX OTHER SURGICAL  2017    Mendel L4-5, L5-S1 Facet Joint block     Social History     Social History    Marital status: SINGLE     Spouse name: N/A    Number of children: 2    Years of education: 12     Occupational History    Not on file.      Social History Main Topics    Smoking status: Former Smoker     Packs/day: 1.00     Years: 13.00     Types: Cigarettes     Quit date: 2015    Smokeless tobacco: Never Used      Comment: cigarello, once a month    Alcohol use No    Drug use: No    Sexual activity: Yes     Partners: Male     Birth control/ protection: Pill     Other Topics Concern     Service Yes     [employer]    Blood Transfusions No    Caffeine Concern No    Occupational Exposure No    Hobby Hazards No    Sleep Concern Yes    Stress Concern No    Weight Concern No    Special Diet Yes    Back Care Yes    Exercise Yes    Bike Helmet No    Seat Belt Yes    Self-Exams No     Social History Narrative     Family History   Problem Relation Age of Onset    Diabetes Mother     Heart Attack Father     Attention Deficit Hyperactivity Disorder Sister     Attention Deficit Hyperactivity Disorder Brother     Cancer Maternal Aunt 38     breast    Diabetes Maternal Grandmother     Attention Deficit Hyperactivity Disorder Brother     No Known Problems Brother     No Known Problems Brother     No Known Problems Brother     Cancer Maternal Aunt      lung    Heart defect Son    Jessica Gopi Migraines Son     Seizures Son      h/o    Other Son      h/o jaundice       Review of systems:  She denies fever chills generally has a poor appetite but has been gaining weight intentionally    Physical Exam:  Visit Vitals    /68 (BP 1 Location: Left arm, BP Patient Position: Sitting)    Pulse 70    Temp 98.7 °F (37.1 °C) (Oral)    Resp 16    Ht 5' 6\" (1.676 m)    Wt 54 kg (119 lb)    LMP 06/07/2018    SpO2 98%    BMI 19.21 kg/m2       Well-developed well-nourished  HEENT: WNL  Lymph node exam: Supraclavicular cervical lymph nodes negative  Chest: Equal symmetrical expansion no dullness and absence of wheezes rales rubs  Heart: Regular rhythm no gallop no murmur no JVD no peripheral edema  Extremities: No cyanosis clubbing or calf tenderness  Neurological: Alert and oriented    Labs:  Echocardiogram 5/26/17 no findings for pulmonary hypertension normal ejection fraction  O2 sat room air at rest 98%      Impression:     Because of the patient's shortness of breath is not clear. Because she does have symptoms of anxiety this may be an etiology however asthma has not been completely ruled out.   She also has gastroesophageal reflux which can aggravate asthma    Plan:     Treat gastroesophageal reflux  Methacholine challenge to rule out  Follow-up in about a 4 weeks    Jane George MD , [facility]    CC: Concha Scott NP     [address] [facility] [facility] [address]  [place], [address] [facility] [address]     P: [phone]     F: [phone]

## 2018-06-13 NOTE — PROGRESS NOTES
Chief Complaint   Patient presents with    Asthma    Shortness of Breath     1. Have you been to the ER, urgent care clinic since your last visit? Hospitalized since your last visit? Yes Where: [facility]    2. Have you seen or consulted any other health care providers outside of the [address] [place] [address] since your last visit? Include any pap smears or colon screening.  Yes Where: [facility]

## 2018-06-14 ENCOUNTER — TELEPHONE (OUTPATIENT)
Dept: OBGYN CLINIC | Age: 33
End: 2018-06-14

## 2018-06-15 ENCOUNTER — OFFICE VISIT (OUTPATIENT)
Dept: OBGYN CLINIC | Age: 33
End: 2018-06-15

## 2018-06-15 VITALS
BODY MASS INDEX: 18.8 KG/M2 | HEIGHT: 66 IN | OXYGEN SATURATION: 100 % | WEIGHT: 117 LBS | DIASTOLIC BLOOD PRESSURE: 60 MMHG | HEART RATE: 80 BPM | RESPIRATION RATE: 18 BRPM | SYSTOLIC BLOOD PRESSURE: 100 MMHG

## 2018-06-15 DIAGNOSIS — N87.1 CIN II (CERVICAL INTRAEPITHELIAL NEOPLASIA II): Primary | ICD-10-CM

## 2018-06-15 NOTE — PATIENT INSTRUCTIONS
Loop Electrosurgical Excision Procedure (LEEP): Before Your Procedure  What is LEEP? Loop electrosurgical excision procedure (LEEP) removes tissue from the cervix. You may have this done if you've had a Pap test that shows tissue that isn't normal.  During LEEP, your doctor will put a tool called a speculum into your vagina. It gently spreads apart the sides of your vagina. This lets your doctor see the cervix and inside the vagina. A special fluid is sometimes put on your cervix to make certain areas easier to see. You may get a shot of medicine to numb the cervix. You may feel a cramp when you have the shot. You may also get pain medicine. Your doctor will put a device with a fine wire loop into your vagina. The doctor uses the heated wire to cut out tissue. You may have mild cramps for several hours after LEEP. A dark brown discharge during the first week is normal. You may have some spotting for about 3 weeks. You should be able to go back to your normal routine in 1 to 3 days. How long it takes you to recover will depend on how much was done. Follow-up care is a key part of your treatment and safety. Be sure to make and go to all appointments, and call your doctor if you are having problems. It's also a good idea to know your test results and keep a list of the medicines you take. What happens before the procedure? ?Preparing for the procedure  ? · Tell your doctor if:  Ramone Ching are having your menstrual period. ¨ You are or might be pregnant. A blood or urine test may be done to see if you are pregnant. ? · Do not douche, use tampons, have sexual intercourse, or use vaginal medicines for 24 hours before the test.   ? · Understand exactly what procedure is planned, along with the risks, benefits, and other options. · Tell your doctors ALL the medicines, vitamins, supplements, and herbal remedies you take. Some of these can increase the risk of bleeding or interact with anesthesia. ?  · If you take blood thinners, such as warfarin (Coumadin), clopidogrel (Plavix), or aspirin, be sure to talk to your doctor. He or she will tell you if you should stop taking these medicines before your procedure. Make sure that you understand exactly what your doctor wants you to do.   ? · Your doctor will tell you which medicines to take or stop before your procedure. You may need to stop taking certain medicines a week or more before the procedure. So talk to your doctor as soon as you can. Procedures can be stressful. This information will help you understand what you can expect. And it will help you safely prepare for your procedure. What happens on the day of the procedure? ? · You may eat or drink as you normally do. ? · Take a bath or shower before you come in for your procedure. Do not apply lotions, perfumes, deodorants, or nail polish. ? · You may want to take a pain reliever, such as ibuprofen (Advil or Motrin), 30 to 60 minutes before you have the procedure. This can help reduce any cramping pain. ? · Take off all jewelry and piercings. And take out contact lenses, if you wear them. ? At the [address] [place] [address] or hospital   · Bring a picture ID. ? · You will be kept comfortable and safe by your anesthesia provider. You may get medicine that relaxes you or puts you in a light sleep. The area being worked on will be numb. ? · The procedure will take about 15 to 30 minutes. Going home  · You will be given more specific instructions about recovering from your procedure. They will cover things like diet, wound care, follow-up care, driving, and getting back to your normal routine. When should you call your doctor? · You have questions or concerns. ? · You don't understand how to prepare for your procedure. ? · You become ill before the procedure (such as fever, flu, or a cold). ? · You need to reschedule or have changed your mind about having the procedure. Where can you learn more?   Go to http://norma-kaley.info/. Enter J081 in the search box to learn more about \"Loop Electrosurgical Excision Procedure (LEEP): Before Your Procedure. \"  Current as of: May 12, 2017  Content Version: 11.4  © 1900-9237 [employer]. Care instructions adapted under license by [employer] (which disclaims liability or warranty for this information). If you have questions about a medical condition or this instruction, always ask your healthcare professional. [address] any warranty or liability for your use of this information. Human Papillomavirus (HPV): Care Instructions  Your Care Instructions    The human papillomavirus (HPV) is a very common virus. There are many types of HPV. Some types cause the common skin wart. Other types cause genital warts, which can be spread by sexual contact. Some types can increase the risk for cervical and anal cancer. Having one type of HPV does not lead to having another type. Many women who have HPV may not know that they are infected until it is found with a Pap test. Your doctor uses this test to look for abnormal cells on your cervix. If you have had an abnormal Pap test, your doctor may recommend that you have an HPV test.  Like a Pap test, an HPV test is done on a sample of cells collected from the cervix. If the test finds that you have the types of HPV that might lead to cancer, your doctor may suggest more tests. This does not mean that you will develop cancer; it means that you may have an increased risk. Abnormal cell changes caused by HPV often go away on their own. If the changes do not go away, they can be treated. But because HPV can stay inside the body, the abnormal cervical cells sometimes come back. This is why it is important to follow up with your doctor and have regular Pap tests. Follow-up care is a key part of your treatment and safety.  Be sure to make and go to all appointments, and call your doctor if you are having problems. It's also a good idea to know your test results and keep a list of the medicines you take. How can you care for yourself at home? · If you are going to have a Pap or HPV test, do not douche or use tampons or vaginal creams in the 24 hours before the test.  · Do not smoke. Smoking increases the risk for cervical problems and abnormal Pap tests. If you need help quitting, talk to your doctor about stop-smoking programs and medicines. These can increase your chances of quitting for good. · Use latex condoms every time you have sex. Use them from the beginning to the end of sexual contact. · Be sure to tell your sexual partner or partners that you have HPV. Even if you do not have symptoms, you can still pass HPV to others. · Having one sex partner (who does not have STIs and does not have sex with anyone else) is a good way to avoid STIs. When should you call for help? Watch closely for changes in your health, and be sure to contact your doctor if:  ? · You have vaginal pain during or after sex. ? · You have vaginal bleeding when you are not in your menstrual period. Where can you learn more? Go to http://norma-kaley.info/. Enter F690 in the search box to learn more about \"Human Papillomavirus (HPV): Care Instructions. \"  Current as of: March 20, 2017  Content Version: 11.4  © 1229-0508 [employer]. Care instructions adapted under license by [employer] (which disclaims liability or warranty for this information). If you have questions about a medical condition or this instruction, always ask your healthcare professional. [address] any warranty or liability for your use of this information.

## 2018-06-15 NOTE — PROGRESS NOTES
Subjective:      Beronica Billings is a 28 y.o. female who is here for colpo results    Menstrual history:  She has been bleeding regularly on ocps  Dysmenorrhea: none. Sexual history: single partner, contraception - OCP (Oral Contraceptive Pills). OB History      Para Term  AB Living    6 2 2  4 2    SAB TAB Ectopic Molar Multiple Live Births    3 1            Patient Active Problem List   Diagnosis Code    Spontaneous  O03.9    Epilepsy ([facility] [address] G40.909    Chronic midline low back pain M54.5, G89.29    ACP (advance care planning) Z71.89    Bradycardia R00.1    Mild intermittent asthma with status asthmaticus J45.22    Low body weight due to inadequate caloric intake R63.6    Lumbar facet arthropathy (HCC) M46.96    Vitamin D deficiency E55.9    Trichomonas infection A59.9    Muscle spasm of back M62.830    Intractable migraine without aura and without status migrainosus G43.019    Analgesic rebound headache G44.40, T39.95XA    Anxiety F41.9    Therapeutic drug monitoring Z51.81    Lumbar spondylosis M47.816    Lumbar degenerative disc disease M51.36    Chronic pain syndrome G89.4    HGSIL (high grade squamous intraepithelial lesion) on Pap smear of cervix R87.613    Gastroesophageal reflux disease without esophagitis K21.9    Edema of foot R60.0     Current Outpatient Prescriptions   Medication Sig Dispense Refill    PARoxetine (PAXIL) 40 mg tablet Take 1 Tab by mouth daily. 30 Tab 3    butalbital-acetaminophen-caff (FIORICET) -40 mg per capsule Take 1 Cap by mouth every four (4) hours as needed for Pain or Headache. 12 Cap 0    busPIRone (BUSPAR) 10 mg tablet Take 1 Tab by mouth daily. 30 Tab 1    promethazine (PHENERGAN) 12.5 mg tablet TAKE 1 TABLET BY MOUTH EVERY 6 HOURS AS NEEDED  0    multivitamin-min-iron-FA-vit K 18 mg iron-400 mcg-25 mcg tab Take 1 Tab by mouth daily. 90 Tab 3    norethindrone (MICRONOR) 0.35 mg tab Take 1 Tab by mouth daily.  1 Package 11    ergocalciferol (ERGOCALCIFEROL) 50,000 unit capsule TAKE 1 CAP BY MOUTH EVERY SEVEN (7) DAYS. 12 Cap 0    acetaminophen (TYLENOL) 325 mg tablet Take 2 Tabs by mouth every four (4) hours as needed for Pain. 60 Tab 0    albuterol (VENTOLIN HFA) 90 mcg/actuation inhaler INHELE 1 PUFF EVERY FOUR (4) HOURS AS NEEDED FOR WHEEZING OR SHORTNESS OF BREATH. 1 Inhaler 3    cyclobenzaprine (FLEXERIL) 10 mg tablet Take  by mouth three (3) times daily as needed for Muscle Spasm(s).  food supplemt, lactose-reduced (ENSURE PLUS) 0.05-1.5 gram-kcal/mL liqd TAKE 237 ML BY MOUTH THREE TIMES DAILY. 37233 mL 2    prenatal multivit-ca-min-fe-fa (PRENATAL VITAMIN) tab TAKE 1 TAB BY MOUTH DAILY. 30 Tab 5    zolpidem CR (AMBIEN CR) 6.25 mg tablet Take 1 Tab by mouth nightly as needed for Sleep. Max Daily Amount: 6.25 mg. 30 Tab 0    divalproex DR (DEPAKOTE) 250 mg tablet Take 1 Tab by mouth two (2) times a day. Indications: MIGRAINE PREVENTION 60 Tab 5    Omeprazole delayed release (PRILOSEC D/R) 20 mg tablet Take 1 Tab by mouth daily. 90 Tab 3    cyproheptadine (PERIACTIN) 4 mg tablet Take 1 Tab by mouth once over twenty-four (24) hours.  90 Tab 3     Allergies   Allergen Reactions    Imitrex [Sumatriptan Succinate] Anaphylaxis    Iodine Cough     Coughing up blood      Sea Food [Seafood] Hives     Per pt report      Past Medical History:   Diagnosis Date    Abnormal Papanicolaou smear of cervix     Anxiety     ANXIETY    Asthma     Bradycardia, sinus     Degenerative disc disease, lumbar     Edema of foot 5/15/2018    Gastroesophageal reflux disease without esophagitis 5/15/2018    GERD (gastroesophageal reflux disease)     HGSIL (high grade squamous intraepithelial lesion) on Pap smear of cervix 3/22/2018    Migraine     Miscarriage     Seizures ([facility] [address]     last episode     Tobacco abuse, in remission      Past Surgical History:   Procedure Laterality Date    HX APPENDECTOMY      HX  SECTION      HX ENDOSCOPY      HX GYN      cervical cerclage; twin pregnancy with loss of first baby, subsequently placed cerclage, 2nd baby delivered term    HX OTHER SURGICAL  03/22/2017    Mendel L4-5, L5-S1 Facet Joint block     Family History   Problem Relation Age of Onset    Diabetes Mother     Heart Attack Father     Attention Deficit Hyperactivity Disorder Sister     Attention Deficit Hyperactivity Disorder Brother     Cancer Maternal Aunt 45     breast    Diabetes Maternal Grandmother     Attention Deficit Hyperactivity Disorder Brother     No Known Problems Brother     No Known Problems Brother     No Known Problems Brother     Cancer Maternal Aunt      lung    Heart defect Son    Irvine Sous Migraines Son     Seizures Son      h/o    Other Son      h/o jaundice     Social History   Substance Use Topics    Smoking status: Former Smoker     Packs/day: 1.00     Years: 13.00     Types: Cigarettes     Quit date: 5/5/2015    Smokeless tobacco: Never Used      Comment: cigarello, once a month    Alcohol use No        Review of Systems:   General ROS: negative for fever, chills, malaise  Respiratory ROS: no cough, shortness of breath, or wheezing  Cardiovascular ROS: no chest pain or dyspnea on exertion  Gastrointestinal ROS: no abdominal pain, change in bowel habits, or black or bloody stools, nausea, vomiting  Genito-Urinary ROS: no dysuria, trouble voiding, incontinence or hematuria. No pelvic pain, abnormal bleeding, unusual discharge, vaginal dryness       Objective:     Visit Vitals    /60    Pulse 80    Resp 18    Ht 5' 6\" (1.676 m)    Wt 117 lb (53.1 kg)    LMP 06/07/2018    SpO2 100%    BMI 18.88 kg/m2      Physical Exam:   General appearance - alert, well appearing, and in no distress, oriented to person, place, and time    Assessment/Plan:       ICD-10-CM ICD-9-CM    1. JUSTEN II (cervical intraepithelial neoplasia II) N87.1 622.12      Recommend LEEP. Discussed with patient. Agree with plan. Aware that procedure may be scheduled with AP.    lab results and schedule of future lab studies reviewed with patient     Discussed with patient that our office will call for abnormal lab results. Normal results can be reviewed through [facility]. If patient has not received a phone call from our office or there are no results found on Mychart, the patient has been instructed to call our office to follow up on results. I have verbalized the plan of care with patient. The patient was given a full opportunity to ask questions, indicated that her questions had been answered and expressed understanding. Greater than 50% of the this 15 min visit was spent in counseling and/or coordination of care.

## 2018-06-20 ENCOUNTER — DOCUMENTATION ONLY (OUTPATIENT)
Dept: NEUROLOGY | Age: 33
End: 2018-06-20

## 2018-06-20 ENCOUNTER — OFFICE VISIT (OUTPATIENT)
Dept: OBGYN CLINIC | Age: 33
End: 2018-06-20

## 2018-06-20 ENCOUNTER — OFFICE VISIT (OUTPATIENT)
Dept: NEUROLOGY | Age: 33
End: 2018-06-20

## 2018-06-20 ENCOUNTER — HOSPITAL ENCOUNTER (OUTPATIENT)
Dept: LAB | Age: 33
Discharge: HOME | End: 2018-06-20

## 2018-06-20 VITALS
OXYGEN SATURATION: 99 % | RESPIRATION RATE: 20 BRPM | HEART RATE: 90 BPM | TEMPERATURE: 98.1 F | WEIGHT: 115.2 LBS | SYSTOLIC BLOOD PRESSURE: 100 MMHG | BODY MASS INDEX: 18.51 KG/M2 | DIASTOLIC BLOOD PRESSURE: 74 MMHG | HEIGHT: 66 IN

## 2018-06-20 VITALS
DIASTOLIC BLOOD PRESSURE: 68 MMHG | WEIGHT: 116 LBS | BODY MASS INDEX: 18.64 KG/M2 | HEART RATE: 67 BPM | SYSTOLIC BLOOD PRESSURE: 110 MMHG | HEIGHT: 66 IN

## 2018-06-20 DIAGNOSIS — G40.409 OTHER GENERALIZED EPILEPSY, NOT INTRACTABLE, WITHOUT STATUS EPILEPTICUS (HCC): ICD-10-CM

## 2018-06-20 DIAGNOSIS — Z51.81 THERAPEUTIC DRUG MONITORING: ICD-10-CM

## 2018-06-20 DIAGNOSIS — G43.019 INTRACTABLE MIGRAINE WITHOUT AURA AND WITHOUT STATUS MIGRAINOSUS: Primary | ICD-10-CM

## 2018-06-20 DIAGNOSIS — Z51.81 ENCOUNTER FOR THERAPEUTIC DRUG MONITORING: Primary | ICD-10-CM

## 2018-06-20 PROCEDURE — 99001 SPECIMEN HANDLING PT-LAB: CPT | Performed by: PSYCHIATRY & NEUROLOGY

## 2018-06-20 RX ORDER — DIVALPROEX SODIUM 250 MG/1
250 TABLET, DELAYED RELEASE ORAL 2 TIMES DAILY
Qty: 60 TAB | Refills: 10 | Status: SHIPPED | OUTPATIENT
Start: 2018-06-20 | End: 2019-05-30

## 2018-06-20 NOTE — MR AVS SNAPSHOT
[address] [address] 
[phone] Patient: Raquel Anand MRN: [id number] PMS:[id number] Visit Information Date & Time Provider Department Dept. Phone Encounter #  
 6/20/2018  2:20 PM Lis Gibbs MD [facility] [phone] [id number] Follow-up Instructions Return in about 4 weeks (around 7/18/2018). Follow-up and Disposition History Your Appointments 7/16/2018 10:45 AM  
Follow Up with Trent Elmore MD  
[address] ([address]) Appt Note: FROM 6/13/18  
 [address] [place] [address], [address] [address]  
[phone]  
  
   
 [address] [place] [address], [address],[facility] [address] [address]  
  
    
 7/18/2018 12:00 PM  
Follow Up with Hernandez Tapia MD  
[facility] [address] [place] [address]) Appt Note: 6 mth f/u  
 [address] [place] [address]  
[phone]  
  
   
 [place] [address]  
  
    
 8/6/2018  1:40 PM  
Follow Up with Lis Gibbs MD  
[facility] [address] [place] [address]) Appt Note: 4wk f/u; Labs [address] [facility] [address]  
[phone]  
  
   
 [place] [address] 8/15/2018 11:00 AM  
Follow Up with Ailin Palmer NP [facility] ([address]) Appt Note: F/U 5/15/18 CMp [address] [facility] [address] [phone]  
[address] [id number] Upcoming Health Maintenance Date Due Influenza Age 5 to Adult 8/1/2018 Pneumococcal 19-64 Highest Risk (2 of 3 - PCV13) 2/13/2019 PAP AKA CERVICAL CYTOLOGY 3/19/2021 DTaP/Tdap/Td series (2 - Td) 5/30/2027 Allergies as of 6/20/2018  Review Complete On: 6/20/2018 By: Magui Mcelroy LPN Severity Noted Reaction Type Reactions Imitrex [Sumatriptan Succinate] High 05/25/2015    Anaphylaxis Iodine  02/16/2017    Cough Coughing up blood Sea Food [Seafood]  06/04/2015    Hives Per pt report Current Immunizations  Never Reviewed No immunizations on file. Not reviewed this visit You Were Diagnosed With   
  
 Codes Comments Intractable migraine without aura and without status migrainosus    -  Primary ICD-10-CM: G43.019 
ICD-9-CM: 346.11 Other generalized epilepsy, not intractable, without status epilepticus ([facility]     ICD-10-CM: G40.409 ICD-9-CM: 345.90 Therapeutic drug monitoring     ICD-10-CM: Z51.81 
ICD-9-CM: V58.83 Vitals BP Pulse Temp Resp Height(growth percentile) Weight(growth percentile) 100/74 (BP 1 Location: Left arm, BP Patient Position: Sitting) 90 98.1 °F (36.7 °C) (Oral) 20 5' 6\" (1.676 m) 115 lb 3.2 oz (52.3 kg) LMP SpO2 BMI OB Status Smoking Status 06/06/2018 (Exact Date) 99% 18.59 kg/m2 Having regular periods Former Smoker Vitals History BMI and BSA Data Body Mass Index Body Surface Area 18.59 kg/m 2 1.56 m 2 Preferred Pharmacy Pharmacy Name Phone [id number] [phone] Your Updated Medication List  
  
   
This list is accurate as of 6/20/18  2:52 PM.  Always use your most recent med list.  
  
  
  
  
 acetaminophen 325 mg tablet Commonly known as:  TYLENOL Take 2 Tabs by mouth every four (4) hours as needed for Pain. albuterol 90 mcg/actuation inhaler Commonly known as:  VENTOLIN HFA INHELE 1 PUFF EVERY FOUR (4) HOURS AS NEEDED FOR WHEEZING OR SHORTNESS OF BREATH.  
  
 busPIRone 10 mg tablet Commonly known as:  BUSPAR Take 1 Tab by mouth daily. butalbital-acetaminophen-caff -40 mg per capsule Commonly known as:  [employer] Take 1 Cap by mouth every four (4) hours as needed for Pain or Headache. cyclobenzaprine 10 mg tablet Commonly known as:  FLEXERIL Take  by mouth three (3) times daily as needed for Muscle Spasm(s). cyproheptadine 4 mg tablet Commonly known as:  PERIACTIN Take 1 Tab by mouth once over twenty-four (24) hours. divalproex  mg tablet Commonly known as:  DEPAKOTE Take 1 Tab by mouth two (2) times a day. Indications: MIGRAINE PREVENTION  
  
 ergocalciferol 50,000 unit capsule Commonly known as:  ERGOCALCIFEROL  
TAKE 1 CAP BY MOUTH EVERY SEVEN (7) DAYS. food supplemt, lactose-reduced 0.05-1.5 gram-kcal/mL Liqd Commonly known as:  ENSURE PLUS  
TAKE 237 ML BY MOUTH THREE TIMES DAILY. multivitamin-min-iron-FA-vit K 18 mg iron-400 mcg-25 mcg Tab Take 1 Tab by mouth daily. norethindrone 0.35 mg Tab Commonly known as:  [employer] Take 1 Tab by mouth daily. Omeprazole delayed release 20 mg tablet Commonly known as:  PRILOSEC D/R Take 1 Tab by mouth daily. PARoxetine 40 mg tablet Commonly known as:  PAXIL Take 1 Tab by mouth daily. prenatal multivit-ca-min-fe-fa Tab Commonly known as:  PRENATAL VITAMIN  
TAKE 1 TAB BY MOUTH DAILY. promethazine 12.5 mg tablet Commonly known as:  PHENERGAN  
TAKE 1 TABLET BY MOUTH EVERY 6 HOURS AS NEEDED  
  
 zolpidem CR 6.25 mg tablet Commonly known as:  AMBIEN CR Take 1 Tab by mouth nightly as needed for Sleep. Max Daily Amount: 6.25 mg.  
  
  
  
  
Prescriptions Sent to Pharmacy Refills  
 divalproex DR (DEPAKOTE) 250 mg tablet 10 Sig: Take 1 Tab by mouth two (2) times a day. Indications: MIGRAINE PREVENTION Class: Normal  
 Pharmacy: [facility], [address] [facility]  #: [phone] Route: Oral  
  
Follow-up Instructions Return in about 4 weeks (around 7/18/2018). To-Do List   
 06/20/2018 Lab:  HEPATIC FUNCTION PANEL   
  
 06/20/2018   Lab:  VALPROIC ACID   
  
 06/29/2018 9:30 AM  
 Appointment with [id number] [place] [address] at [address] [place] [address] ([phone]) 1. Do NOT take any inhaled medications for 24 hours prior to the test.   2. Do NOT take any cold or allergy medications for 2-3 days prior to the test. Some asthma, hay fever and cold medications may interfere with the reaction of your lungs to methacholine. 3. Do NOT smoke for two hours before your test.  4. Do NOT wear perfume on the day of your test and dress in loose, comfortable clothing. 5. Avoid caffeine products for 24 hours before your test. These products include: coffee and tea, chocolate, cocoa, soda, pop, and any supplements or medicine that may contain caffeine. 6. Report to the Patient Registration department on the first floor 15-20 minutes prior to your appointment time to check in.   7. If you have questions or need to reschedule, please call [phone].  
  
 07/03/2018 10:30 AM  
  Appointment with Jose Miguel Wilson RD at [facility] ([phone]) Introducing [facility] & [facility] SERVICES! Dear Janelle Search: 
Thank you for requesting a [employer] account. Our records indicate that you already have an active [employer] account. You can access your account anytime at [email or web address]. [employer] Did you know that you can access your hospital and ER discharge instructions at any time in [employer]? You can also review all of your test results from your hospital stay or ER visit. Additional Information If you have questions, please visit the Frequently Asked Questions section of the [employer] website at [email or web address] Remember, [employer] is NOT to be used for urgent needs. For medical emergencies, dial 911. Now available from your iPhone and Android! Please provide this summary of care documentation to your next provider. Your primary care clinician is listed as Chris Peraza.  If you have any questions after today's visit, please call [phone].

## 2018-06-20 NOTE — PROGRESS NOTES
Re:  Colby Pierre,Follow up visit     6/20/2018 2:40 PM    SSN: [id number]    Subjective:   Khushbu Orourke returns for follow up of her headaches. She was recently placed on venlafaxine which may have lead to a breakthrough seizure. She had only one seizure and struck her head. She had an increase in her baseline headaches, but they have gotten much better since the head trauma. Medications:    Current Outpatient Prescriptions   Medication Sig Dispense Refill    PARoxetine (PAXIL) 40 mg tablet Take 1 Tab by mouth daily. 30 Tab 3    busPIRone (BUSPAR) 10 mg tablet Take 1 Tab by mouth daily. 30 Tab 1    promethazine (PHENERGAN) 12.5 mg tablet TAKE 1 TABLET BY MOUTH EVERY 6 HOURS AS NEEDED  0    multivitamin-min-iron-FA-vit K 18 mg iron-400 mcg-25 mcg tab Take 1 Tab by mouth daily. 90 Tab 3    norethindrone (MICRONOR) 0.35 mg tab Take 1 Tab by mouth daily. 1 Package 11    ergocalciferol (ERGOCALCIFEROL) 50,000 unit capsule TAKE 1 CAP BY MOUTH EVERY SEVEN (7) DAYS. 12 Cap 0    food supplemt, lactose-reduced (ENSURE PLUS) 0.05-1.5 gram-kcal/mL liqd TAKE 237 ML BY MOUTH THREE TIMES DAILY. 14408 mL 2    prenatal multivit-ca-min-fe-fa (PRENATAL VITAMIN) tab TAKE 1 TAB BY MOUTH DAILY. 30 Tab 5    zolpidem CR (AMBIEN CR) 6.25 mg tablet Take 1 Tab by mouth nightly as needed for Sleep. Max Daily Amount: 6.25 mg. 30 Tab 0    divalproex DR (DEPAKOTE) 250 mg tablet Take 1 Tab by mouth two (2) times a day. Indications: MIGRAINE PREVENTION 60 Tab 5    Omeprazole delayed release (PRILOSEC D/R) 20 mg tablet Take 1 Tab by mouth daily. 90 Tab 3    cyproheptadine (PERIACTIN) 4 mg tablet Take 1 Tab by mouth once over twenty-four (24) hours. 90 Tab 3    butalbital-acetaminophen-caff (FIORICET) -40 mg per capsule Take 1 Cap by mouth every four (4) hours as needed for Pain or Headache.  12 Cap 0    acetaminophen (TYLENOL) 325 mg tablet Take 2 Tabs by mouth every four (4) hours as needed for Pain. 60 Tab 0    albuterol (VENTOLIN HFA) 90 mcg/actuation inhaler INHELE 1 PUFF EVERY FOUR (4) HOURS AS NEEDED FOR WHEEZING OR SHORTNESS OF BREATH. 1 Inhaler 3    cyclobenzaprine (FLEXERIL) 10 mg tablet Take  by mouth three (3) times daily as needed for Muscle Spasm(s).          Vital signs:    Visit Vitals    /74 (BP 1 Location: Left arm, BP Patient Position: Sitting)    Pulse 90    Temp 98.1 °F (36.7 °C) (Oral)    Resp 20    Ht 5' 6\" (1.676 m)    Wt 52.3 kg (115 lb 3.2 oz)    LMP 2018 (Exact Date)    SpO2 99%    BMI 18.59 kg/m2       Review of Systems:   As above otherwise 11 point review of systems negative including;   Constitutional no fever or chills  Skin denies rash or itching  HEENT  Denies tinnitus, hearing lose  Eyes denies diplopia vision lose  Respiratory denies sortness of breath  Cardiovascular denies chest pain, dyspnea on exertion  Gastrointestinal denies nausea, vomiting, diarrhea, constipation  Genitourinary denies incontinence  Musculoskeletal denies joint pain or swelling  Endocrine denies weight change  Hematology denies easy bruising or bleeding   Neurological as above in HPI      Patient Active Problem List   Diagnosis Code    Spontaneous  O03.9    Epilepsy ([facility] [address] G40.909    Chronic midline low back pain M54.5, G89.29    ACP (advance care planning) Z71.89    Bradycardia R00.1    Mild intermittent asthma with status asthmaticus J45.22    Low body weight due to inadequate caloric intake R63.6    Lumbar facet arthropathy (HCC) M46.96    Vitamin D deficiency E55.9    Trichomonas infection A59.9    Muscle spasm of back M62.830    Intractable migraine without aura and without status migrainosus G43.019    Analgesic rebound headache G44.40, T39.95XA    Anxiety F41.9    Therapeutic drug monitoring Z51.81    Lumbar spondylosis M47.816    Lumbar degenerative disc disease M51.36    Chronic pain syndrome G89.4    HGSIL (high grade squamous intraepithelial lesion) on Pap smear of cervix R87.613    Gastroesophageal reflux disease without esophagitis K21.9    Edema of foot R60.0         Objective: The patient is awake, alert, and oriented x 4. Fund of knowledge is adequate. Speech is fluent and memory is intact. Cranial Nerves: II - Visual fields are full to confrontation. III, IV, VI - Extraocular movements are intact. There is no nystagmus. V - Facial sensation is intact to pinprick. VII - Face is symmetrical.  VIII - Hearing is present. IX, X, XII - Palate is symmetrical.   XI - Shoulder shrugging and head turning intact  Motor: The patient moves all four limbs fairly well and symmetrically. Tone is normal. Reflexes are 2+ and symmetrical. Plantars are down going. Gait is normal.    CBC:   Lab Results   Component Value Date/Time    WBC 7.8 02/08/2018 01:00 AM    RBC 4.36 02/08/2018 01:00 AM    HGB 13.2 02/08/2018 01:00 AM    HCT 39.2 02/08/2018 01:00 AM    PLATELET 580 07/57/7059 01:00 AM     BMP:   Lab Results   Component Value Date/Time    Glucose 76 02/08/2018 01:00 AM    Sodium 144 02/08/2018 01:00 AM    Potassium 3.8 02/08/2018 01:00 AM    Chloride 107 02/08/2018 01:00 AM    CO2 28 02/08/2018 01:00 AM    BUN 13 02/08/2018 01:00 AM    Creatinine 0.85 02/08/2018 01:00 AM    Calcium 9.2 02/08/2018 01:00 AM     CMP:   Lab Results   Component Value Date/Time    Glucose 76 02/08/2018 01:00 AM    Sodium 144 02/08/2018 01:00 AM    Potassium 3.8 02/08/2018 01:00 AM    Chloride 107 02/08/2018 01:00 AM    CO2 28 02/08/2018 01:00 AM    BUN 13 02/08/2018 01:00 AM    Creatinine 0.85 02/08/2018 01:00 AM    Calcium 9.2 02/08/2018 01:00 AM    Anion gap 9 02/08/2018 01:00 AM    BUN/Creatinine ratio 15 02/08/2018 01:00 AM    Alk.  phosphatase 51 11/07/2017 10:38 AM    Protein, total 7.0 11/07/2017 10:38 AM    Albumin 4.6 11/07/2017 10:38 AM    Globulin 3.0 06/16/2017 10:36 PM    A-G Ratio 1.9 11/07/2017 10:38 AM     Coagulation: No results found for: PTP, INR, APTT, PTTT  Cardiac markers: No results found for: CPK, CKND1, DONNA    Assessment:  Good results with very low dose Depakote. Tolerating the medication without any side effects. .    Plan: Will check her Depakote level, alter as needed as an out patient and see her back here in about 4 weeks. Sincerely,        Jensen Sylvester M.D.

## 2018-06-20 NOTE — COMMUNICATION BODY
Re:  Gurpreet Pierre,Follow up visit     6/20/2018 2:40 PM    SSN: [id number]    Subjective:   Mcgraw Fee returns for follow up of her headaches. She was recently placed on venlafaxine which may have lead to a breakthrough seizure. She had only one seizure and struck her head. She had an increase in her baseline headaches, but they have gotten much better since the head trauma. Medications:    Current Outpatient Prescriptions   Medication Sig Dispense Refill    PARoxetine (PAXIL) 40 mg tablet Take 1 Tab by mouth daily. 30 Tab 3    busPIRone (BUSPAR) 10 mg tablet Take 1 Tab by mouth daily. 30 Tab 1    promethazine (PHENERGAN) 12.5 mg tablet TAKE 1 TABLET BY MOUTH EVERY 6 HOURS AS NEEDED  0    multivitamin-min-iron-FA-vit K 18 mg iron-400 mcg-25 mcg tab Take 1 Tab by mouth daily. 90 Tab 3    norethindrone (MICRONOR) 0.35 mg tab Take 1 Tab by mouth daily. 1 Package 11    ergocalciferol (ERGOCALCIFEROL) 50,000 unit capsule TAKE 1 CAP BY MOUTH EVERY SEVEN (7) DAYS. 12 Cap 0    food supplemt, lactose-reduced (ENSURE PLUS) 0.05-1.5 gram-kcal/mL liqd TAKE 237 ML BY MOUTH THREE TIMES DAILY. 66068 mL 2    prenatal multivit-ca-min-fe-fa (PRENATAL VITAMIN) tab TAKE 1 TAB BY MOUTH DAILY. 30 Tab 5    zolpidem CR (AMBIEN CR) 6.25 mg tablet Take 1 Tab by mouth nightly as needed for Sleep. Max Daily Amount: 6.25 mg. 30 Tab 0    divalproex DR (DEPAKOTE) 250 mg tablet Take 1 Tab by mouth two (2) times a day. Indications: MIGRAINE PREVENTION 60 Tab 5    Omeprazole delayed release (PRILOSEC D/R) 20 mg tablet Take 1 Tab by mouth daily. 90 Tab 3    cyproheptadine (PERIACTIN) 4 mg tablet Take 1 Tab by mouth once over twenty-four (24) hours. 90 Tab 3    butalbital-acetaminophen-caff (FIORICET) -40 mg per capsule Take 1 Cap by mouth every four (4) hours as needed for Pain or Headache.  12 Cap 0    acetaminophen (TYLENOL) 325 mg tablet Take 2 Tabs by mouth every four (4) hours as needed for Pain. 60 Tab 0    albuterol (VENTOLIN HFA) 90 mcg/actuation inhaler INHELE 1 PUFF EVERY FOUR (4) HOURS AS NEEDED FOR WHEEZING OR SHORTNESS OF BREATH. 1 Inhaler 3    cyclobenzaprine (FLEXERIL) 10 mg tablet Take  by mouth three (3) times daily as needed for Muscle Spasm(s).          Vital signs:    Visit Vitals    /74 (BP 1 Location: Left arm, BP Patient Position: Sitting)    Pulse 90    Temp 98.1 °F (36.7 °C) (Oral)    Resp 20    Ht 5' 6\" (1.676 m)    Wt 52.3 kg (115 lb 3.2 oz)    LMP 2018 (Exact Date)    SpO2 99%    BMI 18.59 kg/m2       Review of Systems:   As above otherwise 11 point review of systems negative including;   Constitutional no fever or chills  Skin denies rash or itching  HEENT  Denies tinnitus, hearing lose  Eyes denies diplopia vision lose  Respiratory denies sortness of breath  Cardiovascular denies chest pain, dyspnea on exertion  Gastrointestinal denies nausea, vomiting, diarrhea, constipation  Genitourinary denies incontinence  Musculoskeletal denies joint pain or swelling  Endocrine denies weight change  Hematology denies easy bruising or bleeding   Neurological as above in HPI      Patient Active Problem List   Diagnosis Code    Spontaneous  O03.9    Epilepsy ([facility] [address] G40.909    Chronic midline low back pain M54.5, G89.29    ACP (advance care planning) Z71.89    Bradycardia R00.1    Mild intermittent asthma with status asthmaticus J45.22    Low body weight due to inadequate caloric intake R63.6    Lumbar facet arthropathy (HCC) M46.96    Vitamin D deficiency E55.9    Trichomonas infection A59.9    Muscle spasm of back M62.830    Intractable migraine without aura and without status migrainosus G43.019    Analgesic rebound headache G44.40, T39.95XA    Anxiety F41.9    Therapeutic drug monitoring Z51.81    Lumbar spondylosis M47.816    Lumbar degenerative disc disease M51.36    Chronic pain syndrome G89.4    HGSIL (high grade squamous intraepithelial lesion) on Pap smear of cervix R87.613    Gastroesophageal reflux disease without esophagitis K21.9    Edema of foot R60.0         Objective: The patient is awake, alert, and oriented x 4. Fund of knowledge is adequate. Speech is fluent and memory is intact. Cranial Nerves: II - Visual fields are full to confrontation. III, IV, VI - Extraocular movements are intact. There is no nystagmus. V - Facial sensation is intact to pinprick. VII - Face is symmetrical.  VIII - Hearing is present. IX, X, XII - Palate is symmetrical.   XI - Shoulder shrugging and head turning intact  Motor: The patient moves all four limbs fairly well and symmetrically. Tone is normal. Reflexes are 2+ and symmetrical. Plantars are down going. Gait is normal.    CBC:   Lab Results   Component Value Date/Time    WBC 7.8 02/08/2018 01:00 AM    RBC 4.36 02/08/2018 01:00 AM    HGB 13.2 02/08/2018 01:00 AM    HCT 39.2 02/08/2018 01:00 AM    PLATELET 490 04/12/4975 01:00 AM     BMP:   Lab Results   Component Value Date/Time    Glucose 76 02/08/2018 01:00 AM    Sodium 144 02/08/2018 01:00 AM    Potassium 3.8 02/08/2018 01:00 AM    Chloride 107 02/08/2018 01:00 AM    CO2 28 02/08/2018 01:00 AM    BUN 13 02/08/2018 01:00 AM    Creatinine 0.85 02/08/2018 01:00 AM    Calcium 9.2 02/08/2018 01:00 AM     CMP:   Lab Results   Component Value Date/Time    Glucose 76 02/08/2018 01:00 AM    Sodium 144 02/08/2018 01:00 AM    Potassium 3.8 02/08/2018 01:00 AM    Chloride 107 02/08/2018 01:00 AM    CO2 28 02/08/2018 01:00 AM    BUN 13 02/08/2018 01:00 AM    Creatinine 0.85 02/08/2018 01:00 AM    Calcium 9.2 02/08/2018 01:00 AM    Anion gap 9 02/08/2018 01:00 AM    BUN/Creatinine ratio 15 02/08/2018 01:00 AM    Alk.  phosphatase 51 11/07/2017 10:38 AM    Protein, total 7.0 11/07/2017 10:38 AM    Albumin 4.6 11/07/2017 10:38 AM    Globulin 3.0 06/16/2017 10:36 PM    A-G Ratio 1.9 11/07/2017 10:38 AM     Coagulation: No results found for: PTP, INR, APTT, PTTT  Cardiac markers: No results found for: CPK, CKND1, DONNA    Assessment:  Good results with very low dose Depakote. Tolerating the medication without any side effects. .    Plan: Will check her Depakote level, alter as needed as an out patient and see her back here in about 4 weeks. Sincerely,        Emanuel Jin.  Teena Bey M.D.

## 2018-06-20 NOTE — PROGRESS NOTES
Kandacepeggynaelkatherin Gregory is a 28 y.o. female     Learning assessment previously completed 2/9/2017; primary language is [place]. 1. Have you been to the ER, urgent care clinic since your last visit? Hospitalized since your last visit? Yes Reason for visit: [facility], June 2018, Migraine    2. Have you seen or consulted any other health care providers outside of the [address] [place] [address] since your last visit? Include any pap smears or colon screening.  No

## 2018-06-20 NOTE — PROGRESS NOTES
Received a call from [facility] outpatient lab. Patient's lab orders are out of date. Re-entered labs.

## 2018-06-21 LAB
ALBUMIN SERPL-MCNC: 5 G/DL (ref 3.5–5.5)
ALP SERPL-CCNC: 70 IU/L (ref 39–117)
ALT SERPL-CCNC: 9 IU/L (ref 0–32)
AST SERPL-CCNC: 14 IU/L (ref 0–40)
BILIRUB DIRECT SERPL-MCNC: 0.09 MG/DL (ref 0–0.4)
BILIRUB SERPL-MCNC: 0.4 MG/DL (ref 0–1.2)
PROT SERPL-MCNC: 7.7 G/DL (ref 6–8.5)
VALPROATE SERPL-MCNC: 40 UG/ML (ref 50–100)

## 2018-06-29 ENCOUNTER — HOSPITAL ENCOUNTER (OUTPATIENT)
Dept: RESPIRATORY THERAPY | Age: 33
Discharge: HOME | End: 2018-06-29
Attending: INTERNAL MEDICINE
Payer: MEDICAID

## 2018-06-29 DIAGNOSIS — R06.02 SOB (SHORTNESS OF BREATH): ICD-10-CM

## 2018-06-29 PROCEDURE — 94070 EVALUATION OF WHEEZING: CPT

## 2018-06-29 PROCEDURE — 74011250636 HC RX REV CODE- 250/636: Performed by: INTERNAL MEDICINE

## 2018-06-29 RX ADMIN — METHACHOLINE CHLORIDE 0.03 MG: 100 POWDER, FOR SOLUTION RESPIRATORY (INHALATION) at 10:40

## 2018-06-29 RX ADMIN — METHACHOLINE CHLORIDE 2.5 MG: 100 POWDER, FOR SOLUTION RESPIRATORY (INHALATION) at 10:48

## 2018-06-29 RX ADMIN — METHACHOLINE CHLORIDE 0.25 MG: 100 POWDER, FOR SOLUTION RESPIRATORY (INHALATION) at 10:44

## 2018-06-30 DIAGNOSIS — F41.9 ANXIETY: ICD-10-CM

## 2018-07-02 ENCOUNTER — OFFICE VISIT (OUTPATIENT)
Dept: OBGYN CLINIC | Age: 33
End: 2018-07-02

## 2018-07-02 VITALS
HEART RATE: 74 BPM | WEIGHT: 118 LBS | DIASTOLIC BLOOD PRESSURE: 76 MMHG | HEIGHT: 66 IN | BODY MASS INDEX: 18.96 KG/M2 | SYSTOLIC BLOOD PRESSURE: 113 MMHG

## 2018-07-02 DIAGNOSIS — R87.613 HGSIL (HIGH GRADE SQUAMOUS INTRAEPITHELIAL LESION) ON PAP SMEAR OF CERVIX: Primary | ICD-10-CM

## 2018-07-02 DIAGNOSIS — N87.1 CIN II (CERVICAL INTRAEPITHELIAL NEOPLASIA II): ICD-10-CM

## 2018-07-02 NOTE — PATIENT INSTRUCTIONS
Loop Electrosurgical Excision Procedure (LEEP): Before Your Procedure  What is LEEP? Loop electrosurgical excision procedure (LEEP) removes tissue from the cervix. You may have this done if you've had a Pap test that shows tissue that isn't normal.  During LEEP, your doctor will put a tool called a speculum into your vagina. It gently spreads apart the sides of your vagina. This lets your doctor see the cervix and inside the vagina. A special fluid is sometimes put on your cervix to make certain areas easier to see. You may get a shot of medicine to numb the cervix. You may feel a cramp when you have the shot. You may also get pain medicine. Your doctor will put a device with a fine wire loop into your vagina. The doctor uses the heated wire to cut out tissue. You may have mild cramps for several hours after LEEP. A dark brown discharge during the first week is normal. You may have some spotting for about 3 weeks. You should be able to go back to your normal routine in 1 to 3 days. How long it takes you to recover will depend on how much was done. Follow-up care is a key part of your treatment and safety. Be sure to make and go to all appointments, and call your doctor if you are having problems. It's also a good idea to know your test results and keep a list of the medicines you take. What happens before the procedure? ?Preparing for the procedure  ? · Tell your doctor if:  Cary Caruso are having your menstrual period. ¨ You are or might be pregnant. A blood or urine test may be done to see if you are pregnant. ? · Do not douche, use tampons, have sexual intercourse, or use vaginal medicines for 24 hours before the test.   ? · Understand exactly what procedure is planned, along with the risks, benefits, and other options. · Tell your doctors ALL the medicines, vitamins, supplements, and herbal remedies you take. Some of these can increase the risk of bleeding or interact with anesthesia. ?  · If you take blood thinners, such as warfarin (Coumadin), clopidogrel (Plavix), or aspirin, be sure to talk to your doctor. He or she will tell you if you should stop taking these medicines before your procedure. Make sure that you understand exactly what your doctor wants you to do.   ? · Your doctor will tell you which medicines to take or stop before your procedure. You may need to stop taking certain medicines a week or more before the procedure. So talk to your doctor as soon as you can. Procedures can be stressful. This information will help you understand what you can expect. And it will help you safely prepare for your procedure. What happens on the day of the procedure? ? · You may eat or drink as you normally do. ? · Take a bath or shower before you come in for your procedure. Do not apply lotions, perfumes, deodorants, or nail polish. ? · You may want to take a pain reliever, such as ibuprofen (Advil or Motrin), 30 to 60 minutes before you have the procedure. This can help reduce any cramping pain. ? · Take off all jewelry and piercings. And take out contact lenses, if you wear them. ? At the [address] [place] [address] or hospital   · Bring a picture ID. ? · You will be kept comfortable and safe by your anesthesia provider. You may get medicine that relaxes you or puts you in a light sleep. The area being worked on will be numb. ? · The procedure will take about 15 to 30 minutes. Going home  · You will be given more specific instructions about recovering from your procedure. They will cover things like diet, wound care, follow-up care, driving, and getting back to your normal routine. When should you call your doctor? · You have questions or concerns. ? · You don't understand how to prepare for your procedure. ? · You become ill before the procedure (such as fever, flu, or a cold). ? · You need to reschedule or have changed your mind about having the procedure. Where can you learn more?   Go to http://norma-kaley.info/. Enter J773 in the search box to learn more about \"Loop Electrosurgical Excision Procedure (LEEP): Before Your Procedure. \"  Current as of: May 12, 2017  Content Version: 11.4  © 3172-7888 [employer]. Care instructions adapted under license by [employer] (which disclaims liability or warranty for this information). If you have questions about a medical condition or this instruction, always ask your healthcare professional. [address] any warranty or liability for your use of this information.

## 2018-07-02 NOTE — PROGRESS NOTES
29 y/o patient of [facility] presents to the office for follow-up. She has JUSTEN 2 on colposcopic biopsy and is scheduled for a LEEP procedure on 7/11 with a pre-op appt. On 7/9. She has no new concerns. Chart review reveals active seizure disorder, followed by a neurologist.    We discussed the procedure in detail, including the pre and post-op instructions,   Reasons for the procedure and alternatives. Discussed need for healthier lifestyle and improved diet. 10 minutes spent in face to face consult. RTO as scheduled.

## 2018-07-03 ENCOUNTER — HOSPITAL ENCOUNTER (OUTPATIENT)
Dept: NUTRITION | Age: 33
Discharge: HOME | End: 2018-07-03
Payer: MEDICAID

## 2018-07-03 DIAGNOSIS — R63.0 ANOREXIA: ICD-10-CM

## 2018-07-03 DIAGNOSIS — G47.00 INSOMNIA, UNSPECIFIED TYPE: ICD-10-CM

## 2018-07-03 PROCEDURE — 97803 MED NUTRITION INDIV SUBSEQ: CPT

## 2018-07-03 NOTE — PROGRESS NOTES
NUTRITION  FOLLOW-UP TREATMENT NOTE  Patient Name: Cooper Wolfe         Date: 7/3/2018  : 1985    YES Patient  Verified  Diagnosis: Abnormal wt loss, GERD, IBS   In time:   10:30a             Out time:   11a   Total Treatment Time (min):   30     SUBJECTIVE/ASSESSMENT    Changes in medication or medical history? Any new allergies, surgeries or procedures? YES    If yes, update Summary List   Pt in for follow up reports doing well. Her weight has been maintained. States she has had many anxious days since follow up; feeling nauseous frequently (when she starts cleaning, also during high stress) and vomiting usually every morning - but that is typically just acid. Pt does not think nausea/vomiting is correlated with any foods in particular; seems to be related mainly to anxiousness. She has called several psychiatrists who are either not accepting new pts or do not accept her insurance. Pt is still meeting with therapist; who is happy about pt starting to exercise - but wants her to practice meditation (pt reports meditation makes her more anxious with thoughts running through her head the entire time). Pt reports enjoying the exercise classes, but has on occasion intended to go to a class but decides against it when she gets there and sees how many people are in the class room. On anxious days, she has been able to keep down noodle soup, smaller meals, gatorade, apple & watermelon juice. Tried smoothie with PB and did not like at all. Has not used plan it out sheets yet, but has a really busy day on the  and agrees to try it. Also, tried [place] milk and she didn't like it, but she didn't have any stomach upset (more confirmation of lactose intolerance). Pt will be vacationing in [facility] [place] with her  mid-August.   Current Wt: 118 Previous Wt: 118 Wt Change: --     Achievement of Goals: 1.  Optimize caloric intake on well-days; focus on incorporating wt gain strategies discussed on goals sheet. =met, continue  2. On anxious days:   -Remember to eat, use plan it out sheets, and set phone alarm reminders as needed. =not met, revised  -Try to have smaller, more frequent meals (4-6x per day) that you know are tolerated well (cereal, pasta, toast, bagel, soup, etc.). =met, continue  -Maintain hydration status with caloric beverages, like sports drinks and juices. =met, continue  3. Start exercise program as discussed, progressing gradually and focusing on activities you would enjoy (walking outdoors, yoga, resistance exercise). =met, classess at [facility]  4. Work on getting an appointment scheduled with psychiatry. =in progress, continue         Patient Education:  [x]  Review current plan with patient   []  Other:    Handouts/  Information Provided: []  Carbohydrates  []  Protein  []  Fiber  []  Serving Sizes  []  Fluids  []  Non-starchy veg []  Diabetes  []  Cholesterol  []  Sodium  []  SBGM  []  Food Journals  []  Others:      New Patient Goals: 1. Try out more yoga classes & work on arriving early to get set up before there are already a lot of people there. 2. Try to make a Plan it Out sheet for July 9th. Make it in advance, maybe on 7/5 before you get too anxious. 3. Continue trying to find a psychiatrist (possibily [place] location). 4. Purchase [place] soy milk and yogurt try to have in smoothies.       PLAN    [x]  Continue on current plan []  Follow-up PRN   []  Discharge due to :    [x]  Next appt: Wed. 8/1/18 at 11:00am     Dietitian: Elle Landaverde MS, RD    Date: 7/3/2018 Time: 11:08 AM

## 2018-07-05 RX ORDER — CYPROHEPTADINE HYDROCHLORIDE 4 MG/1
4 TABLET ORAL
Qty: 90 TAB | Refills: 3 | Status: SHIPPED | OUTPATIENT
Start: 2018-07-05 | End: 2018-09-10

## 2018-07-05 RX ORDER — HYDROXYZINE PAMOATE 50 MG/1
CAPSULE ORAL
Qty: 30 CAP | Refills: 2 | Status: SHIPPED | OUTPATIENT
Start: 2018-07-05 | End: 2019-01-22 | Stop reason: SDUPTHER

## 2018-07-09 DIAGNOSIS — J45.41 RAD (REACTIVE AIRWAY DISEASE), MODERATE PERSISTENT, WITH ACUTE EXACERBATION: ICD-10-CM

## 2018-07-10 RX ORDER — ALBUTEROL SULFATE 90 UG/1
AEROSOL, METERED RESPIRATORY (INHALATION)
Qty: 18 INHALER | Refills: 3 | Status: SHIPPED | OUTPATIENT
Start: 2018-07-10 | End: 2019-01-02 | Stop reason: SDUPTHER

## 2018-07-10 RX ORDER — VENLAFAXINE HYDROCHLORIDE 37.5 MG/1
CAPSULE, EXTENDED RELEASE ORAL
Qty: 30 CAP | Refills: 0 | Status: SHIPPED | OUTPATIENT
Start: 2018-07-10 | End: 2018-07-10

## 2018-07-10 RX ORDER — CYPROHEPTADINE HYDROCHLORIDE 4 MG/1
TABLET ORAL
Qty: 90 TAB | Refills: 2 | Status: SHIPPED | OUTPATIENT
Start: 2018-07-10 | End: 2018-09-12

## 2018-07-12 ENCOUNTER — DOCUMENTATION ONLY (OUTPATIENT)
Dept: NEUROLOGY | Age: 33
End: 2018-07-12

## 2018-07-16 ENCOUNTER — TELEPHONE (OUTPATIENT)
Dept: CARDIOLOGY CLINIC | Age: 33
End: 2018-07-16

## 2018-07-24 DIAGNOSIS — F41.9 ANXIETY: ICD-10-CM

## 2018-07-24 RX ORDER — BUSPIRONE HYDROCHLORIDE 10 MG/1
TABLET ORAL
Qty: 30 TAB | Refills: 1 | Status: SHIPPED | OUTPATIENT
Start: 2018-07-24 | End: 2018-09-24 | Stop reason: SDUPTHER

## 2018-07-27 RX ORDER — ERGOCALCIFEROL 1.25 MG/1
CAPSULE ORAL
Qty: 12 CAP | Refills: 0 | Status: SHIPPED | OUTPATIENT
Start: 2018-07-27 | End: 2018-11-06

## 2018-08-01 ENCOUNTER — APPOINTMENT (OUTPATIENT)
Dept: NUTRITION | Age: 33
End: 2018-08-01

## 2018-08-06 ENCOUNTER — OFFICE VISIT (OUTPATIENT)
Dept: NEUROLOGY | Age: 33
End: 2018-08-06

## 2018-08-06 VITALS
TEMPERATURE: 98.9 F | SYSTOLIC BLOOD PRESSURE: 106 MMHG | WEIGHT: 116.8 LBS | BODY MASS INDEX: 18.77 KG/M2 | HEIGHT: 66 IN | DIASTOLIC BLOOD PRESSURE: 58 MMHG | OXYGEN SATURATION: 97 % | RESPIRATION RATE: 16 BRPM | HEART RATE: 74 BPM

## 2018-08-06 DIAGNOSIS — G40.909 SEIZURE DISORDER (HCC): ICD-10-CM

## 2018-08-06 DIAGNOSIS — T39.95XA ANALGESIC REBOUND HEADACHE: ICD-10-CM

## 2018-08-06 DIAGNOSIS — Z51.81 THERAPEUTIC DRUG MONITORING: ICD-10-CM

## 2018-08-06 DIAGNOSIS — G44.40 ANALGESIC REBOUND HEADACHE: ICD-10-CM

## 2018-08-06 DIAGNOSIS — G43.019 INTRACTABLE MIGRAINE WITHOUT AURA AND WITHOUT STATUS MIGRAINOSUS: Primary | ICD-10-CM

## 2018-08-06 NOTE — PROGRESS NOTES
Re:  [facility] up visit     8/6/2018 12:04 PM    SSN: [id number]    Subjective:   Thomas Mao returns for follow up of her headaches. She was recently placed on venlafaxine which may have lead to a breakthrough seizure. She had only one seizure and struck her head. She had an increase in her baseline headaches, but they have gotten much better since the head trauma. She rarely has headache now. Medications:    Current Outpatient Prescriptions   Medication Sig Dispense Refill    ergocalciferol (ERGOCALCIFEROL) 50,000 unit capsule TAKE 1 CAP BY MOUTH EVERY SEVEN (7) DAYS. 12 Cap 0    busPIRone (BUSPAR) 10 mg tablet TAKE 1 TABLET BY MOUTH EVERY DAY 30 Tab 1    cyproheptadine (PERIACTIN) 4 mg tablet TAKE 1 TABLET BY MOUTH EVERY DAY 90 Tab 2    VENTOLIN HFA 90 mcg/actuation inhaler INHELE 1 PUFF EVERY FOUR (4) HOURS AS NEEDED FOR WHEEZING OR SHORTNESS OF BREATH. 18 Inhaler 3    hydrOXYzine pamoate (VISTARIL) 50 mg capsule TAKE ONE CAPSULE BY MOUTH AT BEDTIME 30 Cap 2    cyproheptadine (PERIACTIN) 4 mg tablet Take 1 Tab by mouth once over twenty-four (24) hours. 90 Tab 3    divalproex DR (DEPAKOTE) 250 mg tablet Take 1 Tab by mouth two (2) times a day. Indications: MIGRAINE PREVENTION 60 Tab 10    butalbital-acetaminophen-caff (FIORICET) -40 mg per capsule Take 1 Cap by mouth every four (4) hours as needed for Pain or Headache. 12 Cap 0    promethazine (PHENERGAN) 12.5 mg tablet TAKE 1 TABLET BY MOUTH EVERY 6 HOURS AS NEEDED  0    multivitamin-min-iron-FA-vit K 18 mg iron-400 mcg-25 mcg tab Take 1 Tab by mouth daily. 90 Tab 3    norethindrone (MICRONOR) 0.35 mg tab Take 1 Tab by mouth daily. 1 Package 11    acetaminophen (TYLENOL) 325 mg tablet Take 2 Tabs by mouth every four (4) hours as needed for Pain. 60 Tab 0    cyclobenzaprine (FLEXERIL) 10 mg tablet Take  by mouth three (3) times daily as needed for Muscle Spasm(s).       food supplemt, lactose-reduced (ENSURE PLUS) 0.05-1.5 gram-kcal/mL liqd TAKE 237 ML BY MOUTH THREE TIMES DAILY. 42703 mL 2    prenatal multivit-ca-min-fe-fa (PRENATAL VITAMIN) tab TAKE 1 TAB BY MOUTH DAILY. 30 Tab 5    zolpidem CR (AMBIEN CR) 6.25 mg tablet Take 1 Tab by mouth nightly as needed for Sleep. Max Daily Amount: 6.25 mg. 30 Tab 0    Omeprazole delayed release (PRILOSEC D/R) 20 mg tablet Take 1 Tab by mouth daily.  90 Tab 3       Vital signs:    Visit Vitals    /58 (BP 1 Location: Left arm, BP Patient Position: Sitting)    Pulse 74    Temp 98.9 °F (37.2 °C) (Oral)    Resp 16    Ht 5' 6\" (1.676 m)    Wt 53 kg (116 lb 12.8 oz)    LMP 2018 (Exact Date)    SpO2 97%    BMI 18.85 kg/m2       Review of Systems:   As above otherwise 11 point review of systems negative including;   Constitutional no fever or chills  Skin denies rash or itching  HEENT  Denies tinnitus, hearing lose  Eyes denies diplopia vision lose  Respiratory denies sortness of breath  Cardiovascular denies chest pain, dyspnea on exertion  Gastrointestinal denies nausea, vomiting, diarrhea, constipation  Genitourinary denies incontinence  Musculoskeletal denies joint pain or swelling  Endocrine denies weight change  Hematology denies easy bruising or bleeding   Neurological as above in HPI      Patient Active Problem List   Diagnosis Code    Spontaneous  O03.9    Epilepsy ([facility] [address] G40.909    Chronic midline low back pain M54.5, G89.29    ACP (advance care planning) Z71.89    Bradycardia R00.1    Mild intermittent asthma with status asthmaticus J45.22    Low body weight due to inadequate caloric intake R63.6    Lumbar facet arthropathy (HCC) M46.96    Vitamin D deficiency E55.9    Trichomonas infection A59.9    Muscle spasm of back M62.830    Intractable migraine without aura and without status migrainosus G43.019    Analgesic rebound headache G44.40, T39.95XA    Anxiety F41.9    Therapeutic drug monitoring Z51.81    Lumbar spondylosis M47.816    Lumbar degenerative disc disease M51.36    Chronic pain syndrome G89.4    HGSIL (high grade squamous intraepithelial lesion) on Pap smear of cervix R87.613    Gastroesophageal reflux disease without esophagitis K21.9    Edema of foot R60.0         Objective: The patient is awake, alert, and oriented x 4. Fund of knowledge is adequate. Speech is fluent and memory is intact. Cranial Nerves: II - Visual fields are full to confrontation. III, IV, VI - Extraocular movements are intact. There is no nystagmus. V - Facial sensation is intact to pinprick. VII - Face is symmetrical.  VIII - Hearing is present. IX, X, XII - Palate is symmetrical.   XI - Shoulder shrugging and head turning intact  Motor: The patient moves all four limbs fairly well and symmetrically. Tone is normal. Reflexes are 2+ and symmetrical. Plantars are down going. Gait is normal.    CBC:   Lab Results   Component Value Date/Time    WBC 7.8 02/08/2018 01:00 AM    RBC 4.36 02/08/2018 01:00 AM    HGB 13.2 02/08/2018 01:00 AM    HCT 39.2 02/08/2018 01:00 AM    PLATELET 917 71/43/4253 01:00 AM     BMP:   Lab Results   Component Value Date/Time    Glucose 76 02/08/2018 01:00 AM    Sodium 144 02/08/2018 01:00 AM    Potassium 3.8 02/08/2018 01:00 AM    Chloride 107 02/08/2018 01:00 AM    CO2 28 02/08/2018 01:00 AM    BUN 13 02/08/2018 01:00 AM    Creatinine 0.85 02/08/2018 01:00 AM    Calcium 9.2 02/08/2018 01:00 AM     CMP:   Lab Results   Component Value Date/Time    Glucose 76 02/08/2018 01:00 AM    Sodium 144 02/08/2018 01:00 AM    Potassium 3.8 02/08/2018 01:00 AM    Chloride 107 02/08/2018 01:00 AM    CO2 28 02/08/2018 01:00 AM    BUN 13 02/08/2018 01:00 AM    Creatinine 0.85 02/08/2018 01:00 AM    Calcium 9.2 02/08/2018 01:00 AM    Anion gap 9 02/08/2018 01:00 AM    BUN/Creatinine ratio 15 02/08/2018 01:00 AM    Alk.  phosphatase 70 06/20/2018 12:00 AM    Protein, total 7.7 06/20/2018 12:00 AM    Albumin 5.0 06/20/2018 12:00 AM    Globulin 3.0 06/16/2017 10:36 PM    A-G Ratio 1.9 11/07/2017 10:38 AM     Coagulation: No results found for: PTP, INR, APTT, PTTT  Cardiac markers: No results found for: CPK, CKND1, DONNA    6/21/2018  1:25 PM - [place], [facility] Lab Results In   Component Results   Component Value Flag Ref Range Units Status   Valproic acid 40 (L) 50 - 100 ug/mL Final   Comment:         Assessment:  Good results with very low dose Depakote. Tolerating the medication without any side effects. .    Plan:  Adequate Depakote level. Controlling both seizures and headaches. Return here in 6 months. Sincerely,        Shelby Grace.  Edgard Gupta M.D.

## 2018-08-06 NOTE — PROGRESS NOTES
Chief Complaint   Patient presents with    Follow-up    Migraine    Seizure     1. Have you been to the ER, urgent care clinic since your last visit? Hospitalized since your last visit? No    2. Have you seen or consulted any other health care providers outside of the [address] [place] [address] since your last visit? Include any pap smears or colon screening.  No

## 2018-08-06 NOTE — LETTER
8/6/2018 12:08 PM 
 
Patient:  Rosalie Christian YOB: 1985 Date of Visit: 8/6/2018 Dear Audra Lambert, ANISA 
[address] [facility] [address] [phone] VIA In Basket 
 : Thank you for referring Ms. Vielka [place] to me for evaluation/treatment. Below are the relevant portions of my assessment and plan of care. If you have questions, please do not hesitate to call me. I look forward to following Ms. Jass Castillo along with you.  
 
 
 
Sincerely, 
 
 
Frankie Giron MD

## 2018-08-06 NOTE — MR AVS SNAPSHOT
[address] [id number] [phone] Patient: Brittny Grady MRN: [id number] Visit Information Date & Time Provider Department Dept. Phone Encounter #  
 8/6/2018 11:40 AM Ila Waller MD [address] [phone] [id number] Follow-up Instructions Return in about 6 months (around 2/6/2019). Follow-up and Disposition History Your Appointments 8/15/2018 11:00 AM  
Follow Up with Jordan Bhakta  Ivelisse Alvarenga ([facility]) Appt Note: F/U 5/15/18 [facility] [address] [facility] [address] [facility] [address]  
[phone]  
  
   
 [phone]  
  
    
 9/10/2018 10:45 AM  
PRE OP CPE with Aristides Patrick MD  
[address] [place] [address] ([facility]) Appt Note: pre op; pre op [facility] [address] [phone]  
[phone]  
  
   
 [phone]  
  
    
 2/8/2019 11:40 AM  
Follow Up with Ila Waller MD  
[address] [place] [address] Appt Note: 6mon f/u  
 [address] [address] [phone]  
  
   
 [place] [phone] Upcoming Health Maintenance Date Due Influenza Age 5 to Adult 8/1/2018 Pneumococcal 19-64 Highest Risk (2 of 3 - PCV13) 2/13/2019 PAP AKA CERVICAL CYTOLOGY 3/19/2021 DTaP/Tdap/Td series (2 - Td) 5/30/2027 Allergies as of 8/6/2018  Review Complete On: 8/6/2018 By: Nishant Akbar LPN Severity Noted Reaction Type Reactions Imitrex [Sumatriptan Succinate] High 05/25/2015    Anaphylaxis Iodine  02/16/2017    Cough Coughing up blood Sea Food [Seafood]  06/04/2015    Hives Per pt report Current Immunizations  Never Reviewed No immunizations on file. Not reviewed this visit You Were Diagnosed With   
  
 Codes Comments Intractable migraine without aura and without status migrainosus    -  Primary ICD-10-CM: G43.019 
ICD-9-CM: 346.11 Therapeutic drug monitoring     ICD-10-CM: Z51.81 
ICD-9-CM: V58.83 Analgesic rebound headache     ICD-10-CM: G44.40, T39.95XA ICD-9-CM: 339.3, E935.9 Seizure disorder ([facility] [address]     ICD-10-CM: G40.909 ICD-9-CM: 345.90 Vitals BP Pulse Temp Resp Height(growth percentile) Weight(growth percentile) 106/58 (BP 1 Location: Left arm, BP Patient Position: Sitting) 74 98.9 °F (37.2 °C) (Oral) 16 5' 6\" (1.676 m) 116 lb 12.8 oz (53 kg) LMP SpO2 BMI OB Status Smoking Status 07/29/2018 (Exact Date) 97% 18.85 kg/m2 Having regular periods Former Smoker Vitals History BMI and BSA Data Body Mass Index Body Surface Area  
 18.85 kg/m 2 1.57 m 2 Preferred Pharmacy Pharmacy Name Phone [id number] [phone] [facility], [address] [place] [address] [phone] Your Updated Medication List  
  
   
This list is accurate as of 8/6/18 12:11 PM.  Always use your most recent med list.  
  
  
  
  
 acetaminophen 325 mg tablet Commonly known as:  TYLENOL Take 2 Tabs by mouth every four (4) hours as needed for Pain. busPIRone 10 mg tablet Commonly known as:  BUSPAR  
TAKE 1 TABLET BY MOUTH EVERY DAY  
  
 butalbital-acetaminophen-caff -40 mg per capsule Commonly known as:  [employer] Take 1 Cap by mouth every four (4) hours as needed for Pain or Headache. cyclobenzaprine 10 mg tablet Commonly known as:  FLEXERIL Take  by mouth three (3) times daily as needed for Muscle Spasm(s). * cyproheptadine 4 mg tablet Commonly known as:  PERIACTIN Take 1 Tab by mouth once over twenty-four (24) hours. * cyproheptadine 4 mg tablet Commonly known as:  PERIACTIN  
TAKE 1 TABLET BY MOUTH EVERY DAY  
  
 divalproex  mg tablet Commonly known as:  DEPAKOTE  
 Take 1 Tab by mouth two (2) times a day. Indications: MIGRAINE PREVENTION  
  
 ergocalciferol 50,000 unit capsule Commonly known as:  ERGOCALCIFEROL  
TAKE 1 CAP BY MOUTH EVERY SEVEN (7) DAYS. food supplemt, lactose-reduced 0.05-1.5 gram-kcal/mL Liqd Commonly known as:  ENSURE PLUS  
TAKE 237 ML BY MOUTH THREE TIMES DAILY. hydrOXYzine pamoate 50 mg capsule Commonly known as:  VISTARIL  
TAKE ONE CAPSULE BY MOUTH AT BEDTIME  
  
 multivitamin-min-iron-FA-vit K 18 mg iron-400 mcg-25 mcg Tab Take 1 Tab by mouth daily. norethindrone 0.35 mg Tab Commonly known as:  [employer] Take 1 Tab by mouth daily. Omeprazole delayed release 20 mg tablet Commonly known as:  PRILOSEC D/R Take 1 Tab by mouth daily. prenatal multivit-ca-min-fe-fa Tab Commonly known as:  PRENATAL VITAMIN  
TAKE 1 TAB BY MOUTH DAILY. promethazine 12.5 mg tablet Commonly known as:  PHENERGAN  
TAKE 1 TABLET BY MOUTH EVERY 6 HOURS AS NEEDED VENTOLIN HFA 90 mcg/actuation inhaler Generic drug:  albuterol INHELE 1 PUFF EVERY FOUR (4) HOURS AS NEEDED FOR WHEEZING OR SHORTNESS OF BREATH.  
  
 zolpidem CR 6.25 mg tablet Commonly known as:  AMBIEN CR Take 1 Tab by mouth nightly as needed for Sleep. Max Daily Amount: 6.25 mg.  
  
 * Notice: This list has 2 medication(s) that are the same as other medications prescribed for you. Read the directions carefully, and ask your doctor or other care provider to review them with you. Follow-up Instructions Return in about 6 months (around 2/6/2019). To-Do List   
 08/10/2018 11:30 AM  
  Appointment with Shanell Vale RD at [facility] ([phone]) Introducing [facility]! Dear Mary Britton: 
Thank you for requesting a [email or web address] account. Our records indicate that you already have an active [email or web address] account. You can access your account anytime at [email or web address] Did you know that you can access your hospital and ER discharge instructions at any time in [employer]? You can also review all of your test results from your hospital stay or ER visit. Additional Information If you have questions, please visit the Frequently Asked Questions section of the [employer] website at [email or web address]. Remember, [employer] is NOT to be used for urgent needs. For medical emergencies, dial 911. Now available from your iPhone and Android! Please provide this summary of care documentation to your next provider. Your primary care clinician is listed as Stephen Crandall. If you have any questions after today's visit, please call [phone].

## 2018-08-27 DIAGNOSIS — R62.7 FAILURE TO THRIVE IN ADULT: ICD-10-CM

## 2018-08-27 DIAGNOSIS — E63.9 NUTRITION DISORDER: ICD-10-CM

## 2018-08-27 RX ORDER — LACTOSE-REDUCED FOOD
LIQUID (ML) ORAL
Qty: 127980 ML | Refills: 2 | Status: SHIPPED | OUTPATIENT
Start: 2018-08-27

## 2018-09-10 ENCOUNTER — HOSPITAL ENCOUNTER (OUTPATIENT)
Dept: LAB | Age: 33
Discharge: HOME | End: 2018-09-10
Payer: MEDICAID

## 2018-09-10 ENCOUNTER — HOSPITAL ENCOUNTER (OUTPATIENT)
Dept: PREADMISSION TESTING | Age: 33
Discharge: HOME | End: 2018-09-10
Payer: MEDICAID

## 2018-09-10 ENCOUNTER — OFFICE VISIT (OUTPATIENT)
Dept: OBGYN CLINIC | Age: 33
End: 2018-09-10

## 2018-09-10 VITALS
DIASTOLIC BLOOD PRESSURE: 65 MMHG | WEIGHT: 116 LBS | SYSTOLIC BLOOD PRESSURE: 117 MMHG | BODY MASS INDEX: 18.64 KG/M2 | HEIGHT: 66 IN | HEART RATE: 74 BPM

## 2018-09-10 DIAGNOSIS — Z01.818 PRE-OP EVALUATION: ICD-10-CM

## 2018-09-10 DIAGNOSIS — Z01.818 PRE-OP EVALUATION: Primary | ICD-10-CM

## 2018-09-10 LAB
ABO + RH BLD: NORMAL
ALBUMIN SERPL-MCNC: 4.5 G/DL (ref 3.4–5)
ALBUMIN/GLOB SERPL: 1.4 {RATIO} (ref 0.8–1.7)
ALP SERPL-CCNC: 73 U/L (ref 45–117)
ALT SERPL-CCNC: 21 U/L (ref 13–56)
ANION GAP SERPL CALC-SCNC: 11 MMOL/L (ref 3–18)
AST SERPL-CCNC: 11 U/L (ref 15–37)
ATRIAL RATE: 63 BPM
BASOPHILS # BLD: 0 K/UL (ref 0–0.1)
BASOPHILS NFR BLD: 0 % (ref 0–2)
BILIRUB SERPL-MCNC: 0.4 MG/DL (ref 0.2–1)
BLOOD GROUP ANTIBODIES SERPL: NORMAL
BUN SERPL-MCNC: 8 MG/DL (ref 7–18)
BUN/CREAT SERPL: 11 (ref 12–20)
CALCIUM SERPL-MCNC: 9.1 MG/DL (ref 8.5–10.1)
CALCULATED P AXIS, ECG09: 61 DEGREES
CALCULATED R AXIS, ECG10: 64 DEGREES
CALCULATED T AXIS, ECG11: 50 DEGREES
CHLORIDE SERPL-SCNC: 109 MMOL/L (ref 100–108)
CO2 SERPL-SCNC: 27 MMOL/L (ref 21–32)
CREAT SERPL-MCNC: 0.71 MG/DL (ref 0.6–1.3)
DIAGNOSIS, 93000: NORMAL
DIFFERENTIAL METHOD BLD: NORMAL
EOSINOPHIL # BLD: 0 K/UL (ref 0–0.4)
EOSINOPHIL NFR BLD: 0 % (ref 0–5)
ERYTHROCYTE [DISTWIDTH] IN BLOOD BY AUTOMATED COUNT: 12.6 % (ref 11.6–14.5)
GLOBULIN SER CALC-MCNC: 3.3 G/DL (ref 2–4)
GLUCOSE SERPL-MCNC: 83 MG/DL (ref 74–99)
HCG UR QL: NEGATIVE
HCT VFR BLD AUTO: 39.6 % (ref 35–45)
HGB BLD-MCNC: 12.9 G/DL (ref 12–16)
LYMPHOCYTES # BLD: 2 K/UL (ref 0.9–3.6)
LYMPHOCYTES NFR BLD: 31 % (ref 21–52)
MCH RBC QN AUTO: 30 PG (ref 24–34)
MCHC RBC AUTO-ENTMCNC: 32.6 G/DL (ref 31–37)
MCV RBC AUTO: 92.1 FL (ref 74–97)
MONOCYTES # BLD: 0.4 K/UL (ref 0.05–1.2)
MONOCYTES NFR BLD: 6 % (ref 3–10)
NEUTS SEG # BLD: 4 K/UL (ref 1.8–8)
NEUTS SEG NFR BLD: 63 % (ref 40–73)
P-R INTERVAL, ECG05: 132 MS
PLATELET # BLD AUTO: 265 K/UL (ref 135–420)
PMV BLD AUTO: 11.6 FL (ref 9.2–11.8)
POTASSIUM SERPL-SCNC: 4.1 MMOL/L (ref 3.5–5.5)
PROT SERPL-MCNC: 7.8 G/DL (ref 6.4–8.2)
Q-T INTERVAL, ECG07: 414 MS
QRS DURATION, ECG06: 82 MS
QTC CALCULATION (BEZET), ECG08: 423 MS
RBC # BLD AUTO: 4.3 M/UL (ref 4.2–5.3)
SODIUM SERPL-SCNC: 147 MMOL/L (ref 136–145)
SPECIMEN EXP DATE BLD: NORMAL
VENTRICULAR RATE, ECG03: 63 BPM
WBC # BLD AUTO: 6.4 K/UL (ref 4.6–13.2)

## 2018-09-10 PROCEDURE — 81025 URINE PREGNANCY TEST: CPT | Performed by: OBSTETRICS & GYNECOLOGY

## 2018-09-10 PROCEDURE — 85025 COMPLETE CBC W/AUTO DIFF WBC: CPT | Performed by: OBSTETRICS & GYNECOLOGY

## 2018-09-10 PROCEDURE — 80053 COMPREHEN METABOLIC PANEL: CPT | Performed by: OBSTETRICS & GYNECOLOGY

## 2018-09-10 PROCEDURE — 36415 COLL VENOUS BLD VENIPUNCTURE: CPT | Performed by: OBSTETRICS & GYNECOLOGY

## 2018-09-10 PROCEDURE — 93005 ELECTROCARDIOGRAM TRACING: CPT

## 2018-09-10 PROCEDURE — 86900 BLOOD TYPING SEROLOGIC ABO: CPT | Performed by: OBSTETRICS & GYNECOLOGY

## 2018-09-10 NOTE — PROGRESS NOTES
Preoperative Evaluation    Date of Exam: 9/10/2018    Tobin Ohara is a 28 y.o. female (:1985) who presents for preoperative evaluation.    Procedure/Surgery:LEEP procedure  Date of Procedure/Surgery: 2018 @ 0830  Surgeon: Jaskaran Torres MD  Hospital/Surgical Facility: [facility]  Primary Physician: Kalia Chopra NP  Latex Allergy: no    Problem List:     Patient Active Problem List    Diagnosis Date Noted    Gastroesophageal reflux disease without esophagitis 05/15/2018    Edema of foot 05/15/2018    HGSIL (high grade squamous intraepithelial lesion) on Pap smear of cervix 2018    Lumbar spondylosis 10/26/2017    Lumbar degenerative disc disease 10/26/2017    Chronic pain syndrome 10/26/2017    Therapeutic drug monitoring 2017    Anxiety 2017    Intractable migraine without aura and without status migrainosus 2017    Analgesic rebound headache 2017    Muscle spasm of back 2017    Trichomonas infection 2017    Vitamin D deficiency 2017    Lumbar facet arthropathy ([address] 2017    Mild intermittent asthma with status asthmaticus 2016    Low body weight due to inadequate caloric intake 2016    Bradycardia 2016    Epilepsy ([address] 2016    Chronic midline low back pain 2016    ACP (advance care planning) 2016    Spontaneous  2015     Medical History:     Past Medical History:   Diagnosis Date    Abnormal Papanicolaou smear of cervix     Anxiety     ANXIETY    Asthma     Bradycardia, sinus     Degenerative disc disease, lumbar     Edema of foot 5/15/2018    Gastroesophageal reflux disease without esophagitis 5/15/2018    GERD (gastroesophageal reflux disease)     HGSIL (high grade squamous intraepithelial lesion) on Pap smear of cervix 3/22/2018    Migraine     Miscarriage     Seizures ([address]     Last     Tobacco abuse, in remission Allergies: Allergies   Allergen Reactions    Imitrex [Sumatriptan Succinate] Anaphylaxis    Iodine Cough     Coughing up blood      Sea Food [Seafood] Hives     Per pt report       Medications:     Current Outpatient Prescriptions   Medication Sig    ENSURE PLUS 0.05-1.5 gram-kcal/mL liqd TAKE 2 BOTTLES BY MOUTH 3 TIMES A DAY    ergocalciferol (ERGOCALCIFEROL) 50,000 unit capsule TAKE 1 CAP BY MOUTH EVERY SEVEN (7) DAYS.  busPIRone (BUSPAR) 10 mg tablet TAKE 1 TABLET BY MOUTH EVERY DAY    cyproheptadine (PERIACTIN) 4 mg tablet TAKE 1 TABLET BY MOUTH EVERY DAY    VENTOLIN HFA 90 mcg/actuation inhaler INHELE 1 PUFF EVERY FOUR (4) HOURS AS NEEDED FOR WHEEZING OR SHORTNESS OF BREATH.  hydrOXYzine pamoate (VISTARIL) 50 mg capsule TAKE ONE CAPSULE BY MOUTH AT BEDTIME    divalproex DR (DEPAKOTE) 250 mg tablet Take 1 Tab by mouth two (2) times a day. Indications: MIGRAINE PREVENTION    butalbital-acetaminophen-caff (FIORICET) -40 mg per capsule Take 1 Cap by mouth every four (4) hours as needed for Pain or Headache.  promethazine (PHENERGAN) 12.5 mg tablet TAKE 1 TABLET BY MOUTH EVERY 6 HOURS AS NEEDED    multivitamin-min-iron-FA-vit K 18 mg iron-400 mcg-25 mcg tab Take 1 Tab by mouth daily.  norethindrone (MICRONOR) 0.35 mg tab Take 1 Tab by mouth daily.  acetaminophen (TYLENOL) 325 mg tablet Take 2 Tabs by mouth every four (4) hours as needed for Pain.  cyclobenzaprine (FLEXERIL) 10 mg tablet Take  by mouth three (3) times daily as needed for Muscle Spasm(s).  prenatal multivit-ca-min-fe-fa (PRENATAL VITAMIN) tab TAKE 1 TAB BY MOUTH DAILY.  zolpidem CR (AMBIEN CR) 6.25 mg tablet Take 1 Tab by mouth nightly as needed for Sleep. Max Daily Amount: 6.25 mg.    Omeprazole delayed release (PRILOSEC D/R) 20 mg tablet Take 1 Tab by mouth daily. No current facility-administered medications for this visit.       Surgical History:     Past Surgical History:   Procedure Laterality Date    HX APPENDECTOMY      HX  SECTION      HX ENDOSCOPY      HX GYN      cervical cerclage; twin pregnancy with loss of first baby, subsequently placed cerclage, 2nd baby delivered term    HX OTHER SURGICAL  2017    Mendel L4-5, L5-S1 Facet Joint block     Social History:     Social History     Social History    Marital status: SINGLE     Spouse name: N/A    Number of children: 2    Years of education: 12     Social History Main Topics    Smoking status: Former Smoker     Packs/day: 1.00     Years: 13.00     Types: Cigarettes     Quit date: 2015    Smokeless tobacco: Never Used      Comment: cigarello, once a month    Alcohol use No    Drug use: No    Sexual activity: Yes     Partners: Male     Birth control/ protection: Pill     Other Topics Concern     Service Yes     USN    Blood Transfusions No    Caffeine Concern No    Occupational Exposure No    Hobby Hazards No    Sleep Concern Yes    Stress Concern No    Weight Concern No    Special Diet Yes    Back Care Yes    Exercise Yes    Bike Helmet No    Seat Belt Yes    Self-Exams No     Social History Narrative       Recent use of: No recent use of aspirin (ASA), NSAIDS or steroids    Tetanus up to date: tetanus status unknown to the patient      Anesthesia Complications: None  History of abnormal bleeding : None  History of Blood Transfusions: no  Health Care Directive or Living Will: no    REVIEW OF SYSTEMS:  A comprehensive review of systems was negative except for that written in the HPI. She had a HG-DEWEY pap on last pap smear in 3/2018.     EXAM:   Visit Vitals    /65    Pulse 74    Ht 5' 6\" (1.676 m)    Wt 116 lb (52.6 kg)    LMP 2018    BMI 18.72 kg/m2     General appearance - alert, well appearing, and in no distress, oriented to person, place, and time and normal appearing weight  Mental status - alert, oriented to person, place, and time  Chest - clear to auscultation, no wheezes, rales or rhonchi, symmetric air entry  Heart - normal rate, regular rhythm, normal S1, S2, no murmurs, rubs, clicks or gallops  Abdomen - soft, nontender, nondistended, no masses or organomegaly  Extremities - peripheral pulses normal, no pedal edema, no clubbing or cyanosis      DIAGNOSTICS:   1. EKG: EKG FINDINGS - pending  2. CXR: not indicated  3. Labs:   Lab Results   Component Value Date/Time    WBC 6.4 09/10/2018 01:45 PM    HGB 12.9 09/10/2018 01:45 PM    HCT 39.6 09/10/2018 01:45 PM    PLATELET 973 12/23/9623 01:45 PM    MCV 92.1 09/10/2018 01:45 PM     Lab Results   Component Value Date/Time    Sodium 147 (H) 09/10/2018 01:45 PM    Potassium 4.1 09/10/2018 01:45 PM    Chloride 109 (H) 09/10/2018 01:45 PM    CO2 27 09/10/2018 01:45 PM    Anion gap 11 09/10/2018 01:45 PM    Glucose 83 09/10/2018 01:45 PM    BUN 8 09/10/2018 01:45 PM    Creatinine 0.71 09/10/2018 01:45 PM    BUN/Creatinine ratio 11 (L) 09/10/2018 01:45 PM    GFR est AA >60 09/10/2018 01:45 PM    GFR est non-AA >60 09/10/2018 01:45 PM    Calcium 9.1 09/10/2018 01:45 PM    Bilirubin, total 0.4 09/10/2018 01:45 PM    ALT (SGPT) 21 09/10/2018 01:45 PM    AST (SGOT) 11 (L) 09/10/2018 01:45 PM    Alk. phosphatase 73 09/10/2018 01:45 PM    Protein, total 7.8 09/10/2018 01:45 PM    Albumin 4.5 09/10/2018 01:45 PM    Globulin 3.3 09/10/2018 01:45 PM    A-G Ratio 1.4 09/10/2018 01:45 PM      Lab Results   Component Value Date/Time    HCG, Beta Chain, Quant, S 224 11/11/2015 10:15 AM    HCG urine, QL NEGATIVE  09/10/2018 01:45 PM    Pregnancy test,urine (POC) NEGATIVE  05/24/2018 10:11 AM    HCG, Ql. NEGATIVE  02/08/2018 01:00 AM    Beta HCG, QT 38 (H) 03/16/2018 11:30 AM     ABO/Rh(D)   Date Value Ref Range Status   09/10/2018 B POSITIVE  Final       IMPRESSION:   High grade pap smear result concerning for advance HPV infection. Has h/o abnormal pap smears. LEEP procedure indicated.  Risks, benefits, and alternatives to the planned procedure were discussed in detail. Pre and post-operative instructions reviewed. No contraindications to planned surgery. The plan of care has been discussed with the patient. She has been given the opportunity to ask questions, which seem to have been answered satisfactorily.       Sasha Marin MD   9/10/2018

## 2018-09-10 NOTE — PERIOP NOTES
PAT - SURGICAL PRE-ADMISSION INSTRUCTIONS    NAME:  Meg Caicedo                                                          TODAY'S DATE:  9/10/2018    SURGERY DATE:  9/12/2018                                  SURGERY ARRIVAL TIME:   0630    1. Do NOT eat or drink anything, including candy or gum, after MIDNIGHT on 09/11/18 , unless you have specific instructions from your Surgeon or Anesthesia Provider to do so. 2. No smoking on the day of surgery. 3. No alcohol 24 hours prior to the day of surgery. 4. No recreational drugs for one week prior to the day of surgery. 5. Leave all valuables, including money/purse, at home. 6. Remove all jewelry, nail polish, makeup (including mascara); no lotions, powders, deodorant, or perfume/cologne/after shave. 7. Glasses/Contact lenses and Dentures may be worn to the hospital.  They will be removed prior to surgery. 8. Call your doctor if symptoms of a cold or illness develop within 24 ours prior to surgery. 9. AN ADULT MUST DRIVE YOU HOME AFTER OUTPATIENT SURGERY. 10. If you are having an OUTPATIENT procedure, please make arrangements for a responsible adult to be with you for 24 hours after your surgery. 11. If you are admitted to the hospital, you will be assigned to a bed after surgery is complete. Normally a family member will not be able to see you until you are in your assigned bed. 15. Family is encouraged to accompany you to the hospital.  We ask visitors in the treatment area to be limited to ONE person at a time to ensure patient privacy. EXCEPTIONS WILL BE MADE AS NEEDED. 15. Children under 12 are discouraged from entering the treatment area and need to be supervised by an adult when in the waiting room. Special Instructions: Take these medications the morning of surgery with a sip of water:  Depakote    Patient Prep:    shower with anti-bacterial soap    These surgical instructions were reviewed with Duran Moeller during the PAT visit.  .  A printed copy of the instructions was provided to [facility]. Directions: On the morning of surgery, please go to the [address] [facility]. Enter the building from the [facility] entrance, 1st floor (next to the Emergency Room entrance). Take the elevator to the 2nd floor. Sign in at the Registration Desk.     If you have any questions and/or concerns, please do not hesitate to call:  (Prior to the day of surgery)  [facility] unit:  [phone]  (Day of surgery)  [facility] unit:  [phone]

## 2018-09-11 ENCOUNTER — ANESTHESIA EVENT (OUTPATIENT)
Dept: SURGERY | Age: 33
End: 2018-09-11
Payer: MEDICAID

## 2018-09-12 ENCOUNTER — HOSPITAL ENCOUNTER (OUTPATIENT)
Age: 33
Setting detail: OUTPATIENT SURGERY
Discharge: HOME | End: 2018-09-12
Attending: OBSTETRICS & GYNECOLOGY | Admitting: OBSTETRICS & GYNECOLOGY
Payer: MEDICAID

## 2018-09-12 ENCOUNTER — ANESTHESIA (OUTPATIENT)
Dept: SURGERY | Age: 33
End: 2018-09-12
Payer: MEDICAID

## 2018-09-12 VITALS
BODY MASS INDEX: 18.66 KG/M2 | HEIGHT: 66 IN | DIASTOLIC BLOOD PRESSURE: 59 MMHG | OXYGEN SATURATION: 99 % | RESPIRATION RATE: 18 BRPM | TEMPERATURE: 97.7 F | SYSTOLIC BLOOD PRESSURE: 107 MMHG | HEART RATE: 63 BPM | WEIGHT: 116.1 LBS

## 2018-09-12 DIAGNOSIS — Z98.890 S/P LEEP (STATUS POST LOOP ELECTROSURGICAL EXCISION PROCEDURE): Primary | ICD-10-CM

## 2018-09-12 LAB
C TRACH RRNA SPEC QL NAA+PROBE: NEGATIVE
HCG UR QL: NEGATIVE
N GONORRHOEA RRNA SPEC QL NAA+PROBE: NEGATIVE
SPECIMEN SOURCE: NORMAL
T VAGINALIS RRNA SPEC QL NAA+PROBE: NEGATIVE

## 2018-09-12 PROCEDURE — 74011250637 HC RX REV CODE- 250/637: Performed by: NURSE ANESTHETIST, CERTIFIED REGISTERED

## 2018-09-12 PROCEDURE — 74011250636 HC RX REV CODE- 250/636

## 2018-09-12 PROCEDURE — 88307 TISSUE EXAM BY PATHOLOGIST: CPT | Performed by: OBSTETRICS & GYNECOLOGY

## 2018-09-12 PROCEDURE — 76010000162 HC OR TIME 1.5 TO 2 HR INTENSV-TIER 1: Performed by: OBSTETRICS & GYNECOLOGY

## 2018-09-12 PROCEDURE — 77030010433 HC ELECTRD LP COVD -B: Performed by: OBSTETRICS & GYNECOLOGY

## 2018-09-12 PROCEDURE — 77030032490 HC SLV COMPR SCD KNE COVD -B: Performed by: OBSTETRICS & GYNECOLOGY

## 2018-09-12 PROCEDURE — 76210000006 HC OR PH I REC 0.5 TO 1 HR: Performed by: OBSTETRICS & GYNECOLOGY

## 2018-09-12 PROCEDURE — 77030009409: Performed by: OBSTETRICS & GYNECOLOGY

## 2018-09-12 PROCEDURE — 81025 URINE PREGNANCY TEST: CPT

## 2018-09-12 PROCEDURE — 77030012510 HC MSK AIRWY LMA TELE -B: Performed by: ANESTHESIOLOGY

## 2018-09-12 PROCEDURE — 76210000021 HC REC RM PH II 0.5 TO 1 HR: Performed by: OBSTETRICS & GYNECOLOGY

## 2018-09-12 PROCEDURE — 76060000034 HC ANESTHESIA 1.5 TO 2 HR: Performed by: OBSTETRICS & GYNECOLOGY

## 2018-09-12 PROCEDURE — 87491 CHLMYD TRACH DNA AMP PROBE: CPT | Performed by: OBSTETRICS & GYNECOLOGY

## 2018-09-12 PROCEDURE — 77030013079 HC BLNKT BAIR HGGR 3M -A: Performed by: ANESTHESIOLOGY

## 2018-09-12 PROCEDURE — 74011250636 HC RX REV CODE- 250/636: Performed by: NURSE ANESTHETIST, CERTIFIED REGISTERED

## 2018-09-12 PROCEDURE — 77030018836 HC SOL IRR NACL ICUM -A: Performed by: OBSTETRICS & GYNECOLOGY

## 2018-09-12 PROCEDURE — 77030013591 HC ELECTRD BIOP COOP -C: Performed by: OBSTETRICS & GYNECOLOGY

## 2018-09-12 PROCEDURE — 77030031139 HC SUT VCRL2 J&J -A: Performed by: OBSTETRICS & GYNECOLOGY

## 2018-09-12 PROCEDURE — 74011000272 HC RX REV CODE- 272: Performed by: OBSTETRICS & GYNECOLOGY

## 2018-09-12 PROCEDURE — 77030003679 HC NDL SPN TERU -A: Performed by: OBSTETRICS & GYNECOLOGY

## 2018-09-12 PROCEDURE — 88305 TISSUE EXAM BY PATHOLOGIST: CPT

## 2018-09-12 PROCEDURE — 77030010432 HC ELECTRD BALL COVD -B: Performed by: OBSTETRICS & GYNECOLOGY

## 2018-09-12 PROCEDURE — 77030018724 HC ELCTRD LP RND UTMD -B: Performed by: OBSTETRICS & GYNECOLOGY

## 2018-09-12 PROCEDURE — 74011000250 HC RX REV CODE- 250: Performed by: OBSTETRICS & GYNECOLOGY

## 2018-09-12 RX ORDER — SODIUM CHLORIDE 0.9 % (FLUSH) 0.9 %
5-10 SYRINGE (ML) INJECTION AS NEEDED
Status: DISCONTINUED | OUTPATIENT
Start: 2018-09-12 | End: 2018-09-12 | Stop reason: HOSPADM

## 2018-09-12 RX ORDER — SODIUM CHLORIDE, SODIUM LACTATE, POTASSIUM CHLORIDE, CALCIUM CHLORIDE 600; 310; 30; 20 MG/100ML; MG/100ML; MG/100ML; MG/100ML
25 INJECTION, SOLUTION INTRAVENOUS CONTINUOUS
Status: DISCONTINUED | OUTPATIENT
Start: 2018-09-12 | End: 2018-09-12 | Stop reason: HOSPADM

## 2018-09-12 RX ORDER — LIDOCAINE HYDROCHLORIDE 10 MG/ML
0.1 INJECTION, SOLUTION EPIDURAL; INFILTRATION; INTRACAUDAL; PERINEURAL AS NEEDED
Status: DISCONTINUED | OUTPATIENT
Start: 2018-09-12 | End: 2018-09-12 | Stop reason: HOSPADM

## 2018-09-12 RX ORDER — OXYCODONE AND ACETAMINOPHEN 5; 325 MG/1; MG/1
1 TABLET ORAL
Status: COMPLETED | OUTPATIENT
Start: 2018-09-12 | End: 2018-09-12

## 2018-09-12 RX ORDER — IBUPROFEN 800 MG/1
800 TABLET ORAL
Qty: 30 TAB | Refills: 0 | Status: SHIPPED | OUTPATIENT
Start: 2018-09-12 | End: 2019-01-02 | Stop reason: SDUPTHER

## 2018-09-12 RX ORDER — OXYCODONE AND ACETAMINOPHEN 5; 325 MG/1; MG/1
1 TABLET ORAL
Qty: 10 TAB | Refills: 0 | Status: SHIPPED | OUTPATIENT
Start: 2018-09-12 | End: 2018-11-06

## 2018-09-12 RX ORDER — FENTANYL CITRATE 50 UG/ML
50 INJECTION, SOLUTION INTRAMUSCULAR; INTRAVENOUS
Status: DISCONTINUED | OUTPATIENT
Start: 2018-09-12 | End: 2018-09-12 | Stop reason: HOSPADM

## 2018-09-12 RX ORDER — PROPOFOL 10 MG/ML
INJECTION, EMULSION INTRAVENOUS AS NEEDED
Status: DISCONTINUED | OUTPATIENT
Start: 2018-09-12 | End: 2018-09-12 | Stop reason: HOSPADM

## 2018-09-12 RX ORDER — MORPHINE SULFATE 2 MG/ML
2 INJECTION, SOLUTION INTRAMUSCULAR; INTRAVENOUS
Status: DISCONTINUED | OUTPATIENT
Start: 2018-09-12 | End: 2018-09-12

## 2018-09-12 RX ORDER — MORPHINE SULFATE 10 MG/ML
2 INJECTION, SOLUTION INTRAMUSCULAR; INTRAVENOUS
Status: DISCONTINUED | OUTPATIENT
Start: 2018-09-12 | End: 2018-09-12 | Stop reason: HOSPADM

## 2018-09-12 RX ORDER — DEXTROSE MONOHYDRATE 25 G/50ML
25-50 INJECTION, SOLUTION INTRAVENOUS AS NEEDED
Status: DISCONTINUED | OUTPATIENT
Start: 2018-09-12 | End: 2018-09-12 | Stop reason: HOSPADM

## 2018-09-12 RX ORDER — MIDAZOLAM HYDROCHLORIDE 1 MG/ML
INJECTION, SOLUTION INTRAMUSCULAR; INTRAVENOUS AS NEEDED
Status: DISCONTINUED | OUTPATIENT
Start: 2018-09-12 | End: 2018-09-12 | Stop reason: HOSPADM

## 2018-09-12 RX ORDER — DEXAMETHASONE SODIUM PHOSPHATE 4 MG/ML
INJECTION, SOLUTION INTRA-ARTICULAR; INTRALESIONAL; INTRAMUSCULAR; INTRAVENOUS; SOFT TISSUE AS NEEDED
Status: DISCONTINUED | OUTPATIENT
Start: 2018-09-12 | End: 2018-09-12 | Stop reason: HOSPADM

## 2018-09-12 RX ORDER — LIDOCAINE HYDROCHLORIDE 20 MG/ML
INJECTION, SOLUTION EPIDURAL; INFILTRATION; INTRACAUDAL; PERINEURAL AS NEEDED
Status: DISCONTINUED | OUTPATIENT
Start: 2018-09-12 | End: 2018-09-12 | Stop reason: HOSPADM

## 2018-09-12 RX ORDER — ONDANSETRON 2 MG/ML
INJECTION INTRAMUSCULAR; INTRAVENOUS AS NEEDED
Status: DISCONTINUED | OUTPATIENT
Start: 2018-09-12 | End: 2018-09-12 | Stop reason: HOSPADM

## 2018-09-12 RX ORDER — MAGNESIUM SULFATE 100 %
4 CRYSTALS MISCELLANEOUS AS NEEDED
Status: DISCONTINUED | OUTPATIENT
Start: 2018-09-12 | End: 2018-09-12 | Stop reason: HOSPADM

## 2018-09-12 RX ORDER — ONDANSETRON 2 MG/ML
4 INJECTION INTRAMUSCULAR; INTRAVENOUS
Status: DISCONTINUED | OUTPATIENT
Start: 2018-09-12 | End: 2018-09-12 | Stop reason: HOSPADM

## 2018-09-12 RX ORDER — KETOROLAC TROMETHAMINE 30 MG/ML
INJECTION, SOLUTION INTRAMUSCULAR; INTRAVENOUS AS NEEDED
Status: DISCONTINUED | OUTPATIENT
Start: 2018-09-12 | End: 2018-09-12 | Stop reason: HOSPADM

## 2018-09-12 RX ORDER — FAMOTIDINE 20 MG/1
20 TABLET, FILM COATED ORAL ONCE
Status: COMPLETED | OUTPATIENT
Start: 2018-09-12 | End: 2018-09-12

## 2018-09-12 RX ORDER — ACETIC ACID 0.25 G/100ML
IRRIGANT IRRIGATION AS NEEDED
Status: DISCONTINUED | OUTPATIENT
Start: 2018-09-12 | End: 2018-09-12 | Stop reason: HOSPADM

## 2018-09-12 RX ORDER — FENTANYL CITRATE 50 UG/ML
INJECTION, SOLUTION INTRAMUSCULAR; INTRAVENOUS AS NEEDED
Status: DISCONTINUED | OUTPATIENT
Start: 2018-09-12 | End: 2018-09-12 | Stop reason: HOSPADM

## 2018-09-12 RX ADMIN — FAMOTIDINE 20 MG: 20 TABLET ORAL at 08:06

## 2018-09-12 RX ADMIN — FENTANYL CITRATE 25 MCG: 50 INJECTION, SOLUTION INTRAMUSCULAR; INTRAVENOUS at 09:10

## 2018-09-12 RX ADMIN — FENTANYL CITRATE 25 MCG: 50 INJECTION, SOLUTION INTRAMUSCULAR; INTRAVENOUS at 09:00

## 2018-09-12 RX ADMIN — SODIUM CHLORIDE, SODIUM LACTATE, POTASSIUM CHLORIDE, AND CALCIUM CHLORIDE: 600; 310; 30; 20 INJECTION, SOLUTION INTRAVENOUS at 08:45

## 2018-09-12 RX ADMIN — MIDAZOLAM HYDROCHLORIDE 2 MG: 1 INJECTION, SOLUTION INTRAMUSCULAR; INTRAVENOUS at 08:47

## 2018-09-12 RX ADMIN — FENTANYL CITRATE 25 MCG: 50 INJECTION, SOLUTION INTRAMUSCULAR; INTRAVENOUS at 09:54

## 2018-09-12 RX ADMIN — FENTANYL CITRATE 25 MCG: 50 INJECTION, SOLUTION INTRAMUSCULAR; INTRAVENOUS at 09:28

## 2018-09-12 RX ADMIN — OXYCODONE HYDROCHLORIDE AND ACETAMINOPHEN 1 TABLET: 5; 325 TABLET ORAL at 11:49

## 2018-09-12 RX ADMIN — LIDOCAINE HYDROCHLORIDE 40 MG: 20 INJECTION, SOLUTION EPIDURAL; INFILTRATION; INTRACAUDAL; PERINEURAL at 08:55

## 2018-09-12 RX ADMIN — KETOROLAC TROMETHAMINE 30 MG: 30 INJECTION, SOLUTION INTRAMUSCULAR; INTRAVENOUS at 10:04

## 2018-09-12 RX ADMIN — DEXAMETHASONE SODIUM PHOSPHATE 4 MG: 4 INJECTION, SOLUTION INTRA-ARTICULAR; INTRALESIONAL; INTRAMUSCULAR; INTRAVENOUS; SOFT TISSUE at 09:04

## 2018-09-12 RX ADMIN — PROPOFOL 120 MG: 10 INJECTION, EMULSION INTRAVENOUS at 08:55

## 2018-09-12 RX ADMIN — ONDANSETRON 4 MG: 2 INJECTION INTRAMUSCULAR; INTRAVENOUS at 09:58

## 2018-09-12 RX ADMIN — MEPERIDINE HYDROCHLORIDE 12.5 MG: 50 INJECTION, SOLUTION INTRAMUSCULAR; INTRAVENOUS; SUBCUTANEOUS at 10:41

## 2018-09-12 NOTE — IP AVS SNAPSHOT
[address] [place] [address] [address] 
[phone] Patient: Louisa Frazier MRN: [id number] GMI:[id number] About your hospitalization You were admitted on:  September 12, 2018 You last received care in the:  [facility] PHASE 2 RECOVERY You were discharged on:  September 12, 2018 Why you were hospitalized Your primary diagnosis was:  Not on File Your diagnoses also included:  S/P Leep (Status Post Loop Electrosurgical Excision Procedure) Follow-up Information Follow up With Details Comments Contact Info Elise Mcgill, ANISA   [address] [facility] [phone] [phone] Discharge Orders None A check kenyetta indicates which time of day the medication should be taken. My Medications START taking these medications Instructions Each Dose to Equal  
 Morning Noon Evening Bedtime  
 ibuprofen 800 mg tablet Commonly known as:  MOTRIN Your last dose was: Your next dose is: Take 1 Tab by mouth every eight (8) hours as needed for Pain. Indications: Pain 800 mg  
    
   
   
   
  
 oxyCODONE-acetaminophen 5-325 mg per tablet Commonly known as:  PERCOCET Your last dose was: Your next dose is: Take 1 Tab by mouth every six (6) hours as needed for Pain. Max Daily Amount: 4 Tabs. 1 Tab CONTINUE taking these medications Instructions Each Dose to Equal  
 Morning Noon Evening Bedtime  
 busPIRone 10 mg tablet Commonly known as:  BUSPAR Your last dose was: Your next dose is: TAKE 1 TABLET BY MOUTH EVERY DAY  
     
   
   
   
  
 divalproex  mg tablet Commonly known as:  DEPAKOTE Your last dose was: Your next dose is: Take 1 Tab by mouth two (2) times a day.  Indications: MIGRAINE PREVENTION  
 250 mg  
    
   
   
   
  
 ENSURE PLUS 0.05-1.5 gram-kcal/mL Liqd Generic drug:  food supplemt, lactose-reduced Your last dose was: Your next dose is: TAKE 2 BOTTLES BY MOUTH 3 TIMES A DAY  
     
   
   
   
  
 ergocalciferol 50,000 unit capsule Commonly known as:  ERGOCALCIFEROL Your last dose was: Your next dose is: TAKE 1 CAP BY MOUTH EVERY SEVEN (7) DAYS. hydrOXYzine pamoate 50 mg capsule Commonly known as:  VISTARIL Your last dose was: Your next dose is: TAKE ONE CAPSULE BY MOUTH AT BEDTIME  
     
   
   
   
  
 multivitamin-min-iron-FA-vit K 18 mg iron-400 mcg-25 mcg Tab Your last dose was: Your next dose is: Take 1 Tab by mouth daily. 1 Tab  
    
   
   
   
  
 norethindrone 0.35 mg Tab Commonly known as:  Micha [employer] Micha Your last dose was: Your next dose is: Take 1 Tab by mouth daily. 1 Tab Omeprazole delayed release 20 mg tablet Commonly known as:  PRILOSEC D/R Your last dose was: Your next dose is: Take 1 Tab by mouth daily. 20 mg  
    
   
   
   
  
 prenatal multivit-ca-min-fe-fa Tab Commonly known as:  PRENATAL VITAMIN Your last dose was: Your next dose is: TAKE 1 TAB BY MOUTH DAILY. promethazine 12.5 mg tablet Commonly known as:  PHENERGAN Your last dose was: Your next dose is: TAKE 1 TABLET BY MOUTH EVERY 6 HOURS AS NEEDED VENTOLIN HFA 90 mcg/actuation inhaler Generic drug:  albuterol Your last dose was: Your next dose is:    
   
   
 INHELE 1 PUFF EVERY FOUR (4) HOURS AS NEEDED FOR WHEEZING OR SHORTNESS OF BREATH. STOP taking these medications   
 acetaminophen 325 mg tablet Commonly known as:  TYLENOL  
   
  
 butalbital-acetaminophen-caff -40 mg per capsule Commonly known as:  FIORICET  
   
  
 cyclobenzaprine 10 mg tablet Commonly known as:  FLEXERIL  
   
  
 cyproheptadine 4 mg tablet Commonly known as:  PERIACTIN  
   
  
 zolpidem CR 6.25 mg tablet Commonly known as:  AMBIEN CR Where to Get Your Medications Information on where to get these meds will be given to you by the nurse or doctor. ! Ask your nurse or doctor about these medications  
  ibuprofen 800 mg tablet  
 oxyCODONE-acetaminophen 5-325 mg per tablet Opioid Education Prescription Opioids: What You Need to Know: 
 
Prescription opioids can be used to help relieve moderate-to-severe pain and are often prescribed following a surgery or injury, or for certain health conditions. These medications can be an important part of treatment but also come with serious risks. Opioids are strong pain medicines. Examples include hydrocodone, oxycodone, fentanyl, and morphine. Heroin is an example of an illegal opioid. It is important to work with your health care provider to make sure you are getting the safest, most effective care. WHAT ARE THE RISKS AND SIDE EFFECTS OF OPIOID USE? Prescription opioids carry serious risks of addiction and overdose, especially with prolonged use. An opioid overdose, often marked by slow breathing, can cause sudden death. The use of prescription opioids can have a number of side effects as well, even when taken as directed. · Tolerance-meaning you might need to take more of a medication for the same pain relief · Physical dependence-meaning you have symptoms of withdrawal when the medication is stopped. Withdrawal symptoms can include nausea, sweating, chills, diarrhea, stomach cramps, and muscle aches. Withdrawal can last up to several weeks, depending on which drug you took and how long you took it. · Increased sensitivity to pain · Constipation · Nausea, vomiting, and dry mouth · Sleepiness and dizziness · Confusion · Depression · Low levels of testosterone that can result in lower sex drive, energy, and strength · Itching and sweating RISKS ARE GREATER WITH:      
· History of drug misuse, substance use disorder, or overdose · Mental health conditions (such as depression or anxiety) · Sleep apnea · Older age (72 years or older) · Pregnancy Avoid alcohol while taking prescription opioids. Also, unless specifically advised by your health care provider, medications to avoid include: · Benzodiazepines (such as Xanax or Valium) · Muscle relaxants (such as Soma or Flexeril) · Hypnotics (such as Ambien or Lunesta) · Other prescription opioids KNOW YOUR OPTIONS Talk to your health care provider about ways to manage your pain that don't involve prescription opioids. Some of these options may actually work better and have fewer risks and side effects. Options may include: 
· Pain relievers such as acetaminophen, ibuprofen, and naproxen · Some medications that are also used for depression or seizures · Physical therapy and exercise · Counseling to help patients learn how to cope better with triggers of pain and stress. · Application of heat or cold compress · Massage therapy · Relaxation techniques Be Informed Make sure you know the name of your medication, how much and how often to take it, and its potential risks & side effects. IF YOU ARE PRESCRIBED OPIOIDS FOR PAIN: 
· Never take opioids in greater amounts or more often than prescribed. Remember the goal is not to be pain-free but to manage your pain at a tolerable level. · Follow up with your primary care provider to: · Work together to create a plan on how to manage your pain. · Talk about ways to help manage your pain that don't involve prescription opioids. · Talk about any and all concerns and side effects. · Help prevent misuse and abuse. · Never sell or share prescription opioids · Help prevent misuse and abuse. · Store prescription opioids in a secure place and out of reach of others (this may include visitors, children, friends, and family). · Safely dispose of unused/unwanted prescription opioids: Find your community drug take-back program or your pharmacy mail-back program, or flush them down the toilet, following guidance from the Food and Drug Administration (www.fda.gov/Drugs/ResourcesForYou). · Visit www.cdc.gov/drugoverdose to learn about the risks of opioid abuse and overdose. · If you believe you may be struggling with addiction, tell your health care provider and ask for guidance or call [facility] at 7-870-080-ZOQB. Discharge Instructions Pelvic rest until next appointment Loop Electrosurgical Excision Procedure (LEEP): What to Expect at [facility] Your Recovery You may have mild cramping for several hours after the procedure. A dark brown vaginal discharge during the first week is normal. You can use a sanitary pad for the bleeding. You may also have some spotting for about 3 weeks. How long it takes to recover will depend on how much was done during the procedure. This care sheet gives you a general idea about how long it will take for you to recover. But each person recovers at a different pace. Follow the steps below to feel better as quickly as possible. How can you care for yourself at home? Activity 
  · You should be able to go back to your normal activities in 1 to 3 days. Medicines 
  · Your doctor will tell you if and when you can restart your medicines. He or she will also give you instructions about taking any new medicines.  
  · If you take blood thinners, such as warfarin (Coumadin), clopidogrel (Plavix), or aspirin, be sure to talk to your doctor.  He or she will tell you if and when to start taking those medicines again. Make sure that you understand exactly what your doctor wants you to do.  
  · Take an over-the-counter pain medicine, such as acetaminophen (Tylenol), ibuprofen (Advil, Motrin), or naproxen (Aleve). Read and follow all instructions on the label.  
  · Do not take two or more pain medicines at the same time unless the doctor told you to. Many pain medicines have acetaminophen, which is Tylenol. Too much acetaminophen (Tylenol) can be harmful. Exercise 
  · Do not exercise for 1 to 3 days after the procedure. Other instructions 
  · Use a sanitary pad if you have bleeding.  
  · Do not have sexual intercourse or use tampons for 3 weeks. Do not douche. This will allow your cervix to heal.  
  · You can take a shower anytime after the procedure. Ask your doctor when it is okay to take a bath.  
  · Be sure to have regular follow-up Pap tests. Your doctor can tell you how often you need to have Pap tests. Follow-up care is a key part of your treatment and safety. Be sure to make and go to all appointments, and call your doctor if you are having problems. It's also a good idea to know your test results and keep a list of the medicines you take. When should you call for help? Call 911 anytime you think you may need emergency care. For example, call if: 
  · You passed out (lost consciousness).  
  · You have chest pain, are short of breath, or cough up blood.  
 Call your doctor now or seek immediate medical care if: 
  · You have pain that does not get better after you take pain medicine.  
  · You cannot pass stools or gas.  
  · You have signs of infection, such as: 
¨ Increased pain, swelling, warmth, or redness. ¨ A fever.  
  · You have bright red vaginal bleeding that soaks one or more pads in an hour, or you have large clots.  
  · You have vaginal discharge that has increased in amount or smells bad.   · You are sick to your stomach or cannot drink fluids.  
  · You have signs of a blood clot in your leg (called a deep vein thrombosis), such as: 
¨ Pain in your calf, back of the knee, thigh, or groin. ¨ Redness or swelling in your leg.  
 Watch closely for any changes in your health, and be sure to contact your doctor if you have any problems. Where can you learn more? Go to http://norma-kaley.info/. Enter (00) 6829-8412 in the search box to learn more about \"Loop Electrosurgical Excision Procedure (LEEP): What to Expect at Home. \" Current as of: May 12, 2017 Content Version: 11.7 © 5314-5391 [employer]. Care instructions adapted under license by [employer] (which disclaims liability or warranty for this information). If you have questions about a medical condition or this instruction, always ask your healthcare professional. [place] [address] any warranty or liability for your use of this information. Patient armband removed and given to patient to take home. Patient was informed of the privacy risks if armband lost or stolen DISCHARGE SUMMARY from Nurse PATIENT INSTRUCTIONS: 
 
After general anesthesia or intravenous sedation, for 24 hours or while taking prescription Narcotics: · Limit your activities · Do not drive and operate hazardous machinery · Do not make important personal or business decisions · Do  not drink alcoholic beverages · If you have not urinated within 8 hours after discharge, please contact your surgeon on call. Report the following to your surgeon: 
· Excessive pain, swelling, redness or odor of or around the surgical area · Temperature over 100.5 · Nausea and vomiting lasting longer than 4 hours or if unable to take medications · Any signs of decreased circulation or nerve impairment to extremity: change in color, persistent  numbness, tingling, coldness or increase pain · Any questions What to do at Home: These are general instructions for a healthy lifestyle: No smoking/ No tobacco products/ Avoid exposure to second hand smoke Surgeon General's Warning:  Quitting smoking now greatly reduces serious risk to your health. Obesity, smoking, and sedentary lifestyle greatly increases your risk for illness A healthy diet, regular physical exercise & weight monitoring are important for maintaining a healthy lifestyle You may be retaining fluid if you have a history of heart failure or if you experience any of the following symptoms:  Weight gain of 3 pounds or more overnight or 5 pounds in a week, increased swelling in our hands or feet or shortness of breath while lying flat in bed. Please call your doctor as soon as you notice any of these symptoms; do not wait until your next office visit. Recognize signs and symptoms of STROKE: 
 
F-face looks uneven A-arms unable to move or move unevenly S-speech slurred or non-existent T-time-call 911 as soon as signs and symptoms begin-DO NOT go Back to bed or wait to see if you get better-TIME IS BRAIN. Warning Signs of HEART ATTACK Call 911 if you have these symptoms: 
? Chest discomfort. Most heart attacks involve discomfort in the center of the chest that lasts more than a few minutes, or that goes away and comes back. It can feel like uncomfortable pressure, squeezing, fullness, or pain. ? Discomfort in other areas of the upper body. Symptoms can include pain or discomfort in one or both arms, the back, neck, jaw, or stomach. ? Shortness of breath with or without chest discomfort. ? Other signs may include breaking out in a cold sweat, nausea, or lightheadedness. Don't wait more than five minutes to call [address]! Fast action can save your life. Calling 911 is almost always the fastest way to get lifesaving treatment.  Emergency Medical Services staff can begin treatment when they arrive  up to an hour sooner than if someone gets to the hospital by car. The discharge information has been reviewed with the patient. The patient verbalized understanding. Discharge medications reviewed with the patient and appropriate educational materials and side effects teaching were provided. ___________________________________________________________________________________________________________________________________ Introducing [facility]! Dear Ronda Goodwin: 
Thank you for requesting a [employer] account. Our records indicate that you already have an active [employer] account. You can access your account anytime at [email or web address] Did you know that you can access your hospital and ER discharge instructions at any time in Na[employer]ku? You can also review all of your test results from your hospital stay or ER visit. Additional Information If you have questions, please visit the Frequently Asked Questions section of the [employer] website at [email or web address] Remember, NaPop[employer]ku is NOT to be used for urgent needs. For medical emergencies, dial 911. Now available from your iPhone and Android! Introducing Alex Arroyo As a [employer] patient, I wanted to make you aware of our electronic visit tool called Alex Arroyo. [employer] 24/7 allows you to connect within minutes with a medical provider 24 hours a day, seven days a week via a mobile device or tablet or logging into a secure website from your computer. You can access Alex Arroyo from anywhere in the [place].  
 
A virtual visit might be right for you when you have a simple condition and feel like you just dont want to get out of bed, or cant get away from work for an appointment, when your regular [employer] provider is not available (evenings, weekends or holidays), or when youre out of town and need minor care. Electronic visits cost only $49 and if the [employer] 24/7 provider determines a prescription is needed to treat your condition, one can be electronically transmitted to a nearby pharmacy*. Please take a moment to enroll today if you have not already done so. The enrollment process is free and takes just a few minutes. To enroll, please download the [employer] 24/7 oralia to your tablet or phone, or visit [email or web address] to enroll on your computer. And, as an [address] [place] [address] patient with a [employer] account, the results of your visits will be scanned into your electronic medical record and your primary care provider will be able to view the scanned results. We urge you to continue to see your regular [employer] provider for your ongoing medical care. And while your primary care provider may not be the one available when you seek a [employer] virtual visit, the peace of mind you get from getting a real diagnosis real time can be priceless. For more information on i.am.plus electronics, view our Frequently Asked Questions (FAQs) at [email or web address]. Sincerely, 
 
Fox Espinoza MD 
Chief Medical Officer [address] *:  certain medications cannot be prescribed via i.am.plus electronics Unresulted Labs-Please follow up with your PCP about these lab tests Order Current Status CHLAMYDIA/NEISSERIA/TRICHOMONAS AMP In process Providers Seen During Your Hospitalization Provider Specialty Primary office phone Karina Samuels MD Obstetrics & Gynecology [phone] Your Primary Care Physician (PCP) Primary Care Physician Office Phone Office Fax Tyson Cassette [phone] You are allergic to the following Allergen Reactions Imitrex (Sumatriptan Succinate) Anaphylaxis Iodine Cough Coughing up blood [address] (Seafood) Hives Unknown (comments) Per pt report Recent Documentation Height Weight BMI OB Status Smoking Status 1.676 m 52.7 kg 18.74 kg/m2 Having regular periods Former Smoker Emergency Contacts Name Discharge Info Relation Home Work Mobile Angeli Franklin CAREGIVER [3] Mother [14] [phone] Anais Edward DISCHARGE CAREGIVER [3] Boyfriend [17] [phone] Patient Belongings The following personal items are in your possession at time of discharge: 
  Dental Appliances: None  Visual Aid: None      Home Medications: None   Jewelry: None  Clothing: At bedside, Footwear, Undergarments, Shirt, Shorts, With patient    Other Valuables: Giovanna Please provide this summary of care documentation to your next provider. Signatures-by signing, you are acknowledging that this After Visit Summary has been reviewed with you and you have received a copy. Patient Signature:  ____________________________________________________________ Date:  ____________________________________________________________  
  
[employer] Provider Signature:  ____________________________________________________________ Date:  ____________________________________________________________

## 2018-09-12 NOTE — INTERVAL H&P NOTE
H&P Update:  Derick Bone was seen and examined. History and physical has been reviewed. The patient has been examined.  There have been no significant clinical changes since the completion of the originally dated History and Physical.    Signed By: Hernan Pizano MD     September 12, 2018 8:42 AM

## 2018-09-12 NOTE — H&P (VIEW-ONLY)
Preoperative Evaluation Date of Exam: 9/10/2018 Eusebio Cintron is a 28 y.o. female (:1985) who presents for preoperative evaluation. Procedure/Surgery:LEEP procedure Date of Procedure/Surgery: 2018 @ 0830 Surgeon: Demarcus Argueta MD 
Hospital/Surgical Facility: [address] [place] [address] Primary Physician: René Garduno NP Latex Allergy: no 
 
Problem List:    
Patient Active Problem List  
 Diagnosis Date Noted  Gastroesophageal reflux disease without esophagitis 05/15/2018  Edema of foot 05/15/2018  
 HGSIL (high grade squamous intraepithelial lesion) on Pap smear of cervix 2018  Lumbar spondylosis 10/26/2017  Lumbar degenerative disc disease 10/26/2017  Chronic pain syndrome 10/26/2017  Therapeutic drug monitoring 2017  Anxiety 2017  Intractable migraine without aura and without status migrainosus 2017  Analgesic rebound headache 2017  Muscle spasm of back 2017  Trichomonas infection 2017  Vitamin D deficiency 2017  Lumbar facet arthropathy ([address] 2017  Mild intermittent asthma with status asthmaticus 2016  Low body weight due to inadequate caloric intake 2016  Bradycardia 2016  Epilepsy ([address] 2016  Chronic midline low back pain 2016  ACP (advance care planning) 2016  Spontaneous  2015 Medical History:    
Past Medical History:  
Diagnosis Date  Abnormal Papanicolaou smear of cervix  Anxiety ANXIETY  Asthma  Bradycardia, sinus  Degenerative disc disease, lumbar  Edema of foot 5/15/2018  Gastroesophageal reflux disease without esophagitis 5/15/2018  GERD (gastroesophageal reflux disease)  HGSIL (high grade squamous intraepithelial lesion) on Pap smear of cervix 3/22/2018  Migraine  Miscarriage  Seizures ([address] Last   Tobacco abuse, in remission Allergies: Allergies Allergen Reactions  Imitrex [Sumatriptan Succinate] Anaphylaxis  Iodine Cough Coughing up blood [address] Per pt report Medications:    
Current Outpatient Prescriptions Medication Sig  
 ENSURE PLUS 0.05-1.5 gram-kcal/mL liqd TAKE 2 BOTTLES BY MOUTH 3 TIMES A DAY  ergocalciferol (ERGOCALCIFEROL) 50,000 unit capsule TAKE 1 CAP BY MOUTH EVERY SEVEN (7) DAYS.  busPIRone (BUSPAR) 10 mg tablet TAKE 1 TABLET BY MOUTH EVERY DAY  cyproheptadine (PERIACTIN) 4 mg tablet TAKE 1 TABLET BY MOUTH EVERY DAY  VENTOLIN HFA 90 mcg/actuation inhaler INHELE 1 PUFF EVERY FOUR (4) HOURS AS NEEDED FOR WHEEZING OR SHORTNESS OF BREATH.  hydrOXYzine pamoate (VISTARIL) 50 mg capsule TAKE ONE CAPSULE BY MOUTH AT BEDTIME  divalproex DR (DEPAKOTE) 250 mg tablet Take 1 Tab by mouth two (2) times a day. Indications: MIGRAINE PREVENTION  
 butalbital-acetaminophen-caff (FIORICET) -40 mg per capsule Take 1 Cap by mouth every four (4) hours as needed for Pain or Headache.  promethazine (PHENERGAN) 12.5 mg tablet TAKE 1 TABLET BY MOUTH EVERY 6 HOURS AS NEEDED  
 multivitamin-min-iron-FA-vit K 18 mg iron-400 mcg-25 mcg tab Take 1 Tab by mouth daily.  norethindrone (MICRONOR) 0.35 mg tab Take 1 Tab by mouth daily.  acetaminophen (TYLENOL) 325 mg tablet Take 2 Tabs by mouth every four (4) hours as needed for Pain.  cyclobenzaprine (FLEXERIL) 10 mg tablet Take  by mouth three (3) times daily as needed for Muscle Spasm(s).  prenatal multivit-ca-min-fe-fa (PRENATAL VITAMIN) tab TAKE 1 TAB BY MOUTH DAILY.  zolpidem CR (AMBIEN CR) 6.25 mg tablet Take 1 Tab by mouth nightly as needed for Sleep. Max Daily Amount: 6.25 mg.  
 Omeprazole delayed release (PRILOSEC D/R) 20 mg tablet Take 1 Tab by mouth daily. No current facility-administered medications for this visit. Surgical History:    
Past Surgical History: Procedure Laterality Date  HX APPENDECTOMY  HX  SECTION    
 HX ENDOSCOPY    
 HX GYN    
 cervical cerclage; twin pregnancy with loss of first baby, subsequently placed cerclage, 2nd baby delivered term  HX OTHER SURGICAL  2017 [place] L4-5, L5-S1 Facet Joint block Social History:    
Social History Social History  Marital status: SINGLE Spouse name: N/A  
 Number of children: 2  
 Years of education: 12  
 
Social History Main Topics  Smoking status: Former Smoker Packs/day: 1.00 Years: 13.00 Types: Cigarettes Quit date: 2015  Smokeless tobacco: Never Used Comment: cigarello, once a month  Alcohol use No  
 Drug use: No  
 Sexual activity: Yes  
  Partners: Male Birth control/ protection: Pill Other Topics Concern [address] Yes USN  Blood Transfusions No  
 Caffeine Concern No  
 Occupational Exposure No  
 Hobby Hazards No  
 Sleep Concern Yes  Stress Concern No  
 Weight Concern No  
 Special Diet Yes  Back Care Yes  Exercise Yes  Bike Helmet No  
 Seat Belt Yes  Self-Exams No  
 
Social History Narrative Recent use of: No recent use of aspirin (ASA), NSAIDS or steroids Tetanus up to date: tetanus status unknown to the patient Anesthesia Complications: None History of abnormal bleeding : None History of Blood Transfusions: no 
Health Care Directive or Living Will: no 
 
REVIEW OF SYSTEMS: 
A comprehensive review of systems was negative except for that written in the HPI. She had a HG-DEWEY pap on last pap smear in 3/2018. EXAM:  
Visit Vitals  /65  Pulse 74  Ht 5' 6\" (1.676 m)  Wt 116 lb (52.6 kg)  LMP 2018  BMI 18.72 kg/m2 General appearance - alert, well appearing, and in no distress, oriented to person, place, and time and normal appearing weight Mental status - alert, oriented to person, place, and time Chest - clear to auscultation, no wheezes, rales or rhonchi, symmetric air entry Heart - normal rate, regular rhythm, normal S1, S2, no murmurs, rubs, clicks or gallops Abdomen - soft, nontender, nondistended, no masses or organomegaly Extremities - peripheral pulses normal, no pedal edema, no clubbing or cyanosis DIAGNOSTICS:  
1. EKG: EKG FINDINGS - pending 2. CXR: not indicated 3. Labs:  
Lab Results Component Value Date/Time WBC 6.4 09/10/2018 01:45 PM  
 HGB 12.9 09/10/2018 01:45 PM  
 HCT 39.6 09/10/2018 01:45 PM  
 PLATELET 677 96/03/7376 01:45 PM  
 MCV 92.1 09/10/2018 01:45 PM  
 
Lab Results Component Value Date/Time Sodium 147 (H) 09/10/2018 01:45 PM  
 Potassium 4.1 09/10/2018 01:45 PM  
 Chloride 109 (H) 09/10/2018 01:45 PM  
 CO2 27 09/10/2018 01:45 PM  
 Anion gap 11 09/10/2018 01:45 PM  
 Glucose 83 09/10/2018 01:45 PM  
 BUN 8 09/10/2018 01:45 PM  
 Creatinine 0.71 09/10/2018 01:45 PM  
 BUN/Creatinine ratio 11 (L) 09/10/2018 01:45 PM  
 GFR est AA >60 09/10/2018 01:45 PM  
 GFR est non-AA >60 09/10/2018 01:45 PM  
 Calcium 9.1 09/10/2018 01:45 PM  
 Bilirubin, total 0.4 09/10/2018 01:45 PM  
 ALT (SGPT) 21 09/10/2018 01:45 PM  
 AST (SGOT) 11 (L) 09/10/2018 01:45 PM  
 Alk. phosphatase 73 09/10/2018 01:45 PM  
 Protein, total 7.8 09/10/2018 01:45 PM  
 Albumin 4.5 09/10/2018 01:45 PM  
 Globulin 3.3 09/10/2018 01:45 PM  
 A-G Ratio 1.4 09/10/2018 01:45 PM  
  
Lab Results Component Value Date/Time HCG, Beta Chain, Quant, S 224 11/11/2015 10:15 AM  
 HCG urine, QL NEGATIVE  09/10/2018 01:45 PM  
 Pregnancy test,urine (POC) NEGATIVE  05/24/2018 10:11 AM  
 HCG, Ql. NEGATIVE  02/08/2018 01:00 AM  
 Beta HCG, QT 38 (H) 03/16/2018 11:30 AM  
 
ABO/Rh(D) Date Value Ref Range Status 09/10/2018 B POSITIVE  Final  
 
 
IMPRESSION:  
High grade pap smear result concerning for advance HPV infection. Has h/o abnormal pap smears. LEEP procedure indicated. Risks, benefits, and alternatives to the planned procedure were discussed in detail. Pre and post-operative instructions reviewed. No contraindications to planned surgery. The plan of care has been discussed with the patient. She has been given the opportunity to ask questions, which seem to have been answered satisfactorily. Luanne Méndez MD  
9/10/2018

## 2018-09-12 NOTE — PERIOP NOTES
1024  Assumed care of patient upon arrival to PACU. Patient drowsy, eyes open spontaneously. Placed on monitor x3. VSS. 0  POC (mom) updated. In surgical waiting area.

## 2018-09-12 NOTE — PERIOP NOTES
Phase 2 Recovery Summary  Patient arrived to Phase 2 at with nurse in stretcher.   Report received from Riley Zhu RN    Vitals:    09/12/18 1033 09/12/18 1038 09/12/18 1053 09/12/18 1121   BP: 104/66 111/73 107/59    Pulse: 62 65 63 63   Resp: 21 21 21 18   Temp:   97.7 °F (36.5 °C)    SpO2: 100% 100% 98% 99%   Weight:       Height:           oriented to time, place, person and situation    Lines and Drains  Peripheral Intravenous Line:      Wound  Wound Perineum (Active)   Number of days:0              Patient discharged to pt taken in [facility] to private vehicle with nurse with mother, Fabby Bustillo (same name) with     Kathie Sportsracheal

## 2018-09-12 NOTE — OP NOTES
Pre Operative Diagnosis: JUSTEN 2  Post Operative Diagnosis: TIM  Procedure: Colposcopy LEEP biopsy with 20x12 mm loop  Surgeon: Arnulfo Lemus MD  Anesthesia: LMA    EBL: 250 mL  Findings: Acetic acid- white at 7 and 3 oclock  Specimen: LEEP cervical cone  Complications: none  Disposition: to the recovery room in stable condition    Procedure: The patient was brought to the OR where patient identification and surgery identification were completed with the patient and the OR team. The patient was moved to the OR table and LMA was obtained without difficulty. She was then prepped and draped in the normal sterile fashion. Patient identification and surgery identification were then completed with the surgeon and the OR team.        A coated speculum was placed vaginally. Acetic acid  solution was applied to the cervix. White lesions  were noted at 7 & 3 o'clock. A LEEP loop on pure cut was used to excise the cervical specimen. The LEEP bed was cauterized to achieve hemostasis using the Bovie. Gelfoam with 5000 U of thrombin was put in the surgical bed and a 3-0 vicryl stitch used to anchor it in place. There were noted bilateral vaginal abrasions confirmed hemostatic with a hemostatic stitch of 3-0 vicryl. Excellent hemostasis was noted. All instruments were removed. Sponge, lap, needle and instrument counts were correct x 2. The patient was awoken from anesthesia and transferred to the recovery room in stable condition.     Arnulfo Lemus MD  September 12, 2018

## 2018-09-12 NOTE — ANESTHESIA PREPROCEDURE EVALUATION
Anesthetic History No history of anesthetic complications Review of Systems / Medical History Patient summary reviewed and pertinent labs reviewed Pulmonary Asthma : well controlled Neuro/Psych Psychiatric history Cardiovascular Within defined limits Exercise tolerance: >4 METS 
  
GI/Hepatic/Renal 
  
GERD: poorly controlled Endo/Other Within defined limits Other Findings Comments: Documentation of current medication Current medications obtained, documented and obtained? YES Risk Factors for Postoperative nausea/vomiting: 
     History of postoperative nausea/vomiting? NO Female? YES Motion sickness? NO Intended opioid administration for postoperative analgesia? YES Smoking Abstinence: 
Current Smoker? NO Elective Surgery? YES Seen preoperatively by anesthesiologist or proxy prior to day of surgery? YES Pt abstained from smoking 24 hours prior to anesthesia? N/A Preventive care/screening for High Blood Pressure: 
Aged 18 years and older: YES Screened for high blood pressure: YES Patients with high blood pressure referred to primary care provider 
 for BP management: YES Physical Exam 
 
Airway Mallampati: I 
TM Distance: 4 - 6 cm Neck ROM: normal range of motion Mouth opening: Normal 
 
 Cardiovascular Regular rate and rhythm,  S1 and S2 normal,  no murmur, click, rub, or gallop Rhythm: regular Rate: normal 
 
 
 
 Dental 
 
Dentition: Lower dentition intact and Upper dentition intact Pulmonary Breath sounds clear to auscultation Abdominal 
GI exam deferred Other Findings Anesthetic Plan ASA: 3 Anesthesia type: general 
 
 
 
 
Induction: Intravenous Anesthetic plan and risks discussed with: Patient

## 2018-09-12 NOTE — DISCHARGE INSTRUCTIONS
Pelvic rest until next appointment         Loop Electrosurgical Excision Procedure (LEEP): What to Expect at [address] may have mild cramping for several hours after the procedure. A dark brown vaginal discharge during the first week is normal. You can use a sanitary pad for the bleeding. You may also have some spotting for about 3 weeks. How long it takes to recover will depend on how much was done during the procedure. This care sheet gives you a general idea about how long it will take for you to recover. But each person recovers at a different pace. Follow the steps below to feel better as quickly as possible. How can you care for yourself at home? Activity    · You should be able to go back to your normal activities in 1 to 3 days. Medicines    · Your doctor will tell you if and when you can restart your medicines. He or she will also give you instructions about taking any new medicines.     · If you take blood thinners, such as warfarin (Coumadin), clopidogrel (Plavix), or aspirin, be sure to talk to your doctor. He or she will tell you if and when to start taking those medicines again. Make sure that you understand exactly what your doctor wants you to do.     · Take an over-the-counter pain medicine, such as acetaminophen (Tylenol), ibuprofen (Advil, Motrin), or naproxen (Aleve). Read and follow all instructions on the label.     · Do not take two or more pain medicines at the same time unless the doctor told you to. Many pain medicines have acetaminophen, which is Tylenol. Too much acetaminophen (Tylenol) can be harmful. Exercise    · Do not exercise for 1 to 3 days after the procedure. Other instructions    · Use a sanitary pad if you have bleeding.     · Do not have sexual intercourse or use tampons for 3 weeks. Do not douche. This will allow your cervix to heal.     · You can take a shower anytime after the procedure.  Ask your doctor when it is okay to take a bath.     · Be sure to have regular follow-up Pap tests. Your doctor can tell you how often you need to have Pap tests. Follow-up care is a key part of your treatment and safety. Be sure to make and go to all appointments, and call your doctor if you are having problems. It's also a good idea to know your test results and keep a list of the medicines you take. When should you call for help? Call 911 anytime you think you may need emergency care. For example, call if:    · You passed out (lost consciousness).     · You have chest pain, are short of breath, or cough up blood.    Call your doctor now or seek immediate medical care if:    · You have pain that does not get better after you take pain medicine.     · You cannot pass stools or gas.     · You have signs of infection, such as:  ¨ Increased pain, swelling, warmth, or redness. ¨ A fever.     · You have bright red vaginal bleeding that soaks one or more pads in an hour, or you have large clots.     · You have vaginal discharge that has increased in amount or smells bad.     · You are sick to your stomach or cannot drink fluids.     · You have signs of a blood clot in your leg (called a deep vein thrombosis), such as:  ¨ Pain in your calf, back of the knee, thigh, or groin. ¨ Redness or swelling in your leg.    Watch closely for any changes in your health, and be sure to contact your doctor if you have any problems. Where can you learn more? Go to http://norma-kaley.info/. Enter ([phone] [id number] in the search box to learn more about \"Loop Electrosurgical Excision Procedure (LEEP): What to Expect at Home. \"  Current as of: May 12, 2017  Content Version: 11.7  © 7506-8614 [employer]. Care instructions adapted under license by [employer] (which disclaims liability or warranty for this information).  If you have questions about a medical condition or this instruction, always ask your healthcare professional. Grant Perez disclaims any warranty or liability for your use of this information. Patient armband removed and given to patient to take home. Patient was informed of the privacy risks if armband lost or stolen    DISCHARGE SUMMARY from Nurse    PATIENT INSTRUCTIONS:    After general anesthesia or intravenous sedation, for 24 hours or while taking prescription Narcotics:  · Limit your activities  · Do not drive and operate hazardous machinery  · Do not make important personal or business decisions  · Do  not drink alcoholic beverages  · If you have not urinated within 8 hours after discharge, please contact your surgeon on call. Report the following to your surgeon:  · Excessive pain, swelling, redness or odor of or around the surgical area  · Temperature over 100.5  · Nausea and vomiting lasting longer than 4 hours or if unable to take medications  · Any signs of decreased circulation or nerve impairment to extremity: change in color, persistent  numbness, tingling, coldness or increase pain  · Any questions    What to do at Home:    These are general instructions for a healthy lifestyle:    No smoking/ No tobacco products/ Avoid exposure to second hand smoke  Surgeon General's Warning:  Quitting smoking now greatly reduces serious risk to your health. Obesity, smoking, and sedentary lifestyle greatly increases your risk for illness    A healthy diet, regular physical exercise & weight monitoring are important for maintaining a healthy lifestyle    You may be retaining fluid if you have a history of heart failure or if you experience any of the following symptoms:  Weight gain of 3 pounds or more overnight or 5 pounds in a week, increased swelling in our hands or feet or shortness of breath while lying flat in bed. Please call your doctor as soon as you notice any of these symptoms; do not wait until your next office visit.     Recognize signs and symptoms of STROKE:    F-face looks uneven    A-arms unable to move or move unevenly    S-speech slurred or non-existent    T-time-call 911 as soon as signs and symptoms begin-DO NOT go       Back to bed or wait to see if you get better-TIME IS BRAIN. Warning Signs of HEART ATTACK     Call 911 if you have these symptoms:   Chest discomfort. Most heart attacks involve discomfort in the center of the chest that lasts more than a few minutes, or that goes away and comes back. It can feel like uncomfortable pressure, squeezing, fullness, or pain.  Discomfort in other areas of the upper body. Symptoms can include pain or discomfort in one or both arms, the back, neck, jaw, or stomach.  Shortness of breath with or without chest discomfort.  Other signs may include breaking out in a cold sweat, nausea, or lightheadedness. Don't wait more than five minutes to call 911 - MINUTES MATTER! Fast action can save your life. Calling 911 is almost always the fastest way to get lifesaving treatment. Emergency Medical Services staff can begin treatment when they arrive -- up to an hour sooner than if someone gets to the hospital by car. The discharge information has been reviewed with the patient. The patient verbalized understanding. Discharge medications reviewed with the patient and appropriate educational materials and side effects teaching were provided.   ___________________________________________________________________________________________________________________________________

## 2018-09-13 NOTE — ANESTHESIA POSTPROCEDURE EVALUATION
Post-Anesthesia Evaluation and Assessment Patient: Belkis Whelan MRN: [id number]  SSN: [id number] YOB: 1985  Age: 28 y.o. Sex: female Cardiovascular Function/Vital Signs Visit Vitals  /59 (BP 1 Location: Right arm, BP Patient Position: Supine)  Pulse 63  Temp 36.5 °C (97.7 °F)  Resp 18  Ht 5' 6\" (1.676 m)  Wt 52.7 kg (116 lb 1.6 oz)  SpO2 99%  BMI 18.74 kg/m2 Patient is status post general anesthesia for Procedure(s): 
Colposcopy LEEP biopsy with 20x12 mm loop. Nausea/Vomiting: None Postoperative hydration reviewed and adequate. Pain: 
Pain Scale 1: Numeric (0 - 10) (09/12/18 1121) Pain Intensity 1: 6 (09/12/18 1121) Managed Neurological Status:  
Neuro (WDL): Within Defined Limits (09/12/18 1053) Neuro Neurologic State: Drowsy; Eyes open spontaneously (09/12/18 1053) LUE Motor Response: Purposeful (09/12/18 1053) LLE Motor Response: Purposeful (09/12/18 1053) RUE Motor Response: Purposeful (09/12/18 1053) RLE Motor Response: Purposeful (09/12/18 1053) At baseline Mental Status and Level of Consciousness: Alert and oriented Pulmonary Status:  
O2 Device: Room air (09/12/18 1053) Adequate oxygenation and airway patent Complications related to anesthesia: None Post-anesthesia assessment completed. No concerns Signed By: Kd Aldana MD   
 September 12, 2018

## 2018-09-24 DIAGNOSIS — F41.9 ANXIETY: ICD-10-CM

## 2018-09-25 RX ORDER — BUSPIRONE HYDROCHLORIDE 10 MG/1
TABLET ORAL
Qty: 30 TAB | Refills: 1 | Status: SHIPPED | OUTPATIENT
Start: 2018-09-25 | End: 2018-11-19 | Stop reason: SDUPTHER

## 2018-10-01 ENCOUNTER — OFFICE VISIT (OUTPATIENT)
Dept: OBGYN CLINIC | Age: 33
End: 2018-10-01

## 2018-10-01 VITALS
HEART RATE: 56 BPM | HEIGHT: 66 IN | SYSTOLIC BLOOD PRESSURE: 96 MMHG | WEIGHT: 113 LBS | DIASTOLIC BLOOD PRESSURE: 62 MMHG | BODY MASS INDEX: 18.16 KG/M2

## 2018-10-01 DIAGNOSIS — Z98.890 S/P LEEP (STATUS POST LOOP ELECTROSURGICAL EXCISION PROCEDURE): Primary | ICD-10-CM

## 2018-10-01 DIAGNOSIS — N72: ICD-10-CM

## 2018-10-01 RX ORDER — METRONIDAZOLE 500 MG/1
500 TABLET ORAL 2 TIMES DAILY
Qty: 14 TAB | Refills: 0 | Status: SHIPPED | OUTPATIENT
Start: 2018-10-01 | End: 2018-10-08

## 2018-10-01 NOTE — PATIENT INSTRUCTIONS
Cervicitis: Care Instructions  Your Care Instructions    Cervicitis means that your cervix is inflamed. The cervix is the part of your uterus that opens into your vagina. This problem is most often caused by an infection. Some women get it after they have a sexually transmitted infection (STI). These include gonorrhea and chlamydia. It can also be caused by irritation from some types of birth control. Two examples are the cervical cap or diaphragm. Your doctor may do some tests to help find the cause of the problem. It is very important to treat cervicitis. If you don't, you could have serious health problems. For this reason, you may need a test after your treatment to make sure the infection is gone. Follow-up care is a key part of your treatment and safety. Be sure to make and go to all appointments, and call your doctor if you are having problems. It's also a good idea to know your test results and keep a list of the medicines you take. How can you care for yourself at home? · Take your antibiotics as directed. Do not stop taking them just because you feel better. You need to take the full course of antibiotics. · If your doctor prescribed antifungal medicine, use it as directed. · While you are being treated, do not have sex. If your treatment is one dose of antibiotics, wait at least 7 days after you take your medicine before you have any kind of sexual contact. Even if you use a condom, you could get infected again. · It's important to tell your sex partner or partners that you have cervicitis. It may be related to an STI. Any partners should get tested and then treated if they have an STI. This is true even if they don't have symptoms. · Do not douche. It can change the normal balance of substances in your vagina. · Do not use tampons while you are being treated. To prevent STIs  · Use latex condoms every time you have sex. Use them from the start to the end of sexual contact.   · Talk to your partner before you have sex. Find out if he or she has or is at risk for any sexually transmitted infection (STI). Keep in mind that a person may be able to spread an STI even if he or she does not have symptoms. · Do not have sex with anyone who has symptoms of an STI. These include sores on the genitals or mouth. · Having one sex partner (who does not have STIs and does not have sex with anyone else) is a good way to avoid STIs. When should you call for help? Call your doctor now or seek immediate medical care if:    · You have new or worse pain in your belly or pelvis.     · You have vaginal discharge that has increased in amount or smells bad.     · You have unusual vaginal bleeding.     · You have a new or higher fever.    Watch closely for changes in your health, and be sure to contact your doctor if:    · You do not get better as expected. Where can you learn more? Go to http://norma-kaley.info/. Enter C762 in the search box to learn more about \"Cervicitis: Care Instructions. \"  Current as of: May 12, 2017  Content Version: 11.7  © 8406-9208 [employer]. Care instructions adapted under license by [employer] (which disclaims liability or warranty for this information). If you have questions about a medical condition or this instruction, always ask your healthcare professional. [address] any warranty or liability for your use of this information.

## 2018-10-01 NOTE — MR AVS SNAPSHOT
[address] [facility] 83 [address] 
[phone] Patient: Romana Barter MRN: [id number] ILO:02/83/1863 Visit Information Date & Time Provider Department Dept. Phone Encounter #  
 10/1/2018 10:45 AM MD Katiuska Lewis [phone] [id number] Follow-up Instructions Return in about 2 weeks (around 10/15/2018). Your Appointments 10/16/2018 11:45 AM  
Office Visit with Joselyn Long MD  
[facility] ([facility]) Appt Note: Follow Up From Previous Appointment. [facility] 83 [phone]  
[phone]  
  
   
 [facility] 83 [phone]  
  
    
 2/8/2019 11:40 AM  
Follow Up with Nevin Silveira MD  
[address] [facility] Appt Note: 6mon f/u  
 [address] [address] [phone]  
  
   
 [facility] [address] Upcoming Health Maintenance Date Due Influenza Age 5 to Adult 8/1/2018 PAP AKA CERVICAL CYTOLOGY 3/19/2021 Allergies as of 10/1/2018  Review Complete On: 10/1/2018 By: Ericka Boudreaux LPN Severity Noted Reaction Type Reactions Imitrex [Sumatriptan Succinate] High 05/25/2015    Anaphylaxis Iodine  02/16/2017    Cough Coughing up blood Sea [employer] [Seafood]  06/04/2015    Hives, Unknown (comments) Per pt report Current Immunizations  Never Reviewed No immunizations on file. Not reviewed this visit You Were Diagnosed With   
  
 Codes Comments S/P LEEP (status post loop electrosurgical excision procedure)    -  Primary ICD-10-CM: X73.830 ICD-9-CM: V45.89 Purulent cervicitis     ICD-10-CM: N72 
ICD-9-CM: 616.0 Vitals BP Pulse Height(growth percentile) Weight(growth percentile) BMI OB Status  96/62 (BP 1 Location: Left arm, BP Patient Position: Sitting) (!) 56 5' 6\" (1.676 m) 113 lb (51.3 kg) 18.24 kg/m2 Having regular periods Smoking Status Former Smoker BMI and BSA Data Body Mass Index Body Surface Area  
 18.24 kg/m 2 1.55 m 2 Preferred Pharmacy Pharmacy Name Phone [id number] [phone], [address] [place] [address] [place] [phone] Your Updated Medication List  
  
   
This list is accurate as of 10/1/18 12:10 PM.  Always use your most recent med list.  
  
  
  
  
 busPIRone 10 mg tablet Commonly known as:  BUSPAR  
TAKE 1 TABLET BY MOUTH EVERY DAY  
  
 divalproex  mg tablet Commonly known as:  DEPAKOTE Take 1 Tab by mouth two (2) times a day. Indications: MIGRAINE PREVENTION  
  
 ENSURE PLUS 0.05-1.5 gram-kcal/mL Liqd Generic drug:  food supplemt, lactose-reduced TAKE 2 BOTTLES BY MOUTH 3 TIMES A DAY  
  
 ergocalciferol 50,000 unit capsule Commonly known as:  ERGOCALCIFEROL  
TAKE 1 CAP BY MOUTH EVERY SEVEN (7) DAYS. hydrOXYzine pamoate 50 mg capsule Commonly known as:  VISTARIL  
TAKE ONE CAPSULE BY MOUTH AT BEDTIME  
  
 ibuprofen 800 mg tablet Commonly known as:  MOTRIN Take 1 Tab by mouth every eight (8) hours as needed for Pain. Indications: Pain  
  
 metroNIDAZOLE 500 mg tablet Commonly known as:  FLAGYL Take 1 Tab by mouth two (2) times a day for 7 days. Indications: Cerviciits  
  
 multivitamin-min-iron-FA-vit K 18 mg iron-400 mcg-25 mcg Tab Take 1 Tab by mouth daily. norethindrone 0.35 mg Tab Commonly known as:  Micha & Micha Take 1 Tab by mouth daily. Omeprazole delayed release 20 mg tablet Commonly known as:  PRILOSEC D/R Take 1 Tab by mouth daily. oxyCODONE-acetaminophen 5-325 mg per tablet Commonly known as:  PERCOCET Take 1 Tab by mouth every six (6) hours as needed for Pain. Max Daily Amount: 4 Tabs. prenatal multivit-ca-min-fe-fa Tab Commonly known as:  PRENATAL VITAMIN  
TAKE 1 TAB BY MOUTH DAILY. promethazine 12.5 mg tablet Commonly known as:  PHENERGAN  
TAKE 1 TABLET BY MOUTH EVERY 6 HOURS AS NEEDED VENTOLIN HFA 90 mcg/actuation inhaler Generic drug:  albuterol INHELE 1 PUFF EVERY FOUR (4) HOURS AS NEEDED FOR WHEEZING OR SHORTNESS OF BREATH. Prescriptions Sent to Pharmacy Refills  
 metroNIDAZOLE (FLAGYL) 500 mg tablet 0 Sig: Take 1 Tab by mouth two (2) times a day for 7 days. Indications: Cerviciits Class: Normal  
 Pharmacy: [address] [address], [address] [facility]  #: [phone] Route: Oral  
  
Follow-up Instructions Return in about 2 weeks (around 10/15/2018). Introducing [facility]! Dear Joan Cheek: 
Thank you for requesting a [employer] account. Our records indicate that you already have an active [employer] account. You can access your account anytime at [email or web address] Did you know that you can access your hospital and ER discharge instructions at any time in [employer]? You can also review all of your test results from your hospital stay or ER visit. Additional Information If you have questions, please visit the Frequently Asked Questions section of the [employer] website at [email or web address] Remember, [employer] is NOT to be used for urgent needs. For medical emergencies, dial 911. Now available from your iPhone and Android! Please provide this summary of care documentation to your next provider. Your primary care clinician is listed as Princess Howell. If you have any questions after today's visit, please call [phone].

## 2018-10-01 NOTE — PROGRESS NOTES
2 weeks post-op CKC for HGSIL. Pt. Notes she had menses 5 days post procedure. She currently denies bleeding, but does have milky discharge. She denies any other concerns. Has maintained pelvic rest    Visit Vitals    BP 96/62 (BP 1 Location: Left arm, BP Patient Position: Sitting)    Pulse (!) 56    Ht 5' 6\" (1.676 m)    Wt 113 lb (51.3 kg)    BMI 18.24 kg/m2     SVE; NEFG, noted foul smelling yellow discharge. Suture was removed with long scissors and pick-up. Monsels placed on biopsy bed. Hemostasis confirmed    Pathology:    CERVIX, CONE BIOPSY:   ACUTE AND CHRONIC CERVICITIS. NEGATIVE FOR DYSPLASIA OR MALIGNANCY. A/p  Recent CKC with foul discharge. Will treat with Flagyl. Continue pelvic rest for 2 weeks. RTO 2 weeks. The plan of care has been discussed with the patient. She has been given the opportunity to ask questions, which seem to have been answered satisfactorily.

## 2018-10-09 DIAGNOSIS — G47.00 INSOMNIA, UNSPECIFIED TYPE: ICD-10-CM

## 2018-10-09 NOTE — TELEPHONE ENCOUNTER
[facility] called over a refill request for Ambien 5mg. I did inform them that it may be denied and if so she will have to be seen.

## 2018-10-10 RX ORDER — ZOLPIDEM TARTRATE 5 MG/1
5 TABLET ORAL
Qty: 30 TAB | Refills: 0 | OUTPATIENT
Start: 2018-10-10

## 2018-10-16 ENCOUNTER — OFFICE VISIT (OUTPATIENT)
Dept: OBGYN CLINIC | Age: 33
End: 2018-10-16

## 2018-10-23 ENCOUNTER — OFFICE VISIT (OUTPATIENT)
Dept: OBGYN CLINIC | Age: 33
End: 2018-10-23

## 2018-10-23 VITALS
HEIGHT: 66 IN | BODY MASS INDEX: 18.16 KG/M2 | HEART RATE: 79 BPM | WEIGHT: 113 LBS | SYSTOLIC BLOOD PRESSURE: 121 MMHG | DIASTOLIC BLOOD PRESSURE: 64 MMHG

## 2018-10-23 DIAGNOSIS — N72: Primary | ICD-10-CM

## 2018-10-23 RX ORDER — ERGOCALCIFEROL 1.25 MG/1
CAPSULE ORAL
Qty: 12 CAP | Refills: 0 | OUTPATIENT
Start: 2018-10-23

## 2018-10-23 NOTE — PROGRESS NOTES
27 y/o  s/p LEEP with resultant cervicitis presents to the office for follow-up. She was prescribed Flagyl, which she was compliant with. She notes complete resolution. She is currently taking OCPs and has no new concerns. Visit Vitals  /64   Pulse 79   Ht 5' 6\" (1.676 m)   Wt 113 lb (51.3 kg)   LMP 10/16/2018 (Exact Date)   BMI 18.24 kg/m²     Gen: A&OX3, NAD  SVE:NEFG, cervix appears normal.  No active bleeding. Discharge appears normal.    A/p  Resolved post-op cervicitis. RTO 6 months for interval pap.

## 2018-11-06 ENCOUNTER — HOSPITAL ENCOUNTER (EMERGENCY)
Age: 33
Discharge: HOME | End: 2018-11-06
Attending: EMERGENCY MEDICINE
Payer: MEDICAID

## 2018-11-06 ENCOUNTER — APPOINTMENT (OUTPATIENT)
Dept: GENERAL RADIOLOGY | Age: 33
End: 2018-11-06
Attending: EMERGENCY MEDICINE
Payer: MEDICAID

## 2018-11-06 VITALS
DIASTOLIC BLOOD PRESSURE: 72 MMHG | TEMPERATURE: 98.5 F | HEIGHT: 66 IN | SYSTOLIC BLOOD PRESSURE: 109 MMHG | RESPIRATION RATE: 16 BRPM | BODY MASS INDEX: 18.64 KG/M2 | OXYGEN SATURATION: 99 % | WEIGHT: 116 LBS | HEART RATE: 96 BPM

## 2018-11-06 DIAGNOSIS — J06.9 ACUTE UPPER RESPIRATORY INFECTION: Primary | ICD-10-CM

## 2018-11-06 PROCEDURE — 99284 EMERGENCY DEPT VISIT MOD MDM: CPT

## 2018-11-06 PROCEDURE — 71046 X-RAY EXAM CHEST 2 VIEWS: CPT

## 2018-11-06 PROCEDURE — 93005 ELECTROCARDIOGRAM TRACING: CPT

## 2018-11-06 RX ORDER — PSEUDOEPHEDRINE HYDROCHLORIDE 60 MG/1
60 TABLET ORAL
Qty: 20 TAB | Refills: 1 | Status: SHIPPED | OUTPATIENT
Start: 2018-11-06 | End: 2018-11-11

## 2018-11-06 RX ORDER — FLUTICASONE PROPIONATE 50 MCG
2 SPRAY, SUSPENSION (ML) NASAL DAILY
Qty: 1 BOTTLE | Refills: 0 | Status: SHIPPED | OUTPATIENT
Start: 2018-11-06

## 2018-11-06 NOTE — DISCHARGE INSTRUCTIONS

## 2018-11-06 NOTE — ED TRIAGE NOTES
FLU LIKE SYMPTOMS SINCE WAKING THIS MORNING. COUGH, SORE THROAT AND BODY ACHES.  HEADACHE, CHEST ACHES

## 2018-11-06 NOTE — ED PROVIDER NOTES
EMERGENCY DEPARTMENT HISTORY AND PHYSICAL EXAM 
 
12:47 PM 
 
 
Date: 11/6/2018 Patient Name: Allan Chappell History of Presenting Illness Chief Complaint Patient presents with  Cough  Sore Throat  Headache  Generalized Body Aches History Provided By: Patient Chief Complaint: Sore throat Duration: This morning Timing:  Acute Location: Throat Quality: not described Modifying Factors: No modifying or aggravating factors were reported. Associated Symptoms: cough, headache, myalgias, congestion Additional History (Context): Allan Chappell is a 28 y.o. female with past medical Hx of asthma and migraines who presents with an acute sore throat with an onset of this morning. Associated symptoms include cough, myalgias, headache, and congestion. Patient reports that her  was sick after coming back from a vacation out of the country. No modifying or aggravating factors were reported. Denies tobacco use. Denies any further complaints or symptoms at the moment. PCP: Jinny King NP Current Outpatient Medications Medication Sig Dispense Refill  pseudoephedrine (SUDAFED) 60 mg tablet Take 1 Tab by mouth every six (6) hours as needed for Congestion for up to 5 days. 20 Tab 1  
 dextromethorphan-guaiFENesin (ROBITUSSIN-DM)  mg/5 mL syrup Take 10 mL by mouth every six (6) hours as needed for Cough. 240 mL 0  
 camphor-eucalyptus oil-menthol (VICKS VAPORUB) 4.7-1.2-2.6 % oint 1 Actuation(s) by Apply Externally route three (3) times daily as needed. 50 g 0  
 fluticasone (FLONASE) 50 mcg/actuation nasal spray 2 Sprays by Both Nostrils route daily.  1 Bottle 0  
 busPIRone (BUSPAR) 10 mg tablet TAKE 1 TABLET BY MOUTH EVERY DAY 30 Tab 1  
 hydrOXYzine pamoate (VISTARIL) 50 mg capsule TAKE ONE CAPSULE BY MOUTH AT BEDTIME 30 Cap 2  
 divalproex DR (DEPAKOTE) 250 mg tablet Take 1 Tab by mouth two (2) times a day. Indications: MIGRAINE PREVENTION 60 Tab 10  
 norethindrone (MICRONOR) 0.35 mg tab Take 1 Tab by mouth daily. 1 Package 11  
 Omeprazole delayed release (PRILOSEC D/R) 20 mg tablet Take 1 Tab by mouth daily. 90 Tab 3  ibuprofen (MOTRIN) 800 mg tablet Take 1 Tab by mouth every eight (8) hours as needed for Pain. Indications: Pain 30 Tab 0  
 ENSURE PLUS 0.05-1.5 gram-kcal/mL liqd TAKE 2 BOTTLES BY MOUTH 3 TIMES A DAY 197271 mL 2  VENTOLIN HFA 90 mcg/actuation inhaler INHELE 1 PUFF EVERY FOUR (4) HOURS AS NEEDED FOR WHEEZING OR SHORTNESS OF BREATH. 18 Inhaler 3  promethazine (PHENERGAN) 12.5 mg tablet TAKE 1 TABLET BY MOUTH EVERY 6 HOURS AS NEEDED  0  
 multivitamin-min-iron-FA-vit K 18 mg iron-400 mcg-25 mcg tab Take 1 Tab by mouth daily. 90 Tab 3  prenatal multivit-ca-min-fe-fa (PRENATAL VITAMIN) tab TAKE 1 TAB BY MOUTH DAILY. 30 Tab 5 Past History Past Medical History: 
Past Medical History:  
Diagnosis Date  Abnormal Papanicolaou smear of cervix  Anxiety ANXIETY  Anxiety and depression  Asthma  Bradycardia, sinus  Degenerative disc disease, lumbar  Edema of foot 5/15/2018  Gastroesophageal reflux disease without esophagitis 5/15/2018  GERD (gastroesophageal reflux disease)  HGSIL (high grade squamous intraepithelial lesion) on Pap smear of cervix 3/22/2018  Migraine  Migraines  Miscarriage  Raynaud's disease  S/P LEEP (status post loop electrosurgical excision procedure) 2018  Seizures ([address] Last   Tobacco abuse, in remission Past Surgical History: 
Past Surgical History:  
Procedure Laterality Date  HX APPENDECTOMY  HX  SECTION    
 HX ENDOSCOPY    
 HX GYN    
 cervical cerclage; twin pregnancy with loss of first baby, subsequently placed cerclage, 2nd baby delivered term  HX LEEP PROCEDURE  2018 Dr. Sahra Ramsey  HX OTHER SURGICAL  2017 [place] L4-5, L5-S1 Facet Joint block Family History: 
Family History Problem Relation Age of Onset  Diabetes Mother  Heart Attack Father  Attention Deficit Hyperactivity Disorder Sister  Attention Deficit Hyperactivity Disorder Brother  Cancer Maternal Aunt 38  
     breast  
 Diabetes Maternal Grandmother  Attention Deficit Hyperactivity Disorder Brother  No Known Problems Brother  No Known Problems Brother  No Known Problems Brother  Cancer Maternal Aunt   
     lung  Heart defect Son  Migraines Son   
 Seizures Son   
     h/o  Other Son   
     h/o jaundice Social History: 
Social History Tobacco Use  Smoking status: Former Smoker Packs/day: 1.00 Years: 13.00 Pack years: 13.00 Types: Cigarettes Last attempt to quit: 5/5/2015 Years since quitting: 3.5  Smokeless tobacco: Never Used  Tobacco comment: cigarello, once a month Substance Use Topics  Alcohol use: No  
 Drug use: No  
 
 
Allergies: Allergies Allergen Reactions  Imitrex [Sumatriptan Succinate] Anaphylaxis  Iodine Cough Coughing up blood [address] [Seafood] Hives and Unknown (comments) Per pt report Review of Systems Review of Systems HENT: Positive for congestion and sore throat. Respiratory: Positive for cough. Musculoskeletal: Positive for myalgias. Neurological: Positive for headaches. All other systems reviewed and are negative. Physical Exam  
 
Visit Vitals /72 (BP 1 Location: Right arm, BP Patient Position: At rest) Pulse 96 Temp 98.5 °F (36.9 °C) Resp 16 Ht 5' 6\" (1.676 m) Wt 52.6 kg (116 lb) LMP 10/16/2018 (Exact Date) SpO2 99% BMI 18.72 kg/m² Physical Exam  
Constitutional: She is oriented to person, place, and time. She appears well-developed. HENT:  
Head: Normocephalic and atraumatic. Mouth/Throat: No oropharyngeal exudate. +PND Eyes: Pupils are equal, round, and reactive to light. Neck: No JVD present. No tracheal deviation present. No thyromegaly present. Cardiovascular: Normal rate, regular rhythm and normal heart sounds. Exam reveals no gallop and no friction rub. No murmur heard. Pulmonary/Chest: Effort normal and breath sounds normal. No stridor. No respiratory distress. She has no wheezes. She has no rales. She exhibits no tenderness. Abdominal: Soft. She exhibits no distension and no mass. There is no tenderness. There is no rebound and no guarding. Musculoskeletal: She exhibits no edema or tenderness. Lymphadenopathy:  
  She has no cervical adenopathy. Neurological: She is alert and oriented to person, place, and time. Skin: Skin is warm and dry. No rash noted. No erythema. No pallor. Psychiatric: She has a normal mood and affect. Her behavior is normal. Thought content normal.  
Nursing note and vitals reviewed. Diagnostic Study Results Labs - Recent Results (from the past 12 hour(s)) EKG, 12 LEAD, INITIAL Collection Time: 11/06/18 12:33 PM  
Result Value Ref Range Ventricular Rate 83 BPM  
 Atrial Rate 83 BPM  
 P-R Interval 124 ms QRS Duration 72 ms Q-T Interval 348 ms QTC Calculation (Bezet) 408 ms Calculated P Axis 80 degrees Calculated R Axis 60 degrees Calculated T Axis 5 degrees Diagnosis Normal sinus rhythm with sinus arrhythmia Normal ECG When compared with ECG of 10-SEP-2018 12:29, 
Non-specific change in ST segment in Inferior leads Nonspecific T wave abnormality now evident in Inferior leads Radiologic Studies -  
XR CHEST PA LAT    (Results Pending) Medical Decision Making I am the first provider for this patient. I reviewed the vital signs, available nursing notes, past medical history, past surgical history, family history and social history. Vital Signs-Reviewed the patient's vital signs. Pulse Oximetry Analysis -  99% on room air (Normal) Cardiac Monitor: 
Rate:  
 
Provider Notes (Medical Decision Making):nothing acute on CXR. Treat URI. Diagnosis Clinical Impression: 1. Acute upper respiratory infection Disposition: home Follow-up Information Follow up With Specialties Details Why Contact Info Tone King NP Nurse Practitioner Schedule an appointment as soon as possible for a visit in 2 days As needed [address] [facility] [address] 
[phone] [facility] EMERGENCY DEPT Emergency Medicine  If symptoms worsen return immediately [address] 
[phone] Medication List  
  
START taking these medications   
camphor-eucalyptus oil-menthol 4.7-1.2-2.6 % Oint Commonly known as:  VICKS VAPORUB 
1 Actuation(s) by Apply Externally route three (3) times daily as needed. dextromethorphan-guaiFENesin  mg/5 mL syrup Commonly known as:  ROBITUSSIN-DM Take 10 mL by mouth every six (6) hours as needed for Cough. fluticasone 50 mcg/actuation nasal spray Commonly known as:  Octavia Plater 2 Sprays by Both Nostrils route daily. pseudoephedrine 60 mg tablet Commonly known as:  SUDAFED Take 1 Tab by mouth every six (6) hours as needed for Congestion for up to 5 days. ASK your doctor about these medications   
busPIRone 10 mg tablet Commonly known as:  BUSPAR 
TAKE 1 TABLET BY MOUTH EVERY DAY 
  
divalproex  mg tablet Commonly known as:  DEPAKOTE Take 1 Tab by mouth two (2) times a day. Indications: MIGRAINE PREVENTION 
  
ENSURE PLUS 0.05-1.5 gram-kcal/mL Liqd Generic drug:  food supplemt, lactose-reduced TAKE 2 BOTTLES BY MOUTH 3 TIMES A DAY 
  
hydrOXYzine pamoate 50 mg capsule Commonly known as:  VISTARIL 
TAKE ONE CAPSULE BY MOUTH AT BEDTIME 
  
ibuprofen 800 mg tablet Commonly known as:  MOTRIN Take 1 Tab by mouth every eight (8) hours as needed for Pain. Indications: Pain multivitamin-min-iron-FA-vit K 18 mg iron-400 mcg-25 mcg Tab Take 1 Tab by mouth daily. norethindrone 0.35 mg Tab Commonly known as:  Micha & Micha Take 1 Tab by mouth daily. Omeprazole delayed release 20 mg tablet Commonly known as:  PRILOSEC D/R Take 1 Tab by mouth daily. prenatal multivit-ca-min-fe-fa Tab Commonly known as:  PRENATAL VITAMIN 
TAKE 1 TAB BY MOUTH DAILY. promethazine 12.5 mg tablet Commonly known as:  PHENERGAN 
  
VENTOLIN HFA 90 mcg/actuation inhaler Generic drug:  albuterol INHELE 1 PUFF EVERY FOUR (4) HOURS AS NEEDED FOR WHEEZING OR SHORTNESS OF BREATH. Where to Get Your Medications Information about where to get these medications is not yet available Ask your nurse or doctor about these medications · camphor-eucalyptus oil-menthol 4.7-1.2-2.6 % Oint · dextromethorphan-guaiFENesin  mg/5 mL syrup · fluticasone 50 mcg/actuation nasal spray · pseudoephedrine 60 mg tablet 
  
 
_______________________________ Provider Attestation:     
I personally performed the services described in the documentation, reviewed the documentation, as recorded by the scribe in my presence, and it accurately and completely records my words and actions. November 06, 2018 at 12:47 PM - TE Andrade  
_______________________________

## 2018-11-07 LAB
ATRIAL RATE: 83 BPM
CALCULATED P AXIS, ECG09: 80 DEGREES
CALCULATED R AXIS, ECG10: 60 DEGREES
CALCULATED T AXIS, ECG11: 5 DEGREES
DIAGNOSIS, 93000: NORMAL
P-R INTERVAL, ECG05: 124 MS
Q-T INTERVAL, ECG07: 348 MS
QRS DURATION, ECG06: 72 MS
QTC CALCULATION (BEZET), ECG08: 408 MS
VENTRICULAR RATE, ECG03: 83 BPM

## 2018-11-08 RX ORDER — ERGOCALCIFEROL 1.25 MG/1
CAPSULE ORAL
Qty: 12 CAP | Refills: 0 | OUTPATIENT
Start: 2018-11-08

## 2018-11-15 ENCOUNTER — TELEPHONE (OUTPATIENT)
Dept: FAMILY MEDICINE CLINIC | Facility: CLINIC | Age: 33
End: 2018-11-15

## 2018-11-19 DIAGNOSIS — F41.9 ANXIETY: ICD-10-CM

## 2018-11-19 RX ORDER — BUSPIRONE HYDROCHLORIDE 10 MG/1
TABLET ORAL
Qty: 30 TAB | Refills: 1 | Status: SHIPPED | OUTPATIENT
Start: 2018-11-19 | End: 2018-12-19 | Stop reason: SDUPTHER

## 2018-11-19 NOTE — TELEPHONE ENCOUNTER
The pharmacy is also requesting a refill for Paxil, but is not seen on patient's list. Patient was last seen on 5/15/2018.

## 2018-11-28 ENCOUNTER — APPOINTMENT (OUTPATIENT)
Dept: GENERAL RADIOLOGY | Age: 33
End: 2018-11-28
Attending: PHYSICIAN ASSISTANT
Payer: MEDICAID

## 2018-11-28 ENCOUNTER — HOSPITAL ENCOUNTER (EMERGENCY)
Age: 33
Discharge: HOME | End: 2018-11-28
Attending: EMERGENCY MEDICINE
Payer: MEDICAID

## 2018-11-28 VITALS
WEIGHT: 118 LBS | OXYGEN SATURATION: 100 % | TEMPERATURE: 98 F | SYSTOLIC BLOOD PRESSURE: 127 MMHG | BODY MASS INDEX: 18.96 KG/M2 | RESPIRATION RATE: 16 BRPM | DIASTOLIC BLOOD PRESSURE: 79 MMHG | HEIGHT: 66 IN | HEART RATE: 86 BPM

## 2018-11-28 DIAGNOSIS — M79.671 RIGHT FOOT PAIN: Primary | ICD-10-CM

## 2018-11-28 PROCEDURE — 99283 EMERGENCY DEPT VISIT LOW MDM: CPT

## 2018-11-28 PROCEDURE — 73630 X-RAY EXAM OF FOOT: CPT

## 2018-11-28 NOTE — ED NOTES
I have reviewed discharge instructions with the patient. The patient verbalized understanding. Patient armband removed and shredded. Crutches provided as ordered by PA.

## 2018-11-28 NOTE — DISCHARGE INSTRUCTIONS
Foot Pain: Care Instructions  Your Care Instructions  Foot injuries that cause pain and swelling are fairly common. Almost all sports or home repair projects can cause a misstep that ends up as foot pain. Normal wear and tear, especially as you get older, also can cause foot pain. Most minor foot injuries will heal on their own, and home treatment is usually all you need to do. If you have a severe injury, you may need tests and treatment. Follow-up care is a key part of your treatment and safety. Be sure to make and go to all appointments, and call your doctor if you are having problems. It's also a good idea to know your test results and keep a list of the medicines you take. How can you care for yourself at home? · Take pain medicines exactly as directed. ? If the doctor gave you a prescription medicine for pain, take it as prescribed. ? If you are not taking a prescription pain medicine, ask your doctor if you can take an over-the-counter medicine. · Rest and protect your foot. Take a break from any activity that may cause pain. · Put ice or a cold pack on your foot for 10 to 20 minutes at a time. Put a thin cloth between the ice and your skin. · Prop up the sore foot on a pillow when you ice it or anytime you sit or lie down during the next 3 days. Try to keep it above the level of your heart. This will help reduce swelling. · Your doctor may recommend that you wrap your foot with an elastic bandage. Keep your foot wrapped for as long as your doctor advises. · If your doctor recommends crutches, use them as directed. · Wear roomy footwear. · As soon as pain and swelling end, begin gentle exercises of your foot. Your doctor can tell you which exercises will help. When should you call for help? Call 911 anytime you think you may need emergency care.  For example, call if:    · Your foot turns pale, white, blue, or cold.    Call your doctor now or seek immediate medical care if:    · You cannot move or stand on your foot.     · Your foot looks twisted or out of its normal position.     · Your foot is not stable when you step down.     · You have signs of infection, such as:  ? Increased pain, swelling, warmth, or redness. ? Red streaks leading from the sore area. ? Pus draining from a place on your foot. ? A fever.     · Your foot is numb or tingly.    Watch closely for changes in your health, and be sure to contact your doctor if:    · You do not get better as expected.     · You have bruises from an injury that last longer than 2 weeks. Where can you learn more? Go to http://norma-kaley.info/. Enter D815 in the search box to learn more about \"Foot Pain: Care Instructions. \"  Current as of: November 29, 2017  Content Version: 11.8  © 2626-5098 [employer]. Care instructions adapted under license by [employer] (which disclaims liability or warranty for this information). If you have questions about a medical condition or this instruction, always ask your healthcare professional. [address] any warranty or liability for your use of this information.

## 2018-11-28 NOTE — ED PROVIDER NOTES
The history is provided by the patient. Foot Pain This is a new problem. The current episode started more than 2 days ago. The problem occurs constantly. The problem has not changed since onset. The pain is present in the left foot. The quality of the pain is described as aching and sharp. The pain is mild. Pertinent negatives include no numbness, full range of motion, no stiffness and no tingling. The symptoms are aggravated by standing and palpation. She has tried nothing for the symptoms. The treatment provided no relief. There has been no history of extremity trauma. Past Medical History:  
Diagnosis Date  Abnormal Papanicolaou smear of cervix  Anxiety ANXIETY  Anxiety and depression  Asthma  Bradycardia, sinus  Degenerative disc disease, lumbar  Edema of foot 5/15/2018  Gastroesophageal reflux disease without esophagitis 5/15/2018  GERD (gastroesophageal reflux disease)  HGSIL (high grade squamous intraepithelial lesion) on Pap smear of cervix 3/22/2018  Migraine  Migraines  Miscarriage  Raynaud's disease  S/P LEEP (status post loop electrosurgical excision procedure) 2018  Seizures ([address] Last   Tobacco abuse, in remission Past Surgical History:  
Procedure Laterality Date  HX APPENDECTOMY  HX  SECTION    
 HX ENDOSCOPY    
 HX GYN    
 cervical cerclage; twin pregnancy with loss of first baby, subsequently placed cerclage, 2nd baby delivered term  HX LEEP PROCEDURE  2018 Dr. Marce Chowdhury  HX OTHER SURGICAL  2017 [facility] L4-5, L5-S1 Facet Joint block Family History:  
Problem Relation Age of Onset  Diabetes Mother  Heart Attack Father  Attention Deficit Hyperactivity Disorder Sister  Attention Deficit Hyperactivity Disorder Brother  Cancer Maternal Aunt 38  
     breast  
 Diabetes Maternal Grandmother  Attention Deficit Hyperactivity Disorder Brother  No Known Problems Brother  No Known Problems Brother  No Known Problems Brother  Cancer Maternal Aunt   
     lung  Heart defect Son  Migraines Son   
 Seizures Son   
     h/o  Other Son   
     h/o jaundice Social History Socioeconomic History  Marital status: SINGLE Spouse name: Not on file  Number of children: 2  
 Years of education: 12  
 Highest education level: Not on file Social Needs  Financial resource strain: Not on file  Food insecurity - worry: Not on file  Food insecurity - inability: Not on file  Transportation needs - medical: Not on file  Transportation needs - non-medical: Not on file Occupational History  Not on file Tobacco Use  Smoking status: Former Smoker Packs/day: 1.00 Years: 13.00 Pack years: 13.00 Types: Cigarettes Last attempt to quit: 5/5/2015 Years since quitting: 3.5  Smokeless tobacco: Never Used  Tobacco comment: cigarello, once a month Substance and Sexual Activity  Alcohol use: No  
 Drug use: No  
 Sexual activity: Yes  
  Partners: Male Birth control/protection: Pill Other Topics Concern [address] Yes Comment: USN  Blood Transfusions No  
 Caffeine Concern No  
 Occupational Exposure No  
 Hobby Hazards No  
 Sleep Concern Yes  Stress Concern No  
 Weight Concern No  
 Special Diet Yes  Back Care Yes  Exercise Yes  Bike Helmet No  
 Seat Belt Yes  Self-Exams No  
Social History Narrative  Not on file ALLERGIES: Imitrex [sumatriptan succinate]; Iodine; and Sea food [seafood] Review of Systems Constitutional: Negative for chills and fever. HENT: Negative for congestion. Respiratory: Negative for cough and wheezing. Cardiovascular: Negative for chest pain and leg swelling. Gastrointestinal: Negative for abdominal pain, constipation, diarrhea, nausea and vomiting. Genitourinary: Negative. Musculoskeletal: Negative. Negative for stiffness. Foot pain Skin: Negative. Neurological: Negative for dizziness, tingling, seizures, weakness, numbness and headaches. Hematological: Negative. Psychiatric/Behavioral: Negative. All other systems reviewed and are negative. Vitals:  
 11/28/18 1538 BP: 127/79 Pulse: 86 Resp: 16 Temp: 98 °F (36.7 °C) SpO2: 100% Weight: 53.5 kg (118 lb) Height: 5' 6\" (1.676 m) Physical Exam  
Constitutional: She is oriented to person, place, and time. She appears well-developed and well-nourished. No distress. NAD, well hydrated, non toxic HENT:  
Head: Normocephalic and atraumatic. Nose: Nose normal.  
Mouth/Throat: Oropharynx is clear and moist. No oropharyngeal exudate. Eyes: Conjunctivae and EOM are normal. Pupils are equal, round, and reactive to light. Neck: Normal range of motion. Neck supple. Cardiovascular: Normal rate, regular rhythm and normal heart sounds. No murmur heard. Pulmonary/Chest: Effort normal and breath sounds normal. No respiratory distress. She has no wheezes. She has no rales. Abdominal: Soft. She exhibits no distension. There is no tenderness. There is no guarding. Musculoskeletal: Normal range of motion. Left foot: Normal. There is normal range of motion, no tenderness, no bony tenderness, no swelling, normal capillary refill, no crepitus, no deformity and no laceration. Lymphadenopathy:  
  She has no cervical adenopathy. Neurological: She is alert and oriented to person, place, and time. No cranial nerve deficit. Coordination normal.  
Skin: Skin is warm. No rash noted. She is not diaphoretic. Psychiatric: She has a normal mood and affect. Her behavior is normal.  
Nursing note and vitals reviewed. MDM Number of Diagnoses or Management Options Right foot pain:  
Diagnosis management comments: Foot pain. Treat pain, refer to ortho Amount and/or Complexity of Data Reviewed Tests in the radiology section of CPT®: ordered and reviewed Procedures Medications ordered:  
Medications - No data to display Lab findings: 
No results found for this or any previous visit (from the past 12 hour(s)). EKG interpretation per No att. providers found : 
 
X-Ray, CT or other radiology findings or impressions: 
No results found. Progress notes, Consult notes or additional Procedure notes:  
 
 
 
Dispo: 
Patient was  in stable condition. Return to the ER if you are unable to obtain referral as directed. 
 Discussed proper way to take medications. Discussed treatment plan, return precautions, symptomatic relief, and expected time to improvement. All questions answered. Patient is stable for discharge and outpatient management. Diagnosis: 1. Right foot pain Follow-up Information Follow up With Specialties Details Why Contact Info Mirela Jara NP Nurse Practitioner   [address] [facility] [id number] 
[phone] [facility] EMERGENCY DEPT Emergency Medicine   [address] 
[phone] Medication List  
  
ASK your doctor about these medications   
busPIRone 10 mg tablet Commonly known as:  BUSPAR 
TAKE 1 TABLET BY MOUTH EVERY DAY 
  
camphor-eucalyptus oil-menthol 4.7-1.2-2.6 % Oint Commonly known as:  VICKS VAPORUB 
1 Actuation(s) by Apply Externally route three (3) times daily as needed. dextromethorphan-guaiFENesin  mg/5 mL syrup Commonly known as:  ROBITUSSIN-DM Take 10 mL by mouth every six (6) hours as needed for Cough. divalproex  mg tablet Commonly known as:  DEPAKOTE Take 1 Tab by mouth two (2) times a day. Indications: MIGRAINE PREVENTION 
  
ENSURE PLUS 0.05-1.5 gram-kcal/mL Liqd Generic drug:  food supplemt, lactose-reduced TAKE 2 BOTTLES BY MOUTH 3 TIMES A DAY 
  
fluticasone 50 mcg/actuation nasal spray Commonly known as:  Shyann Bermudez 2 Sprays by Both Nostrils route daily. hydrOXYzine pamoate 50 mg capsule Commonly known as:  VISTARIL 
TAKE ONE CAPSULE BY MOUTH AT BEDTIME 
  
ibuprofen 800 mg tablet Commonly known as:  MOTRIN Take 1 Tab by mouth every eight (8) hours as needed for Pain. Indications: Pain 
  
multivitamin-min-iron-FA-vit K 18 mg iron-400 mcg-25 mcg Tab Take 1 Tab by mouth daily. norethindrone 0.35 mg Tab Commonly known as:  Micha & Micha Take 1 Tab by mouth daily. Omeprazole delayed release 20 mg tablet Commonly known as:  PRILOSEC D/R Take 1 Tab by mouth daily. prenatal multivit-ca-min-fe-fa Tab Commonly known as:  PRENATAL VITAMIN 
TAKE 1 TAB BY MOUTH DAILY. promethazine 12.5 mg tablet Commonly known as:  PHENERGAN 
  
VENTOLIN HFA 90 mcg/actuation inhaler Generic drug:  albuterol INHELE 1 PUFF EVERY FOUR (4) HOURS AS NEEDED FOR WHEEZING OR SHORTNESS OF BREATH.

## 2018-11-28 NOTE — ED TRIAGE NOTES
Pt reports plantar pain of left foot that is worst when she stands up. Pain started this morning. Denies injury.

## 2018-12-06 ENCOUNTER — OFFICE VISIT (OUTPATIENT)
Dept: PAIN MANAGEMENT | Age: 33
End: 2018-12-06

## 2018-12-06 VITALS
HEART RATE: 69 BPM | DIASTOLIC BLOOD PRESSURE: 67 MMHG | SYSTOLIC BLOOD PRESSURE: 102 MMHG | TEMPERATURE: 98.2 F | OXYGEN SATURATION: 97 % | RESPIRATION RATE: 17 BRPM | BODY MASS INDEX: 19.05 KG/M2 | HEIGHT: 66 IN

## 2018-12-06 DIAGNOSIS — M47.816 LUMBAR SPONDYLOSIS: Primary | ICD-10-CM

## 2018-12-06 DIAGNOSIS — R29.2 ABNORMAL REFLEXES: ICD-10-CM

## 2018-12-06 DIAGNOSIS — M54.6 THORACIC SPINE PAIN: ICD-10-CM

## 2018-12-06 NOTE — PROGRESS NOTES
Social History Socioeconomic History  Marital status: SINGLE Spouse name: Not on file  Number of children: 2  
 Years of education: 12  
 Highest education level: Not on file Social Needs  Financial resource strain: Not on file  Food insecurity - worry: Not on file  Food insecurity - inability: Not on file  Transportation needs - medical: Not on file  Transportation needs - non-medical: Not on file Occupational History  Not on file Tobacco Use  Smoking status: Former Smoker Packs/day: 1.00 Years: 13.00 Pack years: 13.00 Types: Cigarettes Last attempt to quit: 5/5/2015 Years since quitting: 3.5  Smokeless tobacco: Never Used  Tobacco comment: cigarello, once a month Substance and Sexual Activity  Alcohol use: No  
 Drug use: No  
 Sexual activity: Yes  
  Partners: Male Birth control/protection: Pill Other Topics Concern [address] Yes Comment: USN  Blood Transfusions No  
 Caffeine Concern No  
 Occupational Exposure No  
 Hobby Hazards No  
 Sleep Concern Yes  Stress Concern No  
 Weight Concern No  
 Special Diet Yes  Back Care Yes  Exercise Yes  Bike Helmet No  
 Seat Belt Yes  Self-Exams No  
Social History Narrative  Not on file Family History Problem Relation Age of Onset  Diabetes Mother  Heart Attack Father  Attention Deficit Hyperactivity Disorder Sister  Attention Deficit Hyperactivity Disorder Brother  Cancer Maternal Aunt 38  
     breast  
 Diabetes Maternal Grandmother  Attention Deficit Hyperactivity Disorder Brother  No Known Problems Brother  No Known Problems Brother  No Known Problems Brother  Cancer Maternal Aunt   
     lung  Heart defect Son  Migraines Son   
 Seizures Son   
     h/o  Other Son   
     h/o jaundice Allergies Allergen Reactions  Imitrex [Sumatriptan Succinate] Anaphylaxis  Iodine Cough Coughing up blood [address] [Seafood] Hives and Unknown (comments) Per pt report Past Medical History:  
Diagnosis Date  Abnormal Papanicolaou smear of cervix  Anxiety ANXIETY  Anxiety and depression  Asthma  Bradycardia, sinus  Degenerative disc disease, lumbar  Edema of foot 5/15/2018  Gastroesophageal reflux disease without esophagitis 5/15/2018  GERD (gastroesophageal reflux disease)  HGSIL (high grade squamous intraepithelial lesion) on Pap smear of cervix 3/22/2018  Migraine  Migraines  Miscarriage  Raynaud's disease  S/P LEEP (status post loop electrosurgical excision procedure) 2018  Seizures ([address] Last   Tobacco abuse, in remission Past Surgical History:  
Procedure Laterality Date  HX APPENDECTOMY  HX  SECTION    
 HX ENDOSCOPY    
 HX GYN    
 cervical cerclage; twin pregnancy with loss of first baby, subsequently placed cerclage, 2nd baby delivered term  HX LEEP PROCEDURE  2018 Dr. Yolette Castellon  HX OTHER SURGICAL  2017 [facility] L4-5, L5-S1 Facet Joint block Current Outpatient Medications on File Prior to Visit Medication Sig  
 busPIRone (BUSPAR) 10 mg tablet TAKE 1 TABLET BY MOUTH EVERY DAY  dextromethorphan-guaiFENesin (ROBITUSSIN-DM)  mg/5 mL syrup Take 10 mL by mouth every six (6) hours as needed for Cough.  camphor-eucalyptus oil-menthol (VICKS VAPORUB) 4.7-1.2-2.6 % oint 1 Actuation(s) by Apply Externally route three (3) times daily as needed.  ENSURE PLUS 0.05-1.5 gram-kcal/mL liqd TAKE 2 BOTTLES BY MOUTH 3 TIMES A DAY  VENTOLIN HFA 90 mcg/actuation inhaler INHELE 1 PUFF EVERY FOUR (4) HOURS AS NEEDED FOR WHEEZING OR SHORTNESS OF BREATH.  hydrOXYzine pamoate (VISTARIL) 50 mg capsule TAKE ONE CAPSULE BY MOUTH AT BEDTIME  divalproex DR (DEPAKOTE) 250 mg tablet Take 1 Tab by mouth two (2) times a day.  Indications: MIGRAINE PREVENTION  promethazine (PHENERGAN) 12.5 mg tablet TAKE 1 TABLET BY MOUTH EVERY 6 HOURS AS NEEDED  
 multivitamin-min-iron-FA-vit K 18 mg iron-400 mcg-25 mcg tab Take 1 Tab by mouth daily.  norethindrone (MICRONOR) 0.35 mg tab Take 1 Tab by mouth daily.  prenatal multivit-ca-min-fe-fa (PRENATAL VITAMIN) tab TAKE 1 TAB BY MOUTH DAILY.  Omeprazole delayed release (PRILOSEC D/R) 20 mg tablet Take 1 Tab by mouth daily.  fluticasone (FLONASE) 50 mcg/actuation nasal spray 2 Sprays by Both Nostrils route daily.  ibuprofen (MOTRIN) 800 mg tablet Take 1 Tab by mouth every eight (8) hours as needed for Pain. Indications: Pain No current facility-administered medications on file prior to visit. Referred in October 2017 by Dr. Luigi Madrigal physiatry for back pain Pt was last seen here on on January 29, 2018. ADELFO -58% COMM-8 
 
HPI: 
Florence He is a 35 y.o. female here for f/u visit for ongoing consideration of lumbar medial branch blocks/radiofrequency ablation for facet arthropathy. Pt denies interval changes in the character or distribution of pain but she repeatedly explains that the lumbar pain is only intermittent in nature and when it does occur it is largely controlled without intervention. He did have a bilateral L3 through L5 (4 levels, 3 joints)medial branch block done but she reports reduction in pain from 7/10 to only 5/10 was an insignificant response anyhow. (The records indicate a 60% reduction postprocedure)  She reports that the pain is located along her entire spine and concentrated midline in the mid thoracic region where there is a constant pinching sensation that ranges from 6-10/10. Symptoms are worsened with increased activity, twisting, lifting, prolonged walking, prolonged standing. Symptoms improved with with her knees pulled up to her chest and this helps relieve pain along the entire length of the spine.  
 
Patient also treats with neurology has been reporting intermittent double vision which occurs with increased activity. Workup is ongoing. ROS:Review of systems is negative for fever, chills,vomiting, diarrhea, constipation, , abdominal pain, weakness, trouble swallowing,acute changes in hearing, falls, dizziness, bladder incontinence, bowel incontinence, , suicidal ideation, homicidal ideation, alcohol use. Review of systems positive for nausea, chest pain both to 2 anxiety trouble breathing depression and anxiety Imaging: Lumbar MRI report from February 2, 2017 showed,\"\"\"\"\"\"\"\"\"\"\"\"\"\"L4-L5: Minimal broad-based disc bulge abuts the ventral thecal sac. Mild 
bilateral facet arthropathy and ligamentum flavum buckling. No central canal or 
foraminal stenosis. 
  
L5-S1:  No significant disc pathology. Mild bilateral facet arthropathy. No 
central canal or foraminal stenosis. 
  
The visualized portions of the sacroiliac joints are unremarkable. \"\"\"\"\"\"\"\"\"\"\"\"\"\"\"\"\" Vitals:  
 12/06/18 1534 BP: 102/67 Pulse: 69 Resp: 17 Temp: 98.2 °F (36.8 °C) TempSrc: Oral  
SpO2: 97% Height: 5' 6\" (1.676 m) PainSc:   8 PainLoc: Back LMP: 11/09/2018 PE: 
AFVSS, no acute distress, normal body habitus. A&OXs 3. 
normocephalic, atraumatic. Conjugate gaze, clear sclerae. Speech is clear and appropriate. Mood is pleasant and appropriate. Patient is cooperative. Strength for right lower extremity is 5/5 for hip flexion, knee extension, dorsiflexion, extensor hallucis longus, plantar flexion. Strength for left lower extremity is 5/5 for hip flexion, knee extension, dorsiflexion, extensor hallucis longus, plantar flexion. Muscle stretch reflexes for right upper extremity are 3 + for C5, C6, C7. Muscle stretch reflexes for left upper extremity are 3 + for C5, C6, C7.  
Muscle Stretch reflexes for right lower extremity are 3 + for L4,  S1.  
Muscle Stretch reflexes for left  lower extremity are 3 + for L4, S1.  
Negative ankle clonus on the right and the left. Positive Hoffmans on the right and the left. Negative seated and supine straight leg raise bilaterally. Negative FABERs on the right and the left. Negative Stinchfield's on the right and a left. Negative FADIRS on the right and the left. Gait is within functional limits. Balance is within functional limits. Sensation to light touch is intact for left C4-T1 and right C4-T1 and right L2-S2 and left L2-S2. Active range of motion for lumbar flexion is reduced approximately 25% without reproduction of primary pain in the other direction. Range of motion for lumbar extension 25% without reproduction primary pain in the lumbosacral region. Full active range of motion for thoracic extension with reproduction of the thoracic pain with audible and palpable crepitus occurring near T6. Negative lumbar facet loading bilateral but it reproduces a T6 thoracic pain when tested on the right and left. Primary Care Physician Mirela Jara 
[id number] [address] 
[phone] PHQ -- . PHQ over the last two weeks 12/6/2018 PHQ Not Done - Little interest or pleasure in doing things More than half the days Feeling down, depressed, irritable, or hopeless More than half the days Total Score PHQ 2 4 Trouble falling or staying asleep, or sleeping too much Nearly every day Feeling tired or having little energy Nearly every day Poor appetite, weight loss, or overeating Not at all Feeling bad about yourself - or that you are a failure or have let yourself or your family down Several days Trouble concentrating on things such as school, work, reading, or watching TV Not at all Moving or speaking so slowly that other people could have noticed; or the opposite being so fidgety that others notice Several days Thoughts of being better off dead, or hurting yourself in some way Not at all PHQ 9 Score 12 How difficult have these problems made it for you to do your work, take care of your home and get along with others Very difficult Assessment/Plan: ICD-10-CM ICD-9-CM 1. Lumbar spondylosis M47.816 721.3 2. Thoracic spine pain M54.6 724.1 XR SPINE THORAC 2 V  
3. Abnormal reflexes R29.2 796.1   
  
 
--Neurologist is Dr Tom Lake, she has upcoming follow-up appointment where she will discuss double vision wien active and eyesight blacks out for a few seconds. DTRs 3+ bilateral L4, L5, S1, see 5 through C7 and positive Krystle's bilaterally. --Continue regular follow-up with her mental health providers as her PHQ 9 score today was 12 and very difficult. --Regarding lumbar spondylosis and potential medial branch blocks/radiofrequency ablation in the lumbar and lumbosacral region patient states that this is only intermittent in nature. Her physical exam did not elicit any suggestion of lumbar facet pathology on today's exam.  I do not recommend lumbar medial branches/RFA at this time. --Patient reports pain and pinching near midline along the thoracic spine about T6 level with crepitus is the most impactful spine pain. We will request a thoracic spine x-ray. GOALS: 
To establish complementary and integrative plan of care to address chronic pain issues while minimizing pharmaceuticals to maximize patient's function improve quality of life. F/u:. Follow-up Disposition: 
Return in about 3 months (around 3/6/2019) for 30 min.

## 2018-12-06 NOTE — PATIENT INSTRUCTIONS

## 2018-12-06 NOTE — PROGRESS NOTES
Nursing Notes Patient presents to the office today for an injection consult for her chronic back pain Patient rates her pain at 8/10 on the numerical pain scale. Reviewed medications with counts as follows:   
Rx Date filled Qty Dispensed Pill Count Last Dose Short No opioids Last opioid agreement N/A Last urine drug screen N/A Comments: POC UDS was not performed in office today Any new labs or imaging since last appointment? NO Have you been to an emergency room (ER) or urgent care clinic since your last visit? NO Have you been hospitalized since your last visit? NO If yes, where, when, and reason for visit? Have you seen or consulted any other health care providers outside of the [facility]  since your last visit? NO If yes, where, when, and reason for visit? Ms. Annie Strong has a reminder for a \"due or due soon\" health maintenance. I have asked that she contact her primary care provider for follow-up on this health maintenance. PHQ over the last two weeks 12/6/2018 PHQ Not Done - Little interest or pleasure in doing things More than half the days Feeling down, depressed, irritable, or hopeless More than half the days Total Score PHQ 2 4 Trouble falling or staying asleep, or sleeping too much Nearly every day Feeling tired or having little energy Nearly every day Poor appetite, weight loss, or overeating Not at all Feeling bad about yourself - or that you are a failure or have let yourself or your family down Several days Trouble concentrating on things such as school, work, reading, or watching TV Not at all Moving or speaking so slowly that other people could have noticed; or the opposite being so fidgety that others notice Several days Thoughts of being better off dead, or hurting yourself in some way Not at all PHQ 9 Score 12  
 How difficult have these problems made it for you to do your work, take care of your home and get along with others Very difficult Pt states that she is being treated by psychiatry for her depression. She is on medication. Provider made aware of the above score. Abuse Screening Questionnaire 12/6/2018 Do you ever feel afraid of your partner? Duong Distance Are you in a relationship with someone who physically or mentally threatens you? Duong Distance Is it safe for you to go home? Alex Cervantes Fall Risk Assessment, last 12 mths 12/6/2018 Able to walk? Yes Fall in past 12 months? No  
 
 
The current ACP document on file is up to date per the pt. No changes were made.

## 2018-12-07 ENCOUNTER — HOSPITAL ENCOUNTER (OUTPATIENT)
Dept: GENERAL RADIOLOGY | Age: 33
Discharge: HOME | End: 2018-12-07
Payer: MEDICAID

## 2018-12-07 DIAGNOSIS — M54.6 THORACIC SPINE PAIN: ICD-10-CM

## 2018-12-07 PROCEDURE — 72070 X-RAY EXAM THORAC SPINE 2VWS: CPT

## 2018-12-13 ENCOUNTER — HOSPITAL ENCOUNTER (EMERGENCY)
Age: 33
Discharge: HOME | End: 2018-12-13
Attending: EMERGENCY MEDICINE
Payer: MEDICAID

## 2018-12-13 ENCOUNTER — DOCUMENTATION ONLY (OUTPATIENT)
Dept: NEUROLOGY | Age: 33
End: 2018-12-13

## 2018-12-13 VITALS
HEART RATE: 68 BPM | BODY MASS INDEX: 19.13 KG/M2 | SYSTOLIC BLOOD PRESSURE: 114 MMHG | OXYGEN SATURATION: 100 % | HEIGHT: 66 IN | TEMPERATURE: 98.5 F | DIASTOLIC BLOOD PRESSURE: 60 MMHG | RESPIRATION RATE: 16 BRPM | WEIGHT: 119 LBS

## 2018-12-13 DIAGNOSIS — G43.809 OTHER MIGRAINE WITHOUT STATUS MIGRAINOSUS, NOT INTRACTABLE: Primary | ICD-10-CM

## 2018-12-13 LAB
ALBUMIN SERPL-MCNC: 4.1 G/DL (ref 3.4–5)
ALBUMIN/GLOB SERPL: 1.2 {RATIO} (ref 0.8–1.7)
ALP SERPL-CCNC: 72 U/L (ref 45–117)
ALT SERPL-CCNC: 22 U/L (ref 13–56)
ANION GAP SERPL CALC-SCNC: 6 MMOL/L (ref 3–18)
APPEARANCE UR: CLEAR
AST SERPL-CCNC: 9 U/L (ref 15–37)
BACTERIA URNS QL MICRO: NEGATIVE /HPF
BASOPHILS # BLD: 0 K/UL (ref 0–0.1)
BASOPHILS NFR BLD: 0 % (ref 0–2)
BILIRUB SERPL-MCNC: 0.4 MG/DL (ref 0.2–1)
BILIRUB UR QL: NEGATIVE
BUN SERPL-MCNC: 16 MG/DL (ref 7–18)
BUN/CREAT SERPL: 22 (ref 12–20)
CALCIUM SERPL-MCNC: 9 MG/DL (ref 8.5–10.1)
CHLORIDE SERPL-SCNC: 108 MMOL/L (ref 100–108)
CO2 SERPL-SCNC: 24 MMOL/L (ref 21–32)
COLOR UR: YELLOW
CREAT SERPL-MCNC: 0.72 MG/DL (ref 0.6–1.3)
DIFFERENTIAL METHOD BLD: ABNORMAL
EOSINOPHIL # BLD: 0 K/UL (ref 0–0.4)
EOSINOPHIL NFR BLD: 0 % (ref 0–5)
EPITH CASTS URNS QL MICRO: NORMAL /LPF (ref 0–5)
ERYTHROCYTE [DISTWIDTH] IN BLOOD BY AUTOMATED COUNT: 12.4 % (ref 11.6–14.5)
GLOBULIN SER CALC-MCNC: 3.5 G/DL (ref 2–4)
GLUCOSE SERPL-MCNC: 85 MG/DL (ref 74–99)
GLUCOSE UR STRIP.AUTO-MCNC: NEGATIVE MG/DL
HCG SERPL QL: NEGATIVE
HCT VFR BLD AUTO: 39 % (ref 35–45)
HGB BLD-MCNC: 13 G/DL (ref 12–16)
HGB UR QL STRIP: ABNORMAL
KETONES UR QL STRIP.AUTO: NEGATIVE MG/DL
LEUKOCYTE ESTERASE UR QL STRIP.AUTO: NEGATIVE
LYMPHOCYTES # BLD: 1.8 K/UL (ref 0.9–3.6)
LYMPHOCYTES NFR BLD: 19 % (ref 21–52)
MCH RBC QN AUTO: 30.2 PG (ref 24–34)
MCHC RBC AUTO-ENTMCNC: 33.3 G/DL (ref 31–37)
MCV RBC AUTO: 90.7 FL (ref 74–97)
MONOCYTES # BLD: 0.6 K/UL (ref 0.05–1.2)
MONOCYTES NFR BLD: 6 % (ref 3–10)
NEUTS SEG # BLD: 7 K/UL (ref 1.8–8)
NEUTS SEG NFR BLD: 75 % (ref 40–73)
NITRITE UR QL STRIP.AUTO: NEGATIVE
PH UR STRIP: 5.5 [PH] (ref 5–8)
PLATELET # BLD AUTO: 270 K/UL (ref 135–420)
PMV BLD AUTO: 10.6 FL (ref 9.2–11.8)
POTASSIUM SERPL-SCNC: 3.8 MMOL/L (ref 3.5–5.5)
PROT SERPL-MCNC: 7.6 G/DL (ref 6.4–8.2)
PROT UR STRIP-MCNC: NEGATIVE MG/DL
RBC # BLD AUTO: 4.3 M/UL (ref 4.2–5.3)
RBC #/AREA URNS HPF: NORMAL /HPF (ref 0–5)
SODIUM SERPL-SCNC: 138 MMOL/L (ref 136–145)
SP GR UR REFRACTOMETRY: 1.03 (ref 1–1.03)
UROBILINOGEN UR QL STRIP.AUTO: 1 EU/DL (ref 0.2–1)
WBC # BLD AUTO: 9.4 K/UL (ref 4.6–13.2)
WBC URNS QL MICRO: NORMAL /HPF (ref 0–4)

## 2018-12-13 PROCEDURE — 74011250636 HC RX REV CODE- 250/636: Performed by: EMERGENCY MEDICINE

## 2018-12-13 PROCEDURE — 96374 THER/PROPH/DIAG INJ IV PUSH: CPT

## 2018-12-13 PROCEDURE — 96375 TX/PRO/DX INJ NEW DRUG ADDON: CPT

## 2018-12-13 PROCEDURE — 74011250636 HC RX REV CODE- 250/636: Performed by: PHYSICIAN ASSISTANT

## 2018-12-13 PROCEDURE — 99284 EMERGENCY DEPT VISIT MOD MDM: CPT

## 2018-12-13 PROCEDURE — 80053 COMPREHEN METABOLIC PANEL: CPT

## 2018-12-13 PROCEDURE — 85025 COMPLETE CBC W/AUTO DIFF WBC: CPT

## 2018-12-13 PROCEDURE — 96361 HYDRATE IV INFUSION ADD-ON: CPT

## 2018-12-13 PROCEDURE — 81001 URINALYSIS AUTO W/SCOPE: CPT

## 2018-12-13 PROCEDURE — 84703 CHORIONIC GONADOTROPIN ASSAY: CPT

## 2018-12-13 RX ORDER — DIPHENHYDRAMINE HYDROCHLORIDE 50 MG/ML
12.5 INJECTION, SOLUTION INTRAMUSCULAR; INTRAVENOUS
Status: COMPLETED | OUTPATIENT
Start: 2018-12-13 | End: 2018-12-13

## 2018-12-13 RX ORDER — KETOROLAC TROMETHAMINE 30 MG/ML
30 INJECTION, SOLUTION INTRAMUSCULAR; INTRAVENOUS
Status: COMPLETED | OUTPATIENT
Start: 2018-12-13 | End: 2018-12-13

## 2018-12-13 RX ORDER — METOCLOPRAMIDE HYDROCHLORIDE 5 MG/ML
5 INJECTION INTRAMUSCULAR; INTRAVENOUS
Status: COMPLETED | OUTPATIENT
Start: 2018-12-13 | End: 2018-12-13

## 2018-12-13 RX ORDER — ACETAMINOPHEN 500 MG
1000 TABLET ORAL
Status: DISCONTINUED | OUTPATIENT
Start: 2018-12-13 | End: 2018-12-13

## 2018-12-13 RX ADMIN — METOCLOPRAMIDE 5 MG: 5 INJECTION, SOLUTION INTRAMUSCULAR; INTRAVENOUS at 21:15

## 2018-12-13 RX ADMIN — DIPHENHYDRAMINE HYDROCHLORIDE 12.5 MG: 50 INJECTION, SOLUTION INTRAMUSCULAR; INTRAVENOUS at 21:16

## 2018-12-13 RX ADMIN — SODIUM CHLORIDE 1000 ML: 900 INJECTION, SOLUTION INTRAVENOUS at 21:12

## 2018-12-13 RX ADMIN — KETOROLAC TROMETHAMINE 30 MG: 30 INJECTION, SOLUTION INTRAMUSCULAR at 21:38

## 2018-12-14 NOTE — ED PROVIDER NOTES
EMERGENCY DEPARTMENT HISTORY AND PHYSICAL EXAM    Date: 12/13/2018  Patient Name: Gia Santiago    History of Presenting Illness     Chief Complaint   Patient presents with    Headache    Dizziness    Nausea    Vomiting         History Provided By: Patient    Chief Complaint: headache  Duration: 3 Days  Timing:  Gradual and Constant  Location: left side of head  Quality: Aching and Pressure  Severity: Severe  Modifying Factors: none  Associated Symptoms: nausea, vomiting, intermittent dizziness      HPI: Gia Santiago is a 35 y.o. female with a PMH of Migraines, epilepsy, GERD, anxiety, miscarriage, abnormal pap, raynauds, DDD who presents to the ER c/o headache. Patient states her symptoms began about 3 days ago and continue to persist.  She has tried taking her migraine medication and tylenol with no relief. She has also had some occasional nausea, vomiting and intermittent dizziness. She denied any recent falls or head trauma. She states her headache is on the left side. Her LMP just ended yesterday. She denied any concern for pregnancy and has no other symptoms or complaints. PCP: Isaac Lay NP    Current Facility-Administered Medications   Medication Dose Route Frequency Provider Last Rate Last Dose    sodium chloride 0.9 % bolus infusion 1,000 mL  1,000 mL IntraVENous ONCE Marina Lucero MD 1,000 mL/hr at 12/13/18 2112 1,000 mL at 12/13/18 2112     Current Outpatient Medications   Medication Sig Dispense Refill    busPIRone (BUSPAR) 10 mg tablet TAKE 1 TABLET BY MOUTH EVERY DAY 30 Tab 1    dextromethorphan-guaiFENesin (ROBITUSSIN-DM)  mg/5 mL syrup Take 10 mL by mouth every six (6) hours as needed for Cough. 240 mL 0    camphor-eucalyptus oil-menthol (VICKS VAPORUB) 4.7-1.2-2.6 % oint 1 Actuation(s) by Apply Externally route three (3) times daily as needed. 50 g 0    fluticasone (FLONASE) 50 mcg/actuation nasal spray 2 Sprays by Both Nostrils route daily.  1 Bottle 0    ibuprofen (MOTRIN) 800 mg tablet Take 1 Tab by mouth every eight (8) hours as needed for Pain. Indications: Pain 30 Tab 0    ENSURE PLUS 0.05-1.5 gram-kcal/mL liqd TAKE 2 BOTTLES BY MOUTH 3 TIMES A DAY 179716 mL 2    VENTOLIN HFA 90 mcg/actuation inhaler INHELE 1 PUFF EVERY FOUR (4) HOURS AS NEEDED FOR WHEEZING OR SHORTNESS OF BREATH. 18 Inhaler 3    hydrOXYzine pamoate (VISTARIL) 50 mg capsule TAKE ONE CAPSULE BY MOUTH AT BEDTIME 30 Cap 2    divalproex DR (DEPAKOTE) 250 mg tablet Take 1 Tab by mouth two (2) times a day. Indications: MIGRAINE PREVENTION 60 Tab 10    promethazine (PHENERGAN) 12.5 mg tablet TAKE 1 TABLET BY MOUTH EVERY 6 HOURS AS NEEDED  0    multivitamin-min-iron-FA-vit K 18 mg iron-400 mcg-25 mcg tab Take 1 Tab by mouth daily. 90 Tab 3    norethindrone (MICRONOR) 0.35 mg tab Take 1 Tab by mouth daily. 1 Package 11    prenatal multivit-ca-min-fe-fa (PRENATAL VITAMIN) tab TAKE 1 TAB BY MOUTH DAILY. 30 Tab 5    Omeprazole delayed release (PRILOSEC D/R) 20 mg tablet Take 1 Tab by mouth daily.  80 Tab 3       Past History     Past Medical History:  Past Medical History:   Diagnosis Date    Abnormal Papanicolaou smear of cervix     Anxiety     ANXIETY    Anxiety and depression     Asthma     Bradycardia, sinus     Degenerative disc disease, lumbar     Edema of foot 5/15/2018    Gastroesophageal reflux disease without esophagitis 5/15/2018    GERD (gastroesophageal reflux disease)     HGSIL (high grade squamous intraepithelial lesion) on Pap smear of cervix 3/22/2018    Migraine     Migraines     Miscarriage     Raynaud's disease     S/P LEEP (status post loop electrosurgical excision procedure) 2018    Seizures ([address]     Last 2018    Tobacco abuse, in remission        Past Surgical History:  Past Surgical History:   Procedure Laterality Date    HX APPENDECTOMY      HX  SECTION      HX ENDOSCOPY      HX GYN      cervical cerclage; twin pregnancy with loss of first baby, subsequently placed cerclage, 2nd baby delivered term    HX LEEP PROCEDURE  09/12/2018    Dr. Lacho Werner [address] [facility]  03/22/2017    Mendel L4-5, L5-S1 Facet Joint block       Family History:  Family History   Problem Relation Age of Onset    Diabetes Mother     Heart Attack Father     Attention Deficit Hyperactivity Disorder Sister     Attention Deficit Hyperactivity Disorder Brother     Cancer Maternal Aunt 45        breast    Diabetes Maternal Grandmother     Attention Deficit Hyperactivity Disorder Brother     No Known Problems Brother     No Known Problems Brother     No Known Problems Brother     Cancer Maternal Aunt         lung    Heart defect Son    [facility] Son     Seizures Son         h/o    Other Son         h/o jaundice       Social History:  Social History     Tobacco Use    Smoking status: Former Smoker     Packs/day: 1.00     Years: 13.00     Pack years: 13.00     Types: Cigarettes     Last attempt to quit: 5/5/2015     Years since quitting: 3.6    Smokeless tobacco: Never Used    Tobacco comment: cigarello, once a month   Substance Use Topics    Alcohol use: No    Drug use: No       Allergies: Allergies   Allergen Reactions    Imitrex [Sumatriptan Succinate] Anaphylaxis    Imitrex [Sumatriptan] Anaphylaxis    Iodine Cough     Coughing up blood      Sea Food [Seafood] Hives and Unknown (comments)     Per pt report          Review of Systems   Review of Systems   Constitutional: Negative for chills, fatigue and fever. HENT: Negative. Negative for sore throat. Eyes: Negative. Respiratory: Negative for cough and shortness of breath. Cardiovascular: Negative for chest pain and palpitations. Gastrointestinal: Positive for nausea and vomiting. Negative for abdominal pain. Genitourinary: Negative for dysuria. Musculoskeletal: Negative. Skin: Negative. Neurological: Positive for dizziness and headaches.  Negative for weakness and light-headedness. Psychiatric/Behavioral: Negative. All other systems reviewed and are negative. Physical Exam     Vitals:    12/13/18 2120 12/13/18 2130 12/13/18 2140 12/13/18 2150   BP: 114/67 109/54 114/67 114/60   Pulse:       Resp:       Temp:       SpO2: 100% 100% 100% 100%   Weight:       Height:         Physical Exam   Constitutional: She is oriented to person, place, and time. She appears well-developed and well-nourished. No distress. HENT:   Head: Normocephalic and atraumatic. Mouth/Throat: Oropharynx is clear and moist.   Eyes: Conjunctivae and EOM are normal. Pupils are equal, round, and reactive to light. No scleral icterus. Neck: Normal range of motion. Neck supple. No JVD present. No tracheal deviation present. Cardiovascular: Normal rate, regular rhythm and normal heart sounds. No murmur heard. Pulmonary/Chest: Effort normal and breath sounds normal. No respiratory distress. She has no wheezes. She has no rales. Abdominal: Soft. Bowel sounds are normal. She exhibits no distension. There is no tenderness. There is no rebound and no guarding. Musculoskeletal: Normal range of motion. Neurological: She is alert and oriented to person, place, and time. She has normal strength. Gait normal. GCS eye subscore is 4. GCS verbal subscore is 5. GCS motor subscore is 6. Skin: Skin is warm and dry. She is not diaphoretic. Psychiatric: She has a normal mood and affect. Nursing note and vitals reviewed.         Diagnostic Study Results     Labs -     Recent Results (from the past 12 hour(s))   URINALYSIS W/ RFLX MICROSCOPIC    Collection Time: 12/13/18  8:12 PM   Result Value Ref Range    Color YELLOW      Appearance CLEAR      Specific gravity 1.029 1.005 - 1.030      pH (UA) 5.5 5.0 - 8.0      Protein NEGATIVE  NEG mg/dL    Glucose NEGATIVE  NEG mg/dL    Ketone NEGATIVE  NEG mg/dL    Bilirubin NEGATIVE  NEG      Blood SMALL (A) NEG      Urobilinogen 1.0 0.2 - 1.0 EU/dL    Nitrites NEGATIVE  NEG      Leukocyte Esterase NEGATIVE  NEG     URINE MICROSCOPIC ONLY    Collection Time: 12/13/18  8:12 PM   Result Value Ref Range    WBC 0 to 2 0 - 4 /hpf    RBC 4 to 6 0 - 5 /hpf    Epithelial cells FEW 0 - 5 /lpf    Bacteria NEGATIVE  NEG /hpf   CBC WITH AUTOMATED DIFF    Collection Time: 12/13/18  8:50 PM   Result Value Ref Range    WBC 9.4 4.6 - 13.2 K/uL    RBC 4.30 4.20 - 5.30 M/uL    HGB 13.0 12.0 - 16.0 g/dL    HCT 39.0 35.0 - 45.0 %    MCV 90.7 74.0 - 97.0 FL    MCH 30.2 24.0 - 34.0 PG    MCHC 33.3 31.0 - 37.0 g/dL    RDW 12.4 11.6 - 14.5 %    PLATELET 571 021 - 137 K/uL    MPV 10.6 9.2 - 11.8 FL    NEUTROPHILS 75 (H) 40 - 73 %    LYMPHOCYTES 19 (L) 21 - 52 %    MONOCYTES 6 3 - 10 %    EOSINOPHILS 0 0 - 5 %    BASOPHILS 0 0 - 2 %    ABS. NEUTROPHILS 7.0 1.8 - 8.0 K/UL    ABS. LYMPHOCYTES 1.8 0.9 - 3.6 K/UL    ABS. MONOCYTES 0.6 0.05 - 1.2 K/UL    ABS. EOSINOPHILS 0.0 0.0 - 0.4 K/UL    ABS. BASOPHILS 0.0 0.0 - 0.1 K/UL    DF AUTOMATED     METABOLIC PANEL, COMPREHENSIVE    Collection Time: 12/13/18  8:50 PM   Result Value Ref Range    Sodium 138 136 - 145 mmol/L    Potassium 3.8 3.5 - 5.5 mmol/L    Chloride 108 100 - 108 mmol/L    CO2 24 21 - 32 mmol/L    Anion gap 6 3.0 - 18 mmol/L    Glucose 85 74 - 99 mg/dL    BUN 16 7.0 - 18 MG/DL    Creatinine 0.72 0.6 - 1.3 MG/DL    BUN/Creatinine ratio 22 (H) 12 - 20      GFR est AA >60 >60 ml/min/1.73m2    GFR est non-AA >60 >60 ml/min/1.73m2    Calcium 9.0 8.5 - 10.1 MG/DL    Bilirubin, total 0.4 0.2 - 1.0 MG/DL    ALT (SGPT) 22 13 - 56 U/L    AST (SGOT) 9 (L) 15 - 37 U/L    Alk.  phosphatase 72 45 - 117 U/L    Protein, total 7.6 6.4 - 8.2 g/dL    Albumin 4.1 3.4 - 5.0 g/dL    Globulin 3.5 2.0 - 4.0 g/dL    A-G Ratio 1.2 0.8 - 1.7     HCG QL SERUM    Collection Time: 12/13/18  8:50 PM   Result Value Ref Range    HCG, Ql. NEGATIVE  NEG         Radiologic Studies -   No orders to display     CT Results  (Last 48 hours)    None        CXR Results  (Last 48 hours)    None            Medical Decision Making   I am the first provider for this patient. I reviewed the vital signs, available nursing notes, past medical history, past surgical history, family history and social history. Vital Signs-Reviewed the patient's vital signs. Records Reviewed: Nursing Notes and Old Medical Records     8:18 PM  34 y/o female c/o headache, N/V and intermittent dizziness x 3 days. Has tried taking Migraine meds and tylenol with no relief. No recent falls or head trauma. LMP just ended yesterday. NVI and well appearing on exam.  Will plan on labs, fluid bolus and meds for symptoms. Lindsey Wagner PA-C     9:46 PM  Labs reviewed and discussed with pt. Negative ua and pregnancy. Pt reports minimal improvement with reglan and benadryl. toradol just given; will reassess. Lindsey Wagner PA-C    10:09 PM  Pt reports feeling much better at this time. Symptoms have resolved and headache is now minimal.  States she is ready to go home. Discussed return precautions. No indication for imaging at this time. All questions answered and patient in agreement with plan of care. Will plan for discharge. Lindsey Wagner PA-C    Disposition:  Discharged    DISCHARGE NOTE:       Care plan outlined and precautions discussed. Patient has no new complaints, changes, or physical findings. Results of labs, ua were reviewed with the patient. All medications were reviewed with the patient; will d/c home with n/a. All of pt's questions and concerns were addressed. Patient was instructed and agrees to follow up with pcp, as well as to return to the ED upon further deterioration. Patient is ready to go home.     Follow-up Information     Follow up With Specialties Details Why [facility] EMERGENCY DEPT Emergency Medicine  If symptoms worsen [address]  [phone]    Ronnell Motley NP Nurse Practitioner Call in 1 day As needed for ER follow up [id number] [address] [address]  [phone]            Current Discharge Medication List      CONTINUE these medications which have NOT CHANGED    Details   busPIRone (BUSPAR) 10 mg tablet TAKE 1 TABLET BY MOUTH EVERY DAY  Qty: 30 Tab, Refills: 1    Associated Diagnoses: Anxiety      dextromethorphan-guaiFENesin (ROBITUSSIN-DM)  mg/5 mL syrup Take 10 mL by mouth every six (6) hours as needed for Cough. Qty: 240 mL, Refills: 0      camphor-eucalyptus oil-menthol (VICKS VAPORUB) 4.7-1.2-2.6 % oint 1 Actuation(s) by Apply Externally route three (3) times daily as needed. Qty: 50 g, Refills: 0      fluticasone (FLONASE) 50 mcg/actuation nasal spray 2 Sprays by Both Nostrils route daily. Qty: 1 Bottle, Refills: 0      ibuprofen (MOTRIN) 800 mg tablet Take 1 Tab by mouth every eight (8) hours as needed for Pain. Indications: Pain  Qty: 30 Tab, Refills: 0      ENSURE PLUS 0.05-1.5 gram-kcal/mL liqd TAKE 2 BOTTLES BY MOUTH 3 TIMES A DAY  Qty: [id number] mL, Refills: 2    Associated Diagnoses: Nutrition disorder; Failure to thrive in adult      VENTOLIN HFA 90 mcg/actuation inhaler INHELE 1 PUFF EVERY FOUR (4) HOURS AS NEEDED FOR WHEEZING OR SHORTNESS OF BREATH. Qty: 18 Inhaler, Refills: 3    Associated Diagnoses: RAD (reactive airway disease), moderate persistent, with acute exacerbation      hydrOXYzine pamoate (VISTARIL) 50 mg capsule TAKE ONE CAPSULE BY MOUTH AT BEDTIME  Qty: 30 Cap, Refills: 2    Associated Diagnoses: Insomnia, unspecified type      divalproex DR (DEPAKOTE) 250 mg tablet Take 1 Tab by mouth two (2) times a day. Indications: MIGRAINE PREVENTION  Qty: 60 Tab, Refills: 10    Associated Diagnoses: Intractable migraine without aura and without status migrainosus      promethazine (PHENERGAN) 12.5 mg tablet TAKE 1 TABLET BY MOUTH EVERY 6 HOURS AS NEEDED  Refills: 0      multivitamin-min-iron-FA-vit K 18 mg iron-400 mcg-25 mcg tab Take 1 Tab by mouth daily.   Qty: 90 Tab, Refills: 3 Associated Diagnoses: Low body weight due to inadequate caloric intake      norethindrone (MICRONOR) 0.35 mg tab Take 1 Tab by mouth daily. Qty: 1 Package, Refills: 11      prenatal multivit-ca-min-fe-fa (PRENATAL VITAMIN) tab TAKE 1 TAB BY MOUTH DAILY. Qty: 30 Tab, Refills: 5      Omeprazole delayed release (PRILOSEC D/R) 20 mg tablet Take 1 Tab by mouth daily. Qty: 90 Tab, Refills: 3             Provider Notes (Medical Decision Making):     Procedures:  Procedures        Diagnosis     Clinical Impression:   1.  Other migraine without status migrainosus, not intractable

## 2018-12-14 NOTE — ED NOTES
I have reviewed discharge instruction and prescriptions with patient. Patient verbalized understanding and has no further questions at this time. Education taught and patient verbalized understanding of education. Teach back method used. Right hand IV removed, catheter tip intact on removal.  Armband removed and shredded per patients request.    Patients pain 2/10. Belongings given to patient. Patient discharged with self to home.

## 2018-12-14 NOTE — ED TRIAGE NOTES
Pt brought self to ED, ambulatory in triage for c/o headache, blurred vision, dizziness, and nausea with vomiting x3 days.

## 2018-12-14 NOTE — DISCHARGE INSTRUCTIONS
Migraine Headache: Care Instructions  Your Care Instructions  Migraines are painful, throbbing headaches that often start on one side of the head. They may cause nausea and vomiting and make you sensitive to light, sound, or smell. Without treatment, migraines can last from 4 hours to a few days. Medicines can help prevent migraines or stop them after they have started. Your doctor can help you find which ones work best for you. Follow-up care is a key part of your treatment and safety. Be sure to make and go to all appointments, and call your doctor if you are having problems. It's also a good idea to know your test results and keep a list of the medicines you take. How can you care for yourself at home? · Do not drive if you have taken a prescription pain medicine. · Rest in a quiet, dark room until your headache is gone. Close your eyes, and try to relax or go to sleep. Don't watch TV or read. · Put a cold, moist cloth or cold pack on the painful area for 10 to 20 minutes at a time. Put a thin cloth between the cold pack and your skin. · Use a warm, moist towel or a heating pad set on low to relax tight shoulder and neck muscles. · Have someone gently massage your neck and shoulders. · Take your medicines exactly as prescribed. Call your doctor if you think you are having a problem with your medicine. You will get more details on the specific medicines your doctor prescribes. · Be careful not to take pain medicine more often than the instructions allow. You could get worse or more frequent headaches when the medicine wears off. To prevent migraines  · Keep a headache diary so you can figure out what triggers your headaches. Avoiding triggers may help you prevent headaches. Record when each headache began, how long it lasted, and what the pain was like.  (Was it throbbing, aching, stabbing, or dull?) Write down any other symptoms you had with the headache, such as nausea, flashing lights or dark spots, or sensitivity to bright light or loud noise. Note if the headache occurred near your period. List anything that might have triggered the headache. Triggers may include certain foods (chocolate, cheese, wine) or odors, smoke, bright light, stress, or lack of sleep. · If your doctor has prescribed medicine for your migraines, take it as directed. You may have medicine that you take only when you get a migraine and medicine that you take all the time to help prevent migraines. ? If your doctor has prescribed medicine for when you get a headache, take it at the first sign of a migraine, unless your doctor has given you other instructions. ? If your doctor has prescribed medicine to prevent migraines, take it exactly as prescribed. Call your doctor if you think you are having a problem with your medicine. · Find healthy ways to deal with stress. Migraines are most common during or right after stressful times. Take time to relax before and after you do something that has caused a migraine in the past.  · Try to keep your muscles relaxed by keeping good posture. Check your jaw, face, neck, and shoulder muscles for tension. Try to relax them. When you sit at a desk, change positions often. And make sure to stretch for 30 seconds each hour. · Get plenty of sleep and exercise. · Eat meals on a regular schedule. Avoid foods and drinks that often trigger migraines. These include chocolate, alcohol (especially red wine and port), aspartame, monosodium glutamate (MSG), and some additives found in foods (such as hot dogs, ching, cold cuts, aged cheeses, and pickled foods). · Limit caffeine. Don't drink too much coffee, tea, or soda. But don't quit caffeine suddenly. That can also give you migraines. · Do not smoke or allow others to smoke around you. If you need help quitting, talk to your doctor about stop-smoking programs and medicines. These can increase your chances of quitting for good.   · If you are taking birth control pills or hormone therapy, talk to your doctor about whether they are triggering your migraines. When should you call for help? Call 911 anytime you think you may need emergency care. For example, call if:    · You have signs of a stroke. These may include:  ? Sudden numbness, paralysis, or weakness in your face, arm, or leg, especially on only one side of your body. ? Sudden vision changes. ? Sudden trouble speaking. ? Sudden confusion or trouble understanding simple statements. ? Sudden problems with walking or balance. ? A sudden, severe headache that is different from past headaches.    Call your doctor now or seek immediate medical care if:    · You have new or worse nausea and vomiting.     · You have a new or higher fever.     · Your headache gets much worse.    Watch closely for changes in your health, and be sure to contact your doctor if:    · You are not getting better after 2 days (48 hours). Where can you learn more? Go to http://norma-kaley.info/. Enter R331 in the search box to learn more about \"Migraine Headache: Care Instructions. \"  Current as of: June 4, 2018  Content Version: 11.8  © 8633-5984 [employer]. Care instructions adapted under license by [employer] (which disclaims liability or warranty for this information). If you have questions about a medical condition or this instruction, always ask your healthcare professional. [address] any warranty or liability for your use of this information.

## 2018-12-15 ENCOUNTER — HOSPITAL ENCOUNTER (EMERGENCY)
Age: 33
Discharge: HOME | End: 2018-12-15
Attending: EMERGENCY MEDICINE
Payer: MEDICAID

## 2018-12-15 VITALS
SYSTOLIC BLOOD PRESSURE: 111 MMHG | RESPIRATION RATE: 18 BRPM | DIASTOLIC BLOOD PRESSURE: 74 MMHG | TEMPERATURE: 98.2 F | HEART RATE: 64 BPM | OXYGEN SATURATION: 100 %

## 2018-12-15 DIAGNOSIS — G44.219 EPISODIC TENSION-TYPE HEADACHE, NOT INTRACTABLE: Primary | ICD-10-CM

## 2018-12-15 PROCEDURE — 96361 HYDRATE IV INFUSION ADD-ON: CPT

## 2018-12-15 PROCEDURE — 96375 TX/PRO/DX INJ NEW DRUG ADDON: CPT

## 2018-12-15 PROCEDURE — 74011250636 HC RX REV CODE- 250/636: Performed by: EMERGENCY MEDICINE

## 2018-12-15 PROCEDURE — 74011000258 HC RX REV CODE- 258: Performed by: EMERGENCY MEDICINE

## 2018-12-15 PROCEDURE — 96365 THER/PROPH/DIAG IV INF INIT: CPT

## 2018-12-15 PROCEDURE — 99282 EMERGENCY DEPT VISIT SF MDM: CPT

## 2018-12-15 RX ORDER — DIPHENHYDRAMINE HYDROCHLORIDE 50 MG/ML
25 INJECTION, SOLUTION INTRAMUSCULAR; INTRAVENOUS ONCE
Status: COMPLETED | OUTPATIENT
Start: 2018-12-15 | End: 2018-12-15

## 2018-12-15 RX ORDER — PREDNISONE 50 MG/1
50 TABLET ORAL DAILY
Qty: 5 TAB | Refills: 0 | Status: SHIPPED | OUTPATIENT
Start: 2018-12-15 | End: 2018-12-20

## 2018-12-15 RX ORDER — KETOROLAC TROMETHAMINE 30 MG/ML
15 INJECTION, SOLUTION INTRAMUSCULAR; INTRAVENOUS
Status: COMPLETED | OUTPATIENT
Start: 2018-12-15 | End: 2018-12-15

## 2018-12-15 RX ORDER — MAGNESIUM SULFATE HEPTAHYDRATE 40 MG/ML
2 INJECTION, SOLUTION INTRAVENOUS
Status: COMPLETED | OUTPATIENT
Start: 2018-12-15 | End: 2018-12-15

## 2018-12-15 RX ADMIN — DIPHENHYDRAMINE HYDROCHLORIDE 25 MG: 50 INJECTION, SOLUTION INTRAMUSCULAR; INTRAVENOUS at 21:37

## 2018-12-15 RX ADMIN — PROMETHAZINE HYDROCHLORIDE 12.5 MG: 25 INJECTION INTRAMUSCULAR; INTRAVENOUS at 21:35

## 2018-12-15 RX ADMIN — METHYLPREDNISOLONE SODIUM SUCCINATE 125 MG: 125 INJECTION, POWDER, FOR SOLUTION INTRAMUSCULAR; INTRAVENOUS at 21:39

## 2018-12-15 RX ADMIN — MAGNESIUM SULFATE HEPTAHYDRATE 2 G: 40 INJECTION, SOLUTION INTRAVENOUS at 22:13

## 2018-12-15 RX ADMIN — SODIUM CHLORIDE 1000 ML: 900 INJECTION, SOLUTION INTRAVENOUS at 21:34

## 2018-12-15 RX ADMIN — KETOROLAC TROMETHAMINE 15 MG: 30 INJECTION, SOLUTION INTRAMUSCULAR at 21:36

## 2018-12-16 NOTE — DISCHARGE INSTRUCTIONS

## 2018-12-16 NOTE — ED PROVIDER NOTES
EMERGENCY DEPARTMENT HISTORY AND PHYSICAL EXAM    7:54 PM      Date: 12/15/2018  Patient Name: Neha Zhu    History of Presenting Illness     Chief Complaint   Patient presents with    Headache         History Provided By: Patient    7:54 PM Neha Zhu is a 35 y.o. female with h/o migraine, Raynaud's disease, Asthma, seizures who presents to ED complaining of an acute on chronic HA of moderate severity and pressure quality with associated symptoms of dizziness, CP, blurred vision, nausea, and vomiting onset 2 days ago. Patient notes symptoms not similar to past migraines. Patient has an appointment with Neurology on Monday. No other concerns or symptoms at this time. PCP: Cornelia Guerra NP      Current Facility-Administered Medications   Medication Dose Route Frequency Provider Last Rate Last Dose    sodium chloride 0.9 % bolus infusion 1,000 mL  1,000 mL IntraVENous ONCE Johanny Peter MD        magnesium sulfate 2 g/50 ml IVPB (premix or compounded)  2 g IntraVENous NOW Angela Angeles MD        promethazine (PHENERGAN) 12.5 mg in 0.9% sodium chloride 50 mL IVPB  12.5 mg IntraVENous NOW Angela Angeles MD        diphenhydrAMINE (BENADRYL) injection 25 mg  25 mg IntraVENous ONCE Johanny Peter MD        methylPREDNISolone (PF) (Solu-MEDROL) injection 125 mg  125 mg IntraVENous NOW Angela Angeles MD         Current Outpatient Medications   Medication Sig Dispense Refill    busPIRone (BUSPAR) 10 mg tablet TAKE 1 TABLET BY MOUTH EVERY DAY 30 Tab 1    dextromethorphan-guaiFENesin (ROBITUSSIN-DM)  mg/5 mL syrup Take 10 mL by mouth every six (6) hours as needed for Cough. 240 mL 0    camphor-eucalyptus oil-menthol (VICKS VAPORUB) 4.7-1.2-2.6 % oint 1 Actuation(s) by Apply Externally route three (3) times daily as needed. 50 g 0    fluticasone (FLONASE) 50 mcg/actuation nasal spray 2 Sprays by Both Nostrils route daily.  1 Bottle 0    ibuprofen (MOTRIN) 800 mg tablet Take 1 Tab by mouth every eight (8) hours as needed for Pain. Indications: Pain 30 Tab 0    ENSURE PLUS 0.05-1.5 gram-kcal/mL liqd TAKE 2 BOTTLES BY MOUTH 3 TIMES A DAY [id number] mL 2    VENTOLIN HFA 90 mcg/actuation inhaler INHELE 1 PUFF EVERY FOUR (4) HOURS AS NEEDED FOR WHEEZING OR SHORTNESS OF BREATH. 18 Inhaler 3    hydrOXYzine pamoate (VISTARIL) 50 mg capsule TAKE ONE CAPSULE BY MOUTH AT BEDTIME 30 Cap 2    divalproex DR (DEPAKOTE) 250 mg tablet Take 1 Tab by mouth two (2) times a day. Indications: MIGRAINE PREVENTION 60 Tab 10    promethazine (PHENERGAN) 12.5 mg tablet TAKE 1 TABLET BY MOUTH EVERY 6 HOURS AS NEEDED  0    multivitamin-min-iron-FA-vit K 18 mg iron-400 mcg-25 mcg tab Take 1 Tab by mouth daily. 90 Tab 3    norethindrone (MICRONOR) 0.35 mg tab Take 1 Tab by mouth daily. 1 Package 11    prenatal multivit-ca-min-fe-fa (PRENATAL VITAMIN) tab TAKE 1 TAB BY MOUTH DAILY. 30 Tab 5    Omeprazole delayed release (PRILOSEC D/R) 20 mg tablet Take 1 Tab by mouth daily.  80 Tab 3       Past History     Past Medical History:  Past Medical History:   Diagnosis Date    Abnormal Papanicolaou smear of cervix     Anxiety     ANXIETY    Anxiety and depression     Asthma     Bradycardia, sinus     Degenerative disc disease, lumbar     Edema of foot 5/15/2018    Gastroesophageal reflux disease without esophagitis 5/15/2018    GERD (gastroesophageal reflux disease)     HGSIL (high grade squamous intraepithelial lesion) on Pap smear of cervix 3/22/2018    Migraine     Migraines     Miscarriage     Raynaud's disease     S/P LEEP (status post loop electrosurgical excision procedure) 2018    Seizures ([address]     Last 2018    Tobacco abuse, in remission        Past Surgical History:  Past Surgical History:   Procedure Laterality Date    HX APPENDECTOMY      HX  SECTION      HX ENDOSCOPY      HX GYN      cervical cerclage; twin pregnancy with loss of first baby, subsequently placed cerclage, 2nd baby delivered term    HX LEEP PROCEDURE  09/12/2018    Dr. Garcia Michael [facility]  03/22/2017    Mendel L4-5, L5-S1 Facet Joint block       Family History:  Family History   Problem Relation Age of Onset    Diabetes Mother     Heart Attack Father     Attention Deficit Hyperactivity Disorder Sister     Attention Deficit Hyperactivity Disorder Brother     Cancer Maternal Aunt 45        breast    Diabetes Maternal Grandmother     Attention Deficit Hyperactivity Disorder Brother     No Known Problems Brother     No Known Problems Brother     No Known Problems Brother     Cancer Maternal Aunt         lung    Heart defect Son    Osorio Dakins Migraines Son     Seizures Son         h/o    Other Son         h/o jaundice       Social History:  Social History     Tobacco Use    Smoking status: Former Smoker     Packs/day: 1.00     Years: 13.00     Pack years: 13.00     Types: Cigarettes     Last attempt to quit: 5/5/2015     Years since quitting: 3.6    Smokeless tobacco: Never Used    Tobacco comment: cigarello, once a month   Substance Use Topics    Alcohol use: No    Drug use: No       Allergies: Allergies   Allergen Reactions    Imitrex [Sumatriptan Succinate] Anaphylaxis    Imitrex [Sumatriptan] Anaphylaxis    Iodine Cough     Coughing up blood      Sea Food [Seafood] Hives and Unknown (comments)     Per pt report          Review of Systems     Review of Systems   Constitutional: Negative for diaphoresis and fever. HENT: Negative for congestion and sore throat. Eyes: Positive for visual disturbance. Negative for pain and itching. Respiratory: Negative for cough and shortness of breath. Cardiovascular: Positive for chest pain. Negative for palpitations. Gastrointestinal: Positive for nausea and vomiting. Negative for abdominal pain and diarrhea. Endocrine: Negative for polydipsia and polyuria. Genitourinary: Negative for dysuria and hematuria. Musculoskeletal: Negative for arthralgias and myalgias. Skin: Negative for rash and wound. Neurological: Positive for dizziness and headaches. Negative for seizures and syncope. Hematological: Does not bruise/bleed easily. Psychiatric/Behavioral: Negative for agitation and hallucinations. Physical Exam     Patient Vitals for the past 12 hrs:   Temp Pulse Resp BP SpO2   12/15/18 1943 98.2 °F (36.8 °C) 72 16 115/67 100 %         Physical Exam   Constitutional: She appears well-developed and well-nourished. Uncomfortable, otherwise normal.    HENT:   Head: Normocephalic and atraumatic. Eyes: Conjunctivae are normal. No scleral icterus. Neck: Normal range of motion. Neck supple. No JVD present. Cardiovascular: Normal rate, regular rhythm and normal heart sounds. 4 intact extremity pulses   Pulmonary/Chest: Effort normal and breath sounds normal.   Abdominal: Soft. She exhibits no mass. There is no tenderness. Musculoskeletal: Normal range of motion. Lymphadenopathy:     She has no cervical adenopathy. Neurological: She is alert. Neurologically intact. Skin: Skin is warm and dry. Nursing note and vitals reviewed. Diagnostic Study Results   Labs -  No results found for this or any previous visit (from the past 12 hour(s)). Radiologic Studies -   No orders to display     No results found.     Medications ordered:   Medications   sodium chloride 0.9 % bolus infusion 1,000 mL (not administered)   magnesium sulfate 2 g/50 ml IVPB (premix or compounded) (not administered)   promethazine (PHENERGAN) 12.5 mg in 0.9% sodium chloride 50 mL IVPB (not administered)   diphenhydrAMINE (BENADRYL) injection 25 mg (not administered)   methylPREDNISolone (PF) (Solu-MEDROL) injection 125 mg (not administered)         Medical Decision Making   Initial Medical Decision Making and DDx:  MDM: HA syndrome, possible migraine, doubt intracranial lesion, increased ICP, subarachnoid hemorrhage, etc. ED Course: Progress Notes, Reevaluation, and Consults:   9:18 PM Pain has had little improvement already, she takes Depakote at home. Will DC on course of Prednisone. Patient took Naproxen at 830 AM this morning. Will give dose of Toradol here. Pregnancy test negative 2 days ago. I am the first provider for this patient. I reviewed the vital signs, available nursing notes, past medical history, past surgical history, family history and social history. Vital Signs-Reviewed the patient's vital signs. Pulse Oximetry Analysis - 100%    Cardiac Monitor:  Rate/Rhythm:  72      Records Reviewed: Nursing Notes and Old Medical Records (Time of Review: 7:54 PM)        Diagnosis     Clinical Impression:   1. Episodic tension-type headache, not intractable        Disposition: home    Follow-up Information     Follow up With Specialties Details Why Contact Info    Virginia Houston NP Nurse Practitioner In 2 days  [address] [place] [id number] [phone]                Medication List      START taking these medications    predniSONE 50 mg tablet  Commonly known as:  DELTASONE  Take 1 Tab by mouth daily for 5 days. ASK your doctor about these medications    busPIRone 10 mg tablet  Commonly known as:  BUSPAR  TAKE 1 TABLET BY MOUTH EVERY DAY     camphor-eucalyptus oil-menthol 4.7-1.2-2.6 % Oint  Commonly known as:  VICKS VAPORUB  1 Actuation(s) by Apply Externally route three (3) times daily as needed. dextromethorphan-guaiFENesin  mg/5 mL syrup  Commonly known as:  ROBITUSSIN-DM  Take 10 mL by mouth every six (6) hours as needed for Cough. divalproex  mg tablet  Commonly known as:  DEPAKOTE  Take 1 Tab by mouth two (2) times a day.  Indications: MIGRAINE PREVENTION     ENSURE PLUS 0.05-1.5 gram-kcal/mL Liqd  Generic drug:  food supplemt, lactose-reduced  TAKE 2 BOTTLES BY MOUTH 3 TIMES A DAY     fluticasone 50 mcg/actuation nasal spray  Commonly known as: FLONASE  2 Sprays by Both Nostrils route daily. hydrOXYzine pamoate 50 mg capsule  Commonly known as:  VISTARIL  TAKE ONE CAPSULE BY MOUTH AT BEDTIME     ibuprofen 800 mg tablet  Commonly known as:  MOTRIN  Take 1 Tab by mouth every eight (8) hours as needed for Pain. Indications: Pain     multivitamin-min-iron-FA-vit K 18 mg iron-400 mcg-25 mcg Tab  Take 1 Tab by mouth daily. norethindrone 0.35 mg Tab  Commonly known as:  MICRONOR  Take 1 Tab by mouth daily. Omeprazole delayed release 20 mg tablet  Commonly known as:  PRILOSEC D/R  Take 1 Tab by mouth daily. prenatal multivit-ca-min-fe-fa Tab  Commonly known as:  PRENATAL VITAMIN  TAKE 1 TAB BY MOUTH DAILY. promethazine 12.5 mg tablet  Commonly known as:  PHENERGAN     VENTOLIN HFA 90 mcg/actuation inhaler  Generic drug:  albuterol  INHELE 1 PUFF EVERY FOUR (4) HOURS AS NEEDED FOR WHEEZING OR SHORTNESS OF BREATH. Where to Get Your Medications      Information about where to get these medications is not yet available    Ask your nurse or doctor about these medications  · predniSONE 50 mg tablet       _______________________________    Attestations:  Jazmyn Wyatt acting as a scribe for and in the presence of Grace Mcgovern MD      December 15, 2018 at 8:17 PM       Provider Attestation:      I personally performed the services described in the documentation, reviewed the documentation, as recorded by the scribe in my presence, and it accurately and completely records my words and actions.  December 15, 2018 at 8:17 PM - Grace Mcgovern MD    _______________________________'

## 2018-12-17 ENCOUNTER — OFFICE VISIT (OUTPATIENT)
Dept: NEUROLOGY | Age: 33
End: 2018-12-17

## 2018-12-17 VITALS
DIASTOLIC BLOOD PRESSURE: 70 MMHG | HEIGHT: 66 IN | BODY MASS INDEX: 19.99 KG/M2 | TEMPERATURE: 98.6 F | OXYGEN SATURATION: 98 % | SYSTOLIC BLOOD PRESSURE: 108 MMHG | RESPIRATION RATE: 20 BRPM | HEART RATE: 66 BPM | WEIGHT: 124.4 LBS

## 2018-12-17 DIAGNOSIS — G44.40 ANALGESIC REBOUND HEADACHE: ICD-10-CM

## 2018-12-17 DIAGNOSIS — Z51.81 THERAPEUTIC DRUG MONITORING: ICD-10-CM

## 2018-12-17 DIAGNOSIS — G40.909 SEIZURE DISORDER (HCC): ICD-10-CM

## 2018-12-17 DIAGNOSIS — T39.95XA ANALGESIC REBOUND HEADACHE: ICD-10-CM

## 2018-12-17 DIAGNOSIS — Z86.69 HISTORY OF EPILEPSY: ICD-10-CM

## 2018-12-17 DIAGNOSIS — G43.019 INTRACTABLE MIGRAINE WITHOUT AURA AND WITHOUT STATUS MIGRAINOSUS: Primary | ICD-10-CM

## 2018-12-17 RX ORDER — CYPROHEPTADINE HYDROCHLORIDE 4 MG/1
TABLET ORAL
Refills: 3 | COMMUNITY
Start: 2018-11-18 | End: 2019-05-30

## 2018-12-17 NOTE — PROGRESS NOTES
Re:  Brissa Pierre,Follow up visit     12/17/2018 10:11 AM    SSN: [id number]    Subjective:   Venkatesh Dubois returns for follow up of her headaches. She's had no further seizure activity. She's had some double vision that she's never complained about before. This has been going on since the beginning of the year, around the time she had her head trauma. The trauma was a lose of consciousness with a seizure at home. Additionally her pain management doctor has noticed some increased reflexes. The headaches have increased to every 2-3 days in the last 2 weeks. .       Medications:    Current Outpatient Medications   Medication Sig Dispense Refill    predniSONE (DELTASONE) 50 mg tablet Take 1 Tab by mouth daily for 5 days. 5 Tab 0    busPIRone (BUSPAR) 10 mg tablet TAKE 1 TABLET BY MOUTH EVERY DAY 30 Tab 1    ENSURE PLUS 0.05-1.5 gram-kcal/mL liqd TAKE 2 BOTTLES BY MOUTH 3 TIMES A DAY [id number] mL 2    hydrOXYzine pamoate (VISTARIL) 50 mg capsule TAKE ONE CAPSULE BY MOUTH AT BEDTIME 30 Cap 2    divalproex DR (DEPAKOTE) 250 mg tablet Take 1 Tab by mouth two (2) times a day. Indications: MIGRAINE PREVENTION 60 Tab 10    multivitamin-min-iron-FA-vit K 18 mg iron-400 mcg-25 mcg tab Take 1 Tab by mouth daily. 90 Tab 3    norethindrone (MICRONOR) 0.35 mg tab Take 1 Tab by mouth daily. 1 Package 11    prenatal multivit-ca-min-fe-fa (PRENATAL VITAMIN) tab TAKE 1 TAB BY MOUTH DAILY. 30 Tab 5    Omeprazole delayed release (PRILOSEC D/R) 20 mg tablet Take 1 Tab by mouth daily. 90 Tab 3    cyproheptadine (PERIACTIN) 4 mg tablet TAKE 1 TAB BY MOUTH ONCE OVER TWENTY-FOUR (24) HOURS.  3    dextromethorphan-guaiFENesin (ROBITUSSIN-DM)  mg/5 mL syrup Take 10 mL by mouth every six (6) hours as needed for Cough. 240 mL 0    camphor-eucalyptus oil-menthol (VICKS VAPORUB) 4.7-1.2-2.6 % oint 1 Actuation(s) by Apply Externally route three (3) times daily as needed.  50 g 0    fluticasone (FLONASE) 50 mcg/actuation nasal spray 2 Sprays by Both Nostrils route daily. 1 Bottle 0    ibuprofen (MOTRIN) 800 mg tablet Take 1 Tab by mouth every eight (8) hours as needed for Pain.  Indications: Pain 30 Tab 0    VENTOLIN HFA 90 mcg/actuation inhaler INHELE 1 PUFF EVERY FOUR (4) HOURS AS NEEDED FOR WHEEZING OR SHORTNESS OF BREATH. 18 Inhaler 3    promethazine (PHENERGAN) 12.5 mg tablet TAKE 1 TABLET BY MOUTH EVERY 6 HOURS AS NEEDED  0       Vital signs:    Visit Vitals  /70 (BP 1 Location: Left arm, BP Patient Position: Sitting)   Pulse 66   Temp 98.6 °F (37 °C) (Oral)   Resp 20   Ht 5' 6\" (1.676 m)   Wt 56.4 kg (124 lb 6.4 oz)   LMP 2018   SpO2 98%   BMI 20.08 kg/m²       Review of Systems:   As above otherwise 11 point review of systems negative including;   Constitutional no fever or chills  Skin denies rash or itching  HEENT  Denies tinnitus, hearing lose  Eyes denies diplopia vision lose  Respiratory denies sortness of breath  Cardiovascular denies chest pain, dyspnea on exertion  Gastrointestinal denies nausea, vomiting, diarrhea, constipation  Genitourinary denies incontinence  Musculoskeletal denies joint pain or swelling  Endocrine denies weight change  Hematology denies easy bruising or bleeding   Neurological as above in HPI      Patient Active Problem List   Diagnosis Code    Spontaneous  O03.9    Epilepsy ([facility] [address] G40.909    Chronic midline low back pain M54.5, G89.29    ACP (advance care planning) Z71.89    Bradycardia R00.1    Mild intermittent asthma with status asthmaticus J45.22    Low body weight due to inadequate caloric intake R63.6    Lumbar facet arthropathy M47.816    Vitamin D deficiency E55.9    Trichomonas infection A59.9    Muscle spasm of back M62.830    Intractable migraine without aura and without status migrainosus G43.019    Analgesic rebound headache G44.40, T39.95XA    Anxiety F41.9    Therapeutic drug monitoring Z51.81    Lumbar spondylosis M47.816  Lumbar degenerative disc disease M51.36    Chronic pain syndrome G89.4    HGSIL (high grade squamous intraepithelial lesion) on Pap smear of cervix R87.613    Gastroesophageal reflux disease without esophagitis K21.9    Edema of foot R60.0    S/P LEEP (status post loop electrosurgical excision procedure) N56.309         Objective: The patient is awake, alert, and oriented x 4. Fund of knowledge is adequate. Speech is fluent and memory is intact. Cranial Nerves: II - Visual fields are full to confrontation. III, IV, VI - Extraocular movements are intact. There is no nystagmus. V - Facial sensation is intact to pinprick. VII - Face is symmetrical.  VIII - Hearing is present. IX, X, XII - Palate is symmetrical.   XI - Shoulder shrugging and head turning intact  Motor: The patient moves all four limbs fairly well and symmetrically. Tone is normal. Reflexes are 3+ and symmetrical. Plantars are down going. Gait is normal.  She can tandem walk. CBC:   Lab Results   Component Value Date/Time    WBC 9.4 12/13/2018 08:50 PM    RBC 4.30 12/13/2018 08:50 PM    HGB 13.0 12/13/2018 08:50 PM    HCT 39.0 12/13/2018 08:50 PM    PLATELET 589 88/68/4808 08:50 PM     BMP:   Lab Results   Component Value Date/Time    Glucose 85 12/13/2018 08:50 PM    Sodium 138 12/13/2018 08:50 PM    Potassium 3.8 12/13/2018 08:50 PM    Chloride 108 12/13/2018 08:50 PM    CO2 24 12/13/2018 08:50 PM    BUN 16 12/13/2018 08:50 PM    Creatinine 0.72 12/13/2018 08:50 PM    Calcium 9.0 12/13/2018 08:50 PM     CMP:   Lab Results   Component Value Date/Time    Glucose 85 12/13/2018 08:50 PM    Sodium 138 12/13/2018 08:50 PM    Potassium 3.8 12/13/2018 08:50 PM    Chloride 108 12/13/2018 08:50 PM    CO2 24 12/13/2018 08:50 PM    BUN 16 12/13/2018 08:50 PM    Creatinine 0.72 12/13/2018 08:50 PM    Calcium 9.0 12/13/2018 08:50 PM    Anion gap 6 12/13/2018 08:50 PM    BUN/Creatinine ratio 22 (H) 12/13/2018 08:50 PM    Alk.  phosphatase 72 12/13/2018 08:50 PM    Protein, total 7.6 12/13/2018 08:50 PM    Albumin 4.1 12/13/2018 08:50 PM    Globulin 3.5 12/13/2018 08:50 PM    A-G Ratio 1.2 12/13/2018 08:50 PM     Coagulation: No results found for: PTP, INR, APTT, PTTT  Cardiac markers: No results found for: CPK, CKND1, DONNA    6/21/2018  1:25 PM - [place], [facility] Lab Results In   Component Results   Component Value Flag Ref Range Units Status   Valproic acid 40 (L) 50 - 100 ug/mL Final   Comment:         Assessment:  Good results with very low dose Depakote, now with recurrent headaches. Some intermittent diplopia not repeatable on exam, but apparent with exertion. Increased reflexes noted on exam, pathologic in nature. Plan:  Needs an MRI scan of the Brain. Check Depakote level before incresing dose. RTC 3 weeks. Sincerely,        Jason Leon.  Kandi Street M.D.

## 2018-12-17 NOTE — LETTER
12/17/2018 10:23 AM 
 
Patient:  Stewart Ro YOB: 1985 Date of Visit: 12/17/2018 Dear Jasmin Marx, ANISA 
[id number] [facility] [id number] VIA In Basket 
 : Thank you for referring Ms. Jessica Aragon to me for evaluation/treatment. Below are the relevant portions of my assessment and plan of care. If you have questions, please do not hesitate to call me. I look forward to following Ms. Elina Puentes along with you.  
 
 
 
Sincerely, 
 
 
Malcolm Hughes MD 
 
 Patient/Caregiver provided printed discharge information.

## 2018-12-17 NOTE — PROGRESS NOTES
Cameron Ugalde is a 35 y.o. female in today for follow-up on seizures and migraines. Patient has additional concerns of blurriness and temporary blindness, as well as high reflexes per Pain Mgm't provider. Learning assessment previously completed; primary language is [place]. 1. Have you been to the ER, urgent care clinic since your last visit? Hospitalized since your last visit? Yes Reason for visit: [facility] ED, 12/13/18, head pain, dizziness, blurred vision and vomiting 12/15/18, HA     2. Have you seen or consulted any other health care providers outside of the [address] [place] [address] since your last visit? Include any pap smears or colon screening.  No

## 2018-12-19 DIAGNOSIS — F41.9 ANXIETY: ICD-10-CM

## 2018-12-19 RX ORDER — BUSPIRONE HYDROCHLORIDE 5 MG/1
TABLET ORAL
Qty: 60 TAB | Refills: 2 | Status: SHIPPED | OUTPATIENT
Start: 2018-12-19 | End: 2019-01-22 | Stop reason: SDUPTHER

## 2018-12-31 ENCOUNTER — HOSPITAL ENCOUNTER (OUTPATIENT)
Dept: MRI IMAGING | Age: 33
Discharge: HOME | End: 2018-12-31
Attending: PSYCHIATRY & NEUROLOGY
Payer: MEDICAID

## 2018-12-31 DIAGNOSIS — G40.909 SEIZURE DISORDER (HCC): ICD-10-CM

## 2018-12-31 DIAGNOSIS — G43.019 INTRACTABLE MIGRAINE WITHOUT AURA AND WITHOUT STATUS MIGRAINOSUS: ICD-10-CM

## 2018-12-31 PROCEDURE — 70551 MRI BRAIN STEM W/O DYE: CPT

## 2019-01-04 RX ORDER — IBUPROFEN 800 MG/1
TABLET ORAL
Qty: 30 TAB | Refills: 0 | Status: SHIPPED | OUTPATIENT
Start: 2019-01-04

## 2019-01-09 ENCOUNTER — TELEPHONE (OUTPATIENT)
Dept: FAMILY MEDICINE CLINIC | Facility: CLINIC | Age: 34
End: 2019-01-09

## 2019-01-09 NOTE — TELEPHONE ENCOUNTER
Pharmacy called for an alternative for the patient's Buspar prescription to be sent over because it is on back order.

## 2019-01-22 ENCOUNTER — OFFICE VISIT (OUTPATIENT)
Dept: FAMILY MEDICINE CLINIC | Facility: CLINIC | Age: 34
End: 2019-01-22

## 2019-01-22 VITALS
RESPIRATION RATE: 15 BRPM | OXYGEN SATURATION: 98 % | SYSTOLIC BLOOD PRESSURE: 109 MMHG | WEIGHT: 124 LBS | HEIGHT: 66 IN | TEMPERATURE: 97.8 F | HEART RATE: 67 BPM | DIASTOLIC BLOOD PRESSURE: 72 MMHG | BODY MASS INDEX: 19.93 KG/M2

## 2019-01-22 DIAGNOSIS — G47.00 INSOMNIA, UNSPECIFIED TYPE: ICD-10-CM

## 2019-01-22 DIAGNOSIS — J45.41 RAD (REACTIVE AIRWAY DISEASE), MODERATE PERSISTENT, WITH ACUTE EXACERBATION: ICD-10-CM

## 2019-01-22 DIAGNOSIS — F41.9 ANXIETY: ICD-10-CM

## 2019-01-22 RX ORDER — HYDROXYZINE PAMOATE 50 MG/1
CAPSULE ORAL
Qty: 30 CAP | Refills: 2 | Status: SHIPPED | OUTPATIENT
Start: 2019-01-22

## 2019-01-22 RX ORDER — ALBUTEROL SULFATE 90 UG/1
AEROSOL, METERED RESPIRATORY (INHALATION)
Qty: 1 INHALER | Refills: 3 | Status: SHIPPED | OUTPATIENT
Start: 2019-01-22

## 2019-01-22 RX ORDER — BUSPIRONE HYDROCHLORIDE 5 MG/1
TABLET ORAL
Qty: 60 TAB | Refills: 2 | Status: SHIPPED | OUTPATIENT
Start: 2019-01-22 | End: 2019-05-30

## 2019-01-22 RX ORDER — PAROXETINE HYDROCHLORIDE 40 MG/1
40 TABLET, FILM COATED ORAL DAILY
Qty: 30 TAB | Refills: 3 | Status: SHIPPED | OUTPATIENT
Start: 2019-01-22 | End: 2019-05-30

## 2019-01-22 NOTE — PATIENT INSTRUCTIONS
Anxiety Disorder: Care Instructions  Your Care Instructions    Anxiety is a normal reaction to stress. Difficult situations can cause you to have symptoms such as sweaty palms and a nervous feeling. In an anxiety disorder, the symptoms are far more severe. Constant worry, muscle tension, trouble sleeping, nausea and diarrhea, and other symptoms can make normal daily activities difficult or impossible. These symptoms may occur for no reason, and they can affect your work, school, or social life. Medicines, counseling, and self-care can all help. Follow-up care is a key part of your treatment and safety. Be sure to make and go to all appointments, and call your doctor if you are having problems. It's also a good idea to know your test results and keep a list of the medicines you take. How can you care for yourself at home? · Take medicines exactly as directed. Call your doctor if you think you are having a problem with your medicine. · Go to your counseling sessions and follow-up appointments. · Recognize and accept your anxiety. Then, when you are in a situation that makes you anxious, say to yourself, \"This is not an emergency. I feel uncomfortable, but I am not in danger. I can keep going even if I feel anxious. \"  · Be kind to your body:  ? Relieve tension with exercise or a massage. ? Get enough rest.  ? Avoid alcohol, caffeine, nicotine, and illegal drugs. They can increase your anxiety level and cause sleep problems. ? Learn and do relaxation techniques. See below for more about these techniques. · Engage your mind. Get out and do something you enjoy. Go to a funny movie, or take a walk or hike. Plan your day. Having too much or too little to do can make you anxious. · Keep a record of your symptoms. Discuss your fears with a good friend or family member, or join a support group for people with similar problems. Talking to others sometimes relieves stress.   · Get involved in social groups, or volunteer to help others. Being alone sometimes makes things seem worse than they are. · Get at least 30 minutes of exercise on most days of the week to relieve stress. Walking is a good choice. You also may want to do other activities, such as running, swimming, cycling, or playing tennis or team sports. Relaxation techniques  Do relaxation exercises 10 to 20 minutes a day. You can play soothing, relaxing music while you do them, if you wish. · Tell others in your house that you are going to do your relaxation exercises. Ask them not to disturb you. · Find a comfortable place, away from all distractions and noise. · Lie down on your back, or sit with your back straight. · Focus on your breathing. Make it slow and steady. · Breathe in through your nose. Breathe out through either your nose or mouth. · Breathe deeply, filling up the area between your navel and your rib cage. Breathe so that your belly goes up and down. · Do not hold your breath. · Breathe like this for 5 to 10 minutes. Notice the feeling of calmness throughout your whole body. As you continue to breathe slowly and deeply, relax by doing the following for another 5 to 10 minutes:  · Tighten and relax each muscle group in your body. You can begin at your toes and work your way up to your head. · Imagine your muscle groups relaxing and becoming heavy. · Empty your mind of all thoughts. · Let yourself relax more and more deeply. · Become aware of the state of calmness that surrounds you. · When your relaxation time is over, you can bring yourself back to alertness by moving your fingers and toes and then your hands and feet and then stretching and moving your entire body. Sometimes people fall asleep during relaxation, but they usually wake up shortly afterward. · Always give yourself time to return to full alertness before you drive a car or do anything that might cause an accident if you are not fully alert.  Never play a relaxation tape while you drive a car. When should you call for help? Call 911 anytime you think you may need emergency care. For example, call if:    · You feel you cannot stop from hurting yourself or someone else.   Jacques Gold the numbers for these national suicide hotlines: 4-085-532-TALK (5-220.486.8284) and 4-668-XXZOLTY (2-324.467.5006). If you or someone you know talks about suicide or feeling hopeless, get help right away.   Watch closely for changes in your health, and be sure to contact your doctor if:    · You have anxiety or fear that affects your life.     · You have symptoms of anxiety that are new or different from those you had before. Where can you learn more? Go to http://norma-kaley.info/. Enter P754 in the search box to learn more about \"Anxiety Disorder: Care Instructions. \"  Current as of: September 11, 2018  Content Version: 11.9  © 6018-7977 [employer]. Care instructions adapted under license by [employer] (which disclaims liability or warranty for this information). If you have questions about a medical condition or this instruction, always ask your healthcare professional. [place] [address] any warranty or liability for your use of this information. Depression and Chronic Disease: Care Instructions  Your Care Instructions    A chronic disease is one that you have for a long time. Some chronic diseases can be controlled, but they usually cannot be cured. Depression is common in people with chronic diseases, but it often goes unnoticed. Many people have concerns about seeking treatment for a mental health problem. You may think it's a sign of weakness, or you don't want people to know about it. It's important to overcome these reasons for not seeking treatment. Treating depression or anxiety is good for your health. Follow-up care is a key part of your treatment and safety.  Be sure to make and go to all appointments, and call your doctor if you are having problems. It's also a good idea to know your test results and keep a list of the medicines you take. How can you care for yourself at home? Watch for symptoms of depression  The symptoms of depression are often subtle at first. You may think they are caused by your disease rather than depression. Or you may think it is normal to be depressed when you have a chronic disease. If you are depressed you may:  · Feel sad or hopeless. · Feel guilty or worthless. · Not enjoy the things you used to enjoy. · Feel hopeless, as though life is not worth living. · Have trouble thinking or remembering. · Have low energy, and you may not eat or sleep well. · Pull away from others. · Think often about death or killing yourself. (Keep the numbers for these national suicide hotlines: 9-919-173-TALK [1-567.880.4064] and 2-331-SWTUPME [1-450.691.1464]. )  Get treatment  By treating your depression, you can feel more hopeful and have more energy. If you feel better, you may take better care of yourself, so your health may improve. · Talk to your doctor if you have any changes in mood during treatment for your disease. · Ask your doctor for help. Counseling, antidepressant medicine, or a combination of the two can help most people with depression. Often a combination works best. Counseling can also help you cope with having a chronic disease. When should you call for help? Call 911 anytime you think you may need emergency care. For example, call if:    · You feel like hurting yourself or someone else.     · Someone you know has depression and is about to attempt or is attempting suicide.   [facility] your doctor now or seek immediate medical care if:    · You hear voices.     · Someone you know has depression and:  ? Starts to give away his or her possessions. ? Uses illegal drugs or drinks alcohol heavily.   ? Talks or writes about death, including writing suicide notes or talking about guns, knives, or pills.  ? Starts to spend a lot of time alone. ? Acts very aggressively or suddenly appears calm.    Watch closely for changes in your health, and be sure to contact your doctor if:    · You do not get better as expected. Where can you learn more? Go to http://norma-kaley.info/. Enter H640 in the search box to learn more about \"Depression and Chronic Disease: Care Instructions. \"  Current as of: September 11, 2018  Content Version: 11.9  © 5745-3103 [employer]. Care instructions adapted under license by [employer] (which disclaims liability or warranty for this information). If you have questions about a medical condition or this instruction, always ask your healthcare professional. [address] any warranty or liability for your use of this information.

## 2019-01-22 NOTE — PROGRESS NOTES
Progress Note  Today's Date:  2019   Patient:  Shashank Rodriguez  Patient :  1985    Subjective:   Shashank Rodriguez is a 35 y.o. female who presents for follow up. Anxiety  She takes Buspar and Paxil.     Low body weight  Her BMI is normal.   she is on ensure, she takes Periactin     Back pain - she sees pain management and back specialist.     Vitaminm D deficiency- she is taking vitamin D, and multivitamin supplement      Gerd- followed by GI  Takes phenergan, and prilosec   . Insomnia- she sees sleep specialist    Current Outpatient Meds and Allergies     Current Outpatient Medications on File Prior to Visit   Medication Sig Dispense Refill    ibuprofen (MOTRIN) 800 mg tablet TAKE 1 TABLET BY MOUTH EVERY 8 HOURS AS NEEDED FOR PAIN 30 Tab 0    busPIRone (BUSPAR) 5 mg tablet TAKE 2 TABLET BY MOUTH EVERY DAY 60 Tab 2    cyproheptadine (PERIACTIN) 4 mg tablet TAKE 1 TAB BY MOUTH ONCE OVER TWENTY-FOUR (24) HOURS.  3    camphor-eucalyptus oil-menthol (VICKS VAPORUB) 4.7-1.2-2.6 % oint 1 Actuation(s) by Apply Externally route three (3) times daily as needed. 50 g 0    ENSURE PLUS 0.05-1.5 gram-kcal/mL liqd TAKE 2 BOTTLES BY MOUTH 3 TIMES A DAY [id number] mL 2    hydrOXYzine pamoate (VISTARIL) 50 mg capsule TAKE ONE CAPSULE BY MOUTH AT BEDTIME 30 Cap 2    divalproex DR (DEPAKOTE) 250 mg tablet Take 1 Tab by mouth two (2) times a day. Indications: MIGRAINE PREVENTION 60 Tab 10    promethazine (PHENERGAN) 12.5 mg tablet TAKE 1 TABLET BY MOUTH EVERY 6 HOURS AS NEEDED  0    multivitamin-min-iron-FA-vit K 18 mg iron-400 mcg-25 mcg tab Take 1 Tab by mouth daily. 90 Tab 3    norethindrone (MICRONOR) 0.35 mg tab Take 1 Tab by mouth daily. 1 Package 11    prenatal multivit-ca-min-fe-fa (PRENATAL VITAMIN) tab TAKE 1 TAB BY MOUTH DAILY. 30 Tab 5    Omeprazole delayed release (PRILOSEC D/R) 20 mg tablet Take 1 Tab by mouth daily.  90 Tab 3    PROAIR HFA 90 mcg/actuation inhaler INHELE 1 PUFF EVERY FOUR (4) HOURS AS NEEDED FOR WHEEZING OR SHORTNESS OF BREATH. 8.5 Inhaler 0    dextromethorphan-guaiFENesin (ROBITUSSIN-DM)  mg/5 mL syrup Take 10 mL by mouth every six (6) hours as needed for Cough. 240 mL 0    fluticasone (FLONASE) 50 mcg/actuation nasal spray 2 Sprays by Both Nostrils route daily. 1 Bottle 0     No current facility-administered medications on file prior to visit. These medications have been reviewed and reconciled with the patient during today's visit. Allergies   Allergen Reactions    Imitrex [Sumatriptan Succinate] Anaphylaxis    Imitrex [Sumatriptan] Anaphylaxis    Iodine Cough     Coughing up blood      Sea Food [Seafood] Hives and Unknown (comments)     Per pt report        ROS:       Positive symptoms are BOLDED:    CONST:   Fatigue, weight change, appetite change  NEURO:   Headaches, vision changes, dizziness, loss of consciousness  CV:      Chest pain, palpitations, orthopnea, PND  PULM:             SOB, wheezing, cough, hemoptysis  GI:             Nausea, vomiting, abdominal pain, greasy stools, blood in stool,     diarrhea, constipation  :       Dysuria, hematuria, change in urine  MS:      Muscle/joint pain, joint swelling  SKIN:        Rashes, skin changes  ALLERGY: Seasonal allergies, itchy eyes  HEME: Easy bleeding/bruising      Objective:       Visit Vitals  /72 (BP 1 Location: Left arm, BP Patient Position: Sitting)   Pulse 67   Temp 97.8 °F (36.6 °C) (Oral)   Resp 15   Ht 5' 6\" (1.676 m)   Wt 124 lb (56.2 kg)   LMP 12/30/2018 (Exact Date)   SpO2 98%   BMI 20.01 kg/m²     General:   Well-nourished, well-groomed, pleasant, alert, in no acute distress.      Head:  Normocephalic, atraumatic, MMM, normal dentition  Ears:  External ears normaL, BilateralTMs normal,   Eyes:  EOMI, PERRL  Nose:  External nares WNL  Neck:  Neck supple with normal ROM for age, no thyromegaly, No LAD  Cardiovasc:   Regular rate and rhythm, no murmurs, no rubs, no gallops, Pulmonary:   Clear breath sounds bilaterally, good air movement, no wheezing, no rales, no rhonchi, normal respiratory effort  Abdomen:   Abdomen soft, nontender, nondistended, NABS  Extremities:   No edema, no tenderness with palpation of calves, warm and well-perfused  Neuro:   Alert, conversant, appropriate, following commands, no focal deficits. Admission on 12/13/2018, Discharged on 12/13/2018   Component Date Value Ref Range Status    WBC 12/13/2018 9.4  4.6 - 13.2 K/uL Final    RBC 12/13/2018 4.30  4.20 - 5.30 M/uL Final    HGB 12/13/2018 13.0  12.0 - 16.0 g/dL Final    HCT 12/13/2018 39.0  35.0 - 45.0 % Final    MCV 12/13/2018 90.7  74.0 - 97.0 FL Final    MCH 12/13/2018 30.2  24.0 - 34.0 PG Final    MCHC 12/13/2018 33.3  31.0 - 37.0 g/dL Final    RDW 12/13/2018 12.4  11.6 - 14.5 % Final    PLATELET 67/96/4059 225  135 - 420 K/uL Final    MPV 12/13/2018 10.6  9.2 - 11.8 FL Final    NEUTROPHILS 12/13/2018 75* 40 - 73 % Final    LYMPHOCYTES 12/13/2018 19* 21 - 52 % Final    MONOCYTES 12/13/2018 6  3 - 10 % Final    EOSINOPHILS 12/13/2018 0  0 - 5 % Final    BASOPHILS 12/13/2018 0  0 - 2 % Final    ABS. NEUTROPHILS 12/13/2018 7.0  1.8 - 8.0 K/UL Final    ABS. LYMPHOCYTES 12/13/2018 1.8  0.9 - 3.6 K/UL Final    ABS. MONOCYTES 12/13/2018 0.6  0.05 - 1.2 K/UL Final    ABS. EOSINOPHILS 12/13/2018 0.0  0.0 - 0.4 K/UL Final    ABS.  BASOPHILS 12/13/2018 0.0  0.0 - 0.1 K/UL Final    DF 12/13/2018 AUTOMATED    Final    Sodium 12/13/2018 138  136 - 145 mmol/L Final    Potassium 12/13/2018 3.8  3.5 - 5.5 mmol/L Final    Chloride 12/13/2018 108  100 - 108 mmol/L Final    CO2 12/13/2018 24  21 - 32 mmol/L Final    Anion gap 12/13/2018 6  3.0 - 18 mmol/L Final    Glucose 12/13/2018 85  74 - 99 mg/dL Final    BUN 12/13/2018 16  7.0 - 18 MG/DL Final    Creatinine 12/13/2018 0.72  0.6 - 1.3 MG/DL Final    BUN/Creatinine ratio 12/13/2018 22* 12 - 20   Final    GFR est AA 12/13/2018 >60  >60 ml/min/1.73m2 Final    GFR est non-AA 12/13/2018 >60  >60 ml/min/1.73m2 Final    Comment: (NOTE)  Estimated GFR is calculated using the Modification of Diet in Renal   Disease (MDRD) Study equation, reported for both  Americans   (GFRAA) and non- Americans (GFRNA), and normalized to 1.73m2   body surface area. The physician must decide which value applies to   the patient. The MDRD study equation should only be used in   individuals age 25 or older. It has not been validated for the   following: pregnant women, patients with serious comorbid conditions,   or on certain medications, or persons with extremes of body size,   muscle mass, or nutritional status.  Calcium 12/13/2018 9.0  8.5 - 10.1 MG/DL Final    Bilirubin, total 12/13/2018 0.4  0.2 - 1.0 MG/DL Final    ALT (SGPT) 12/13/2018 22  13 - 56 U/L Final    AST (SGOT) 12/13/2018 9* 15 - 37 U/L Final    Alk. phosphatase 12/13/2018 72  45 - 117 U/L Final    Protein, total 12/13/2018 7.6  6.4 - 8.2 g/dL Final    Albumin 12/13/2018 4.1  3.4 - 5.0 g/dL Final    Globulin 12/13/2018 3.5  2.0 - 4.0 g/dL Final    A-G Ratio 12/13/2018 1.2  0.8 - 1.7   Final    HCG, Ql. 12/13/2018 NEGATIVE   NEG   Final    Test results should be confirmed using serum quantitative hCG when detection of pregnancy is critical and before performing any critical medical procedure.     Color 12/13/2018 YELLOW    Final    Appearance 12/13/2018 CLEAR    Final    Specific gravity 12/13/2018 1.029  1.005 - 1.030   Final    pH (UA) 12/13/2018 5.5  5.0 - 8.0   Final    Protein 12/13/2018 NEGATIVE   NEG mg/dL Final    Glucose 12/13/2018 NEGATIVE   NEG mg/dL Final    Ketone 12/13/2018 NEGATIVE   NEG mg/dL Final    Bilirubin 12/13/2018 NEGATIVE   NEG   Final    Blood 12/13/2018 SMALL* NEG   Final    Urobilinogen 12/13/2018 1.0  0.2 - 1.0 EU/dL Final    Nitrites 12/13/2018 NEGATIVE   NEG   Final    Leukocyte Esterase 12/13/2018 NEGATIVE   NEG   Final    WBC 12/13/2018 0 to 2  0 - 4 /hpf Final    RBC 12/13/2018 4 to 6  0 - 5 /hpf Final    Epithelial cells 12/13/2018 FEW  0 - 5 /lpf Final    Bacteria 12/13/2018 NEGATIVE   NEG /hpf Final   Admission on 11/06/2018, Discharged on 11/06/2018   Component Date Value Ref Range Status    Ventricular Rate 11/06/2018 83  BPM Final    Atrial Rate 11/06/2018 83  BPM Final    P-R Interval 11/06/2018 124  ms Final    QRS Duration 11/06/2018 72  ms Final    Q-T Interval 11/06/2018 348  ms Final    QTC Calculation (Bezet) 11/06/2018 408  ms Final    Calculated P Axis 11/06/2018 80  degrees Final    Calculated R Axis 11/06/2018 60  degrees Final    Calculated T Axis 11/06/2018 5  degrees Final    Diagnosis 11/06/2018    Final                    Value:Normal sinus rhythm with sinus arrhythmia  Normal ECG  When compared with ECG of 10-SEP-2018 12:29,  Non-specific change in ST segment in Inferior leads  Nonspecific T wave abnormality now evident in Inferior leads  Confirmed by [employer] [employer] ([id number]) on 11/7/2018 3:52:31 PM     Admission on 09/12/2018, Discharged on 09/12/2018   Component Date Value Ref Range Status    Pregnancy test,urine (POC) 09/12/2018 NEGATIVE   NEG   Final    Test results should be confirmed using serum quantitative hCG when detection of pregnancy is critical and before performing any critical medical procedure.     Chlamydia amplification 09/12/2018 NEGATIVE   NEG   Final    N. gonorrhoeae amplification 09/12/2018 NEGATIVE   NEG   Final    Trichomonas amplification 09/12/2018 NEGATIVE   NEG   Final    Specimen Source 09/12/2018 ENDOCERVICAL    Final   Hospital Outpatient Visit on 09/10/2018   Component Date Value Ref Range Status    Ventricular Rate 09/10/2018 63  BPM Final    Atrial Rate 09/10/2018 63  BPM Final    P-R Interval 09/10/2018 132  ms Final    QRS Duration 09/10/2018 82  ms Final    Q-T Interval 09/10/2018 414  ms Final    QTC Calculation (Bezet) 09/10/2018 423  ms Final    Calculated P Axis 09/10/2018 61  degrees Final    Calculated R Axis 09/10/2018 64  degrees Final    Calculated T Axis 09/10/2018 50  degrees Final    Diagnosis 09/10/2018    Final                    Value:Normal sinus rhythm  Normal ECG  When compared with ECG of 05-JUN-2018 11:34,  No significant change was found  Confirmed by Gregg Barton ([id number]) on 9/10/2018 [address] Outpatient Visit on 09/10/2018   Component Date Value Ref Range Status    WBC 09/10/2018 6.4  4.6 - 13.2 K/uL Final    RBC 09/10/2018 4.30  4.20 - 5.30 M/uL Final    HGB 09/10/2018 12.9  12.0 - 16.0 g/dL Final    HCT 09/10/2018 39.6  35.0 - 45.0 % Final    MCV 09/10/2018 92.1  74.0 - 97.0 FL Final    MCH 09/10/2018 30.0  24.0 - 34.0 PG Final    MCHC 09/10/2018 32.6  31.0 - 37.0 g/dL Final    RDW 09/10/2018 12.6  11.6 - 14.5 % Final    PLATELET 39/57/4425 055  135 - 420 K/uL Final    MPV 09/10/2018 11.6  9.2 - 11.8 FL Final    NEUTROPHILS 09/10/2018 63  40 - 73 % Final    LYMPHOCYTES 09/10/2018 31  21 - 52 % Final    MONOCYTES 09/10/2018 6  3 - 10 % Final    EOSINOPHILS 09/10/2018 0  0 - 5 % Final    BASOPHILS 09/10/2018 0  0 - 2 % Final    ABS. NEUTROPHILS 09/10/2018 4.0  1.8 - 8.0 K/UL Final    ABS. LYMPHOCYTES 09/10/2018 2.0  0.9 - 3.6 K/UL Final    ABS. MONOCYTES 09/10/2018 0.4  0.05 - 1.2 K/UL Final    ABS. EOSINOPHILS 09/10/2018 0.0  0.0 - 0.4 K/UL Final    ABS. BASOPHILS 09/10/2018 0.0  0.0 - 0.1 K/UL Final    DF 09/10/2018 AUTOMATED    Final    HCG urine, QL 09/10/2018 NEGATIVE   NEG   Final    Test results should be confirmed using serum quantitative hCG when detection of pregnancy is critical and before performing any critical medical procedure.     Sodium 09/10/2018 147* 136 - 145 mmol/L Final    Potassium 09/10/2018 4.1  3.5 - 5.5 mmol/L Final    Chloride 09/10/2018 109* 100 - 108 mmol/L Final    CO2 09/10/2018 27  21 - 32 mmol/L Final    Anion gap 09/10/2018 11  3.0 - 18 mmol/L Final    Glucose 09/10/2018 83  74 - 99 mg/dL Final    BUN 09/10/2018 8  7.0 - 18 MG/DL Final    Creatinine 09/10/2018 0.71  0.6 - 1.3 MG/DL Final    BUN/Creatinine ratio 09/10/2018 11* 12 - 20   Final    GFR est AA 09/10/2018 >60  >60 ml/min/1.73m2 Final    GFR est non-AA 09/10/2018 >60  >60 ml/min/1.73m2 Final    Comment: (NOTE)  Estimated GFR is calculated using the Modification of Diet in Renal   Disease (MDRD) Study equation, reported for both  Americans   (GFRAA) and non- Americans (GFRNA), and normalized to 1.73m2   body surface area. The physician must decide which value applies to   the patient. The MDRD study equation should only be used in   individuals age 25 or older. It has not been validated for the   following: pregnant women, patients with serious comorbid conditions,   or on certain medications, or persons with extremes of body size,   muscle mass, or nutritional status.  Calcium 09/10/2018 9.1  8.5 - 10.1 MG/DL Final    Bilirubin, total 09/10/2018 0.4  0.2 - 1.0 MG/DL Final    ALT (SGPT) 09/10/2018 21  13 - 56 U/L Final    AST (SGOT) 09/10/2018 11* 15 - 37 U/L Final    Alk. phosphatase 09/10/2018 73  45 - 117 U/L Final    Protein, total 09/10/2018 7.8  6.4 - 8.2 g/dL Final    Albumin 09/10/2018 4.5  3.4 - 5.0 g/dL Final    Globulin 09/10/2018 3.3  2.0 - 4.0 g/dL Final    A-G Ratio 09/10/2018 1.4  0.8 - 1.7   Final    Crossmatch Expiration 09/10/2018 09/15/2018   Final    ABO/Rh(D) 09/10/2018 B POSITIVE   Final    Antibody screen 09/10/2018 NEG   Final   Office Visit on 06/20/2018   Component Date Value Ref Range Status    Protein, total 06/20/2018 7.7  6.0 - 8.5 g/dL Final    Albumin 06/20/2018 5.0  3.5 - 5.5 g/dL Final    Bilirubin, total 06/20/2018 0.4  0.0 - 1.2 mg/dL Final    Bilirubin, direct 06/20/2018 0.09  0.00 - 0.40 mg/dL Final    Alk.  phosphatase 06/20/2018 70  39 - 117 IU/L Final    AST (SGOT) 06/20/2018 14  0 - 40 IU/L Final    ALT (SGPT) 06/20/2018 9 0 - 32 IU/L Final    Valproic acid 06/20/2018 40* 50 - 100 ug/mL Final    Comment:                                 Detection Limit = 4                             <4 indicates None Detected  Toxicity may occur at levels of 100-500. Measurements  of free unbound valproic acid may improve the assess-  ment of clinical response. Office Visit on 06/06/2018   Component Date Value Ref Range Status    . 06/06/2018 Comment   Final    Comment: Material submitted: Waqas Wade PART A: CERVIX, 12:00, BIOPSY  PART B: CERVIX, 7:00, BIOPSY  PART C: ENDOCERVIX, CURETTINGS      Diagnosis ICD code 06/06/2018 Comment   Final    Comment: Clinician provided ICD-10:  B57.637      . 06/06/2018 Comment   Final    Comment: Pre-operative diagnosis:                                        . HGSIL      . 06/06/2018 Comment   Final        . 06/06/2018 Comment   Final    Comment: Electronically signed: Waqas Mott MD, Pathologist      . 06/06/2018 Comment   Final    Comment: Savanna Herman description:                                         .  3 Containers, formalin-filled, labeled with patient identification. Part A: CERVIX, 12:00, BIOPSY:  One fragment measuring 0.3 cm, totally submitted in  cassette(s) 1. Part B: CERVIX, 7:00, BIOPSY:  One fragment measuring 0.3 cm, totally submitted in  cassette(s) 1. Part C: ENDOCERVIX, CURETTINGS:  Received in formalin is <0.1 X <0.1 X <0.1 cm in aggregate of  mucinous material.  Tissue is submitted in toto in 1 cassette(s). /SFS  /SFS      Diagnosis ICD code 06/06/2018 Comment   Final    Comment: Pathologist provided ICD-10:  N72, N87.1      . 06/06/2018 Comment   Final    Comment: CPT                                                        .   [id number], [id number], [id number]     Admission on 06/05/2018, Discharged on 06/05/2018   Component Date Value Ref Range Status    Ventricular Rate 06/05/2018 54  BPM Final    Atrial Rate 06/05/2018 54  BPM Final    P-R Interval 06/05/2018 108  ms Final    QRS Duration 06/05/2018 76  ms Final    Q-T Interval 06/05/2018 434  ms Final    QTC Calculation (Bezet) 06/05/2018 411  ms Final    Calculated P Axis 06/05/2018 67  degrees Final    Calculated R Axis 06/05/2018 72  degrees Final    Calculated T Axis 06/05/2018 46  degrees Final    Diagnosis 06/05/2018    Final                    Value:Sinus bradycardia with short WA  Otherwise normal ECG  When compared with ECG of 21-MAR-2018 10:32,  No significant change was found  Confirmed by Elier Cueva ([id number]) on 6/6/2018 2:00:57 PM     Admission on 05/24/2018, Discharged on 05/24/2018   Component Date Value Ref Range Status    Pregnancy test,urine (POC) 05/24/2018 NEGATIVE   NEG   Final    Test results should be confirmed using serum quantitative hCG when detection of pregnancy is critical and before performing any critical medical procedure. Assessment:       1. RAD (reactive airway disease), moderate persistent, with acute exacerbation    2. Insomnia, unspecified type    3. Anxiety        Plan:       Orders Placed This Encounter    albuterol (VENTOLIN HFA) 90 mcg/actuation inhaler     Sig: INHALE 1 PUFF EVERY FOUR (4) HOURS AS NEEDED FOR WHEEZING OR SHORTNESS OF BREATH. Dispense:  1 Inhaler     Refill:  3     Please dispense ventolin - not pro air. Patient states pro air does not work for her.  hydrOXYzine pamoate (VISTARIL) 50 mg capsule     Sig: TAKE ONE CAPSULE BY MOUTH AT BEDTIME     Dispense:  30 Cap     Refill:  2    busPIRone (BUSPAR) 5 mg tablet     Sig: TAKE 2 TABLET BY MOUTH EVERY DAY     Dispense:  60 Tab     Refill:  2    PARoxetine (PAXIL) 40 mg tablet     Sig: Take 1 Tab by mouth daily.      Dispense:  30 Tab     Refill:  3   follow up in three months    Healthy lifestyle has been encouraged including avoidance of tobacco, limiting or avoiding alcohol intake, heart healthy diet which is low in cholesterol and saturated fat and contains fresh fruits, vegetables and whole grains and fiber, regular exercise with goals of 20-30 minutes 3-5 days weekly and maintaining an optimal BMI. I have discussed the diagnosis with the patient and the intended plan as seen in the above orders. The patient has received an after-visit summary along with patient information handout. I have discussed medication side effects and warnings with the patient as well. Pt verbalized understanding.     Sharad JON  [facility]  [address], [address]  Phone [phone]  Fax [phone]

## 2019-01-22 NOTE — PROGRESS NOTES
Beronica Billings is a 35 y.o.  female presents today for office visit for   Chief Complaint   Patient presents with    Weight Loss    Depression    Anxiety   Pt is fasting. Pt is in Room # 8.      1. Have you been to the ER, urgent care clinic since your last visit? Hospitalized since your last visit? Yes [facility] 12/2018 for Anxiety, [facility] URI 2018,  [facility], [address] [facility]    2. Have you seen or consulted any other health care providers outside of the [address] [place] [address] since your last visit? Include any pap smears or colon screening. No    Health Maintenance reviewed -  Pt declines the influenza vaccine at this time. Requested Prescriptions     Pending Prescriptions Disp Refills    albuterol (VENTOLIN HFA) 90 mcg/actuation inhaler 18 Inhaler 3     Sig: INHELE 1 PUFF EVERY FOUR (4) HOURS AS NEEDED FOR WHEEZING OR SHORTNESS OF BREATH.  hydrOXYzine pamoate (VISTARIL) 50 mg capsule 30 Cap 2     Sig: TAKE ONE CAPSULE BY MOUTH AT BEDTIME    busPIRone (BUSPAR) 5 mg tablet 60 Tab 2     Sig: TAKE 2 TABLET BY MOUTH EVERY DAY    PARoxetine (PAXIL) 40 mg tablet 30 Tab 3     Sig: Take 1 Tab by mouth daily.        Visit Vitals  /72 (BP 1 Location: Left arm, BP Patient Position: Sitting)   Pulse 67   Temp 97.8 °F (36.6 °C) (Oral)   Resp 15   Ht 5' 6\" (1.676 m)   Wt 124 lb (56.2 kg)   LMP 12/30/2018 (Exact Date)   SpO2 98%   BMI 20.01 kg/m²         Upcoming Appts  Yes Neurology, Dr. Padmini Sanchez 2/8/19, and Pain Management, Dr. Lizzy Banda 3/4/19    VORB: No orders of the defined types were placed in this encounter.  Glen Monroy NP/Nimco King LPN

## 2019-02-06 ENCOUNTER — DOCUMENTATION ONLY (OUTPATIENT)
Dept: NEUROLOGY | Age: 34
End: 2019-02-06

## 2019-02-06 NOTE — PROGRESS NOTES
Chart review reveals scheduled follow-up to discuss migraines and results of ordered testing. MRI completed, Valproic Acid not completed at this time.

## 2019-05-30 ENCOUNTER — HOSPITAL ENCOUNTER (EMERGENCY)
Age: 34
Discharge: HOME | End: 2019-05-30
Attending: EMERGENCY MEDICINE
Payer: OTHER GOVERNMENT

## 2019-05-30 VITALS
OXYGEN SATURATION: 99 % | RESPIRATION RATE: 18 BRPM | DIASTOLIC BLOOD PRESSURE: 43 MMHG | BODY MASS INDEX: 19.29 KG/M2 | HEIGHT: 66 IN | TEMPERATURE: 98 F | SYSTOLIC BLOOD PRESSURE: 105 MMHG | HEART RATE: 67 BPM | WEIGHT: 120 LBS

## 2019-05-30 DIAGNOSIS — O26.899 ABDOMINAL CRAMPING AFFECTING PREGNANCY: ICD-10-CM

## 2019-05-30 DIAGNOSIS — R10.9 ABDOMINAL CRAMPING AFFECTING PREGNANCY: ICD-10-CM

## 2019-05-30 DIAGNOSIS — G43.109 MIGRAINE WITH AURA AND WITHOUT STATUS MIGRAINOSUS, NOT INTRACTABLE: Primary | ICD-10-CM

## 2019-05-30 LAB
ANION GAP SERPL CALC-SCNC: 12 MMOL/L (ref 3–18)
APPEARANCE UR: CLEAR
BACTERIA URNS QL MICRO: ABNORMAL /HPF
BASOPHILS # BLD: 0 K/UL (ref 0–0.1)
BASOPHILS NFR BLD: 0 % (ref 0–2)
BILIRUB UR QL: NEGATIVE
BUN SERPL-MCNC: 11 MG/DL (ref 7–18)
BUN/CREAT SERPL: 16 (ref 12–20)
CALCIUM SERPL-MCNC: 9.3 MG/DL (ref 8.5–10.1)
CHLORIDE SERPL-SCNC: 105 MMOL/L (ref 100–108)
CO2 SERPL-SCNC: 24 MMOL/L (ref 21–32)
COLOR UR: YELLOW
CREAT SERPL-MCNC: 0.68 MG/DL (ref 0.6–1.3)
DIFFERENTIAL METHOD BLD: ABNORMAL
EOSINOPHIL # BLD: 0 K/UL (ref 0–0.4)
EOSINOPHIL NFR BLD: 0 % (ref 0–5)
EPITH CASTS URNS QL MICRO: ABNORMAL /LPF (ref 0–5)
ERYTHROCYTE [DISTWIDTH] IN BLOOD BY AUTOMATED COUNT: 12.3 % (ref 11.6–14.5)
GLUCOSE SERPL-MCNC: 94 MG/DL (ref 74–99)
GLUCOSE UR STRIP.AUTO-MCNC: NEGATIVE MG/DL
HCG UR QL: POSITIVE
HCT VFR BLD AUTO: 36.3 % (ref 35–45)
HGB BLD-MCNC: 12.2 G/DL (ref 12–16)
HGB UR QL STRIP: ABNORMAL
KETONES UR QL STRIP.AUTO: NEGATIVE MG/DL
LEUKOCYTE ESTERASE UR QL STRIP.AUTO: NEGATIVE
LYMPHOCYTES # BLD: 2.1 K/UL (ref 0.9–3.6)
LYMPHOCYTES NFR BLD: 25 % (ref 21–52)
MCH RBC QN AUTO: 29.8 PG (ref 24–34)
MCHC RBC AUTO-ENTMCNC: 33.6 G/DL (ref 31–37)
MCV RBC AUTO: 88.5 FL (ref 74–97)
MONOCYTES # BLD: 0.4 K/UL (ref 0.05–1.2)
MONOCYTES NFR BLD: 5 % (ref 3–10)
MUCOUS THREADS URNS QL MICRO: ABNORMAL /LPF
NEUTS SEG # BLD: 6 K/UL (ref 1.8–8)
NEUTS SEG NFR BLD: 70 % (ref 40–73)
NITRITE UR QL STRIP.AUTO: NEGATIVE
PH UR STRIP: 5.5 [PH] (ref 5–8)
PLATELET # BLD AUTO: 256 K/UL (ref 135–420)
PMV BLD AUTO: 10.9 FL (ref 9.2–11.8)
POTASSIUM SERPL-SCNC: 3.9 MMOL/L (ref 3.5–5.5)
PROT UR STRIP-MCNC: NEGATIVE MG/DL
RBC # BLD AUTO: 4.1 M/UL (ref 4.2–5.3)
RBC #/AREA URNS HPF: 0 /HPF (ref 0–5)
SODIUM SERPL-SCNC: 141 MMOL/L (ref 136–145)
SP GR UR REFRACTOMETRY: >1.03 (ref 1–1.03)
UROBILINOGEN UR QL STRIP.AUTO: 1 EU/DL (ref 0.2–1)
WBC # BLD AUTO: 8.6 K/UL (ref 4.6–13.2)
WBC URNS QL MICRO: ABNORMAL /HPF (ref 0–4)

## 2019-05-30 PROCEDURE — 87491 CHLMYD TRACH DNA AMP PROBE: CPT

## 2019-05-30 PROCEDURE — 84702 CHORIONIC GONADOTROPIN TEST: CPT

## 2019-05-30 PROCEDURE — 74011250636 HC RX REV CODE- 250/636: Performed by: PHYSICIAN ASSISTANT

## 2019-05-30 PROCEDURE — 99285 EMERGENCY DEPT VISIT HI MDM: CPT

## 2019-05-30 PROCEDURE — 96361 HYDRATE IV INFUSION ADD-ON: CPT

## 2019-05-30 PROCEDURE — 85025 COMPLETE CBC W/AUTO DIFF WBC: CPT

## 2019-05-30 PROCEDURE — 80048 BASIC METABOLIC PNL TOTAL CA: CPT

## 2019-05-30 PROCEDURE — 96374 THER/PROPH/DIAG INJ IV PUSH: CPT

## 2019-05-30 PROCEDURE — 74011250637 HC RX REV CODE- 250/637: Performed by: PHYSICIAN ASSISTANT

## 2019-05-30 PROCEDURE — 74011250636 HC RX REV CODE- 250/636: Performed by: EMERGENCY MEDICINE

## 2019-05-30 PROCEDURE — 74011250637 HC RX REV CODE- 250/637: Performed by: EMERGENCY MEDICINE

## 2019-05-30 PROCEDURE — 81025 URINE PREGNANCY TEST: CPT

## 2019-05-30 PROCEDURE — 81001 URINALYSIS AUTO W/SCOPE: CPT

## 2019-05-30 RX ORDER — PROCHLORPERAZINE EDISYLATE 5 MG/ML
10 INJECTION INTRAMUSCULAR; INTRAVENOUS ONCE
Status: COMPLETED | OUTPATIENT
Start: 2019-05-30 | End: 2019-05-30

## 2019-05-30 RX ORDER — DIPHENHYDRAMINE HCL 12.5MG/5ML
25 ELIXIR ORAL
Status: COMPLETED | OUTPATIENT
Start: 2019-05-30 | End: 2019-05-30

## 2019-05-30 RX ORDER — PROMETHAZINE HYDROCHLORIDE 25 MG/1
25 TABLET ORAL
Status: COMPLETED | OUTPATIENT
Start: 2019-05-30 | End: 2019-05-30

## 2019-05-30 RX ORDER — PROMETHAZINE HYDROCHLORIDE 25 MG/1
25 TABLET ORAL
Qty: 25 TAB | Refills: 0 | Status: SHIPPED | OUTPATIENT
Start: 2019-05-30

## 2019-05-30 RX ADMIN — PROMETHAZINE HYDROCHLORIDE 25 MG: 25 TABLET ORAL at 21:21

## 2019-05-30 RX ADMIN — PROCHLORPERAZINE EDISYLATE 10 MG: 5 INJECTION INTRAMUSCULAR; INTRAVENOUS at 22:13

## 2019-05-30 RX ADMIN — SODIUM CHLORIDE 1000 ML: 900 INJECTION, SOLUTION INTRAVENOUS at 21:19

## 2019-05-30 RX ADMIN — DIPHENHYDRAMINE HYDROCHLORIDE 25 MG: 25 SOLUTION ORAL at 22:13

## 2019-05-30 NOTE — ED NOTES
I performed a brief evaluation, including history and physical, of the patient here in triage and I have determined that pt will need further treatment and evaluation from the main side ER physician. I have placed initial orders to help in expediting patients care. May 30, 2019 at 7:46 PM - TE Gusman      HPI: pt who is supposedly pregnant to ED c/o migraine, has hx of the same and this HA is no different but does not resolve even after sleep. Pt notes vomiting, blurry vision, dizziness. Notes abd cramping.     Visit Vitals  /72 (BP 1 Location: Right arm, BP Patient Position: At rest)   Pulse 63   Temp 98 °F (36.7 °C)   Resp 16   Ht 5' 6\" (1.676 m)   Wt 54.4 kg (120 lb)   SpO2 99%   BMI 19.37 kg/m²      Orders Placed This Encounter    CBC WITH AUTOMATED DIFF    METABOLIC PANEL, BASIC    URINALYSIS W/ RFLX MICROSCOPIC    HCG URINE, QL    sodium chloride 0.9 % bolus infusion 1,000 mL    promethazine (PHENERGAN) tablet 25 mg

## 2019-05-30 NOTE — ED TRIAGE NOTES
Positive pregnancy test at [place] by blood test. Waiting for [facility] appointment. Hx migraines. This one won't go away after sleep. vomittng , blurred vision. Dizzy. Cramping.

## 2019-05-31 LAB — HCG SERPL-ACNC: ABNORMAL MIU/ML (ref 0–10)

## 2019-05-31 NOTE — DISCHARGE INSTRUCTIONS
Migraine Headache: Care Instructions  Your Care Instructions  Migraines are painful, throbbing headaches that often start on one side of the head. They may cause nausea and vomiting and make you sensitive to light, sound, or smell. Without treatment, migraines can last from 4 hours to a few days. Medicines can help prevent migraines or stop them after they have started. Your doctor can help you find which ones work best for you. Follow-up care is a key part of your treatment and safety. Be sure to make and go to all appointments, and call your doctor if you are having problems. It's also a good idea to know your test results and keep a list of the medicines you take. How can you care for yourself at home? · Do not drive if you have taken a prescription pain medicine. · Rest in a quiet, dark room until your headache is gone. Close your eyes, and try to relax or go to sleep. Don't watch TV or read. · Put a cold, moist cloth or cold pack on the painful area for 10 to 20 minutes at a time. Put a thin cloth between the cold pack and your skin. · Use a warm, moist towel or a heating pad set on low to relax tight shoulder and neck muscles. · Have someone gently massage your neck and shoulders. · Take your medicines exactly as prescribed. Call your doctor if you think you are having a problem with your medicine. You will get more details on the specific medicines your doctor prescribes. · Be careful not to take pain medicine more often than the instructions allow. You could get worse or more frequent headaches when the medicine wears off. To prevent migraines  · Keep a headache diary so you can figure out what triggers your headaches. Avoiding triggers may help you prevent headaches. Record when each headache began, how long it lasted, and what the pain was like.  (Was it throbbing, aching, stabbing, or dull?) Write down any other symptoms you had with the headache, such as nausea, flashing lights or dark spots, or sensitivity to bright light or loud noise. Note if the headache occurred near your period. List anything that might have triggered the headache. Triggers may include certain foods (chocolate, cheese, wine) or odors, smoke, bright light, stress, or lack of sleep. · If your doctor has prescribed medicine for your migraines, take it as directed. You may have medicine that you take only when you get a migraine and medicine that you take all the time to help prevent migraines. ? If your doctor has prescribed medicine for when you get a headache, take it at the first sign of a migraine, unless your doctor has given you other instructions. ? If your doctor has prescribed medicine to prevent migraines, take it exactly as prescribed. Call your doctor if you think you are having a problem with your medicine. · Find healthy ways to deal with stress. Migraines are most common during or right after stressful times. Take time to relax before and after you do something that has caused a migraine in the past.  · Try to keep your muscles relaxed by keeping good posture. Check your jaw, face, neck, and shoulder muscles for tension. Try to relax them. When you sit at a desk, change positions often. And make sure to stretch for 30 seconds each hour. · Get plenty of sleep and exercise. · Eat meals on a regular schedule. Avoid foods and drinks that often trigger migraines. These include chocolate, alcohol (especially red wine and port), aspartame, monosodium glutamate (MSG), and some additives found in foods (such as hot dogs, ching, cold cuts, aged cheeses, and pickled foods). · Limit caffeine. Don't drink too much coffee, tea, or soda. But don't quit caffeine suddenly. That can also give you migraines. · Do not smoke or allow others to smoke around you. If you need help quitting, talk to your doctor about stop-smoking programs and medicines. These can increase your chances of quitting for good.   · If you are taking birth control pills or hormone therapy, talk to your doctor about whether they are triggering your migraines. When should you call for help? Call 911 anytime you think you may need emergency care. For example, call if:    · You have signs of a stroke. These may include:  ? Sudden numbness, paralysis, or weakness in your face, arm, or leg, especially on only one side of your body. ? Sudden vision changes. ? Sudden trouble speaking. ? Sudden confusion or trouble understanding simple statements. ? Sudden problems with walking or balance. ? A sudden, severe headache that is different from past headaches.    Call your doctor now or seek immediate medical care if:    · You have new or worse nausea and vomiting.     · You have a new or higher fever.     · Your headache gets much worse.    Watch closely for changes in your health, and be sure to contact your doctor if:    · You are not getting better after 2 days (48 hours). Where can you learn more? Go to http://norma-kaley.info/. Enter Y522 in the search box to learn more about \"Migraine Headache: Care Instructions. \"  Current as of: Stephanie 3, 2018  Content Version: 11.9  © 1061-1171 [employer] Care instructions adapted under license by [employer] (which disclaims liability or warranty for this information). If you have questions about a medical condition or this instruction, always ask your healthcare professional. [address] any warranty or liability for your use of this information. Belly Pain in Pregnancy: Care Instructions  Your Care Instructions  When you're pregnant, any belly pain can be a worry. You may not want to call your doctor about every pain you have. But you don't want to miss something that is dangerous for you or your baby. Even if it feels familiar, belly pain can mean something new when you're pregnant. It's important to know when to call your doctor.  It will also help to know how to care for yourself at home when your pain is not caused by anything harmful. · When belly pain is more severe or constant, see a doctor right away. · If you're sure your belly pain is a sign of labor, call your doctor. · When belly pain is brief, it's usually a normal part of pregnancy. It might be related to changes in the growing uterus. Or it could be the stretching of ligaments called round ligaments. These ligaments help support the uterus. Round ligament pain can be on either side of your belly. It can also be felt in your hips or groin. Follow-up care is a key part of your treatment and safety. Be sure to make and go to all appointments, and call your doctor if you are having problems. It's also a good idea to know your test results and keep a list of the medicines you take. How can you tell if belly pain is a sign of labor? When belly pain is caused by labor, it can feel like mild or menstrual-like cramps in your lower belly. These cramps are probably contractions. They can happen in your second or third trimester. You may also have:  · A steady, dull ache in your lower back, pelvis, or thighs. · A feeling of pressure in your pelvis or lower belly. · Changes in your vaginal discharge or a sudden release of fluid from the vagina. If you think you are in labor, call your doctor. How can you care for yourself at home? When belly pain is mild and is not a symptom of labor:  · Rest until you feel better. · Take a warm bath. · Think about what you drink and eat:  ¨ Drink plenty of fluids. Choose water and other caffeine-free clear liquids until you feel better. ¨ Try eating small, frequent meals. If your stomach is upset, try bland, low-fat foods like plain rice, broiled chicken, toast, and yogurt. · Think about how you move if you are having brief pains from stretching of the round ligaments. ¨ Try gentle stretching.   ¨ Move a little more slowly when turning in bed or getting up from a chair, so those ligaments don't stretch quickly. ¨ Lean forward a bit if you think you are going to cough or sneeze. When should you call for help? Call 911 anytime you think you may need emergency care. For example, call if:  · You have sudden, severe pain in your belly. · You have severe vaginal bleeding. Call your doctor now or seek immediate medical care if:  · You have new or worse belly pain or cramping. · You have any vaginal bleeding. · You have a fever. · You have symptoms of preeclampsia, such as:  ¨ Sudden swelling of your face, hands, or feet. ¨ New vision problems (such as dimness or blurring). ¨ A severe headache. · You think that you may be in labor. This means that you've had at least 8 contractions within 1 hour or at least 4 contractions within 20 minutes, even after you change your position and drink fluids. · You have symptoms of a urinary tract infection. These may include:  ¨ Pain or burning when you urinate. ¨ A frequent need to urinate without being able to pass much urine. ¨ Pain in the flank, which is just below the rib cage and above the waist on either side of the back. ¨ Blood in your urine. Watch closely for changes in your health, and be sure to contact your doctor if you are worried about your or your baby's health. Where can you learn more? Go to [employer]  Enter B275 in the search box to learn more about \"Belly Pain in Pregnancy: Care Instructions. \"   © 0395-8458 [employer]. Care instructions adapted under license by [facility] (which disclaims liability or warranty for this information). This care instruction is for use with your licensed healthcare professional. If you have questions about a medical condition or this instruction, always ask your healthcare professional. [place] [address] any warranty or liability for your use of this information.   Content Version: 63.1.133954; Current as of: November 12, 2015      Patient Education        Migraine Headache: Care Instructions  Your Care Instructions  Migraines are painful, throbbing headaches that often start on one side of the head. They may cause nausea and vomiting and make you sensitive to light, sound, or smell. Without treatment, migraines can last from 4 hours to a few days. Medicines can help prevent migraines or stop them after they have started. Your doctor can help you find which ones work best for you. Follow-up care is a key part of your treatment and safety. Be sure to make and go to all appointments, and call your doctor if you are having problems. It's also a good idea to know your test results and keep a list of the medicines you take. How can you care for yourself at home? · Do not drive if you have taken a prescription pain medicine. · Rest in a quiet, dark room until your headache is gone. Close your eyes, and try to relax or go to sleep. Don't watch TV or read. · Put a cold, moist cloth or cold pack on the painful area for 10 to 20 minutes at a time. Put a thin cloth between the cold pack and your skin. · Use a warm, moist towel or a heating pad set on low to relax tight shoulder and neck muscles. · Have someone gently massage your neck and shoulders. · Take your medicines exactly as prescribed. Call your doctor if you think you are having a problem with your medicine. You will get more details on the specific medicines your doctor prescribes. · Be careful not to take pain medicine more often than the instructions allow. You could get worse or more frequent headaches when the medicine wears off. To prevent migraines  · Keep a headache diary so you can figure out what triggers your headaches. Avoiding triggers may help you prevent headaches. Record when each headache began, how long it lasted, and what the pain was like.  (Was it throbbing, aching, stabbing, or dull?) Write down any other symptoms you had with the headache, such as nausea, flashing lights or dark spots, or sensitivity to bright light or loud noise. Note if the headache occurred near your period. List anything that might have triggered the headache. Triggers may include certain foods (chocolate, cheese, wine) or odors, smoke, bright light, stress, or lack of sleep. · If your doctor has prescribed medicine for your migraines, take it as directed. You may have medicine that you take only when you get a migraine and medicine that you take all the time to help prevent migraines. ? If your doctor has prescribed medicine for when you get a headache, take it at the first sign of a migraine, unless your doctor has given you other instructions. ? If your doctor has prescribed medicine to prevent migraines, take it exactly as prescribed. Call your doctor if you think you are having a problem with your medicine. · Find healthy ways to deal with stress. Migraines are most common during or right after stressful times. Take time to relax before and after you do something that has caused a migraine in the past.  · Try to keep your muscles relaxed by keeping good posture. Check your jaw, face, neck, and shoulder muscles for tension. Try to relax them. When you sit at a desk, change positions often. And make sure to stretch for 30 seconds each hour. · Get plenty of sleep and exercise. · Eat meals on a regular schedule. Avoid foods and drinks that often trigger migraines. These include chocolate, alcohol (especially red wine and port), aspartame, monosodium glutamate (MSG), and some additives found in foods (such as hot dogs, ching, cold cuts, aged cheeses, and pickled foods). · Limit caffeine. Don't drink too much coffee, tea, or soda. But don't quit caffeine suddenly. That can also give you migraines. · Do not smoke or allow others to smoke around you. If you need help quitting, talk to your doctor about stop-smoking programs and medicines.  These can increase your chances of quitting for good. · If you are taking birth control pills or hormone therapy, talk to your doctor about whether they are triggering your migraines. When should you call for help? Call 911 anytime you think you may need emergency care. For example, call if:    · You have signs of a stroke. These may include:  ? Sudden numbness, paralysis, or weakness in your face, arm, or leg, especially on only one side of your body. ? Sudden vision changes. ? Sudden trouble speaking. ? Sudden confusion or trouble understanding simple statements. ? Sudden problems with walking or balance. ? A sudden, severe headache that is different from past headaches.    Call your doctor now or seek immediate medical care if:    · You have new or worse nausea and vomiting.     · You have a new or higher fever.     · Your headache gets much worse.    Watch closely for changes in your health, and be sure to contact your doctor if:    · You are not getting better after 2 days (48 hours). Where can you learn more? Go to http://norma-kaley.info/. Enter B575 in the search box to learn more about \"Migraine Headache: Care Instructions. \"  Current as of: Stephanie 3, 2018  Content Version: 11.9  © 5579-3122 [employer]. Care instructions adapted under license by [employer] (which disclaims liability or warranty for this information). If you have questions about a medical condition or this instruction, always ask your healthcare professional. [place] [address] any warranty or liability for your use of this information.

## 2019-05-31 NOTE — ED PROVIDER NOTES
EMERGENCY DEPARTMENT HISTORY AND PHYSICAL EXAM    Date: 2019  Patient Name: Lyssa Crespo    History of Presenting Illness     Chief Complaint   Patient presents with    Migraine    Blurred Vision    Pregnancy Problem         History Provided By: Patient    Additional History (Context):   9:33 PM  Lyssa Crespo is a 35 y.o. female with PMHX of high risk pregancy and who is currently pregnant but does not know how far along who presents to the emergency department with C/O headache onset 2 days ago. The pt describes her HA as a throbbing pain located behind the right eye. She states that she has been feeling lightheaded and complains of sometimes having blurry vision. Yesterday she was on the stairs when her vision suddenly darkened and she almost fell. All psych and migraine meds stopped due to pregnancy, but no immediate appt with OB or MFM is available yet. The pt also reports that she is pregnant but does not know how far along. LNMP was 2019. She is  A4. 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 She complaints of abdominal pain and vaginal bleeding for the last 3 days. No spotting today. She says she has been having twisting pain in her abdomen. She also mentions that she has been coughing up sputum mixed with blood. The pt blood type is B+. She has a hx of seizure disorder and has been off of her seizures medications since noticing that she missed her period. She has not had any seizures. She does not have a history of HTN but does have a hx of hypoglycemia. The patient presents no further medical complaints or concerns to the ED at this time. PCP: None    Current Outpatient Medications   Medication Sig Dispense Refill    promethazine (PHENERGAN) 25 mg tablet Take 1 Tab by mouth every six (6) hours as needed for Nausea. Caution: This medication may make you drowsy. Avoid driving while under the influence of this medication.  25 Tab 0    albuterol (VENTOLIN HFA) 90 mcg/actuation inhaler INHALE 1 PUFF EVERY FOUR (4) HOURS AS NEEDED FOR WHEEZING OR SHORTNESS OF BREATH. 1 Inhaler 3    hydrOXYzine pamoate (VISTARIL) 50 mg capsule TAKE ONE CAPSULE BY MOUTH AT BEDTIME 30 Cap 2    ibuprofen (MOTRIN) 800 mg tablet TAKE 1 TABLET BY MOUTH EVERY 8 HOURS AS NEEDED FOR PAIN 30 Tab 0    dextromethorphan-guaiFENesin (ROBITUSSIN-DM)  mg/5 mL syrup Take 10 mL by mouth every six (6) hours as needed for Cough. 240 mL 0    camphor-eucalyptus oil-menthol (VICKS VAPORUB) 4.7-1.2-2.6 % oint 1 Actuation(s) by Apply Externally route three (3) times daily as needed. 50 g 0    fluticasone (FLONASE) 50 mcg/actuation nasal spray 2 Sprays by Both Nostrils route daily. 1 Bottle 0    ENSURE PLUS 0.05-1.5 gram-kcal/mL liqd TAKE 2 BOTTLES BY MOUTH 3 TIMES A DAY [id number] mL 2    multivitamin-min-iron-FA-vit K 18 mg iron-400 mcg-25 mcg tab Take 1 Tab by mouth daily. 90 Tab 3    prenatal multivit-ca-min-fe-fa (PRENATAL VITAMIN) tab TAKE 1 TAB BY MOUTH DAILY. 30 Tab 5    Omeprazole delayed release (PRILOSEC D/R) 20 mg tablet Take 1 Tab by mouth daily.  80 Tab 3       Past History     Past Medical History:  Past Medical History:   Diagnosis Date    Abnormal Papanicolaou smear of cervix     Anxiety     ANXIETY    Anxiety and depression     Asthma     Bradycardia, sinus     Degenerative disc disease, lumbar     Edema of foot 5/15/2018    Gastroesophageal reflux disease without esophagitis 5/15/2018    GERD (gastroesophageal reflux disease)     HGSIL (high grade squamous intraepithelial lesion) on Pap smear of cervix 3/22/2018    Migraine     Migraines     Miscarriage     Raynaud's disease     S/P LEEP (status post loop electrosurgical excision procedure) 2018    Seizures ([address]     Last 2018    Tobacco abuse, in remission        Past Surgical History:  Past Surgical History:   Procedure Laterality Date    HX APPENDECTOMY      HX  SECTION      HX ENDOSCOPY      HX GYN      cervical cerclage; twin pregnancy with loss of first baby, subsequently placed cerclage, 2nd baby delivered term    HX LEEP PROCEDURE  2018    Dr. Hernández Lav [address] [facility]  2017    Mendel L4-5, L5-S1 Facet Joint block       Family History:  Family History   Problem Relation Age of Onset    Diabetes Mother     Heart Attack Father     Attention Deficit Hyperactivity Disorder Sister     Attention Deficit Hyperactivity Disorder Brother     Cancer Maternal Aunt 45        breast    Diabetes Maternal Grandmother     Attention Deficit Hyperactivity Disorder Brother     No Known Problems Brother     No Known Problems Brother     No Known Problems Brother     Cancer Maternal Aunt         lung    Heart defect Son    Marnie Aura Migraines Son     Seizures Son         h/o    Other Son         h/o jaundice       Social History:  Social History     Tobacco Use    Smoking status: Former Smoker     Packs/day: 1.00     Years: 13.00     Pack years: 13.00     Types: Cigarettes     Last attempt to quit: 2015     Years since quittin.0    Smokeless tobacco: Never Used    Tobacco comment: cigarello, once a month   Substance Use Topics    Alcohol use: No    Drug use: No       Allergies: Allergies   Allergen Reactions    Imitrex [Sumatriptan Succinate] Anaphylaxis    Imitrex [Sumatriptan] Anaphylaxis    Iodine Cough     Coughing up blood      Sea Food [Seafood] Hives and Unknown (comments)     Per pt report          Review of Systems   Review of Systems   Constitutional: Negative for chills, diaphoresis, fever and unexpected weight change. HENT: Negative for congestion, drooling, ear pain, rhinorrhea, sore throat, tinnitus and trouble swallowing. Eyes: Negative for photophobia, pain, redness and visual disturbance. Positive for blurry vision. Respiratory: Negative for cough, choking, chest tightness, shortness of breath, wheezing and stridor.          Positive for hemoptysis. Cardiovascular: Negative for chest pain, palpitations and leg swelling. Gastrointestinal: Positive for abdominal pain, diarrhea and nausea. Negative for abdominal distention, anal bleeding, blood in stool, constipation and vomiting. Endocrine: Negative for cold intolerance, heat intolerance, polydipsia and polyuria. Genitourinary: Positive for vaginal bleeding (spotting). Negative for difficulty urinating, dysuria, flank pain, frequency, hematuria and urgency. Musculoskeletal: Negative for arthralgias, back pain and neck pain. Skin: Negative for color change, rash and wound. Allergic/Immunologic: Negative for immunocompromised state. Neurological: Positive for light-headedness and headaches. Negative for dizziness, seizures, syncope and speech difficulty. Hematological: Does not bruise/bleed easily. Psychiatric/Behavioral: Negative for agitation, behavioral problems, hallucinations, self-injury and suicidal ideas. The patient is not hyperactive. Physical Exam     Vitals:    05/30/19 2145 05/30/19 2200 05/30/19 2300 05/30/19 2315   BP: 115/67 101/53 121/61 105/43   Pulse: 70 63 92 67   Resp: 12 14 19 18   Temp:       SpO2: 100% 100% 99% 99%   Weight:       Height:         Physical Exam   Constitutional: She is oriented to person, place, and time. She appears well-developed and well-nourished. No distress. HENT:   Head: Normocephalic and atraumatic. Right Ear: External ear normal.   Left Ear: External ear normal.   Mouth/Throat: Oropharynx is clear and moist. No oropharyngeal exudate. Eyes: Pupils are equal, round, and reactive to light. Conjunctivae and EOM are normal. No scleral icterus. No pallor   Neck: Normal range of motion. Neck supple. No JVD present. No tracheal deviation present. No thyromegaly present. Cardiovascular: Normal rate, regular rhythm and normal heart sounds. Pulmonary/Chest: Effort normal and breath sounds normal. No stridor.  No respiratory distress. Abdominal: Soft. Bowel sounds are normal. She exhibits no distension. There is no tenderness. There is no rebound and no guarding. Musculoskeletal: Normal range of motion. She exhibits no edema or tenderness. No soft tissue injuries   Lymphadenopathy:     She has no cervical adenopathy. Neurological: She is alert and oriented to person, place, and time. She has normal reflexes. No cranial nerve deficit. Coordination normal.   Skin: Skin is warm and dry. No rash noted. She is not diaphoretic. No erythema. Psychiatric: She has a normal mood and affect. Her behavior is normal. Judgment and thought content normal.   Nursing note and vitals reviewed. Diagnostic Study Results     Labs -     Recent Results (from the past 12 hour(s))   CBC WITH AUTOMATED DIFF    Collection Time: 05/30/19  7:00 PM   Result Value Ref Range    WBC 8.6 4.6 - 13.2 K/uL    RBC 4.10 (L) 4.20 - 5.30 M/uL    HGB 12.2 12.0 - 16.0 g/dL    HCT 36.3 35.0 - 45.0 %    MCV 88.5 74.0 - 97.0 FL    MCH 29.8 24.0 - 34.0 PG    MCHC 33.6 31.0 - 37.0 g/dL    RDW 12.3 11.6 - 14.5 %    PLATELET 131 566 - 810 K/uL    MPV 10.9 9.2 - 11.8 FL    NEUTROPHILS 70 40 - 73 %    LYMPHOCYTES 25 21 - 52 %    MONOCYTES 5 3 - 10 %    EOSINOPHILS 0 0 - 5 %    BASOPHILS 0 0 - 2 %    ABS. NEUTROPHILS 6.0 1.8 - 8.0 K/UL    ABS. LYMPHOCYTES 2.1 0.9 - 3.6 K/UL    ABS. MONOCYTES 0.4 0.05 - 1.2 K/UL    ABS. EOSINOPHILS 0.0 0.0 - 0.4 K/UL    ABS.  BASOPHILS 0.0 0.0 - 0.1 K/UL    DF AUTOMATED     METABOLIC PANEL, BASIC    Collection Time: 05/30/19  7:00 PM   Result Value Ref Range    Sodium 141 136 - 145 mmol/L    Potassium 3.9 3.5 - 5.5 mmol/L    Chloride 105 100 - 108 mmol/L    CO2 24 21 - 32 mmol/L    Anion gap 12 3.0 - 18 mmol/L    Glucose 94 74 - 99 mg/dL    BUN 11 7.0 - 18 MG/DL    Creatinine 0.68 0.6 - 1.3 MG/DL    BUN/Creatinine ratio 16 12 - 20      GFR est AA >60 >60 ml/min/1.73m2    GFR est non-AA >60 >60 ml/min/1.73m2    Calcium 9.3 8.5 - 10.1 MG/DL URINALYSIS W/ RFLX MICROSCOPIC    Collection Time: 05/30/19  7:00 PM   Result Value Ref Range    Color YELLOW      Appearance CLEAR      Specific gravity >1.030 (H) 1.005 - 1.030    pH (UA) 5.5 5.0 - 8.0      Protein NEGATIVE  NEG mg/dL    Glucose NEGATIVE  NEG mg/dL    Ketone NEGATIVE  NEG mg/dL    Bilirubin NEGATIVE  NEG      Blood TRACE (A) NEG      Urobilinogen 1.0 0.2 - 1.0 EU/dL    Nitrites NEGATIVE  NEG      Leukocyte Esterase NEGATIVE  NEG     HCG URINE, QL    Collection Time: 05/30/19  7:00 PM   Result Value Ref Range    HCG urine, QL POSITIVE (A) NEG     URINE MICROSCOPIC ONLY    Collection Time: 05/30/19  7:00 PM   Result Value Ref Range    WBC 1 to 2 0 - 4 /hpf    RBC 0 0 - 5 /hpf    Epithelial cells 1+ 0 - 5 /lpf    Bacteria FEW (A) NEG /hpf    Mucus 2+ (A) NEG /lpf       Radiologic Studies -   US UTS TRANSVAGINAL OB    (Results Pending)       Radiologic Studies -   No orders to display     CT Results  (Last 48 hours)    None        CXR Results  (Last 48 hours)    None          Medications given in the ED-  Medications   sodium chloride 0.9 % bolus infusion 1,000 mL (0 mL IntraVENous IV Completed 5/30/19 2334)   promethazine (PHENERGAN) tablet 25 mg (25 mg Oral Given 5/30/19 2121)   prochlorperazine (COMPAZINE) injection 10 mg (10 mg IntraVENous Given 5/30/19 2213)   diphenhydrAMINE (BENADRYL) 12.5 mg/5 mL oral elixir 25 mg (25 mg Oral Given 5/30/19 2213)         Medical Decision Making   I am the first provider for this patient. I reviewed the vital signs, available nursing notes, past medical history, past surgical history, family history and social history. Vital Signs-Reviewed the patient's vital signs. Pulse Oximetry Analysis - 99% on RA     Cardiac Monitor:  Rate: 85 bpm  Rhythm: NSR    Records Reviewed: NURSING NOTES AND PREVIOUS MEDICAL RECORDS    Provider Notes (Medical Decision Making): This is very likely migraine headache only.  Her medication list is suitibly truncated due to pregnancy. Will consult with M to assist with follow up and pt medication regiment. HA unlikely to be sinus tumor, ICH, or CST. 10:05 PM  TVUS and pelvic exam ordered due to sxs. Prior to completion of these she had a phone call from the  stating that she had a \"sick child\" at home, she immediately requested to be discharged. Procedures:  Procedures    ED Course:   9:33 PM: Initial assessment performed. The patients presenting problems have been discussed, and they are in agreement with the care plan formulated and outlined with them. I have encouraged them to ask questions as they arise throughout their visit. Diagnosis and Disposition       DISCHARGE NOTE:  10:00 PM  Osei Pierre's  results have been reviewed with her. She has been counseled regarding her diagnosis, treatment, and plan. She verbally conveys understanding and agreement of the signs, symptoms, diagnosis, treatment and prognosis and additionally agrees to follow up as discussed. She also agrees with the care-plan and conveys that all of her questions have been answered. I have also provided discharge instructions for her that include: educational information regarding their diagnosis and treatment, and list of reasons why they would want to return to the ED prior to their follow-up appointment, should her condition change. She has been provided with education for proper emergency department utilization. CLINICAL IMPRESSION:    1. Migraine with aura and without status migrainosus, not intractable    2. Abdominal cramping affecting pregnancy        PLAN:  1. D/C Home  2. Discharge Medication List as of 5/30/2019 11:19 PM      CONTINUE these medications which have CHANGED    Details   promethazine (PHENERGAN) 25 mg tablet Take 1 Tab by mouth every six (6) hours as needed for Nausea. Caution: This medication may make you drowsy. Avoid driving while under the influence of this medication. , Print, Disp-25 Tab, R-0 CONTINUE these medications which have NOT CHANGED    Details   albuterol (VENTOLIN HFA) 90 mcg/actuation inhaler INHALE 1 PUFF EVERY FOUR (4) HOURS AS NEEDED FOR WHEEZING OR SHORTNESS OF BREATH., PrintPlease dispense ventolin - not pro air. Patient states pro air does not work for her. Disp-1 Inhaler, R-3      hydrOXYzine pamoate (VISTARIL) 50 mg capsule TAKE ONE CAPSULE BY MOUTH AT BEDTIME, Normal, Disp-30 Cap, R-2      ibuprofen (MOTRIN) 800 mg tablet TAKE 1 TABLET BY MOUTH EVERY 8 HOURS AS NEEDED FOR PAIN, Normal, Disp-30 Tab, R-0      dextromethorphan-guaiFENesin (ROBITUSSIN-DM)  mg/5 mL syrup Take 10 mL by mouth every six (6) hours as needed for Cough. , Print, Disp-240 mL, R-0      camphor-eucalyptus oil-menthol (VICKS VAPORUB) 4.7-1.2-2.6 % oint 1 Actuation(s) by Apply Externally route three (3) times daily as needed. , Print, Disp-50 g, R-0      fluticasone (FLONASE) 50 mcg/actuation nasal spray 2 Sprays by Both Nostrils route daily. , Print, Disp-1 Bottle, R-0      ENSURE PLUS 0.05-1.5 gram-kcal/mL liqd TAKE 2 BOTTLES BY MOUTH 3 TIMES A DAY, Normal, Disp-272201 mL, R-2      multivitamin-min-iron-FA-vit K 18 mg iron-400 mcg-25 mcg tab Take 1 Tab by mouth daily. , Normal, Disp-90 Tab, R-3      prenatal multivit-ca-min-fe-fa (PRENATAL VITAMIN) tab TAKE 1 TAB BY MOUTH DAILY., Normal, Disp-30 Tab, R-5      Omeprazole delayed release (PRILOSEC D/R) 20 mg tablet Take 1 Tab by mouth daily. , Normal, Disp-90 Tab, R-3         STOP taking these medications       busPIRone (BUSPAR) 5 mg tablet Comments:   Reason for Stopping:         PARoxetine (PAXIL) 40 mg tablet Comments:   Reason for Stopping:         cyproheptadine (PERIACTIN) 4 mg tablet Comments:   Reason for Stopping:         divalproex DR (DEPAKOTE) 250 mg tablet Comments:   Reason for Stopping:         norethindrone (MICRONOR) 0.35 mg tab Comments:   Reason for Stopping:             3.   Follow-up Information     Follow up With Specialties Details Why Contact Info    [facility] Obstetrics & Gynecology -Maternal Fetal Medicine  In 1 week  [address] [place] [address] Dr  [address] [facility] EMERGENCY DEPT Emergency Medicine  As needed [address] [facility] [address]  [phone]    [facility] EMERGENCY DEPT Emergency Medicine  As needed [address] [facility] [address]  [phone]        _______________________________    This note was partially transcribed via voice recognition software. Although efforts have been made to catch any discrepancies, it may contain sound alike words, grammatical errors, or nonsensical words. Scribe Attestation     Edgardo Whitman acting as a scribe for and in the presence of Marce Roche MD      May 30, 2019 at 9:34 PM       Provider Attestation:      I personally performed the services described in the documentation, reviewed the documentation, as recorded by the scribe in my presence, and it accurately and completely records my words and actions.  May 30, 2019 at 9:34 PM - Marce Roche MD

## 2019-05-31 NOTE — ED NOTES
I have reviewed discharge instructions with the patient and/or family member. The patient verbalized understanding. Patient armband removed and shredded. VSS. Patient verbalized understanding of how to properly take any prescribed medications.

## 2019-06-05 LAB
C TRACH RRNA SPEC QL NAA+PROBE: NEGATIVE
N GONORRHOEA RRNA SPEC QL NAA+PROBE: NEGATIVE
SPECIMEN SOURCE: NORMAL
T VAGINALIS RRNA VAG QL NAA+PROBE: NEGATIVE

## 2021-08-03 PROBLEM — M47.816 LUMBAR FACET ARTHROPATHY: Status: RESOLVED | Noted: 2017-01-31 | Resolved: 2021-08-03

## 2022-01-28 NOTE — MR AVS SNAPSHOT
Visit Information Date & Time Provider Department Dept. Phone Encounter #  
 7/17/2017 10:45 AM Charo Madison MD [facility] [phone] [id number] Follow-up Instructions Return in about 3 months (around 10/17/2017) for Medication follow up. Routing History Your Appointments 7/28/2017  4:00 PM  
Follow Up with Luz Marina Arriaga MD  
[facility]) Appt Note: 3wk f/u; r/s from 6/14 to this date w/pt. ...ywp; r/s from 7/11 to this date & time w/pt; Dr. Teena Bey on call [place] [address] [address] [facility] [id number] [phone]  
  
   
 [facility] [id number]  
  
    
 9/12/2017 11:00 AM  
ROUTINE CARE with [facility] ([address]) Appt Note: Return in about 2 months (around 9/11/2017) for routine care with me. [address] [place] [phone]  
[phone]  
  
   
 [place] [phone]  
  
    
 10/5/2017 10:45 AM  
Follow Up with Eden Pleitez MD  
[facility] [address]) Appt Note: 3 month fu  CPAP  
 [address] [facility] [address] [facility] [id number] [phone]  
  
   
 [place] [address] Upcoming Health Maintenance Date Due INFLUENZA AGE 9 TO ADULT 8/1/2017 PAP AKA CERVICAL CYTOLOGY 12/3/2018 DTaP/Tdap/Td series (2 - Td) 5/30/2027 Allergies as of 7/17/2017  Review Complete On: 7/17/2017 By: Charo Madison MD  
  
 Severity Noted Reaction Type Reactions Imitrex [Sumatriptan Succinate] High 05/25/2015    Anaphylaxis Iodine  02/16/2017    Cough Coughing up blood Sea Food [Seafood]  06/04/2015    Hives Per pt report Current Immunizations  Never Reviewed No immunizations on file. Not reviewed this visit You Were Diagnosed With   
  
 Codes Comments Lumbar facet arthropathy (HCC)    -  Primary ICD-10-CM: M12.88 ICD-9-CM: 009. 3 Chronic midline low back pain, with sciatica presence unspecified     ICD-10-CM: M54.5, G89.29 ICD-9-CM: 724.2, 338.29 Muscle spasm of back     ICD-10-CM: S06.605 ICD-9-CM: 724.8 Vitals BP Pulse Temp Resp Height(growth percentile) Weight(growth percentile) 107/62 60 98.7 °F (37.1 °C) (Oral) 16 5' 7\" (1.702 m) 106 lb (48.1 kg) LMP BMI OB Status Smoking Status 04/30/2017 16.6 kg/m2 Unknown Former Smoker BMI and BSA Data Body Mass Index Body Surface Area  
 16.6 kg/m 2 1.51 m 2 Preferred Pharmacy Pharmacy Name Phone [id number] [phone] Your Updated Medication List  
  
   
This list is accurate as of: 7/17/17 10:56 AM.  Always use your most recent med list.  
  
  
  
  
 CLARITIN-D 12 HOUR 5-120 mg per tablet Generic drug:  loratadine-pseudoephedrine Take 1 Tab by mouth two (2) times daily as needed. cyproheptadine 4 mg tablet Commonly known as:  PERIACTIN Take 1 Tab by mouth once over twenty-four (24) hours. divalproex  mg tablet Commonly known as:  DEPAKOTE Take 1 Tab by mouth two (2) times a day. Indications: MIGRAINE PREVENTION  
  
 ENSURE PLUS 0.05-1.5 gram-kcal/mL Liqd Generic drug:  food supplemt, lactose-reduced TAKE 237 ML BY MOUTH THREE TIMES DAILY. ergocalciferol 50,000 unit capsule Commonly known as:  ERGOCALCIFEROL Take 1 Cap by mouth every seven (7) days. hydrOXYzine pamoate 50 mg capsule Commonly known as:  VISTARIL Take 1 Cap by mouth nightly. meloxicam 7.5 mg tablet Commonly known as:  MOBIC Take 1 Tab by mouth daily. norethindrone 0.35 mg Tab Commonly known as:  Micha & Micha Take 1 Tab by mouth daily. Omeprazole delayed release 20 mg tablet Commonly known as:  PRILOSEC D/R Take 1 Tab by mouth daily. ondansetron 4 mg disintegrating tablet Commonly known as:  ZOFRAN ODT Take 1 Tab by mouth every eight (8) hours as needed for Nausea. PARoxetine 20 mg tablet Commonly known as:  PAXIL  
1 qhs  
  
 prenatal vitamin 27 mg iron- 800 mcg Tab tablet Take 1 Tab by mouth daily. VENTOLIN HFA 90 mcg/actuation inhaler Generic drug:  albuterol INHELE 1 PUFF EVERY FOUR (4) HOURS AS NEEDED FOR WHEEZING OR SHORTNESS OF BREATH.  
  
 zolpidem 5 mg tablet Commonly known as:  AMBIEN Take 1 Tab by mouth nightly as needed for Sleep. Max Daily Amount: 5 mg. Follow-up Instructions Return in about 3 months (around 10/17/2017) for Medication follow up. Patient Instructions Low Back Arthritis: Exercises Your Care Instructions Here are some examples of typical rehabilitation exercises for your condition. Start each exercise slowly. Ease off the exercise if you start to have pain. Your doctor or physical therapist will tell you when you can start these exercises and which ones will work best for you. When you are not being active, find a comfortable position for rest. Some people are comfortable on the floor or a medium-firm bed with a small pillow under their head and another under their knees. Some people prefer to lie on their side with a pillow between their knees. Don't stay in one position for too long. Take short walks (10 to 20 minutes) every 2 to 3 hours. Avoid slopes, hills, and stairs until you feel better. Walk only distances you can manage without pain, especially leg pain. How to do the exercises Pelvic tilt 1. Lie on your back with your knees bent. 2. \"Brace\" your stomachtighten your muscles by pulling in and imagining your belly button moving toward your spine. 3. Press your lower back into the floor. You should feel your hips and pelvis rock back. 4. Hold for 6 seconds while breathing smoothly. 5. Relax and allow your pelvis and hips to rock forward. 6. Repeat 8 to 12 times. Back stretches 1. Get down on your hands and knees on the floor. 2. Relax your head and allow it to droop. Round your back up toward the ceiling until you feel a nice stretch in your upper, middle, and lower back. Hold this stretch for as long as it feels comfortable, or about 15 to 30 seconds. 3. Return to the starting position with a flat back while you are on your hands and knees. 4. Let your back sway by pressing your stomach toward the floor. Lift your buttocks toward the ceiling. 5. Hold this position for 15 to 30 seconds. 6. Repeat 2 to 4 times. Follow-up care is a key part of your treatment and safety. Be sure to make and go to all appointments, and call your doctor if you are having problems. It's also a good idea to know your test results and keep a list of the medicines you take. Where can you learn more? Go to http://norma-kaley.info/. Enter M778 in the search box to learn more about \"Low Back Arthritis: Exercises. \" Current as of: March 21, 2017 Content Version: 11.3 © 8440-8137 [employer]. Care instructions adapted under license by [employer] (which disclaims liability or warranty for this information). If you have questions about a medical condition or this instruction, always ask your healthcare professional. [place] [address] any warranty or liability for your use of this information. Introducing [facility]! Dear Suzi Willett: 
Thank you for requesting a [employer] account. Our records indicate that you already have an active [employer] account. You can access your account anytime at [email or web address] Did you know that you can access your hospital and ER discharge instructions at any time in [employer]? You can also review all of your test results from your hospital stay or ER visit. Additional Information If you have questions, please visit the Frequently Asked Questions section of the [employer] [facility] [employer] website at [email or web address] Remember, [employer] is NOT to be used for urgent needs. For medical emergencies, dial 911. Now available from your iPhone and Android! Please provide this summary of care documentation to your next provider. Your primary care clinician is listed as Marilynn Swan. If you have any questions after today's visit, please call [phone]. 1 [place]

## (undated) DEVICE — SOLUTION IV 1000ML 0.9% SOD CHL

## (undated) DEVICE — SYR 50ML SLIP TIP NSAF LF STRL --

## (undated) DEVICE — CUFF BLD PRESSURE MONITORING LNG AD 23-33 CM 1 TUBE MY CUF

## (undated) DEVICE — ELECTRODE ARTHSCP W10MM D10MM SHFT 11CM YEL MPLR LOOP W/

## (undated) DEVICE — KENDALL 500 SERIES DIAPHORETIC FOAM MONITORING ELECTRODE - TEAR DROP SHAPE ( 30/PK): Brand: KENDALL

## (undated) DEVICE — SYR 10ML CTRL LR LCK NSAF LF --

## (undated) DEVICE — TOWEL: OR BLU 80/CS: Brand: MEDICAL ACTION INDUSTRIES

## (undated) DEVICE — (D)APPLICATOR COT TIP 6IN STRL -- DISC BY MFR USE ITEM 325856

## (undated) DEVICE — (D)BNDG ADHESIVE FABRIC 3/4X3 -- DISC BY MFR USE ITEM 357960

## (undated) DEVICE — AVANOS* SHORT BEVEL NEEDLE: Brand: AVANOS

## (undated) DEVICE — (D)SYR 10ML 1/5ML GRAD NSAF -- PKGING CHANGE USE ITEM 338027

## (undated) DEVICE — TRAY MYEL SFTY +

## (undated) DEVICE — BALL ELECTRODE: Brand: VALLEYLAB

## (undated) DEVICE — EXCISOR BX SM H0354XL0472IN ES CONE FISCHER

## (undated) DEVICE — REM POLYHESIVE ADULT PATIENT RETURN ELECTRODE: Brand: VALLEYLAB

## (undated) DEVICE — NEEDLE SPNL 22GAX3 1 2IN

## (undated) DEVICE — TUBING SMK EVAC SUCT W/ INTEGR WAND STRL 7/8INX10FT

## (undated) DEVICE — MEDI-VAC NON-CONDUCTIVE SUCTION TUBING: Brand: CARDINAL HEALTH

## (undated) DEVICE — SUTURE VCRL SZ 3-0 L27IN ABSRB UD L26MM SH 1/2 CIR J416H

## (undated) DEVICE — STERILE POLYISOPRENE POWDER-FREE SURGICAL GLOVES: Brand: PROTEXIS

## (undated) DEVICE — BITE BLK ENDOSCP AD 54FR GRN POLYETH ENDOSCP W STRP SLD

## (undated) DEVICE — DRAPE TWL SURG 16X26IN BLU ORB04] ALLCARE INC]

## (undated) DEVICE — MIRAGE SWIFT II PILLOW LGE: Brand: MIRAGE SWIFT II

## (undated) DEVICE — KENDALL SCD EXPRESS SLEEVES, KNEE LENGTH, MEDIUM: Brand: KENDALL SCD

## (undated) DEVICE — MEDIA CONTRAST 10ML 200MG/ML 41%

## (undated) DEVICE — CUSTOM PROCTO SWABS: Brand: DEROYAL

## (undated) DEVICE — TRAY SUPP STD NO DRUG W EXTENSION SET

## (undated) DEVICE — TUNGSTEN LOOP ELECTRODE: Brand: VALLEYLAB

## (undated) DEVICE — TELFA NON-ADHERENT PADS PREPAK: Brand: TELFA

## (undated) DEVICE — TRAY PREP DRY W/ PREM GLV 2 APPL 6 SPNG 2 UNDPD 1 OVERWRAP

## (undated) DEVICE — X-RAY SPONGES,12 PLY: Brand: DERMACEA

## (undated) DEVICE — BASIN EMESIS 500CC ROSE 250/CS 60/PLT: Brand: MEDEGEN MEDICAL PRODUCTS, LLC

## (undated) DEVICE — Device

## (undated) DEVICE — KIT COLON W/ 1.1OZ LUB AND 2 END

## (undated) DEVICE — FLEX ADVANTAGE 1500CC: Brand: FLEX ADVANTAGE